# Patient Record
Sex: FEMALE | Race: WHITE | NOT HISPANIC OR LATINO | Employment: OTHER | URBAN - METROPOLITAN AREA
[De-identification: names, ages, dates, MRNs, and addresses within clinical notes are randomized per-mention and may not be internally consistent; named-entity substitution may affect disease eponyms.]

---

## 2023-04-03 DIAGNOSIS — M85.80 OSTEOPENIA, UNSPECIFIED LOCATION: ICD-10-CM

## 2023-04-03 RX ORDER — ALENDRONATE SODIUM 70 MG/1
70 TABLET ORAL
Qty: 12 TABLET | Refills: 3 | Status: SHIPPED | OUTPATIENT
Start: 2023-04-03 | End: 2024-04-16 | Stop reason: SDUPTHER

## 2023-04-03 RX ORDER — ALENDRONATE SODIUM 70 MG/1
70 TABLET ORAL
COMMUNITY
Start: 2020-07-31 | End: 2023-04-03 | Stop reason: SDUPTHER

## 2023-04-10 LAB
ALBUMIN (G/DL) IN SER/PLAS: 4.7 G/DL (ref 3.4–5)
ANION GAP IN SER/PLAS: 14 MMOL/L (ref 10–20)
APPEARANCE, URINE: ABNORMAL
BACTERIA, URINE: ABNORMAL /HPF
BILIRUBIN, URINE: NEGATIVE
BLOOD, URINE: ABNORMAL
CALCIUM (MG/DL) IN SER/PLAS: 9.8 MG/DL (ref 8.6–10.6)
CARBON DIOXIDE, TOTAL (MMOL/L) IN SER/PLAS: 30 MMOL/L (ref 21–32)
CHLORIDE (MMOL/L) IN SER/PLAS: 104 MMOL/L (ref 98–107)
COLOR, URINE: YELLOW
CREATININE (MG/DL) IN SER/PLAS: 0.81 MG/DL (ref 0.5–1.05)
ERYTHROCYTE DISTRIBUTION WIDTH (RATIO) BY AUTOMATED COUNT: 14.6 % (ref 11.5–14.5)
ERYTHROCYTE MEAN CORPUSCULAR HEMOGLOBIN CONCENTRATION (G/DL) BY AUTOMATED: 31.4 G/DL (ref 32–36)
ERYTHROCYTE MEAN CORPUSCULAR VOLUME (FL) BY AUTOMATED COUNT: 92 FL (ref 80–100)
ERYTHROCYTES (10*6/UL) IN BLOOD BY AUTOMATED COUNT: 4.27 X10E12/L (ref 4–5.2)
GFR FEMALE: 74 ML/MIN/1.73M2
GLUCOSE (MG/DL) IN SER/PLAS: 117 MG/DL (ref 74–99)
GLUCOSE, URINE: NEGATIVE MG/DL
HEMATOCRIT (%) IN BLOOD BY AUTOMATED COUNT: 39.2 % (ref 36–46)
HEMOGLOBIN (G/DL) IN BLOOD: 12.3 G/DL (ref 12–16)
HYALINE CASTS, URINE: ABNORMAL /LPF
KETONES, URINE: NEGATIVE MG/DL
LEUKOCYTE ESTERASE, URINE: ABNORMAL
LEUKOCYTES (10*3/UL) IN BLOOD BY AUTOMATED COUNT: 7.6 X10E9/L (ref 4.4–11.3)
NITRITE, URINE: NEGATIVE
NRBC (PER 100 WBCS) BY AUTOMATED COUNT: 0 /100 WBC (ref 0–0)
PH, URINE: 6 (ref 5–8)
PHOSPHATE (MG/DL) IN SER/PLAS: 4 MG/DL (ref 2.5–4.9)
PLATELETS (10*3/UL) IN BLOOD AUTOMATED COUNT: 173 X10E9/L (ref 150–450)
POTASSIUM (MMOL/L) IN SER/PLAS: 4.5 MMOL/L (ref 3.5–5.3)
PROTEIN, URINE: NEGATIVE MG/DL
RBC, URINE: 1 /HPF (ref 0–5)
SODIUM (MMOL/L) IN SER/PLAS: 143 MMOL/L (ref 136–145)
SPECIFIC GRAVITY, URINE: 1.01 (ref 1–1.03)
SQUAMOUS EPITHELIAL CELLS, URINE: 2 /HPF
UREA NITROGEN (MG/DL) IN SER/PLAS: 21 MG/DL (ref 6–23)
UROBILINOGEN, URINE: <2 MG/DL (ref 0–1.9)
WBC CLUMPS, URINE: ABNORMAL /HPF
WBC, URINE: 69 /HPF (ref 0–5)

## 2023-04-12 LAB — URINE CULTURE: ABNORMAL

## 2023-04-17 LAB
ALANINE AMINOTRANSFERASE (SGPT) (U/L) IN SER/PLAS: 12 U/L (ref 7–45)
ALBUMIN (G/DL) IN SER/PLAS: 4.7 G/DL (ref 3.4–5)
ALKALINE PHOSPHATASE (U/L) IN SER/PLAS: 73 U/L (ref 33–136)
ANION GAP IN SER/PLAS: 12 MMOL/L (ref 10–20)
ASPARTATE AMINOTRANSFERASE (SGOT) (U/L) IN SER/PLAS: 15 U/L (ref 9–39)
BILIRUBIN TOTAL (MG/DL) IN SER/PLAS: 0.3 MG/DL (ref 0–1.2)
CALCIUM (MG/DL) IN SER/PLAS: 9.9 MG/DL (ref 8.6–10.6)
CARBON DIOXIDE, TOTAL (MMOL/L) IN SER/PLAS: 30 MMOL/L (ref 21–32)
CHLORIDE (MMOL/L) IN SER/PLAS: 106 MMOL/L (ref 98–107)
CREATININE (MG/DL) IN SER/PLAS: 0.73 MG/DL (ref 0.5–1.05)
ERYTHROCYTE DISTRIBUTION WIDTH (RATIO) BY AUTOMATED COUNT: 14.5 % (ref 11.5–14.5)
ERYTHROCYTE MEAN CORPUSCULAR HEMOGLOBIN CONCENTRATION (G/DL) BY AUTOMATED: 30.3 G/DL (ref 32–36)
ERYTHROCYTE MEAN CORPUSCULAR VOLUME (FL) BY AUTOMATED COUNT: 92 FL (ref 80–100)
ERYTHROCYTES (10*6/UL) IN BLOOD BY AUTOMATED COUNT: 4.37 X10E12/L (ref 4–5.2)
GFR FEMALE: 84 ML/MIN/1.73M2
GLUCOSE (MG/DL) IN SER/PLAS: 68 MG/DL (ref 74–99)
HEMATOCRIT (%) IN BLOOD BY AUTOMATED COUNT: 40.2 % (ref 36–46)
HEMOGLOBIN (G/DL) IN BLOOD: 12.2 G/DL (ref 12–16)
LEUKOCYTES (10*3/UL) IN BLOOD BY AUTOMATED COUNT: 7.3 X10E9/L (ref 4.4–11.3)
NRBC (PER 100 WBCS) BY AUTOMATED COUNT: 0 /100 WBC (ref 0–0)
PLATELETS (10*3/UL) IN BLOOD AUTOMATED COUNT: 177 X10E9/L (ref 150–450)
POTASSIUM (MMOL/L) IN SER/PLAS: 4.3 MMOL/L (ref 3.5–5.3)
PROTEIN TOTAL: 7.2 G/DL (ref 6.4–8.2)
SODIUM (MMOL/L) IN SER/PLAS: 144 MMOL/L (ref 136–145)
UREA NITROGEN (MG/DL) IN SER/PLAS: 24 MG/DL (ref 6–23)

## 2023-04-18 LAB
APPEARANCE, URINE: CLEAR
BILIRUBIN, URINE: NEGATIVE
BLOOD, URINE: ABNORMAL
COLOR, URINE: YELLOW
GLUCOSE, URINE: NEGATIVE MG/DL
KETONES, URINE: NEGATIVE MG/DL
LEUKOCYTE ESTERASE, URINE: ABNORMAL
MUCUS, URINE: NORMAL /LPF
NITRITE, URINE: NEGATIVE
PH, URINE: 6 (ref 5–8)
PROTEIN, URINE: NEGATIVE MG/DL
RBC, URINE: <1 /HPF (ref 0–5)
SPECIFIC GRAVITY, URINE: 1.01 (ref 1–1.03)
SQUAMOUS EPITHELIAL CELLS, URINE: 1 /HPF
UROBILINOGEN, URINE: <2 MG/DL (ref 0–1.9)
WBC, URINE: NORMAL /HPF (ref 0–5)

## 2023-04-19 LAB — URINE CULTURE: NORMAL

## 2023-04-21 LAB
ALBUMIN (G/DL) IN SER/PLAS: 4.6 G/DL (ref 3.4–5)
ANION GAP IN SER/PLAS: 9 MMOL/L (ref 10–20)
APPEARANCE, URINE: CLEAR
BILIRUBIN, URINE: NEGATIVE
BLOOD, URINE: ABNORMAL
CALCIUM (MG/DL) IN SER/PLAS: 9.7 MG/DL (ref 8.6–10.6)
CARBON DIOXIDE, TOTAL (MMOL/L) IN SER/PLAS: 33 MMOL/L (ref 21–32)
CHLORIDE (MMOL/L) IN SER/PLAS: 106 MMOL/L (ref 98–107)
COLOR, URINE: YELLOW
CREATININE (MG/DL) IN SER/PLAS: 0.84 MG/DL (ref 0.5–1.05)
ERYTHROCYTE DISTRIBUTION WIDTH (RATIO) BY AUTOMATED COUNT: 14.5 % (ref 11.5–14.5)
ERYTHROCYTE MEAN CORPUSCULAR HEMOGLOBIN CONCENTRATION (G/DL) BY AUTOMATED: 31 G/DL (ref 32–36)
ERYTHROCYTE MEAN CORPUSCULAR VOLUME (FL) BY AUTOMATED COUNT: 92 FL (ref 80–100)
ERYTHROCYTES (10*6/UL) IN BLOOD BY AUTOMATED COUNT: 4.12 X10E12/L (ref 4–5.2)
GFR FEMALE: 71 ML/MIN/1.73M2
GLUCOSE (MG/DL) IN SER/PLAS: 105 MG/DL (ref 74–99)
GLUCOSE, URINE: NEGATIVE MG/DL
HEMATOCRIT (%) IN BLOOD BY AUTOMATED COUNT: 38.1 % (ref 36–46)
HEMOGLOBIN (G/DL) IN BLOOD: 11.8 G/DL (ref 12–16)
HYALINE CASTS, URINE: ABNORMAL /LPF
KETONES, URINE: NEGATIVE MG/DL
LEUKOCYTE ESTERASE, URINE: ABNORMAL
LEUKOCYTES (10*3/UL) IN BLOOD BY AUTOMATED COUNT: 6.9 X10E9/L (ref 4.4–11.3)
MUCUS, URINE: ABNORMAL /LPF
NITRITE, URINE: NEGATIVE
NRBC (PER 100 WBCS) BY AUTOMATED COUNT: 0 /100 WBC (ref 0–0)
PH, URINE: 5 (ref 5–8)
PHOSPHATE (MG/DL) IN SER/PLAS: 3.6 MG/DL (ref 2.5–4.9)
PLATELETS (10*3/UL) IN BLOOD AUTOMATED COUNT: 163 X10E9/L (ref 150–450)
POTASSIUM (MMOL/L) IN SER/PLAS: 4.2 MMOL/L (ref 3.5–5.3)
PROTEIN, URINE: NEGATIVE MG/DL
RBC, URINE: <1 /HPF (ref 0–5)
SODIUM (MMOL/L) IN SER/PLAS: 144 MMOL/L (ref 136–145)
SPECIFIC GRAVITY, URINE: 1.01 (ref 1–1.03)
SQUAMOUS EPITHELIAL CELLS, URINE: 3 /HPF
UREA NITROGEN (MG/DL) IN SER/PLAS: 19 MG/DL (ref 6–23)
UROBILINOGEN, URINE: <2 MG/DL (ref 0–1.9)
WBC, URINE: 4 /HPF (ref 0–5)

## 2023-04-22 LAB — URINE CULTURE: NORMAL

## 2023-04-28 LAB
ALBUMIN (G/DL) IN SER/PLAS: 4.6 G/DL (ref 3.4–5)
ANION GAP IN SER/PLAS: 10 MMOL/L (ref 10–20)
APPEARANCE, URINE: CLEAR
BILIRUBIN, URINE: NEGATIVE
BLOOD, URINE: ABNORMAL
CALCIUM (MG/DL) IN SER/PLAS: 9.7 MG/DL (ref 8.6–10.6)
CARBON DIOXIDE, TOTAL (MMOL/L) IN SER/PLAS: 31 MMOL/L (ref 21–32)
CHLORIDE (MMOL/L) IN SER/PLAS: 106 MMOL/L (ref 98–107)
COLOR, URINE: YELLOW
CREATININE (MG/DL) IN SER/PLAS: 0.87 MG/DL (ref 0.5–1.05)
ERYTHROCYTE DISTRIBUTION WIDTH (RATIO) BY AUTOMATED COUNT: 14.6 % (ref 11.5–14.5)
ERYTHROCYTE MEAN CORPUSCULAR HEMOGLOBIN CONCENTRATION (G/DL) BY AUTOMATED: 31.6 G/DL (ref 32–36)
ERYTHROCYTE MEAN CORPUSCULAR VOLUME (FL) BY AUTOMATED COUNT: 92 FL (ref 80–100)
ERYTHROCYTES (10*6/UL) IN BLOOD BY AUTOMATED COUNT: 4.04 X10E12/L (ref 4–5.2)
GFR FEMALE: 68 ML/MIN/1.73M2
GLUCOSE (MG/DL) IN SER/PLAS: 158 MG/DL (ref 74–99)
GLUCOSE, URINE: NEGATIVE MG/DL
HEMATOCRIT (%) IN BLOOD BY AUTOMATED COUNT: 37 % (ref 36–46)
HEMOGLOBIN (G/DL) IN BLOOD: 11.7 G/DL (ref 12–16)
KETONES, URINE: NEGATIVE MG/DL
LEUKOCYTE ESTERASE, URINE: ABNORMAL
LEUKOCYTES (10*3/UL) IN BLOOD BY AUTOMATED COUNT: 6.3 X10E9/L (ref 4.4–11.3)
NITRITE, URINE: NEGATIVE
NRBC (PER 100 WBCS) BY AUTOMATED COUNT: 0 /100 WBC (ref 0–0)
PH, URINE: 5 (ref 5–8)
PHOSPHATE (MG/DL) IN SER/PLAS: 3.9 MG/DL (ref 2.5–4.9)
PLATELETS (10*3/UL) IN BLOOD AUTOMATED COUNT: 146 X10E9/L (ref 150–450)
POTASSIUM (MMOL/L) IN SER/PLAS: 4 MMOL/L (ref 3.5–5.3)
PROTEIN, URINE: NEGATIVE MG/DL
RBC, URINE: 2 /HPF (ref 0–5)
SODIUM (MMOL/L) IN SER/PLAS: 143 MMOL/L (ref 136–145)
SPECIFIC GRAVITY, URINE: 1.02 (ref 1–1.03)
SQUAMOUS EPITHELIAL CELLS, URINE: 1 /HPF
UREA NITROGEN (MG/DL) IN SER/PLAS: 21 MG/DL (ref 6–23)
UROBILINOGEN, URINE: <2 MG/DL (ref 0–1.9)
WBC, URINE: 12 /HPF (ref 0–5)

## 2023-04-29 LAB — URINE CULTURE: NORMAL

## 2023-05-03 DIAGNOSIS — F41.9 ANXIETY: ICD-10-CM

## 2023-05-03 RX ORDER — SERTRALINE HYDROCHLORIDE 100 MG/1
2 TABLET, FILM COATED ORAL DAILY
COMMUNITY
Start: 2013-10-15 | End: 2023-05-03 | Stop reason: SDUPTHER

## 2023-05-03 RX ORDER — SERTRALINE HYDROCHLORIDE 100 MG/1
200 TABLET, FILM COATED ORAL DAILY
Qty: 180 TABLET | Refills: 3 | Status: SHIPPED | OUTPATIENT
Start: 2023-05-03 | End: 2024-05-06 | Stop reason: SDUPTHER

## 2023-05-05 LAB
ALBUMIN (G/DL) IN SER/PLAS: 4.6 G/DL (ref 3.4–5)
ANION GAP IN SER/PLAS: 12 MMOL/L (ref 10–20)
APPEARANCE, URINE: CLEAR
BILIRUBIN, URINE: NEGATIVE
BLOOD, URINE: ABNORMAL
CALCIUM (MG/DL) IN SER/PLAS: 10 MG/DL (ref 8.6–10.6)
CARBON DIOXIDE, TOTAL (MMOL/L) IN SER/PLAS: 30 MMOL/L (ref 21–32)
CHLORIDE (MMOL/L) IN SER/PLAS: 105 MMOL/L (ref 98–107)
COLOR, URINE: ABNORMAL
CREATININE (MG/DL) IN SER/PLAS: 0.82 MG/DL (ref 0.5–1.05)
ERYTHROCYTE DISTRIBUTION WIDTH (RATIO) BY AUTOMATED COUNT: 14.9 % (ref 11.5–14.5)
ERYTHROCYTE MEAN CORPUSCULAR HEMOGLOBIN CONCENTRATION (G/DL) BY AUTOMATED: 31.8 G/DL (ref 32–36)
ERYTHROCYTE MEAN CORPUSCULAR VOLUME (FL) BY AUTOMATED COUNT: 92 FL (ref 80–100)
ERYTHROCYTES (10*6/UL) IN BLOOD BY AUTOMATED COUNT: 4.15 X10E12/L (ref 4–5.2)
GFR FEMALE: 73 ML/MIN/1.73M2
GLUCOSE (MG/DL) IN SER/PLAS: 102 MG/DL (ref 74–99)
GLUCOSE, URINE: NEGATIVE MG/DL
HEMATOCRIT (%) IN BLOOD BY AUTOMATED COUNT: 38 % (ref 36–46)
HEMOGLOBIN (G/DL) IN BLOOD: 12.1 G/DL (ref 12–16)
KETONES, URINE: NEGATIVE MG/DL
LEUKOCYTE ESTERASE, URINE: ABNORMAL
LEUKOCYTES (10*3/UL) IN BLOOD BY AUTOMATED COUNT: 7.7 X10E9/L (ref 4.4–11.3)
NITRITE, URINE: NEGATIVE
NRBC (PER 100 WBCS) BY AUTOMATED COUNT: 0 /100 WBC (ref 0–0)
PH, URINE: 6 (ref 5–8)
PHOSPHATE (MG/DL) IN SER/PLAS: 3.4 MG/DL (ref 2.5–4.9)
PLATELETS (10*3/UL) IN BLOOD AUTOMATED COUNT: 158 X10E9/L (ref 150–450)
POTASSIUM (MMOL/L) IN SER/PLAS: 3.7 MMOL/L (ref 3.5–5.3)
PROTEIN, URINE: NEGATIVE MG/DL
RBC, URINE: <1 /HPF (ref 0–5)
SODIUM (MMOL/L) IN SER/PLAS: 143 MMOL/L (ref 136–145)
SPECIFIC GRAVITY, URINE: 1 (ref 1–1.03)
SQUAMOUS EPITHELIAL CELLS, URINE: 2 /HPF
UREA NITROGEN (MG/DL) IN SER/PLAS: 22 MG/DL (ref 6–23)
UROBILINOGEN, URINE: <2 MG/DL (ref 0–1.9)
WBC, URINE: 2 /HPF (ref 0–5)

## 2023-05-06 LAB — URINE CULTURE: NORMAL

## 2023-05-12 LAB
ALBUMIN (G/DL) IN SER/PLAS: 4.6 G/DL (ref 3.4–5)
ANION GAP IN SER/PLAS: 14 MMOL/L (ref 10–20)
APPEARANCE, URINE: ABNORMAL
BILIRUBIN, URINE: NEGATIVE
BLOOD, URINE: ABNORMAL
CALCIUM (MG/DL) IN SER/PLAS: 10.1 MG/DL (ref 8.6–10.6)
CALCIUM OXALATE CRYSTALS, URINE: ABNORMAL /HPF
CARBON DIOXIDE, TOTAL (MMOL/L) IN SER/PLAS: 30 MMOL/L (ref 21–32)
CHLORIDE (MMOL/L) IN SER/PLAS: 107 MMOL/L (ref 98–107)
COLOR, URINE: ABNORMAL
CREATININE (MG/DL) IN SER/PLAS: 0.81 MG/DL (ref 0.5–1.05)
ERYTHROCYTE DISTRIBUTION WIDTH (RATIO) BY AUTOMATED COUNT: 15.5 % (ref 11.5–14.5)
ERYTHROCYTE MEAN CORPUSCULAR HEMOGLOBIN CONCENTRATION (G/DL) BY AUTOMATED: 31.6 G/DL (ref 32–36)
ERYTHROCYTE MEAN CORPUSCULAR VOLUME (FL) BY AUTOMATED COUNT: 94 FL (ref 80–100)
ERYTHROCYTES (10*6/UL) IN BLOOD BY AUTOMATED COUNT: 3.98 X10E12/L (ref 4–5.2)
GFR FEMALE: 74 ML/MIN/1.73M2
GLUCOSE (MG/DL) IN SER/PLAS: 112 MG/DL (ref 74–99)
GLUCOSE, URINE: NEGATIVE MG/DL
HEMATOCRIT (%) IN BLOOD BY AUTOMATED COUNT: 37.3 % (ref 36–46)
HEMOGLOBIN (G/DL) IN BLOOD: 11.8 G/DL (ref 12–16)
KETONES, URINE: NEGATIVE MG/DL
LEUKOCYTE ESTERASE, URINE: ABNORMAL
LEUKOCYTES (10*3/UL) IN BLOOD BY AUTOMATED COUNT: 7 X10E9/L (ref 4.4–11.3)
MUCUS, URINE: ABNORMAL /LPF
NITRITE, URINE: NEGATIVE
NRBC (PER 100 WBCS) BY AUTOMATED COUNT: 0 /100 WBC (ref 0–0)
PH, URINE: 5 (ref 5–8)
PHOSPHATE (MG/DL) IN SER/PLAS: 3.5 MG/DL (ref 2.5–4.9)
PLATELETS (10*3/UL) IN BLOOD AUTOMATED COUNT: 157 X10E9/L (ref 150–450)
POTASSIUM (MMOL/L) IN SER/PLAS: 4.7 MMOL/L (ref 3.5–5.3)
PROTEIN, URINE: NEGATIVE MG/DL
RBC, URINE: 1 /HPF (ref 0–5)
SODIUM (MMOL/L) IN SER/PLAS: 146 MMOL/L (ref 136–145)
SPECIFIC GRAVITY, URINE: 1.02 (ref 1–1.03)
SQUAMOUS EPITHELIAL CELLS, URINE: <1 /HPF
UREA NITROGEN (MG/DL) IN SER/PLAS: 18 MG/DL (ref 6–23)
UROBILINOGEN, URINE: <2 MG/DL (ref 0–1.9)
WBC CLUMPS, URINE: ABNORMAL /HPF
WBC, URINE: 23 /HPF (ref 0–5)

## 2023-05-13 LAB — URINE CULTURE: NORMAL

## 2023-05-19 LAB
ALBUMIN (G/DL) IN SER/PLAS: 4.8 G/DL (ref 3.4–5)
ANION GAP IN SER/PLAS: 16 MMOL/L (ref 10–20)
APPEARANCE, URINE: ABNORMAL
BILIRUBIN, URINE: NEGATIVE
BLOOD, URINE: ABNORMAL
CALCIUM (MG/DL) IN SER/PLAS: 10.4 MG/DL (ref 8.6–10.6)
CARBON DIOXIDE, TOTAL (MMOL/L) IN SER/PLAS: 27 MMOL/L (ref 21–32)
CHLORIDE (MMOL/L) IN SER/PLAS: 107 MMOL/L (ref 98–107)
COLOR, URINE: YELLOW
CREATININE (MG/DL) IN SER/PLAS: 0.81 MG/DL (ref 0.5–1.05)
ERYTHROCYTE DISTRIBUTION WIDTH (RATIO) BY AUTOMATED COUNT: 15 % (ref 11.5–14.5)
ERYTHROCYTE MEAN CORPUSCULAR HEMOGLOBIN CONCENTRATION (G/DL) BY AUTOMATED: 31.5 G/DL (ref 32–36)
ERYTHROCYTE MEAN CORPUSCULAR VOLUME (FL) BY AUTOMATED COUNT: 91 FL (ref 80–100)
ERYTHROCYTES (10*6/UL) IN BLOOD BY AUTOMATED COUNT: 4.15 X10E12/L (ref 4–5.2)
GFR FEMALE: 74 ML/MIN/1.73M2
GLUCOSE (MG/DL) IN SER/PLAS: 129 MG/DL (ref 74–99)
GLUCOSE, URINE: NEGATIVE MG/DL
HEMATOCRIT (%) IN BLOOD BY AUTOMATED COUNT: 37.8 % (ref 36–46)
HEMOGLOBIN (G/DL) IN BLOOD: 11.9 G/DL (ref 12–16)
KETONES, URINE: NEGATIVE MG/DL
LEUKOCYTE ESTERASE, URINE: ABNORMAL
LEUKOCYTES (10*3/UL) IN BLOOD BY AUTOMATED COUNT: 7.9 X10E9/L (ref 4.4–11.3)
MUCUS, URINE: NORMAL /LPF
NITRITE, URINE: NEGATIVE
NRBC (PER 100 WBCS) BY AUTOMATED COUNT: 0 /100 WBC (ref 0–0)
PH, URINE: 5 (ref 5–8)
PHOSPHATE (MG/DL) IN SER/PLAS: 3.7 MG/DL (ref 2.5–4.9)
PLATELETS (10*3/UL) IN BLOOD AUTOMATED COUNT: 193 X10E9/L (ref 150–450)
POTASSIUM (MMOL/L) IN SER/PLAS: 4.7 MMOL/L (ref 3.5–5.3)
PROTEIN, URINE: ABNORMAL MG/DL
RBC, URINE: 1 /HPF (ref 0–5)
SODIUM (MMOL/L) IN SER/PLAS: 145 MMOL/L (ref 136–145)
SPECIFIC GRAVITY, URINE: 1.02 (ref 1–1.03)
SQUAMOUS EPITHELIAL CELLS, URINE: 3 /HPF
UREA NITROGEN (MG/DL) IN SER/PLAS: 23 MG/DL (ref 6–23)
UROBILINOGEN, URINE: <2 MG/DL (ref 0–1.9)
WBC, URINE: 2 /HPF (ref 0–5)

## 2023-05-20 LAB — URINE CULTURE: ABNORMAL

## 2023-07-21 DIAGNOSIS — G47.00 INSOMNIA, UNSPECIFIED TYPE: Primary | ICD-10-CM

## 2023-07-21 RX ORDER — MIRTAZAPINE 30 MG/1
30 TABLET, FILM COATED ORAL NIGHTLY
Qty: 90 TABLET | Refills: 3 | Status: SHIPPED | OUTPATIENT
Start: 2023-07-21

## 2023-07-21 RX ORDER — MIRTAZAPINE 30 MG/1
30 TABLET, FILM COATED ORAL NIGHTLY
COMMUNITY
End: 2023-07-21 | Stop reason: SDUPTHER

## 2023-09-14 PROBLEM — M18.12 LOCALIZED PRIMARY OSTEOARTHRITIS OF CARPOMETACARPAL JOINT OF LEFT THUMB: Status: ACTIVE | Noted: 2023-09-14

## 2023-09-14 PROBLEM — H90.3 SENSORINEURAL HEARING LOSS OF BOTH EARS: Status: ACTIVE | Noted: 2023-09-14

## 2023-09-14 PROBLEM — M65.4 DE QUERVAIN'S TENOSYNOVITIS, RIGHT: Status: ACTIVE | Noted: 2023-09-14

## 2023-09-14 PROBLEM — M77.11 LATERAL EPICONDYLITIS OF RIGHT ELBOW: Status: ACTIVE | Noted: 2023-09-14

## 2023-09-14 PROBLEM — I10 HTN (HYPERTENSION), BENIGN: Status: ACTIVE | Noted: 2023-09-14

## 2023-09-14 PROBLEM — K63.5 COLON POLYP: Status: ACTIVE | Noted: 2023-09-14

## 2023-09-14 PROBLEM — S42.90XA SHOULDER FRACTURE: Status: ACTIVE | Noted: 2023-09-14

## 2023-09-14 PROBLEM — M51.36 DEGENERATION OF LUMBAR INTERVERTEBRAL DISC: Status: ACTIVE | Noted: 2023-09-14

## 2023-09-14 PROBLEM — M77.12 LEFT LATERAL EPICONDYLITIS: Status: ACTIVE | Noted: 2023-09-14

## 2023-09-14 PROBLEM — R92.8 ABNORMAL MAMMOGRAM: Status: ACTIVE | Noted: 2023-09-14

## 2023-09-14 PROBLEM — M51.369 DEGENERATION OF LUMBAR INTERVERTEBRAL DISC: Status: ACTIVE | Noted: 2023-09-14

## 2023-09-14 PROBLEM — R73.01 IMPAIRED FASTING GLUCOSE: Status: ACTIVE | Noted: 2023-09-14

## 2023-09-14 PROBLEM — E55.9 VITAMIN D DEFICIENCY: Status: ACTIVE | Noted: 2023-09-14

## 2023-09-14 PROBLEM — E78.5 DYSLIPIDEMIA: Status: ACTIVE | Noted: 2023-09-14

## 2023-09-14 PROBLEM — M48.07 LUMBOSACRAL SPINAL STENOSIS: Status: ACTIVE | Noted: 2023-09-14

## 2023-09-14 PROBLEM — M65.312 TRIGGER THUMB OF LEFT HAND: Status: ACTIVE | Noted: 2023-09-14

## 2023-09-14 PROBLEM — L65.9 ALOPECIA: Status: ACTIVE | Noted: 2023-09-14

## 2023-09-14 PROBLEM — M51.26 DISC DISPLACEMENT, LUMBAR: Status: ACTIVE | Noted: 2023-09-14

## 2023-09-14 PROBLEM — M17.12 PRIMARY OSTEOARTHRITIS OF LEFT KNEE: Status: ACTIVE | Noted: 2023-09-14

## 2023-09-14 PROBLEM — S42.209A CLOSED FRACTURE OF PROXIMAL END OF HUMERUS: Status: ACTIVE | Noted: 2023-09-14

## 2023-09-14 PROBLEM — J30.9 ALLERGIC RHINITIS: Status: ACTIVE | Noted: 2023-09-14

## 2023-09-14 PROBLEM — H91.90 DECREASED HEARING: Status: ACTIVE | Noted: 2023-09-14

## 2023-09-14 PROBLEM — N95.2 ATROPHY OF VAGINA: Status: ACTIVE | Noted: 2023-09-14

## 2023-09-14 PROBLEM — C67.9 CARCINOMA OF BLADDER (MULTI): Status: ACTIVE | Noted: 2023-09-14

## 2023-09-14 PROBLEM — R07.9 CHEST PAIN: Status: ACTIVE | Noted: 2023-09-14

## 2023-09-14 PROBLEM — K21.9 ACID REFLUX: Status: ACTIVE | Noted: 2023-09-14

## 2023-09-14 PROBLEM — H93.19 SUBJECTIVE TINNITUS: Status: ACTIVE | Noted: 2023-09-14

## 2023-09-14 PROBLEM — S42.309A FRACTURE OF HUMERUS: Status: ACTIVE | Noted: 2023-09-14

## 2023-09-14 PROBLEM — M85.80 OSTEOPENIA: Status: ACTIVE | Noted: 2023-09-14

## 2023-09-14 PROBLEM — E78.5 HYPERLIPIDEMIA: Status: ACTIVE | Noted: 2023-09-14

## 2023-09-14 PROBLEM — D49.4 BLADDER TUMOR: Status: ACTIVE | Noted: 2023-09-14

## 2023-09-14 PROBLEM — C67.9 BLADDER CANCER (MULTI): Status: ACTIVE | Noted: 2023-09-14

## 2023-09-14 PROBLEM — R32 URINARY INCONTINENCE: Status: ACTIVE | Noted: 2023-09-14

## 2023-09-14 RX ORDER — ROSUVASTATIN CALCIUM 20 MG/1
20 TABLET, COATED ORAL DAILY
COMMUNITY
End: 2023-10-17 | Stop reason: SDUPTHER

## 2023-09-14 RX ORDER — FLUTICASONE PROPIONATE 50 MCG
2 SPRAY, SUSPENSION (ML) NASAL DAILY
COMMUNITY
Start: 2014-09-04

## 2023-09-14 RX ORDER — GEMCITABINE HYDROCHLORIDE 1 G/1
1000 INJECTION, POWDER, LYOPHILIZED, FOR SOLUTION INTRAVENOUS
COMMUNITY
Start: 2022-06-28 | End: 2023-11-20 | Stop reason: ALTCHOICE

## 2023-09-14 RX ORDER — DOCETAXEL 10 MG/ML
INJECTION, SOLUTION INTRAVENOUS
COMMUNITY
Start: 2022-06-28 | End: 2023-11-20 | Stop reason: ALTCHOICE

## 2023-09-14 RX ORDER — CLOTRIMAZOLE AND BETAMETHASONE DIPROPIONATE 10; .64 MG/G; MG/G
CREAM TOPICAL 2 TIMES DAILY PRN
Status: ON HOLD | COMMUNITY
Start: 2022-08-22 | End: 2024-03-28 | Stop reason: WASHOUT

## 2023-09-14 RX ORDER — OMEPRAZOLE 20 MG/1
20 CAPSULE, DELAYED RELEASE ORAL DAILY
COMMUNITY
End: 2023-09-18 | Stop reason: SDUPTHER

## 2023-09-14 RX ORDER — PREDNISONE 50 MG/1
TABLET ORAL
COMMUNITY
Start: 2023-01-27 | End: 2023-11-20 | Stop reason: ALTCHOICE

## 2023-09-14 RX ORDER — OXYBUTYNIN CHLORIDE 15 MG/1
15 TABLET, EXTENDED RELEASE ORAL DAILY
COMMUNITY
End: 2024-01-16 | Stop reason: SDUPTHER

## 2023-09-14 RX ORDER — ALPRAZOLAM 0.25 MG/1
1 TABLET ORAL 3 TIMES DAILY PRN
COMMUNITY
Start: 2014-08-26 | End: 2024-04-05 | Stop reason: SDUPTHER

## 2023-09-14 RX ORDER — NITROFURANTOIN 25; 75 MG/1; MG/1
1 CAPSULE ORAL 2 TIMES DAILY
COMMUNITY
Start: 2022-11-10 | End: 2023-11-20 | Stop reason: ALTCHOICE

## 2023-09-14 RX ORDER — HYDROCODONE BITARTRATE AND ACETAMINOPHEN 5; 325 MG/1; MG/1
1 TABLET ORAL EVERY 6 HOURS PRN
COMMUNITY
Start: 2023-02-17 | End: 2023-11-20 | Stop reason: ALTCHOICE

## 2023-09-14 RX ORDER — CHOLECALCIFEROL (VITAMIN D3) 50 MCG
1 TABLET ORAL DAILY
COMMUNITY

## 2023-09-14 RX ORDER — MINOXIDIL 2.5 MG/1
0.5 TABLET ORAL DAILY
COMMUNITY

## 2023-09-18 DIAGNOSIS — K21.9 GASTROESOPHAGEAL REFLUX DISEASE WITHOUT ESOPHAGITIS: Primary | ICD-10-CM

## 2023-09-18 RX ORDER — OMEPRAZOLE 20 MG/1
20 CAPSULE, DELAYED RELEASE ORAL DAILY
Qty: 90 CAPSULE | Refills: 3 | Status: SHIPPED | OUTPATIENT
Start: 2023-09-18

## 2023-10-17 ENCOUNTER — PROCEDURE VISIT (OUTPATIENT)
Dept: UROLOGY | Facility: HOSPITAL | Age: 78
End: 2023-10-17
Payer: MEDICARE

## 2023-10-17 ENCOUNTER — APPOINTMENT (OUTPATIENT)
Dept: UROLOGY | Facility: HOSPITAL | Age: 78
End: 2023-10-17
Payer: MEDICARE

## 2023-10-17 VITALS
DIASTOLIC BLOOD PRESSURE: 76 MMHG | WEIGHT: 159.61 LBS | TEMPERATURE: 97.3 F | OXYGEN SATURATION: 94 % | HEART RATE: 86 BPM | BODY MASS INDEX: 29.19 KG/M2 | RESPIRATION RATE: 18 BRPM | SYSTOLIC BLOOD PRESSURE: 144 MMHG

## 2023-10-17 DIAGNOSIS — E78.5 DYSLIPIDEMIA: Primary | ICD-10-CM

## 2023-10-17 DIAGNOSIS — R31.0 GROSS HEMATURIA: ICD-10-CM

## 2023-10-17 DIAGNOSIS — C67.3 MALIGNANT NEOPLASM OF ANTERIOR WALL OF URINARY BLADDER (MULTI): Primary | ICD-10-CM

## 2023-10-17 PROCEDURE — 88112 CYTOPATH CELL ENHANCE TECH: CPT | Mod: TC,MCY | Performed by: STUDENT IN AN ORGANIZED HEALTH CARE EDUCATION/TRAINING PROGRAM

## 2023-10-17 PROCEDURE — 88112 CYTOPATH CELL ENHANCE TECH: CPT | Performed by: PATHOLOGY

## 2023-10-17 PROCEDURE — 99213 OFFICE O/P EST LOW 20 MIN: CPT | Performed by: STUDENT IN AN ORGANIZED HEALTH CARE EDUCATION/TRAINING PROGRAM

## 2023-10-17 RX ORDER — ROSUVASTATIN CALCIUM 20 MG/1
20 TABLET, COATED ORAL DAILY
Qty: 90 TABLET | Refills: 3 | Status: SHIPPED | OUTPATIENT
Start: 2023-10-17 | End: 2024-05-08 | Stop reason: WASHOUT

## 2023-10-17 RX ORDER — CHLORHEXIDINE GLUCONATE 40 MG/ML
SOLUTION TOPICAL DAILY PRN
Status: CANCELLED | OUTPATIENT
Start: 2023-10-17

## 2023-10-17 RX ORDER — SODIUM CHLORIDE 9 MG/ML
100 INJECTION, SOLUTION INTRAVENOUS CONTINUOUS
Status: CANCELLED | OUTPATIENT
Start: 2023-10-17

## 2023-10-17 ASSESSMENT — ENCOUNTER SYMPTOMS
LOSS OF SENSATION IN FEET: 0
OCCASIONAL FEELINGS OF UNSTEADINESS: 1
DEPRESSION: 1

## 2023-10-17 ASSESSMENT — PAIN SCALES - GENERAL: PAINLEVEL: 0-NO PAIN

## 2023-10-17 NOTE — PROGRESS NOTES
Subjective   Patient ID: Anahi Wall is a 78 y.o. 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     She tolerated [place]/Doc well. She denies gross hematuria. She has met with medical oncology at [facility].     Pathology and cytology showed HG NMIBC from anterior wall and right ureter. Muscle was present and not involved. Cytology was concerning on the left side and not on the right side.     Initial dx: 2010, initially LG but recurred with HG pTa.     BCG treatment: First dose in 2013. She had induction and 1 maintenance with recurrence of HG pTa with CIS. She received further induction BCG and six cycles maintenance BCG through 2019. She developed more recurrences in 2019 with CIS, 2020 HG pTa with chemotherapy after surgery. She was given more BCG including induction and maintenance and had recurrence 11 months later with HG with LG pTa.       Past medical, surgical, family and social history were reviewed and unchanged since last visit besides what is in the HPI.           Review of Systems   All other systems reviewed and are negative.      Objective   Physical Exam  Gen: No acute distress     Psych: Alert and oriented x3     Neuro:  Normal ROM    Resp: Nonlabored respirations     CV: Regular rate and rhythm     Abd: S, NT, ND.     : Normal External Genitalia    Skin: Warm, dry and intact without rashes     Lymphatics: No peripheral edema         Cystoscopy    Date/Time: 10/17/2023 1:45 PM    Performed by: Jorge A De Leon MD MPH  Authorized by: Jorge A De Leon MD MPH    Procedure - Bladder Cystoscopy:     Procedure details: cystoscopy    Post-procedure:     Patient tolerance: Patient tolerated the procedure well with no immediate complications      Comments:      Procedure: Cystoscopy    Indication: Bladder cancer     Complications: None    Time Out: Performed with 2 patient identifiers    EBL: None    Abx: None    Procedure:   The patient was brought to the procedure room.  Informed consent was obtained.  The genitals were sterilely prepped and draped once they were placed on the procedure table.    Multi focal on the anterior wall of the bladder. The total volume is larger than seen on last cystoscopy. Papillary tumour has progressed towards R UO. Left UO is unaffected.         Assessment/Plan   Problem List Items Addressed This Visit             ICD-10-CM       Hematology and Neoplasia    Bladder cancer (CMS/HCC) - Primary C67.9     The patient tolerated the cystoscopy well today. We discussed that they may have hematuria after the procedure.     Findings of this procedure showed     We had a long discussion regarding her options moving forward. She is refractory to both BCG and combo [place]/Doc. She has failed both these therapies. 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 The gold standard therapy is cystectomy which she has refused in the past. We discussed her options:    IV pembro, clinical trial. I would refer her to medical oncology. There is no published data for cohort 2 papillary disease in patients unresposive to BCG and will most likely not lead to durable responses. Pap cohort was preseted at national meetings but data is not currently published. It is an option, however with significant cost and a not insignificant risk of potential severe toxicity.     Adstriladrin. Adenovirus vector coupled with high doses interferon. Manufacturers are having a very difficult time supplying this and it is not ready for clinic use yet.     3.  Periodic TURBT without therapies. Although, non-standard , an option is to move forward without therapies for 4-5 months and continue TURBTs when disease burden worsens.                  Plan:  Urine cytology today   Schedule re-TURBT and R URS given worsened disease burden   May leave stent         Scribe Attestation  By signing my name below, I, Katelyn Casalla, Scribe   attest  that this documentation has been prepared under the direction and in the presence of Jorge A De Leon MD MPH.

## 2023-10-17 NOTE — ASSESSMENT & PLAN NOTE
The patient tolerated the cystoscopy well today. We discussed that they may have hematuria after the procedure.     Findings of this procedure showed multi focal tumour on the anterior wall of the bladder. The total volume is larger than seen on last cystoscopy. Papillary tumour has progressed towards R UO. Left UO is unaffected.     We had a long discussion regarding her options moving forward. She is refractory to both BCG and combo [place]/Doc. She has failed both these therapies. 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 The gold standard therapy is cystectomy which she has refused in the past. We discussed her options:    IV pembro,. I would refer her to medical oncology. There is no published data for cohort 2 papillary disease in patients unresposive to BCG and will most likely not lead to durable responses. Pap cohort was preseted at national meetings but data is not currently published. It is an option, however with significant cost and a not insignificant risk of potential severe toxicity.     Adstriladrin. Adenovirus vector coupled with high doses interferon. Manufacturers are having a very difficult time supplying this and it is not ready for clinic use yet.     3.  Periodic TURBT without therapies. Although, non-standard , an option is to move forward without therapies for 4-5 months and continue TURBTs when disease burden worsens.     4. Clinical Trial

## 2023-10-18 LAB
LABORATORY COMMENT REPORT: NORMAL
LABORATORY COMMENT REPORT: NORMAL
PATH REPORT.FINAL DX SPEC: NORMAL
PATH REPORT.GROSS SPEC: NORMAL
PATH REPORT.RELEVANT HX SPEC: NORMAL
PATH REPORT.TOTAL CANCER: NORMAL

## 2023-11-03 ENCOUNTER — PREP FOR PROCEDURE (OUTPATIENT)
Dept: UROLOGY | Facility: HOSPITAL | Age: 78
End: 2023-11-03
Payer: MEDICARE

## 2023-11-20 ENCOUNTER — PRE-ADMISSION TESTING (OUTPATIENT)
Dept: PREADMISSION TESTING | Facility: HOSPITAL | Age: 78
End: 2023-11-20
Payer: MEDICARE

## 2023-11-20 ENCOUNTER — LAB (OUTPATIENT)
Dept: LAB | Facility: LAB | Age: 78
End: 2023-11-20
Payer: MEDICARE

## 2023-11-20 VITALS
SYSTOLIC BLOOD PRESSURE: 146 MMHG | BODY MASS INDEX: 29.58 KG/M2 | RESPIRATION RATE: 20 BRPM | DIASTOLIC BLOOD PRESSURE: 77 MMHG | HEIGHT: 62 IN | WEIGHT: 160.72 LBS | TEMPERATURE: 97 F | HEART RATE: 79 BPM

## 2023-11-20 DIAGNOSIS — Z01.818 PREOPERATIVE TESTING: ICD-10-CM

## 2023-11-20 DIAGNOSIS — R31.0 GROSS HEMATURIA: ICD-10-CM

## 2023-11-20 DIAGNOSIS — C67.3 MALIGNANT NEOPLASM OF ANTERIOR WALL OF URINARY BLADDER (MULTI): ICD-10-CM

## 2023-11-20 DIAGNOSIS — Z01.818 PREOPERATIVE TESTING: Primary | ICD-10-CM

## 2023-11-20 LAB
AMORPH CRY #/AREA UR COMP ASSIST: ABNORMAL /HPF
ANION GAP SERPL CALC-SCNC: 12 MMOL/L (ref 10–20)
APPEARANCE UR: ABNORMAL
APTT PPP: 21.6 SECONDS (ref 22–32.5)
BACTERIA #/AREA URNS AUTO: ABNORMAL /HPF
BILIRUB UR STRIP.AUTO-MCNC: NEGATIVE MG/DL
BUN SERPL-MCNC: 31 MG/DL (ref 6–23)
CALCIUM SERPL-MCNC: 9.6 MG/DL (ref 8.6–10.3)
CHLORIDE SERPL-SCNC: 104 MMOL/L (ref 98–107)
CO2 SERPL-SCNC: 30 MMOL/L (ref 21–32)
COLOR UR: YELLOW
CREAT SERPL-MCNC: 0.82 MG/DL (ref 0.5–1.05)
ERYTHROCYTE [DISTWIDTH] IN BLOOD BY AUTOMATED COUNT: 13.8 % (ref 11.5–14.5)
GFR SERPL CREATININE-BSD FRML MDRD: 73 ML/MIN/1.73M*2
GLUCOSE SERPL-MCNC: 85 MG/DL (ref 74–99)
GLUCOSE UR STRIP.AUTO-MCNC: NEGATIVE MG/DL
GRAN CASTS #/AREA UR COMP ASSIST: ABNORMAL /LPF
HCT VFR BLD AUTO: 37.8 % (ref 36–46)
HGB BLD-MCNC: 12.3 G/DL (ref 12–16)
HOLD SPECIMEN: NORMAL
HYALINE CASTS #/AREA URNS AUTO: ABNORMAL /LPF
INR PPP: 1 (ref 0.9–1.2)
KETONES UR STRIP.AUTO-MCNC: NEGATIVE MG/DL
LEUKOCYTE ESTERASE UR QL STRIP.AUTO: ABNORMAL
MCH RBC QN AUTO: 29.6 PG (ref 26–34)
MCHC RBC AUTO-ENTMCNC: 32.5 G/DL (ref 32–36)
MCV RBC AUTO: 91 FL (ref 80–100)
MUCOUS THREADS #/AREA URNS AUTO: ABNORMAL /LPF
NITRITE UR QL STRIP.AUTO: NEGATIVE
NRBC BLD-RTO: NORMAL /100{WBCS}
OVAL FAT BODIES #/AREA UR COMP ASSIST: PRESENT /HPF
PH UR STRIP.AUTO: 6 [PH]
PLATELET # BLD AUTO: 169 X10*3/UL (ref 150–450)
POTASSIUM SERPL-SCNC: 4.3 MMOL/L (ref 3.5–5.3)
PROT UR STRIP.AUTO-MCNC: NEGATIVE MG/DL
PROTHROMBIN TIME: 10.2 SECONDS (ref 9.3–12.7)
RBC # BLD AUTO: 4.16 X10*6/UL (ref 4–5.2)
RBC # UR STRIP.AUTO: ABNORMAL /UL
RBC #/AREA URNS AUTO: ABNORMAL /HPF
RENAL EPI CELLS #/AREA UR COMP ASSIST: ABNORMAL /HPF
SODIUM SERPL-SCNC: 142 MMOL/L (ref 136–145)
SP GR UR STRIP.AUTO: <=1.005
SQUAMOUS #/AREA URNS AUTO: ABNORMAL /HPF
TRANS CELLS #/AREA UR COMP ASSIST: ABNORMAL /HPF
UROBILINOGEN UR STRIP.AUTO-MCNC: 0.2 MG/DL
WBC # BLD AUTO: 9.5 X10*3/UL (ref 4.4–11.3)
WBC #/AREA URNS AUTO: ABNORMAL /HPF

## 2023-11-20 PROCEDURE — 85730 THROMBOPLASTIN TIME PARTIAL: CPT

## 2023-11-20 PROCEDURE — 85027 COMPLETE CBC AUTOMATED: CPT

## 2023-11-20 PROCEDURE — 80048 BASIC METABOLIC PNL TOTAL CA: CPT

## 2023-11-20 PROCEDURE — 99203 OFFICE O/P NEW LOW 30 MIN: CPT | Performed by: NURSE PRACTITIONER

## 2023-11-20 PROCEDURE — 36415 COLL VENOUS BLD VENIPUNCTURE: CPT

## 2023-11-20 PROCEDURE — 85610 PROTHROMBIN TIME: CPT

## 2023-11-20 PROCEDURE — 87086 URINE CULTURE/COLONY COUNT: CPT

## 2023-11-20 PROCEDURE — 81001 URINALYSIS AUTO W/SCOPE: CPT

## 2023-11-20 ASSESSMENT — PAIN SCALES - GENERAL: PAINLEVEL_OUTOF10: 0 - NO PAIN

## 2023-11-20 ASSESSMENT — PAIN - FUNCTIONAL ASSESSMENT: PAIN_FUNCTIONAL_ASSESSMENT: 0-10

## 2023-11-20 NOTE — H&P (VIEW-ONLY)
CPM/PAT Evaluation     Anahi Wall is a 78 y.o. female   Chief Complaint: bladder cancer    HPI:  Patient is a 78 alert and oriented fe male coming in for PAT for a scheduled Flexible Cystoscopy w/ Excision of Tissue, Cystoscopy w/ Retrograde Pyelogram on 11/28/23 w/ Dr. De Leon.  The patient had an in office cystoscopy w/ Dr. De Leon on 10/17/23.  The patient reports she was diagnosed with bladder cancer in 2010.   She denies any hematuria, dysuria, burning with urination, frequency or urgency. No fever or chills.  No lower abdominal, back or flank pain.  Patient denies chest pain, SOB, RENEE and NVDC.  Patient also denies Hx: DVT/PE.  Current medications were reviewed and a presurgical mediation schedule was provided.  She has no questions at this time.   Past Medical History:   Diagnosis Date    Age-related osteoporosis without current pathological fracture     Osteoporosis    Anxiety disorder, unspecified 09/04/2014    Anxiety    Cramp and spasm     Cramp of limb    Other conditions influencing health status     Arthritis    Other conditions influencing health status     Acute Meniscal Tear Of Right Knee    Trochanteric bursitis, unspecified hip     Trochanteric bursitis      Past Surgical History:   Procedure Laterality Date    BREAST LUMPECTOMY  02/10/2014    Breast Surgery Lumpectomy    CARPAL TUNNEL RELEASE  02/10/2014    Neuroplasty Decompression Median Nerve At Carpal Tunnel    HYSTERECTOMY  02/10/2014    Hysterectomy        Allergies   Allergen Reactions    Aspirin Nausea Only    Nsaids (Non-Steroidal Anti-Inflammatory Drug) Nausea Only    Penicillins Rash        Current Outpatient Medications on File Prior to Visit   Medication Sig Dispense Refill    alendronate (Fosamax) 70 mg tablet Take 1 tablet (70 mg) by mouth every 7 days. 12 tablet 3    ALPRAZolam (Xanax) 0.25 mg tablet Take 1 tablet (0.25 mg) by mouth 3 times a day as needed.      cholecalciferol (Vitamin D-3) 50 MCG (2000 UT) tablet Take 1  tablet (2,000 Units) by mouth once daily.      clotrimazole-betamethasone (Lotrisone) cream Apply topically 2 times a day as needed.      minoxidil (Loniten) 2.5 mg tablet Take 0.5 tablets (1.25 mg) by mouth once daily.      mirtazapine (Remeron) 30 mg tablet Take 1 tablet (30 mg) by mouth once daily at bedtime. 90 tablet 3    omeprazole (PriLOSEC) 20 mg DR capsule Take 1 capsule (20 mg) by mouth once daily. 90 capsule 3    oxybutynin XL (Ditropan-XL) 15 mg 24 hr tablet Take 1 tablet (15 mg) by mouth once daily.      rosuvastatin (Crestor) 20 mg tablet Take 1 tablet (20 mg) by mouth once daily. (Patient taking differently: Take 1 tablet (20 mg) by mouth once daily at bedtime.) 90 tablet 3    sertraline (Zoloft) 100 mg tablet Take 2 tablets (200 mg) by mouth once daily. 180 tablet 3    fluticasone (Flonase) 50 mcg/actuation nasal spray Administer 2 sprays into affected nostril(s) once daily.      [DISCONTINUED] DOCEtaxeL (Taxotere) 20 mg/2 mL (10 mg/mL) chemo injection Infuse into a venous catheter.      [DISCONTINUED] gemcitabine (Gemzar) 1 gram chemo injection Infuse 26.3 mL (1,000 mg) into a venous catheter 1 (one) time per week.      [DISCONTINUED] HYDROcodone-acetaminophen (Norco) 5-325 mg tablet Take 1 tablet by mouth every 6 hours if needed.      [DISCONTINUED] nitrofurantoin, macrocrystal-monohydrate, (Macrobid) 100 mg capsule Take 1 capsule (100 mg) by mouth 2 times a day.      [DISCONTINUED] predniSONE (Deltasone) 50 mg tablet Take by mouth.       No current facility-administered medications on file prior to visit.         Review of Systems   Genitourinary:         See hpi for details   All other systems reviewed and are negative.     Physical Exam  Vitals and nursing note reviewed.   Constitutional:       Appearance: Normal appearance.   HENT:      Head: Normocephalic and atraumatic.      Mouth/Throat:      Mouth: Mucous membranes are moist.      Pharynx: Oropharynx is clear.   Eyes:      Pupils: Pupils  are equal, round, and reactive to light.   Cardiovascular:      Rate and Rhythm: Normal rate and regular rhythm.      Heart sounds: Normal heart sounds.   Pulmonary:      Effort: Pulmonary effort is normal.      Breath sounds: Normal breath sounds.   Abdominal:      General: Bowel sounds are normal.   Musculoskeletal:         General: Normal range of motion.      Cervical back: Normal range of motion and neck supple.   Skin:     General: Skin is warm and dry.   Neurological:      Mental Status: She is alert and oriented to person, place, and time.   Psychiatric:         Mood and Affect: Mood normal.         Behavior: Behavior normal.         Thought Content: Thought content normal.         Judgment: Judgment normal.        PAT AIRWAY:   Airway:     Mallampati::  IV    TM distance::  <3 FB    Neck ROM::  Full  Has dental crowns    Stop Bang Score      Assessment and Plan:   Malignant Neoplasm of Anterior Wall of Urinary Bladder  Flexible Cystoscopy w/ Excision of Tissue, Cystoscopy w/ Retrograde Pyelogram  Managed with ditropan XL 15mg daily     Osteoporosis  Managed with alendronate 70mg once a week    GERD  Managed with  Prilosec 20mg daily  Stable no recent flares    ASA II  RCRI - 0 points  Class I Risk 3.9%  ANDREINA -2  points low Risk for JESSICA   NSQIP - Predicted length of stay 0 days  ARISCAT - 3 points Low Risk 1.6%  DASI 34.7 Points 7.01 Mets  LILLIANA - 0.1%  JHFRAT - 7 points moderate risk for falls  Clearance - not indicated  PAT Testing - EKG  CBC, BPM, PT/INR, UA & EKG ordered by Dr. De Leon    Face to Face patient contact time 20 minutes    ESTEFANÍA Domínguez-CNP 11/20/2023 10:57 AM

## 2023-11-20 NOTE — PREPROCEDURE INSTRUCTIONS
Medication List            Accurate as of November 20, 2023 11:10 AM. Always use your most recent med list.                alendronate 70 mg tablet  Commonly known as: Fosamax  Take 1 tablet (70 mg) by mouth every 7 days.  Notes to patient: Takes every 7 days     ALPRAZolam 0.25 mg tablet  Commonly known as: Xanax  Medication Adjustments for Surgery: Take morning of surgery with sip of water, no other fluids     cholecalciferol 50 MCG (2000 UT) tablet  Commonly known as: Vitamin D-3  Medication Adjustments for Surgery: Stop 7 days before surgery     clotrimazole-betamethasone cream  Commonly known as: Lotrisone  Medication Adjustments for Surgery: Stop 1 day before surgery     fluticasone 50 mcg/actuation nasal spray  Commonly known as: Flonase  Medication Adjustments for Surgery: Take morning of surgery with sip of water, no other fluids     minoxidil 2.5 mg tablet  Commonly known as: Loniten  Medication Adjustments for Surgery: Take morning of surgery with sip of water, no other fluids     mirtazapine 30 mg tablet  Commonly known as: Remeron  Take 1 tablet (30 mg) by mouth once daily at bedtime.  Medication Adjustments for Surgery: Continue until night before surgery     omeprazole 20 mg DR capsule  Commonly known as: PriLOSEC  Take 1 capsule (20 mg) by mouth once daily.  Medication Adjustments for Surgery: Take morning of surgery with sip of water, no other fluids     oxybutynin XL 15 mg 24 hr tablet  Commonly known as: Ditropan-XL  Medication Adjustments for Surgery: Stop 1 day before surgery     rosuvastatin 20 mg tablet  Commonly known as: Crestor  Take 1 tablet (20 mg) by mouth once daily.  Medication Adjustments for Surgery: Continue until night before surgery     sertraline 100 mg tablet  Commonly known as: Zoloft  Take 2 tablets (200 mg) by mouth once daily.  Medication Adjustments for Surgery: Take morning of surgery with sip of water, no other fluids                              NPO  Instructions:    Do not eat any food after midnight the night before your surgery/procedure.    Additional Instructions:     Seven/Six Days before Surgery:  Review your medication instructions, stop indicated medications  Five Days before Surgery:  Review your medication instructions, stop indicated medications  Three Days before Surgery:  Review your medication instructions, stop indicated medications  The Day before Surgery:  Review your medication instructions, stop indicated medications  You will be contacted regarding the time of your arrival to facility and surgery time  Do not eat any food after Midnight  Day of Surgery:  Review your medication instructions, take indicated medications  Wear  comfortable loose fitting clothing  Do not use moisturizers, creams, lotions or perfume  All jewelry and valuables should be left at home

## 2023-11-21 LAB — BACTERIA UR CULT: NORMAL

## 2023-11-24 ENCOUNTER — ANESTHESIA EVENT (OUTPATIENT)
Dept: OPERATING ROOM | Facility: HOSPITAL | Age: 78
End: 2023-11-24
Payer: MEDICARE

## 2023-11-27 ENCOUNTER — CLINICAL SUPPORT (OUTPATIENT)
Dept: AUDIOLOGY | Facility: CLINIC | Age: 78
End: 2023-11-27
Payer: MEDICARE

## 2023-11-27 DIAGNOSIS — H90.3 SENSORINEURAL HEARING LOSS OF BOTH EARS: Primary | ICD-10-CM

## 2023-11-27 PROCEDURE — 92550 TYMPANOMETRY & REFLEX THRESH: CPT | Performed by: AUDIOLOGIST

## 2023-11-27 PROCEDURE — 92557 COMPREHENSIVE HEARING TEST: CPT | Performed by: AUDIOLOGIST

## 2023-11-27 ASSESSMENT — PAIN SCALES - GENERAL: PAINLEVEL_OUTOF10: 0 - NO PAIN

## 2023-11-27 ASSESSMENT — PAIN - FUNCTIONAL ASSESSMENT: PAIN_FUNCTIONAL_ASSESSMENT: 0-10

## 2023-11-27 NOTE — PROGRESS NOTES
AUDIOLOGY ADULT AUDIOMETRIC EVALUATION    Name:  Anahi Wall  :  1945  Age:  78 y.o.  Date of Evaluation:  23  Time: 1035 - 1115    History:  Ms. Wall being seen today for annual audiogram.  Hx of bilateral sensorineural hearing loss with hearing use.      Fit 9/11/15  Audeo V70 312 T bilaterally.   Right # [id number]  Left # [id number]   Warranty expires 18     Today, reports  - Stable hearing  - No issues with hiearng aids  - No pain, pressure, drainage, ear surgeries, dizziness/imbalance.  - Tinnitus bilaterally, non bothersome    Recall 2022  77 year old female seen today for her annual hearing test due to history of bilateral sensorineural hearing loss. She wears hearing aids and reported that these are working well. She stated that the only time she removes the hearing aids is when she is sleeping or bathing. She denied otalgia, otorrhea, vertigo, dizziness, and recent ear infections. She has had intermittent bilateral tinnitus for many years, per patient report. This is non-bothersome.      Hearing aid information:   Fit 9/11/15  Audeo V70 312 T bilaterally.   Right # [id number]  Left # [id number]   Warranty expires 18     RESULTS:    Otoscopic Evaluation: Clear bilaterally     Immittance Measures: 226 Hz       Right Ear: Middle ear pressure and eardrm mobility within defined limits       Left Ear: Middle ear pressure and eardrm mobility within defined limits    Acoustic reflexes (Ipsilateral)  - Could not maintain seal bilaterally    Test technique:  Pure Tone Audiometry     Reliability:   good    Pure Tone Audiometry:  - Right ear:  Normal hearing sloping to moderately-severe sensorineural hearing loss   - Left ear: Normal hearing sloping to severe sensorineural hearing loss     Speech Audiometry:   - Right ear: 90 % at 70 dB HL, recorded NU6 words  - Left ear:  90 % at 70 dB HL, recorded NU6 words    IMPRESSIONS/RECOMMENDATIONS:  - Stable sensorineural hearing loss  bilaterally  - Happy with performance with current hearing aids.  - Current aids are from 2015 - discussed option to upgrade if she is interested. She has Medicare with [employer] as secondary. She is aware that we do not bill the secondary and hearing aids at  would be out of her pocket.  - She inquired about new hearing aids and obtaining hearing aids from an outside provider to bring in for programming. She was advised of our programming fee. She was also encouraged to call our clinic and inquire if we have the programming capabilities for any new devices she would purchase at an outside location. She was informed of our consultation fee.   - Annual audiogram to monitor hearing, or sooner if there are concerns re: hearing or if warranted by ENT     Viral Olamide Soriano, PhD  Audiologist /  of Otolaryngology

## 2023-11-28 ENCOUNTER — ANESTHESIA (OUTPATIENT)
Dept: OPERATING ROOM | Facility: HOSPITAL | Age: 78
End: 2023-11-28
Payer: MEDICARE

## 2023-11-28 ENCOUNTER — APPOINTMENT (OUTPATIENT)
Dept: RADIOLOGY | Facility: HOSPITAL | Age: 78
End: 2023-11-28
Payer: MEDICARE

## 2023-11-28 ENCOUNTER — HOSPITAL ENCOUNTER (OUTPATIENT)
Facility: HOSPITAL | Age: 78
Setting detail: OUTPATIENT SURGERY
Discharge: HOME | End: 2023-11-28
Attending: STUDENT IN AN ORGANIZED HEALTH CARE EDUCATION/TRAINING PROGRAM | Admitting: STUDENT IN AN ORGANIZED HEALTH CARE EDUCATION/TRAINING PROGRAM
Payer: MEDICARE

## 2023-11-28 VITALS
OXYGEN SATURATION: 96 % | HEART RATE: 84 BPM | TEMPERATURE: 96.8 F | BODY MASS INDEX: 29.49 KG/M2 | RESPIRATION RATE: 16 BRPM | HEIGHT: 62 IN | DIASTOLIC BLOOD PRESSURE: 72 MMHG | WEIGHT: 160.27 LBS | SYSTOLIC BLOOD PRESSURE: 147 MMHG

## 2023-11-28 DIAGNOSIS — C67.3 MALIGNANT NEOPLASM OF ANTERIOR WALL OF URINARY BLADDER (MULTI): Primary | ICD-10-CM

## 2023-11-28 DIAGNOSIS — G89.18 ACUTE POST-OPERATIVE PAIN: ICD-10-CM

## 2023-11-28 PROCEDURE — 52351 CYSTOURETERO & OR PYELOSCOPE: CPT | Performed by: STUDENT IN AN ORGANIZED HEALTH CARE EDUCATION/TRAINING PROGRAM

## 2023-11-28 PROCEDURE — 2500000001 HC RX 250 WO HCPCS SELF ADMINISTERED DRUGS (ALT 637 FOR MEDICARE OP): Performed by: STUDENT IN AN ORGANIZED HEALTH CARE EDUCATION/TRAINING PROGRAM

## 2023-11-28 PROCEDURE — 88112 CYTOPATH CELL ENHANCE TECH: CPT | Mod: TC,MCY,BEALAB | Performed by: STUDENT IN AN ORGANIZED HEALTH CARE EDUCATION/TRAINING PROGRAM

## 2023-11-28 PROCEDURE — A52240 PR CYSTOURETHROSCOPY,FULGUR >5 CM LESN: Performed by: STUDENT IN AN ORGANIZED HEALTH CARE EDUCATION/TRAINING PROGRAM

## 2023-11-28 PROCEDURE — 2500000004 HC RX 250 GENERAL PHARMACY W/ HCPCS (ALT 636 FOR OP/ED): Performed by: STUDENT IN AN ORGANIZED HEALTH CARE EDUCATION/TRAINING PROGRAM

## 2023-11-28 PROCEDURE — 99100 ANES PT EXTEME AGE<1 YR&>70: CPT | Performed by: STUDENT IN AN ORGANIZED HEALTH CARE EDUCATION/TRAINING PROGRAM

## 2023-11-28 PROCEDURE — 3700000002 HC GENERAL ANESTHESIA TIME - EACH INCREMENTAL 1 MINUTE: Performed by: STUDENT IN AN ORGANIZED HEALTH CARE EDUCATION/TRAINING PROGRAM

## 2023-11-28 PROCEDURE — 3700000001 HC GENERAL ANESTHESIA TIME - INITIAL BASE CHARGE: Performed by: STUDENT IN AN ORGANIZED HEALTH CARE EDUCATION/TRAINING PROGRAM

## 2023-11-28 PROCEDURE — 7100000010 HC PHASE TWO TIME - EACH INCREMENTAL 1 MINUTE: Performed by: STUDENT IN AN ORGANIZED HEALTH CARE EDUCATION/TRAINING PROGRAM

## 2023-11-28 PROCEDURE — 88112 CYTOPATH CELL ENHANCE TECH: CPT | Performed by: PATHOLOGY

## 2023-11-28 PROCEDURE — 2550000001 HC RX 255 CONTRASTS: Performed by: STUDENT IN AN ORGANIZED HEALTH CARE EDUCATION/TRAINING PROGRAM

## 2023-11-28 PROCEDURE — 3600000008 HC OR TIME - EACH INCREMENTAL 1 MINUTE - PROCEDURE LEVEL THREE: Performed by: STUDENT IN AN ORGANIZED HEALTH CARE EDUCATION/TRAINING PROGRAM

## 2023-11-28 PROCEDURE — 76000 FLUOROSCOPY <1 HR PHYS/QHP: CPT

## 2023-11-28 PROCEDURE — 74420 UROGRAPHY RTRGR +-KUB: CPT | Performed by: STUDENT IN AN ORGANIZED HEALTH CARE EDUCATION/TRAINING PROGRAM

## 2023-11-28 PROCEDURE — 7100000001 HC RECOVERY ROOM TIME - INITIAL BASE CHARGE: Performed by: STUDENT IN AN ORGANIZED HEALTH CARE EDUCATION/TRAINING PROGRAM

## 2023-11-28 PROCEDURE — 3600000003 HC OR TIME - INITIAL BASE CHARGE - PROCEDURE LEVEL THREE: Performed by: STUDENT IN AN ORGANIZED HEALTH CARE EDUCATION/TRAINING PROGRAM

## 2023-11-28 PROCEDURE — C1769 GUIDE WIRE: HCPCS | Performed by: STUDENT IN AN ORGANIZED HEALTH CARE EDUCATION/TRAINING PROGRAM

## 2023-11-28 PROCEDURE — C1758 CATHETER, URETERAL: HCPCS | Performed by: STUDENT IN AN ORGANIZED HEALTH CARE EDUCATION/TRAINING PROGRAM

## 2023-11-28 PROCEDURE — 7100000009 HC PHASE TWO TIME - INITIAL BASE CHARGE: Performed by: STUDENT IN AN ORGANIZED HEALTH CARE EDUCATION/TRAINING PROGRAM

## 2023-11-28 PROCEDURE — 7100000002 HC RECOVERY ROOM TIME - EACH INCREMENTAL 1 MINUTE: Performed by: STUDENT IN AN ORGANIZED HEALTH CARE EDUCATION/TRAINING PROGRAM

## 2023-11-28 PROCEDURE — 2500000005 HC RX 250 GENERAL PHARMACY W/O HCPCS: Performed by: STUDENT IN AN ORGANIZED HEALTH CARE EDUCATION/TRAINING PROGRAM

## 2023-11-28 PROCEDURE — 52240 CYSTOSCOPY AND TREATMENT: CPT | Performed by: STUDENT IN AN ORGANIZED HEALTH CARE EDUCATION/TRAINING PROGRAM

## 2023-11-28 PROCEDURE — 2720000007 HC OR 272 NO HCPCS: Performed by: STUDENT IN AN ORGANIZED HEALTH CARE EDUCATION/TRAINING PROGRAM

## 2023-11-28 PROCEDURE — 88307 TISSUE EXAM BY PATHOLOGIST: CPT | Mod: TC,SUR,BEALAB | Performed by: STUDENT IN AN ORGANIZED HEALTH CARE EDUCATION/TRAINING PROGRAM

## 2023-11-28 PROCEDURE — 88112 CYTOPATH CELL ENHANCE TECH: CPT | Mod: TC,BEALAB | Performed by: STUDENT IN AN ORGANIZED HEALTH CARE EDUCATION/TRAINING PROGRAM

## 2023-11-28 PROCEDURE — 88307 TISSUE EXAM BY PATHOLOGIST: CPT | Performed by: PATHOLOGY

## 2023-11-28 RX ORDER — ROCURONIUM BROMIDE 10 MG/ML
INJECTION, SOLUTION INTRAVENOUS AS NEEDED
Status: DISCONTINUED | OUTPATIENT
Start: 2023-11-28 | End: 2023-11-28

## 2023-11-28 RX ORDER — OXYBUTYNIN CHLORIDE 5 MG/1
5 TABLET ORAL ONCE
Status: COMPLETED | OUTPATIENT
Start: 2023-11-28 | End: 2023-11-28

## 2023-11-28 RX ORDER — IPRATROPIUM BROMIDE 0.5 MG/2.5ML
500 SOLUTION RESPIRATORY (INHALATION) ONCE
Status: DISCONTINUED | OUTPATIENT
Start: 2023-11-28 | End: 2023-11-28 | Stop reason: HOSPADM

## 2023-11-28 RX ORDER — PHENAZOPYRIDINE HYDROCHLORIDE 100 MG/1
200 TABLET, FILM COATED ORAL ONCE
Status: COMPLETED | OUTPATIENT
Start: 2023-11-28 | End: 2023-11-28

## 2023-11-28 RX ORDER — OXYCODONE HYDROCHLORIDE 5 MG/1
5 TABLET ORAL EVERY 6 HOURS PRN
Qty: 5 TABLET | Refills: 0 | Status: SHIPPED | OUTPATIENT
Start: 2023-11-28 | End: 2023-12-01 | Stop reason: SDUPTHER

## 2023-11-28 RX ORDER — CHLORHEXIDINE GLUCONATE 40 MG/ML
SOLUTION TOPICAL DAILY PRN
Status: DISCONTINUED | OUTPATIENT
Start: 2023-11-28 | End: 2023-11-28 | Stop reason: HOSPADM

## 2023-11-28 RX ORDER — ONDANSETRON HYDROCHLORIDE 2 MG/ML
INJECTION, SOLUTION INTRAVENOUS AS NEEDED
Status: DISCONTINUED | OUTPATIENT
Start: 2023-11-28 | End: 2023-11-28

## 2023-11-28 RX ORDER — CEFAZOLIN SODIUM 2 G/100ML
2 INJECTION, SOLUTION INTRAVENOUS ONCE
Status: COMPLETED | OUTPATIENT
Start: 2023-11-28 | End: 2023-11-28

## 2023-11-28 RX ORDER — MEPERIDINE HYDROCHLORIDE 25 MG/ML
12.5 INJECTION INTRAMUSCULAR; INTRAVENOUS; SUBCUTANEOUS EVERY 10 MIN PRN
Status: DISCONTINUED | OUTPATIENT
Start: 2023-11-28 | End: 2023-11-28 | Stop reason: HOSPADM

## 2023-11-28 RX ORDER — LIDOCAINE HYDROCHLORIDE 10 MG/ML
INJECTION, SOLUTION EPIDURAL; INFILTRATION; INTRACAUDAL; PERINEURAL AS NEEDED
Status: DISCONTINUED | OUTPATIENT
Start: 2023-11-28 | End: 2023-11-28

## 2023-11-28 RX ORDER — POLYETHYLENE GLYCOL 3350 17 G/17G
17 POWDER, FOR SOLUTION ORAL DAILY
Qty: 10 PACKET | Refills: 0 | Status: SHIPPED | OUTPATIENT
Start: 2023-11-28 | End: 2024-01-09 | Stop reason: WASHOUT

## 2023-11-28 RX ORDER — ONDANSETRON HYDROCHLORIDE 2 MG/ML
4 INJECTION, SOLUTION INTRAVENOUS ONCE AS NEEDED
Status: DISCONTINUED | OUTPATIENT
Start: 2023-11-28 | End: 2023-11-28 | Stop reason: HOSPADM

## 2023-11-28 RX ORDER — ALBUTEROL SULFATE 0.83 MG/ML
2.5 SOLUTION RESPIRATORY (INHALATION) ONCE AS NEEDED
Status: DISCONTINUED | OUTPATIENT
Start: 2023-11-28 | End: 2023-11-28 | Stop reason: HOSPADM

## 2023-11-28 RX ORDER — FENTANYL CITRATE 50 UG/ML
25 INJECTION, SOLUTION INTRAMUSCULAR; INTRAVENOUS EVERY 5 MIN PRN
Status: DISCONTINUED | OUTPATIENT
Start: 2023-11-28 | End: 2023-11-28 | Stop reason: HOSPADM

## 2023-11-28 RX ORDER — OXYCODONE HYDROCHLORIDE 5 MG/1
5 TABLET ORAL ONCE AS NEEDED
Status: COMPLETED | OUTPATIENT
Start: 2023-11-28 | End: 2023-11-28

## 2023-11-28 RX ORDER — HYDRALAZINE HYDROCHLORIDE 20 MG/ML
5 INJECTION INTRAMUSCULAR; INTRAVENOUS EVERY 30 MIN PRN
Status: DISCONTINUED | OUTPATIENT
Start: 2023-11-28 | End: 2023-11-28 | Stop reason: HOSPADM

## 2023-11-28 RX ORDER — LABETALOL HYDROCHLORIDE 5 MG/ML
5 INJECTION, SOLUTION INTRAVENOUS ONCE AS NEEDED
Status: DISCONTINUED | OUTPATIENT
Start: 2023-11-28 | End: 2023-11-28 | Stop reason: HOSPADM

## 2023-11-28 RX ORDER — PROPOFOL 10 MG/ML
INJECTION, EMULSION INTRAVENOUS AS NEEDED
Status: DISCONTINUED | OUTPATIENT
Start: 2023-11-28 | End: 2023-11-28

## 2023-11-28 RX ORDER — PHENAZOPYRIDINE HYDROCHLORIDE 200 MG/1
200 TABLET, FILM COATED ORAL 3 TIMES DAILY PRN
Qty: 10 TABLET | Refills: 0 | Status: SHIPPED | OUTPATIENT
Start: 2023-11-28 | End: 2023-12-01 | Stop reason: SDUPTHER

## 2023-11-28 RX ORDER — DIPHENHYDRAMINE HYDROCHLORIDE 50 MG/ML
12.5 INJECTION INTRAMUSCULAR; INTRAVENOUS ONCE AS NEEDED
Status: DISCONTINUED | OUTPATIENT
Start: 2023-11-28 | End: 2023-11-28 | Stop reason: HOSPADM

## 2023-11-28 RX ORDER — SODIUM CHLORIDE, SODIUM LACTATE, POTASSIUM CHLORIDE, CALCIUM CHLORIDE 600; 310; 30; 20 MG/100ML; MG/100ML; MG/100ML; MG/100ML
100 INJECTION, SOLUTION INTRAVENOUS CONTINUOUS
Status: DISCONTINUED | OUTPATIENT
Start: 2023-11-28 | End: 2023-11-28 | Stop reason: HOSPADM

## 2023-11-28 RX ORDER — ACETAMINOPHEN 325 MG/1
650 TABLET ORAL EVERY 6 HOURS PRN
Qty: 30 TABLET | Refills: 0 | Status: SHIPPED | OUTPATIENT
Start: 2023-11-28 | End: 2024-04-05 | Stop reason: ALTCHOICE

## 2023-11-28 RX ORDER — FENTANYL CITRATE 50 UG/ML
INJECTION, SOLUTION INTRAMUSCULAR; INTRAVENOUS AS NEEDED
Status: DISCONTINUED | OUTPATIENT
Start: 2023-11-28 | End: 2023-11-28

## 2023-11-28 RX ORDER — DEXAMETHASONE SODIUM PHOSPHATE 4 MG/ML
INJECTION, SOLUTION INTRA-ARTICULAR; INTRALESIONAL; INTRAMUSCULAR; INTRAVENOUS; SOFT TISSUE AS NEEDED
Status: DISCONTINUED | OUTPATIENT
Start: 2023-11-28 | End: 2023-11-28

## 2023-11-28 RX ORDER — FENTANYL CITRATE 50 UG/ML
50 INJECTION, SOLUTION INTRAMUSCULAR; INTRAVENOUS EVERY 5 MIN PRN
Status: DISCONTINUED | OUTPATIENT
Start: 2023-11-28 | End: 2023-11-28 | Stop reason: HOSPADM

## 2023-11-28 RX ORDER — SODIUM CHLORIDE 9 MG/ML
100 INJECTION, SOLUTION INTRAVENOUS CONTINUOUS
Status: DISCONTINUED | OUTPATIENT
Start: 2023-11-28 | End: 2023-11-28 | Stop reason: HOSPADM

## 2023-11-28 RX ADMIN — OXYCODONE HYDROCHLORIDE 5 MG: 5 TABLET ORAL at 13:19

## 2023-11-28 RX ADMIN — CEFAZOLIN SODIUM 2 G: 2 INJECTION, SOLUTION INTRAVENOUS at 10:45

## 2023-11-28 RX ADMIN — ROCURONIUM BROMIDE 10 MG: 10 INJECTION, SOLUTION INTRAVENOUS at 11:23

## 2023-11-28 RX ADMIN — ONDANSETRON 4 MG: 2 INJECTION INTRAMUSCULAR; INTRAVENOUS at 12:01

## 2023-11-28 RX ADMIN — PROPOFOL 50 MG: 10 INJECTION, EMULSION INTRAVENOUS at 12:20

## 2023-11-28 RX ADMIN — ROCURONIUM BROMIDE 40 MG: 10 INJECTION, SOLUTION INTRAVENOUS at 10:40

## 2023-11-28 RX ADMIN — SODIUM CHLORIDE: 900 INJECTION, SOLUTION INTRAVENOUS at 11:42

## 2023-11-28 RX ADMIN — LIDOCAINE HYDROCHLORIDE 5 ML: 10 INJECTION, SOLUTION EPIDURAL; INFILTRATION; INTRACAUDAL; PERINEURAL at 10:39

## 2023-11-28 RX ADMIN — FENTANYL CITRATE 25 MCG: 0.05 INJECTION, SOLUTION INTRAMUSCULAR; INTRAVENOUS at 13:03

## 2023-11-28 RX ADMIN — OXYBUTYNIN CHLORIDE 5 MG: 5 TABLET ORAL at 14:38

## 2023-11-28 RX ADMIN — DEXAMETHASONE SODIUM PHOSPHATE 4 MG: 4 INJECTION, SOLUTION INTRAMUSCULAR; INTRAVENOUS at 12:01

## 2023-11-28 RX ADMIN — PROPOFOL 120 MG: 10 INJECTION, EMULSION INTRAVENOUS at 10:39

## 2023-11-28 RX ADMIN — FENTANYL CITRATE 50 MCG: 50 INJECTION INTRAMUSCULAR; INTRAVENOUS at 11:23

## 2023-11-28 RX ADMIN — FENTANYL CITRATE 25 MCG: 0.05 INJECTION, SOLUTION INTRAMUSCULAR; INTRAVENOUS at 12:35

## 2023-11-28 RX ADMIN — FENTANYL CITRATE 50 MCG: 50 INJECTION INTRAMUSCULAR; INTRAVENOUS at 10:39

## 2023-11-28 RX ADMIN — PHENAZOPYRIDINE 200 MG: 100 TABLET ORAL at 14:38

## 2023-11-28 RX ADMIN — SUGAMMADEX 200 MG: 100 INJECTION, SOLUTION INTRAVENOUS at 12:06

## 2023-11-28 RX ADMIN — SODIUM CHLORIDE 100 ML/HR: 900 INJECTION, SOLUTION INTRAVENOUS at 08:45

## 2023-11-28 SDOH — HEALTH STABILITY: MENTAL HEALTH: CURRENT SMOKER: 0

## 2023-11-28 ASSESSMENT — PAIN SCALES - GENERAL
PAINLEVEL_OUTOF10: 5 - MODERATE PAIN
PAINLEVEL_OUTOF10: 8
PAINLEVEL_OUTOF10: 6
PAINLEVEL_OUTOF10: 5 - MODERATE PAIN
PAINLEVEL_OUTOF10: 0 - NO PAIN
PAINLEVEL_OUTOF10: 4
PAINLEVEL_OUTOF10: 6
PAINLEVEL_OUTOF10: 8
PAINLEVEL_OUTOF10: 6

## 2023-11-28 ASSESSMENT — PAIN - FUNCTIONAL ASSESSMENT
PAIN_FUNCTIONAL_ASSESSMENT: 0-10

## 2023-11-28 NOTE — ANESTHESIA PROCEDURE NOTES
Airway  Date/Time: 11/28/2023 10:43 AM  Urgency: elective    Airway not difficult    Staffing  Performed: attending   Authorized by: Freddie Forte MD    Performed by: Freddie Forte MD  Patient location during procedure: OR    Indications and Patient Condition  Indications for airway management: anesthesia and airway protection  Spontaneous Ventilation: absent  Sedation level: deep  Preoxygenated: yes  Mask difficulty assessment: 2 - vent by mask + OA or adjuvant +/- NMBA    Final Airway Details  Final airway type: endotracheal airway      Successful airway: ETT  Cuffed: yes   Successful intubation technique: direct laryngoscopy  Facilitating devices/methods: cricoid pressure  Blade: Marcus  Blade size: #3  ETT size (mm): 7.0  Cormack-Lehane Classification: grade IIb - view of arytenoids or posterior of glottis only  Placement verified by: capnometry   Cuff volume (mL): 8  Measured from: lips  ETT to lips (cm): 21  Number of attempts at approach: 1

## 2023-11-28 NOTE — ANESTHESIA POSTPROCEDURE EVALUATION
Patient: Anahi Wall    Procedure Summary       Date: 11/28/23 Room / Location: [facility] / [facility]    Anesthesia Start: 1030 Anesthesia Stop: 1230    Procedures:       Cystoscopy with Excision Tissue (Bladder)      Cystoscopy Flexible      Cystoscopy with Retrograde Pyelogram (Bilateral: Bladder) Diagnosis:       Malignant neoplasm of anterior wall of urinary bladder (CMS/HCC)      (Malignant neoplasm of anterior wall of urinary bladder (CMS/HCC) [C67.3])    Surgeons: Jorge A De Leon MD MPH Responsible Provider: Freddie Forte MD    Anesthesia Type: general ASA Status: 2            Anesthesia Type: general    Vitals Value Taken Time   /75 11/28/23 1300   Temp 35.8 °C (96.4 °F) 11/28/23 1221   Pulse 80 11/28/23 1300   Resp 16 11/28/23 1300   SpO2 97 % 11/28/23 1305       Anesthesia Post Evaluation    Patient location during evaluation: PACU  Patient participation: complete - patient participated  Level of consciousness: awake  Pain management: adequate  Multimodal analgesia pain management approach  Airway patency: patent  Cardiovascular status: acceptable and hemodynamically stable  Respiratory status: acceptable and spontaneous ventilation  Hydration status: euvolemic  Postoperative Nausea and Vomiting: none  Comments: No nausea or vomiting        There were no known notable events for this encounter.

## 2023-11-28 NOTE — ANESTHESIA PREPROCEDURE EVALUATION
Patient: Anahi Wall    Procedure Information       Date/Time: 11/28/23 0930    Procedures:       Cystoscopy with Excision Tissue (Bladder)      Cystoscopy Flexible (Right)      Cystoscopy with Retrograde Pyelogram (Bilateral: Bladder)    Location: [facility] / [facility]    Surgeons: Jorge A De Leon MD MPH            Relevant Problems   Cardiovascular   (+) Chest pain   (+) HTN (hypertension), benign   (+) Hyperlipidemia   No history of complications History of coronary artery bypass graft   No history of complications Pacemaker      Endocrine   No history of complications Diabetes mellitus, type 2 (CMS/HCC)      GI   (+) Acid reflux      Neuro/Psych   No history of complications CVA (cerebral vascular accident) (CMS/HCC)   No history of complications Seizures (CMS/HCC)      Pulmonary   No history of complications Asthma   No history of complications Chronic obstructive pulmonary disease (CMS/HCC)   No history of complications JESSICA (obstructive sleep apnea)      Hematology   No history of complications Coagulopathy (CMS/HCC)      Musculoskeletal   (+) Degeneration of lumbar intervertebral disc   (+) Disc displacement, lumbar   (+) Localized primary osteoarthritis of carpometacarpal joint of left thumb   (+) Lumbosacral spinal stenosis   (+) Primary osteoarthritis of left knee      Eyes, Ears, Nose, and Throat   (+) Sensorineural hearing loss of both ears      Other   (+) Lateral epicondylitis of right elbow   (+) Left lateral epicondylitis       Clinical information reviewed:   Tobacco  Allergies  Meds  Problems  Med Hx  Surg Hx  OB Status    Fam Hx  Soc Hx        NPO Detail:  NPO/Void Status  Date of Last Liquid: 11/27/23  Time of Last Liquid: 2300  Date of Last Solid: 11/27/23  Time of Last Solid: 2300  Time of Last Void: 0825         Physical Exam    Airway  Mallampati: II  TM distance: >3 FB  Neck ROM: full     Cardiovascular    Dental    Pulmonary    Abdominal            Anesthesia Plan    ASA 3      general     The patient is not a current smoker.    intravenous induction   Postoperative administration of opioids is intended.  Anesthetic plan and risks discussed with patient.  Use of blood products discussed with patient who consented to blood products.

## 2023-11-28 NOTE — DISCHARGE INSTRUCTIONS
DEPARTMENT OF UROLOGY  DISCHARGE INSTRUCTIONS TURBT/BIOPSY  Outpatient Surgery    C O N F I D E N T I A L   I N F O R M A T I O N    Anahi NOONAN [facility]      Call [phone] during regular daytime business hours (8:00 am - 5:00 pm) and after 5:00 pm and ask for the Urology resident with any questions or concerns.      If it is a life-threatening situation, proceed to the nearest emergency department.        Follow-up appointment:  12/19/23     Thank you for the opportunity to care for you today.  Your health and healing are very important to us.  We hope we made you feel as comfortable as possible and are committed to your recovery and continued well-being.      The following is a brief overview of your transurethral bladder tumor resection/biopsy today. Some of the information contained on this summary may be confidential.  This information should be kept in your records and should be shared with your regular doctor.    Physicians:   Dr. De Leon      Procedure performed: bladder tumor resection/biopsy  Pending results:   pathology of tissue taken from your bladder (we will go over these results at your post-operative appointment.)    What to Expect During your Recovery and Home Care  Anesthesia Side Effects   You received anesthesia today.  You may feel sleepy, tired, or have a sore throat.   You may also feel drowsiness, dizziness, or inability to think clearly.  For your safety, do not drive, drink alcoholic beverages, take any unprescribed medication or make any important decisions for 24 hours.  A responsible adult should be with you for 24 hours.        Activity and Recovery    No heavy lifting over ten pounds. Limit activity if you have a urinary catheter in place. Avoid activities that would cause pulling or tugging on your catheter.    Do not drive or operate heavy machinery while taking narcotic pain medications as these medications can alter perception, impair judgement, and slow reaction times.      Pain  Control  Unfortunately, you may experience pain after your procedure.  Adequate management can include alternative measures to help ease your pain and can include over the counter Tylenol or oxycodone can be taken as prescribed as needed for breakthrough pain. Do not take more than 4,000mg of Tylenol in a 24-hour period.      Oxycodone is a narcotic and can become addictive and may also induce constipation.  Please take Miralax (prescribed or not) stool softeners when taking this medication to prevent constipation.    You may also experience bladder spasms due to the catheter. Ditropan may be prescribed to help alleviate this problem.    Nausea/Vomiting   Clear liquids are best tolerated at first. Start slow, advance your diet as tolerated to normal foods. Avoid spicy, greasy, heavy foods at first. Also, you may feel nauseous or like you need to vomit if you take any type of medication on an empty stomach.  Call your physician if you are unable to eat or drink and have persistent vomiting.    Signs of Bleeding   You are going to have some blood in your urine. Your urine will be light pink to yellow. If you have a catheter you always want to look at the urine in the tubing of your catheter and not in the large urine collection bag to check for bleeding. If urine becomes thick dark jessica red, has large clots or stops draining, please notify your physician.    Treatment/wound care:   Keep area(s) clean and dry. Clean around insertion site of catheter daily with mild soap and water.  It is okay to shower 24 hours after time of surgery.    Do not submerge your catheter in standing water until seen for follow up appointment (no tub bathing, swimming, or hot tubs).      Signs of Infection  Signs of infection can include fever, drainage(green/yellow), chills, burning sensation with passing of urine, catheter leakage, or severe abdominal pain.  If you see any of these occur, please contact your doctor's office at  [phone].  Any fever higher than 100.4, especially if associated with an ill feeling, abdominal pain, chills, or nausea should be reported to your surgeon.          Assist in bowel movements/urination  Increase fiber in diet  Increase water (6 to 8 glasses)  Increase walking   Urination should occur within 6 hours of anesthesia  If you have tried these methods and your bladder still feels full and you cannot use the bathroom, please go to your nearest Emergency room.    Additional Instructions: CATHETER CARE  Always keep the catheters tubing and drainage bag below the level of your bladder.  Avoid loops and kinks in the catheter tubing.  NOTIFY your physician if catheter falls out or catheter seems clogged and urine is not draining.   Do not wear the small leg bag to bed you should be provided with a larger overnight bag that you should wear to bed and can hang over the side of the bed.  We recommend wearing the large bag in the shower as well as this is easy to dry, and you do not get your leg straps wet from your leg bag.   Your catheter should be secured to your upper thigh, do not allow it to hang or dangle.  Your catheter will be removed at your post-operative appointment.

## 2023-11-28 NOTE — OP NOTE
Cystoscopy with Excision Tissue, Cystoscopy Flexible, Cystoscopy with Retrograde Pyelogram (B) Operative Note     Date: 2023  OR Location: [facility]    Name: Anahi Wall, : 1945, Age: 78 y.o., MRN: [id number], Sex: female    Diagnosis  Pre-op Diagnosis     * Malignant neoplasm of anterior wall of urinary bladder (CMS/HCC) [C67.3] Post-op Diagnosis     * Malignant neoplasm of anterior wall of urinary bladder (CMS/HCC) [C67.3]     Procedures  Cystoscopy with Excision Tissue  26549 - [place] CYSTOURETHROSCOPY W/DEST &/RMVL TUMOR LARGE    Cystoscopy Flexible  19183 - [place] CYSTO W/URTROSCOPY&/PYELOSCOPY DX    Cystoscopy with Retrograde Pyelogram  97446 - [facility] UROGRAPHY RETROGRADE WITH/WO KUB      Surgeons      * Jorge A De Leon - Primary    Resident/Fellow/Other Assistant:  Surgeon(s) and Role:    Procedure Summary  Anesthesia: General  ASA: II  Anesthesia Staff: Anesthesiologist: Freddie Forte MD  Estimated Blood Loss: 20 mL  Intra-op Medications: * No intraprocedure medications in log *           Anesthesia Record               Intraprocedure I/O Totals          Intake    Propofol Drip 0.00 mL    The total shown is the total volume documented since Anesthesia Start was filed.    sodium chloride 0.9% infusion 1000.00 mL    Total Intake 1000 mL          Specimen:   ID Type Source Tests Collected by Time   1 : right ureteral wash Non-Gynecologic Cytology URETER WASH RIGHT CYTOLOGY CONSULTATION (NON-GYNECOLOGIC) Jorge A De Leon MD MPH 2023 1126   2 : left ureteral wash Non-Gynecologic Cytology URETER WASH LEFT CYTOLOGY CONSULTATION (NON-GYNECOLOGIC) Jorge A De Leon MD MPH 2023 1126   3 : Bladder tumor Tissue BLADDER TRANSURETHRAL RESECTION SURGICAL PATHOLOGY EXAM Jorge A De Leon MD MPH 2023 1201        Staff:   Circulator: Li Salmon RN  Scrub Person: Miquel Ricardo         Drains and/or Catheters:   Urethral Catheter Double-lumen 18 Fr. (Active)         Findings: Large volume bladder tumor.   Right URS with large volume papillary tumor taking up the entirety of the renal pelvis and calyces.    Indications: Anahi Wall is an 78 y.o. female who is having surgery for Malignant neoplasm of anterior wall of urinary bladder (CMS/HCC) [C67.3]. 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  She has recurrence again in the bladder.  Her last recurrence was near the right UO.  Cytology was done bilaterally.  We planned to do URS on the right today because of the tumor overlying the right UO.    The patient was seen in the preoperative area. The risks, benefits, complications, treatment options, non-operative alternatives, expected recovery and outcomes were discussed with the patient. The possibilities of reaction to medication, pulmonary aspiration, injury to surrounding structures, bleeding, recurrent infection, the need for additional procedures, failure to diagnose a condition, and creating a complication requiring transfusion or operation were discussed with the patient. The patient concurred with the proposed plan, giving informed consent.  The site of surgery was properly noted/marked if necessary per policy. The patient has been actively warmed in preoperative area. Preoperative antibiotics have been ordered and given within 1 hours of incision. Venous thrombosis prophylaxis have been ordered including bilateral sequential compression devices    Procedure Details:     After general anesthesia was induced, we placed the patient in dorsal lithotomy position and prepped and drapped her genitalia.  We placed a cystoscope into the bladder and large volume papillary tumor was seen diffusely.  This was multifocal.    We found the left and the right UO.  The right UO had tumor over it again.  Ureteral wash cytology was performed with a dual lumen catheter on the left and the right.  RGP was then performed on the right which showed some concerns for right pelvis filling defects.  A second wire was  inserted.  A ureteroscope was inserted over one of the wires to the renal pelvis.  On the right, there was high volume papillary tumors throughout the renal pelvis in all calcyces.  No biopsies were taken given no access sheath.  There was some disease within the ureter as well.  Pictures were taken.    The left side was addressed then with RGP and URS in a same fashion however no tumor was visualized.    We then removed all wires.  No stent was placed given no access sheath was used or trauma.  We then placed our resectoscope.  We systematically resected all of the tumors.  We removed them and sent them for pathology.  There was > 5 cm in total tumor but the tumor was quite multifocal.  The bladder was thin given her age, gender and past resections.  Given it was thin, a rodriguez was left in place.  We obtained hemostasis.  Specimens were obtained.  No bleeding was seen without flow.  The scope was removed and a rodriguez was inserted.        Complications:  None; patient tolerated the procedure well.    Disposition: PACU - hemodynamically stable.  Condition: stable         Additional Details:     Attending Attestation: I was present and scrubbed for the entire procedure.    Jorge A De Leon  Phone Number: [phone]

## 2023-11-28 NOTE — PERIOPERATIVE NURSING NOTE
Patient in Phase 2; dressed and up to chair with RN assist. Tolerating po fluids, moderate complaint of pain and no complaint of nausea.     Significant other at bedside; discussed discharge instructions with patient and Significant other. All questions at this time answered.     Patient clinically appropriate for discharge. IV removed and patient transported to discharge area via wheelchair.

## 2023-11-29 ASSESSMENT — PAIN SCALES - GENERAL: PAINLEVEL_OUTOF10: 4

## 2023-12-01 DIAGNOSIS — G89.18 ACUTE POST-OPERATIVE PAIN: ICD-10-CM

## 2023-12-01 RX ORDER — OXYCODONE HYDROCHLORIDE 5 MG/1
5 TABLET ORAL EVERY 6 HOURS PRN
Qty: 5 TABLET | Refills: 0 | Status: SHIPPED | OUTPATIENT
Start: 2023-12-01 | End: 2024-01-09 | Stop reason: WASHOUT

## 2023-12-01 RX ORDER — PHENAZOPYRIDINE HYDROCHLORIDE 200 MG/1
200 TABLET, FILM COATED ORAL 3 TIMES DAILY PRN
Qty: 10 TABLET | Refills: 0 | Status: SHIPPED | OUTPATIENT
Start: 2023-12-01 | End: 2024-01-09 | Stop reason: WASHOUT

## 2023-12-05 ENCOUNTER — APPOINTMENT (OUTPATIENT)
Dept: UROLOGY | Facility: HOSPITAL | Age: 78
End: 2023-12-05
Payer: MEDICARE

## 2023-12-06 DIAGNOSIS — C67.3 MALIGNANT NEOPLASM OF ANTERIOR WALL OF URINARY BLADDER (MULTI): Primary | ICD-10-CM

## 2023-12-10 LAB
LABORATORY COMMENT REPORT: NORMAL
PATH REPORT.FINAL DX SPEC: NORMAL
PATH REPORT.GROSS SPEC: NORMAL
PATH REPORT.RELEVANT HX SPEC: NORMAL
PATH REPORT.TOTAL CANCER: NORMAL

## 2023-12-11 DIAGNOSIS — C67.9 MALIGNANT NEOPLASM OF URINARY BLADDER, UNSPECIFIED SITE (MULTI): ICD-10-CM

## 2023-12-13 ENCOUNTER — LAB (OUTPATIENT)
Dept: LAB | Facility: LAB | Age: 78
End: 2023-12-13
Payer: MEDICARE

## 2023-12-13 DIAGNOSIS — N39.0 URINARY TRACT INFECTION WITHOUT HEMATURIA, SITE UNSPECIFIED: ICD-10-CM

## 2023-12-13 DIAGNOSIS — C67.9 MALIGNANT NEOPLASM OF URINARY BLADDER, UNSPECIFIED SITE (MULTI): ICD-10-CM

## 2023-12-13 LAB
ANION GAP SERPL CALC-SCNC: 13 MMOL/L (ref 10–20)
APPEARANCE UR: ABNORMAL
BILIRUB UR STRIP.AUTO-MCNC: NEGATIVE MG/DL
BUN SERPL-MCNC: 21 MG/DL (ref 6–23)
CALCIUM SERPL-MCNC: 9.5 MG/DL (ref 8.6–10.6)
CHLORIDE SERPL-SCNC: 107 MMOL/L (ref 98–107)
CO2 SERPL-SCNC: 29 MMOL/L (ref 21–32)
COLOR UR: YELLOW
CREAT SERPL-MCNC: 1.03 MG/DL (ref 0.5–1.05)
GFR SERPL CREATININE-BSD FRML MDRD: 55 ML/MIN/1.73M*2
GLUCOSE SERPL-MCNC: 108 MG/DL (ref 74–99)
GLUCOSE UR STRIP.AUTO-MCNC: NEGATIVE MG/DL
KETONES UR STRIP.AUTO-MCNC: NEGATIVE MG/DL
LEUKOCYTE ESTERASE UR QL STRIP.AUTO: ABNORMAL
MUCOUS THREADS #/AREA URNS AUTO: ABNORMAL /LPF
NITRITE UR QL STRIP.AUTO: NEGATIVE
PH UR STRIP.AUTO: 5 [PH]
POTASSIUM SERPL-SCNC: 4.2 MMOL/L (ref 3.5–5.3)
PROT UR STRIP.AUTO-MCNC: ABNORMAL MG/DL
RBC # UR STRIP.AUTO: ABNORMAL /UL
RBC #/AREA URNS AUTO: ABNORMAL /HPF
SODIUM SERPL-SCNC: 145 MMOL/L (ref 136–145)
SP GR UR STRIP.AUTO: 1.01
SQUAMOUS #/AREA URNS AUTO: ABNORMAL /HPF
UROBILINOGEN UR STRIP.AUTO-MCNC: <2 MG/DL
WBC #/AREA URNS AUTO: >50 /HPF
WBC CLUMPS #/AREA URNS AUTO: ABNORMAL /HPF

## 2023-12-13 PROCEDURE — 81001 URINALYSIS AUTO W/SCOPE: CPT

## 2023-12-13 PROCEDURE — 80048 BASIC METABOLIC PNL TOTAL CA: CPT

## 2023-12-13 PROCEDURE — 87086 URINE CULTURE/COLONY COUNT: CPT

## 2023-12-13 PROCEDURE — 36415 COLL VENOUS BLD VENIPUNCTURE: CPT

## 2023-12-14 LAB — BACTERIA UR CULT: NORMAL

## 2023-12-18 ENCOUNTER — ANCILLARY PROCEDURE (OUTPATIENT)
Dept: RADIOLOGY | Facility: CLINIC | Age: 78
End: 2023-12-18
Payer: MEDICARE

## 2023-12-18 DIAGNOSIS — C67.3 MALIGNANT NEOPLASM OF ANTERIOR WALL OF URINARY BLADDER (MULTI): ICD-10-CM

## 2023-12-18 PROCEDURE — 76377 3D RENDER W/INTRP POSTPROCES: CPT

## 2023-12-18 PROCEDURE — 2550000001 HC RX 255 CONTRASTS: Performed by: STUDENT IN AN ORGANIZED HEALTH CARE EDUCATION/TRAINING PROGRAM

## 2023-12-18 PROCEDURE — 76376 3D RENDER W/INTRP POSTPROCES: CPT | Performed by: RADIOLOGY

## 2023-12-18 PROCEDURE — 74178 CT ABD&PLV WO CNTR FLWD CNTR: CPT | Performed by: RADIOLOGY

## 2023-12-18 RX ADMIN — IOHEXOL 75 ML: 350 INJECTION, SOLUTION INTRAVENOUS at 14:03

## 2023-12-19 ENCOUNTER — APPOINTMENT (OUTPATIENT)
Dept: UROLOGY | Facility: HOSPITAL | Age: 78
End: 2023-12-19
Payer: MEDICARE

## 2023-12-28 NOTE — PROGRESS NOTES
Subjective   Patient ID: Anahi Wall is a 78 y.o. 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 CT Urogram 12/18/2023 showed two prominent lymph nodes which are concerning. One measures 1.4cm in size. Another is near renal artery which measures 1cm.     She denies gross hematuria. She was recently treated for UTI. She denies new or bothersome lower back pain. She denies pain that radiates to her LE.     She tolerated [place]/Doc well. Pathology and cytology showed HG NMIBC from anterior wall and right ureter. Muscle was present and not involved. Cytology was concerning on the left side and not on the right side.     Initial dx: 2010, initially LG but recurred with HG pTa.     BCG treatment: First dose in 2013. She had induction and 1 maintenance with recurrence of HG pTa with CIS. She received further induction BCG and six cycles maintenance BCG through 2019. She developed more recurrences in 2019 with CIS, 2020 HG pTa with chemotherapy after surgery. She was given more BCG including induction and maintenance and had recurrence 11 months later with HG with LG pTa.     She has 14 year history of NMIBC.     Past medical, surgical, family and social history were reviewed and unchanged since last visit besides what is in the HPI.           Review of Systems   All other systems reviewed and are negative.      Objective   Physical Exam  Gen: No acute distress     Psych: Alert and oriented x3     Neuro:  Normal ROM    Resp: Nonlabored respirations     CV: Regular rate and rhythm     Abd: S, NT, ND.     : Deferred    Skin: Warm, dry and intact without rashes     Lymphatics: No peripheral edema             Assessment/Plan   Problem List Items Addressed This Visit             ICD-10-CM       Hematology and Neoplasia    Bladder cancer (CMS/HCC) C67.9     Pathology of R URS showed recurrence in right kidney.  CT Urogram 12/18/2023 showed two prominent lymph nodes which are concerning. One measures 1.4cm in size. Another is near renal artery which measures 1cm.     We also discussed small bone break in her back. She will discuss this with her PCP and orthopedic physician.     We had a long discussion regarding her options moving forward. She is refractory to both BCG and combo [place]/Doc. She has failed both these therapies. 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     I will present her case at . Given presence of two lymph nodes, I believe that the best approach would be referral to medical oncologist for consideration of neoadjuvant immunotherapy or chemotherapy then radical R nephrectomy.           Other Visit Diagnoses         Codes    Dysuria    -  Primary R30.0    Relevant Orders    Urine Culture           Plan:  Ucx and treat recurrent UTI accordingly  Discuss her case at   Referral to medical oncology   I would likely advise a neoadjuvant approach here followed by consolidative surgery if response.  The scope of that surgery whether nephroureterectomy +/- cystectomy would be discussed later with node dissection.  She will be resistant to cystectomy I imagine.        Famibe Attestation  By signing my name below, I, Katelyn Casalla, Scribe   attest that this documentation has been prepared under the direction and in the presence of Jorge A De Leon MD MPH.

## 2023-12-29 ENCOUNTER — OFFICE VISIT (OUTPATIENT)
Dept: UROLOGY | Facility: CLINIC | Age: 78
End: 2023-12-29
Payer: MEDICARE

## 2023-12-29 VITALS
SYSTOLIC BLOOD PRESSURE: 142 MMHG | WEIGHT: 159 LBS | HEART RATE: 105 BPM | TEMPERATURE: 93.6 F | BODY MASS INDEX: 29.26 KG/M2 | HEIGHT: 62 IN | DIASTOLIC BLOOD PRESSURE: 67 MMHG

## 2023-12-29 DIAGNOSIS — R30.0 DYSURIA: ICD-10-CM

## 2023-12-29 DIAGNOSIS — C67.3 MALIGNANT NEOPLASM OF ANTERIOR WALL OF URINARY BLADDER (MULTI): Primary | ICD-10-CM

## 2023-12-29 PROCEDURE — 3077F SYST BP >= 140 MM HG: CPT | Performed by: STUDENT IN AN ORGANIZED HEALTH CARE EDUCATION/TRAINING PROGRAM

## 2023-12-29 PROCEDURE — 87086 URINE CULTURE/COLONY COUNT: CPT

## 2023-12-29 PROCEDURE — 99215 OFFICE O/P EST HI 40 MIN: CPT | Performed by: STUDENT IN AN ORGANIZED HEALTH CARE EDUCATION/TRAINING PROGRAM

## 2023-12-29 PROCEDURE — 3078F DIAST BP <80 MM HG: CPT | Performed by: STUDENT IN AN ORGANIZED HEALTH CARE EDUCATION/TRAINING PROGRAM

## 2023-12-29 PROCEDURE — 1036F TOBACCO NON-USER: CPT | Performed by: STUDENT IN AN ORGANIZED HEALTH CARE EDUCATION/TRAINING PROGRAM

## 2023-12-29 PROCEDURE — 1159F MED LIST DOCD IN RCRD: CPT | Performed by: STUDENT IN AN ORGANIZED HEALTH CARE EDUCATION/TRAINING PROGRAM

## 2023-12-29 PROCEDURE — 1126F AMNT PAIN NOTED NONE PRSNT: CPT | Performed by: STUDENT IN AN ORGANIZED HEALTH CARE EDUCATION/TRAINING PROGRAM

## 2023-12-29 NOTE — ASSESSMENT & PLAN NOTE
Pathology of R URS showed recurrence in right kidney. CT Urogram 12/18/2023 showed two prominent lymph nodes which are concerning. One measures 1.4cm in size. Another is near renal artery which measures 1cm.     We also discussed small bone break in her back. She will discuss this with her PCP and orthopedic physician.     We had a long discussion regarding her options moving forward. She is refractory to both BCG and combo [place]/Doc. She has failed both these therapies. 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     I will present her case at [facility]. Given presence of two lymph nodes, I believe that the best approach would be referral to medical oncologist for consideration of neoadjuvant immunotherapy or chemotherapy then radical R nephrectomy.

## 2023-12-31 LAB — BACTERIA UR CULT: NORMAL

## 2024-01-07 NOTE — PROGRESS NOTES
Oncology New Patient Visit  Patient ID: Anahi Wall is a 78 y.o. female who presents today to [facility] care.  Referring Physician: Jorge A De Leon MD MPH  [id number]  [place] [address]  Primary Care Provider: MD Jorge A Warner  Clemencia Guerra  [facility]     Cancer History  Recurrent NMIBC  Treatment  -3/2010 LGTA non inv Pap   -Recurrence 2013, s/p 6 BCG, s/p BCG maintenance,   -8/2014 - non inv CIS, repeat 6 BCG, maintenance, Jan 2017  -Continued to be monitored  -7/2020 - non inv HG pap UC, s/p BCG, and maintenance into Jan 2021  -Path 6/2021 - pap UC, LG w/ focal HG features   -3 induction cycles of BCG  -cysto 10/12/2021 - multifocal lesions, s/p IV gem , path - / TURBT Urothelium, mod to severe atypia, carcinoma in situ  -refused Pembro/Cystectomy s/p induction gem/docetaxel x2   -repeat cysto - 4/22 - non inv pap UC HG, possible R UO, involvement, non specific Lns  -repeat gem/docetaxel completed 6 cycles 8/22, procedure 1/23 pap tumors at both ureteral orficies, r>l, tumor posterior trigone, wall of bladder, path c/w HG NMIBC ant wall and right ureteral, cytology concerning on left side, yet unable to evaluate, repeat gem/docetaxel x2 w/ persistent disease  -cysto / right ureteroscope - 11/23 - right UO tumor, renal pelvis high volume papillary tumors, path urinary bladder pap UC, HG, no muscle invasion, right ureter wash - highly atypical cells concerning for urothelial neoplasms, right, left, highly atypical UC present, concerning for neoplasms  CT urogram 12/23- Multiple subcentimeter small filling defects in the right kidney, lower pole calyx, as well as left kidney, mid zone, lower pole, small necrosis or pap necrosis, small foci of neoplasm, acute subacute fracture of L5, enhancing mass left gluteus gume [id number], stable, right pelvic sidewall LAD, malignant, or neoplastic, 14mm,  grade 1 anterolisthesis,     Breast Cancer - left sided lumpectomy,  s/p xrt also completed 1996    Other contributing history  HL, Broken Wrist, Overactive bladder issues, Depression, Uterine Fibroids, Carpal tunnel syndrome, cataract,   Anxiety, HTN,     HPI  Referred by provider/s above.  Oncology history is summarized above.  The patient is accompanied by her .  Again they asked multiple questions throughout overall she is doing fairly well some mild complications with infusion but other than that she is without any other major new complaints.  Denies any ongoing issues with nausea, vomiting, fevers, chills, easy bruising or bleeding denies any chest pain or chest tightness.  Appetite and energy is otherwise stable.    ROS:  10-pt ROS reviewed and negative except as mentioned above.    Medications: below was reviewed and is accurate  Current Outpatient Medications   Medication Instructions    acetaminophen (TYLENOL) 650 mg, oral, Every 6 hours PRN    alendronate (FOSAMAX) 70 mg, oral, Every 7 days    ALPRAZolam (Xanax) 0.25 mg tablet 1 tablet, oral, 3 times daily PRN    cholecalciferol (Vitamin D-3) 50 MCG (2000 UT) tablet 1 tablet, oral, Daily    clotrimazole-betamethasone (Lotrisone) cream Topical, 2 times daily PRN    fluticasone (Flonase) 50 mcg/actuation nasal spray 2 sprays, nasal, Daily    minoxidil (Loniten) 2.5 mg tablet 0.5 tablets, oral, Daily    mirtazapine (REMERON) 30 mg, oral, Nightly    omeprazole (PRILOSEC) 20 mg, oral, Daily    oxybutynin XL (DITROPAN-XL) 15 mg, oral, Daily    rosuvastatin (CRESTOR) 20 mg, oral, Daily    sertraline (ZOLOFT) 200 mg, oral, Daily        Past Medical History  Past Medical History:   Diagnosis Date    Age-related osteoporosis without current pathological fracture     Osteoporosis    Anxiety     Anxiety disorder, unspecified 09/04/2014    Anxiety    Arthritis     Bladder cancer (CMS/HCC)     Breast cancer (CMS/HCC)     left breast cancer 1994    Cramp and spasm     Cramp of limb    Depression     GERD (gastroesophageal reflux  disease)     HL (hearing loss)     wears B/L hearing aids    Hyperlipidemia     Joint pain     Other conditions influencing health status     Arthritis    Other conditions influencing health status     Acute Meniscal Tear Of Right Knee    Trochanteric bursitis, unspecified hip     Trochanteric bursitis     FamilyHistory  Family History   Problem Relation Name Age of Onset    Diabetes Mother      Multiple sclerosis Mother      Heart disease Father      Bipolar disorder Brother      Cardiomyopathy Brother        Past Surgical History  Past Surgical History:   Procedure Laterality Date    BLADDER SURGERY      bladder bx    BREAST LUMPECTOMY Left     Breast Surgery Lumpectomy x 2 1996    CARPAL TUNNEL RELEASE Left     Neuroplasty Decompression Median Nerve At Carpal Tunnel    CATARACT EXTRACTION EXTRACAPSULAR W/ INTRAOCULAR LENS IMPLANTATION Bilateral     COLONOSCOPY      CYSTOSCOPY      multiple    HYSTERECTOMY  02/10/2014    Hysterectomy    OTHER SURGICAL HISTORY      bladder sling    OTHER SURGICAL HISTORY Left     post cataract laser surgery 2021    OTHER SURGICAL HISTORY Left     left shoulder surgery to remove bone spur     Social History    She  reports that she does not currently use alcohol after a past usage of about 1.0 standard drink of alcohol per week.  She  reports that she does not currently use drugs.    Physical exam:  Vitals:    01/09/24 1402   BP: 137/72   BP Location: Left arm   Patient Position: Sitting   BP Cuff Size: Adult   Pulse: 81   Resp: 18   Temp: 36 °C (96.8 °F)   TempSrc: Core   SpO2: 96%   Weight: 72.8 kg (160 lb 7.9 oz)       GEN: NAD, well-appearing  HEENT: no clear lesions seen  NECK: no clear masses  LYMPH: no palpable adenopathy  SKIN: no concerning new lesions  LUNGS: CTA  CV: RRR no clear R/G  ABD: Soft, NTND. No rebound or guarding.  EXT:  trace edema bilaterally   NEURO: Grossly intact. No focal deficits.  PSYCH: Normal mood and affect  No clear masses in the gluteal  "region    Radiology review  Reviewed in detail personally and for detail see results in EPIC        Genomics Data    Laboratory review  Reviewed in detail personally and for detail see results in EPIC  Lab Results   Component Value Date    WBC 9.5 11/20/2023    WBC 7.9 05/19/2023    WBC 7.0 05/12/2023     Lab Results   Component Value Date    HGB 12.3 11/20/2023    HGB 11.9 (L) 05/19/2023    HGB 11.8 (L) 05/12/2023     Lab Results   Component Value Date     11/20/2023     05/19/2023     05/12/2023     Lab Results   Component Value Date    GLUCOSE 108 (H) 12/13/2023    CALCIUM 9.5 12/13/2023     12/13/2023    K 4.2 12/13/2023    CO2 29 12/13/2023     12/13/2023    BUN 21 12/13/2023    CREATININE 1.03 12/13/2023     No results found for: \"PSA\"  Lab Results   Component Value Date    ALT 12 04/17/2023    AST 15 04/17/2023    ALKPHOS 73 04/17/2023    BILITOT 0.3 04/17/2023       ASSESSMENT AND PLAN   We had a long discussion regarding the natural history, prognosis, and potential treatment options for his disease.  We discussed timing of therapy and next line standard options as well as clinical trial options for his current disease state. Discussed also NCCN guidlines as per the patients diagnosis and treatment options.  The Total Visit time for this visit was minutes , which includes the following :  face to face time during the visit today if in person, phone / virtual time with the patient,  review of records, including office notes, pathology, radiology, labs, and prior treatments and care coordination. This review directly influenced my assessment and recommendations for this visit.    NMIBC -discussed in detail with the patient discussed in detail options at this juncture I discussed in detail there are 2 competing interests.  Discussed in detail the need for definitive management of her nonmuscle invasive bladder cancer status post refractory multiple agents including BCG and most " recently gemcitabine and docetaxel intravesical and discussed again also the need to address now upper tract atypical cells concerning for high-grade disease also in the right upper tract.  I discussed in detail first the rationale to address the right upper tract disease which would require a right nephroureterectomy.  I did discuss in detail the pros and cons of repeat imaging and/or repeat testing and difficulty sometimes getting a definitive diagnosis.  Also discussed in detail potential role of neoadjuvant chemotherapy with cisplatin and gemcitabine based therapy days 1 and 8 on a every 3 week cycle every 4 weeks for 4 cycles discussed in detail also limited data of the use of enfortumab vedotin pembrolizumab as a neoadjuvant approach.  Discussed the backbone of treatment would be definitive surgery discussed in detail with no clear endpoints to monitor to prevent surgery.  Discussed in detail complications that could occur from chemotherapy.  I also then discussed the need to further address and options for other agents for her nonmuscle invasive bladder cancer including discussing experimental FDA approvals FastTrack's and other agents such as studies [id number] / other options [place] / Nadoferagene risk benefits and alternatives.  Extensive conversations were had and also given I did review in detail overall very minimal growth in these lymph nodes but limited utility of CT-guided biopsy and would have definitive surgical resection of these lymph nodes with surgery.  Also discussed with her the nonspecific findings of the gluteus which would monitor for now.  I will touch base with her urologist today and most likely will schedule another follow-up with her urologist and then decide how they want to proceed.    Marcos Rojas MD  Senior Attending Physician/  in Medicine [employer] [employer]  [facility]  Patient line  [phone]  Fax [phone]

## 2024-01-09 ENCOUNTER — TUMOR BOARD CONFERENCE (OUTPATIENT)
Dept: HEMATOLOGY/ONCOLOGY | Facility: HOSPITAL | Age: 79
End: 2024-01-09
Payer: MEDICARE

## 2024-01-09 ENCOUNTER — OFFICE VISIT (OUTPATIENT)
Dept: HEMATOLOGY/ONCOLOGY | Facility: CLINIC | Age: 79
End: 2024-01-09
Payer: MEDICARE

## 2024-01-09 VITALS
HEART RATE: 81 BPM | DIASTOLIC BLOOD PRESSURE: 72 MMHG | SYSTOLIC BLOOD PRESSURE: 137 MMHG | OXYGEN SATURATION: 96 % | RESPIRATION RATE: 18 BRPM | TEMPERATURE: 96.8 F | BODY MASS INDEX: 29.35 KG/M2 | WEIGHT: 160.5 LBS

## 2024-01-09 DIAGNOSIS — C67.3 MALIGNANT NEOPLASM OF ANTERIOR WALL OF URINARY BLADDER (MULTI): ICD-10-CM

## 2024-01-09 PROCEDURE — 3075F SYST BP GE 130 - 139MM HG: CPT | Performed by: INTERNAL MEDICINE

## 2024-01-09 PROCEDURE — 1126F AMNT PAIN NOTED NONE PRSNT: CPT | Performed by: INTERNAL MEDICINE

## 2024-01-09 PROCEDURE — 1160F RVW MEDS BY RX/DR IN RCRD: CPT | Performed by: INTERNAL MEDICINE

## 2024-01-09 PROCEDURE — 3078F DIAST BP <80 MM HG: CPT | Performed by: INTERNAL MEDICINE

## 2024-01-09 PROCEDURE — 99215 OFFICE O/P EST HI 40 MIN: CPT | Performed by: INTERNAL MEDICINE

## 2024-01-09 PROCEDURE — 1036F TOBACCO NON-USER: CPT | Performed by: INTERNAL MEDICINE

## 2024-01-09 PROCEDURE — 1159F MED LIST DOCD IN RCRD: CPT | Performed by: INTERNAL MEDICINE

## 2024-01-09 ASSESSMENT — PAIN SCALES - GENERAL: PAINLEVEL: 0-NO PAIN

## 2024-01-09 ASSESSMENT — ENCOUNTER SYMPTOMS
OCCASIONAL FEELINGS OF UNSTEADINESS: 0
LOSS OF SENSATION IN FEET: 0
DEPRESSION: 0

## 2024-01-16 ENCOUNTER — TELEPHONE (OUTPATIENT)
Dept: HEMATOLOGY/ONCOLOGY | Facility: CLINIC | Age: 79
End: 2024-01-16
Payer: MEDICARE

## 2024-01-16 DIAGNOSIS — N32.81 OVERACTIVE BLADDER: Primary | ICD-10-CM

## 2024-01-16 RX ORDER — OXYBUTYNIN CHLORIDE 15 MG/1
15 TABLET, EXTENDED RELEASE ORAL DAILY
Qty: 90 TABLET | Refills: 1 | Status: SHIPPED | OUTPATIENT
Start: 2024-01-16

## 2024-02-06 NOTE — ASSESSMENT & PLAN NOTE
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  She has a slowly enlarging RP and pelvic lymph nodes.  She has met with med onc and [facility] urology oncology

## 2024-02-06 NOTE — PROGRESS NOTES
Subjective   Patient ID: Anahi Wall is a 78 y.o. female with BCG unresponsive HG pTa NMIBC with concern for b/l UO involvement s/p R URS who presents for 1 month FUV.    In the interim, has seen Dr. Rojas and seen second opinion at [facility] with Dr. Hayden.     She has a new right upper tract urothelial carcinoma which blankets the entire collecting system on the right.  This is not visible on CT Urogram.  She has an enlarging pelvic LN.  She has a recurrence of HG NMIBC.  The rest of a long history of HG NMIBC is outlined in a multitude of past notes.    Pathology showed recurrence in her R kidney. CT Urogram 12/18/2023 showed two prominent lymph nodes which are concerning. One measures 1.4cm in size. Another is near renal artery which measures 1cm.     Past medical, surgical, family and social history were reviewed and unchanged since last visit besides what is in the HPI.           Review of Systems   All other systems reviewed and are negative.      Objective   Physical Exam  Gen: No acute distress     Psych: Alert and oriented x3     Neuro:  Normal ROM    Resp: Nonlabored respirations     CV: Regular rate and rhythm     Abd: S, NT, ND.     : Deferred    Skin: Warm, dry and intact without rashes     Lymphatics: No peripheral edema             Assessment/Plan   Problem List Items Addressed This Visit             ICD-10-CM    Bladder cancer (CMS/HCC) - Primary C67.9     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  She has new right upper urinary tract urothelial carcinoma.  She has a slowly enlarging RP and pelvic lymph nodes.  She has met with med onc and [facility] urology oncology                 Plan:  I would likely advise a neoadjuvant approach here followed by consolidative surgery if response.  The scope of that surgery whether nephroureterectomy +/- cystectomy would be discussed later with node dissection.  She will be resistant to cystectomy I imagine still.  She is afraid of chemotherapy.  We went over  that I would consider doing the radical nephroureterectomy and node dissection without chemotherapy but if those nodes are positive then the surgery will not be curative and she will need systemic therapy and she may not be able to get that given changes to expect in GFR if she has one kidney.    She will call us next week with her decisions on how to proceed.        Soraida Attestation  By signing my name below, I, Katelyn Casalla, Scribe   attest that this documentation has been prepared under the direction and in the presence of Jorge A De Leon MD MPH.

## 2024-02-09 ENCOUNTER — OFFICE VISIT (OUTPATIENT)
Dept: UROLOGY | Facility: CLINIC | Age: 79
End: 2024-02-09
Payer: MEDICARE

## 2024-02-09 VITALS
HEART RATE: 84 BPM | SYSTOLIC BLOOD PRESSURE: 142 MMHG | WEIGHT: 159 LBS | DIASTOLIC BLOOD PRESSURE: 79 MMHG | BODY MASS INDEX: 29.26 KG/M2 | HEIGHT: 62 IN

## 2024-02-09 DIAGNOSIS — C67.9 MALIGNANT NEOPLASM OF URINARY BLADDER, UNSPECIFIED SITE (MULTI): Primary | ICD-10-CM

## 2024-02-09 PROCEDURE — 1036F TOBACCO NON-USER: CPT | Performed by: STUDENT IN AN ORGANIZED HEALTH CARE EDUCATION/TRAINING PROGRAM

## 2024-02-09 PROCEDURE — 1157F ADVNC CARE PLAN IN RCRD: CPT | Performed by: STUDENT IN AN ORGANIZED HEALTH CARE EDUCATION/TRAINING PROGRAM

## 2024-02-09 PROCEDURE — 1160F RVW MEDS BY RX/DR IN RCRD: CPT | Performed by: STUDENT IN AN ORGANIZED HEALTH CARE EDUCATION/TRAINING PROGRAM

## 2024-02-09 PROCEDURE — 1159F MED LIST DOCD IN RCRD: CPT | Performed by: STUDENT IN AN ORGANIZED HEALTH CARE EDUCATION/TRAINING PROGRAM

## 2024-02-09 PROCEDURE — 99215 OFFICE O/P EST HI 40 MIN: CPT | Performed by: STUDENT IN AN ORGANIZED HEALTH CARE EDUCATION/TRAINING PROGRAM

## 2024-02-09 PROCEDURE — 3078F DIAST BP <80 MM HG: CPT | Performed by: STUDENT IN AN ORGANIZED HEALTH CARE EDUCATION/TRAINING PROGRAM

## 2024-02-09 PROCEDURE — 1126F AMNT PAIN NOTED NONE PRSNT: CPT | Performed by: STUDENT IN AN ORGANIZED HEALTH CARE EDUCATION/TRAINING PROGRAM

## 2024-02-09 PROCEDURE — 3077F SYST BP >= 140 MM HG: CPT | Performed by: STUDENT IN AN ORGANIZED HEALTH CARE EDUCATION/TRAINING PROGRAM

## 2024-02-13 ENCOUNTER — TELEPHONE (OUTPATIENT)
Dept: HEMATOLOGY/ONCOLOGY | Facility: CLINIC | Age: 79
End: 2024-02-13
Payer: MEDICARE

## 2024-02-13 ENCOUNTER — PREP FOR PROCEDURE (OUTPATIENT)
Dept: UROLOGY | Facility: HOSPITAL | Age: 79
End: 2024-02-13
Payer: MEDICARE

## 2024-02-13 DIAGNOSIS — C64.9 UROTHELIAL CARCINOMA OF KIDNEY, UNSPECIFIED LATERALITY (MULTI): Primary | ICD-10-CM

## 2024-02-13 DIAGNOSIS — C68.9 UROTHELIAL CARCINOMA (MULTI): ICD-10-CM

## 2024-02-13 DIAGNOSIS — C67.3 MALIGNANT NEOPLASM OF ANTERIOR WALL OF URINARY BLADDER (MULTI): Primary | ICD-10-CM

## 2024-02-13 DIAGNOSIS — R31.21 ASYMPTOMATIC MICROSCOPIC HEMATURIA: ICD-10-CM

## 2024-02-13 DIAGNOSIS — R30.0 DYSURIA: ICD-10-CM

## 2024-02-13 RX ORDER — CHLORHEXIDINE GLUCONATE 40 MG/ML
SOLUTION TOPICAL DAILY PRN
Status: CANCELLED | OUTPATIENT
Start: 2024-02-13

## 2024-02-13 RX ORDER — SODIUM CHLORIDE 9 MG/ML
100 INJECTION, SOLUTION INTRAVENOUS CONTINUOUS
Status: CANCELLED | OUTPATIENT
Start: 2024-02-13

## 2024-02-13 RX ORDER — HEPARIN SODIUM 5000 [USP'U]/ML
5000 INJECTION, SOLUTION INTRAVENOUS; SUBCUTANEOUS ONCE
Status: CANCELLED | OUTPATIENT
Start: 2024-02-13 | End: 2024-02-13

## 2024-02-13 RX ORDER — GABAPENTIN 100 MG/1
300 CAPSULE ORAL ONCE
Status: CANCELLED | OUTPATIENT
Start: 2024-02-13 | End: 2024-02-13

## 2024-02-13 RX ORDER — CELECOXIB 50 MG/1
400 CAPSULE ORAL ONCE
Status: CANCELLED | OUTPATIENT
Start: 2024-02-13 | End: 2024-02-13

## 2024-02-13 RX ORDER — ACETAMINOPHEN 325 MG/1
975 TABLET ORAL ONCE
Status: CANCELLED | OUTPATIENT
Start: 2024-02-13 | End: 2024-02-13

## 2024-02-13 NOTE — TELEPHONE ENCOUNTER
Discussed in detail with the patient and the patient's  discussed in detail with urology at this point no planned neoadjuvant chemotherapy wants to proceed without any systemic therapy aware the risk benefits and alternatives we will then arrange follow-up postsurgery.  Her last scan was in December we will go ahead and arrange for follow-up CT scan of the chest abdomen and pelvis prior to surgery order was placed.

## 2024-02-14 PROBLEM — C68.9 UROTHELIAL CARCINOMA (MULTI): Status: ACTIVE | Noted: 2024-02-13

## 2024-02-19 ENCOUNTER — APPOINTMENT (OUTPATIENT)
Dept: PRIMARY CARE | Facility: CLINIC | Age: 79
End: 2024-02-19
Payer: MEDICARE

## 2024-02-20 ENCOUNTER — TELEPHONE (OUTPATIENT)
Dept: HEMATOLOGY/ONCOLOGY | Facility: CLINIC | Age: 79
End: 2024-02-20
Payer: MEDICARE

## 2024-02-20 NOTE — TELEPHONE ENCOUNTER
Discussed in detail with the  who was going to relay the message to the wife.  Again ongoing difficulty in communication as they continue to dictate how they would like to proceed with her treatment explained in detail the rationale to get a CAT scan prior to surgery who I did actually discuss with their urologist Dr. French also recommended as if those lymph nodes are growing prior to surgery the recommendation would be to proceed with chemotherapy.  The patient refused getting CAT scan aware of the risk benefits and alternatives and again offered to get the CAT scan and they will contact us if they would like to get that done before surgery but as of now it is canceled and patient is proceeding with surgery alone and no neoadjuvant chemotherapy or systemic therapy upfront.

## 2024-03-01 ENCOUNTER — OFFICE VISIT (OUTPATIENT)
Dept: PRIMARY CARE | Facility: CLINIC | Age: 79
End: 2024-03-01
Payer: MEDICARE

## 2024-03-01 ENCOUNTER — LAB (OUTPATIENT)
Dept: LAB | Facility: LAB | Age: 79
End: 2024-03-01
Payer: MEDICARE

## 2024-03-01 VITALS — SYSTOLIC BLOOD PRESSURE: 120 MMHG | HEART RATE: 68 BPM | OXYGEN SATURATION: 97 % | DIASTOLIC BLOOD PRESSURE: 64 MMHG

## 2024-03-01 DIAGNOSIS — I10 HTN (HYPERTENSION), BENIGN: Primary | ICD-10-CM

## 2024-03-01 DIAGNOSIS — I10 HTN (HYPERTENSION), BENIGN: ICD-10-CM

## 2024-03-01 LAB
ALBUMIN SERPL BCP-MCNC: 4.7 G/DL (ref 3.4–5)
ALP SERPL-CCNC: 63 U/L (ref 33–136)
ALT SERPL W P-5'-P-CCNC: 16 U/L (ref 7–45)
ANION GAP SERPL CALC-SCNC: 13 MMOL/L (ref 10–20)
AST SERPL W P-5'-P-CCNC: 19 U/L (ref 9–39)
BASOPHILS # BLD AUTO: 0.02 X10*3/UL (ref 0–0.1)
BASOPHILS NFR BLD AUTO: 0.3 %
BILIRUB SERPL-MCNC: 0.3 MG/DL (ref 0–1.2)
BUN SERPL-MCNC: 28 MG/DL (ref 6–23)
CALCIUM SERPL-MCNC: 9.7 MG/DL (ref 8.6–10.3)
CHLORIDE SERPL-SCNC: 105 MMOL/L (ref 98–107)
CO2 SERPL-SCNC: 29 MMOL/L (ref 21–32)
CREAT SERPL-MCNC: 0.88 MG/DL (ref 0.5–1.05)
EGFRCR SERPLBLD CKD-EPI 2021: 67 ML/MIN/1.73M*2
EOSINOPHIL # BLD AUTO: 0.08 X10*3/UL (ref 0–0.4)
EOSINOPHIL NFR BLD AUTO: 1.2 %
ERYTHROCYTE [DISTWIDTH] IN BLOOD BY AUTOMATED COUNT: 13.8 % (ref 11.5–14.5)
ERYTHROCYTE [SEDIMENTATION RATE] IN BLOOD BY WESTERGREN METHOD: 15 MM/H (ref 0–30)
GLUCOSE SERPL-MCNC: 104 MG/DL (ref 74–99)
HCT VFR BLD AUTO: 37.9 % (ref 36–46)
HGB BLD-MCNC: 12.2 G/DL (ref 12–16)
IMM GRANULOCYTES # BLD AUTO: 0.02 X10*3/UL (ref 0–0.5)
IMM GRANULOCYTES NFR BLD AUTO: 0.3 % (ref 0–0.9)
LYMPHOCYTES # BLD AUTO: 1.42 X10*3/UL (ref 0.8–3)
LYMPHOCYTES NFR BLD AUTO: 20.7 %
MCH RBC QN AUTO: 29.4 PG (ref 26–34)
MCHC RBC AUTO-ENTMCNC: 32.2 G/DL (ref 32–36)
MCV RBC AUTO: 91 FL (ref 80–100)
MONOCYTES # BLD AUTO: 0.46 X10*3/UL (ref 0.05–0.8)
MONOCYTES NFR BLD AUTO: 6.7 %
NEUTROPHILS # BLD AUTO: 4.87 X10*3/UL (ref 1.6–5.5)
NEUTROPHILS NFR BLD AUTO: 70.8 %
NRBC BLD-RTO: 0 /100 WBCS (ref 0–0)
PLATELET # BLD AUTO: 146 X10*3/UL (ref 150–450)
POTASSIUM SERPL-SCNC: 4.6 MMOL/L (ref 3.5–5.3)
PROT SERPL-MCNC: 7.6 G/DL (ref 6.4–8.2)
RBC # BLD AUTO: 4.15 X10*6/UL (ref 4–5.2)
SODIUM SERPL-SCNC: 142 MMOL/L (ref 136–145)
TSH SERPL-ACNC: 2.31 MIU/L (ref 0.44–3.98)
WBC # BLD AUTO: 6.9 X10*3/UL (ref 4.4–11.3)

## 2024-03-01 PROCEDURE — 1160F RVW MEDS BY RX/DR IN RCRD: CPT | Performed by: INTERNAL MEDICINE

## 2024-03-01 PROCEDURE — 84443 ASSAY THYROID STIM HORMONE: CPT

## 2024-03-01 PROCEDURE — 85025 COMPLETE CBC W/AUTO DIFF WBC: CPT

## 2024-03-01 PROCEDURE — 99213 OFFICE O/P EST LOW 20 MIN: CPT | Performed by: INTERNAL MEDICINE

## 2024-03-01 PROCEDURE — 1126F AMNT PAIN NOTED NONE PRSNT: CPT | Performed by: INTERNAL MEDICINE

## 2024-03-01 PROCEDURE — 1036F TOBACCO NON-USER: CPT | Performed by: INTERNAL MEDICINE

## 2024-03-01 PROCEDURE — 1157F ADVNC CARE PLAN IN RCRD: CPT | Performed by: INTERNAL MEDICINE

## 2024-03-01 PROCEDURE — 36415 COLL VENOUS BLD VENIPUNCTURE: CPT

## 2024-03-01 PROCEDURE — 85652 RBC SED RATE AUTOMATED: CPT

## 2024-03-01 PROCEDURE — 80053 COMPREHEN METABOLIC PANEL: CPT

## 2024-03-01 PROCEDURE — 3074F SYST BP LT 130 MM HG: CPT | Performed by: INTERNAL MEDICINE

## 2024-03-01 PROCEDURE — 3078F DIAST BP <80 MM HG: CPT | Performed by: INTERNAL MEDICINE

## 2024-03-01 PROCEDURE — 1159F MED LIST DOCD IN RCRD: CPT | Performed by: INTERNAL MEDICINE

## 2024-03-01 NOTE — PROGRESS NOTES
Renal CT legs tired  labs order  Patient is here today to discuss her ongoing evaluation for urothelial cancer.  She has seen urologist and hematology oncologist at the [facility] and at [facility].  She apparently has urothelial cancer involving the renal pelvis although that was not seen on CT scan.  Recommendation was for a staging workup and then possible chemo versus surgical approach.  She had been resistant to the workup and is very resistant to chemotherapy at this time.  We reviewed her medical records and her current workup.  I did encourage her to go ahead and get the CT chest abdomen pelvis to help with staging and further treatment planning.  Patient also complains of generalized fatigue.  She has not had any lab work in about 6 months she denies any specific complaints of chest pain shortness of breath nausea vomiting diarrhea or constipation.  Her weight is stable her appetite is good.  Will check a CBC chemistries and thyroid function studies.  Will reach out to Dr. French her urologist to discuss her treatment plan and help her make some decisions about how to approach her upcoming treatment.

## 2024-03-06 ENCOUNTER — HOSPITAL ENCOUNTER (OUTPATIENT)
Dept: RADIOLOGY | Facility: CLINIC | Age: 79
Discharge: HOME | End: 2024-03-06
Payer: MEDICARE

## 2024-03-06 ENCOUNTER — APPOINTMENT (OUTPATIENT)
Dept: RADIOLOGY | Facility: CLINIC | Age: 79
End: 2024-03-06
Payer: MEDICARE

## 2024-03-06 ENCOUNTER — TELEPHONE (OUTPATIENT)
Dept: HEMATOLOGY/ONCOLOGY | Facility: CLINIC | Age: 79
End: 2024-03-06
Payer: MEDICARE

## 2024-03-06 DIAGNOSIS — C67.3 MALIGNANT NEOPLASM OF ANTERIOR WALL OF URINARY BLADDER (MULTI): ICD-10-CM

## 2024-03-06 PROCEDURE — 74176 CT ABD & PELVIS W/O CONTRAST: CPT

## 2024-03-06 PROCEDURE — 74176 CT ABD & PELVIS W/O CONTRAST: CPT | Performed by: RADIOLOGY

## 2024-03-06 PROCEDURE — 71250 CT THORAX DX C-: CPT | Performed by: RADIOLOGY

## 2024-03-06 NOTE — TELEPHONE ENCOUNTER
Discussed in detail nonspecific findings on the CT nonspecific pulmonary nodules cannot find an old CT will need to follow-up and monitor with 3 months new kidney stones lymph nodes are stable at this point she is proceeding with surgery and then we will arrange follow-up afterwards to discuss pathology and will need follow-up CAT scan in the future.

## 2024-03-11 ENCOUNTER — PRE-ADMISSION TESTING (OUTPATIENT)
Dept: PREADMISSION TESTING | Facility: HOSPITAL | Age: 79
End: 2024-03-11
Payer: MEDICARE

## 2024-03-11 ENCOUNTER — HOSPITAL ENCOUNTER (OUTPATIENT)
Dept: CARDIOLOGY | Facility: HOSPITAL | Age: 79
Discharge: HOME | End: 2024-03-11
Payer: MEDICARE

## 2024-03-11 VITALS
RESPIRATION RATE: 16 BRPM | SYSTOLIC BLOOD PRESSURE: 139 MMHG | WEIGHT: 160 LBS | BODY MASS INDEX: 29.44 KG/M2 | HEIGHT: 62 IN | DIASTOLIC BLOOD PRESSURE: 81 MMHG | HEART RATE: 88 BPM | TEMPERATURE: 97.2 F

## 2024-03-11 DIAGNOSIS — C68.9 UROTHELIAL CARCINOMA (MULTI): ICD-10-CM

## 2024-03-11 DIAGNOSIS — C67.3 MALIGNANT NEOPLASM OF ANTERIOR WALL OF URINARY BLADDER (MULTI): ICD-10-CM

## 2024-03-11 DIAGNOSIS — R31.21 ASYMPTOMATIC MICROSCOPIC HEMATURIA: ICD-10-CM

## 2024-03-11 LAB
ABO GROUP (TYPE) IN BLOOD: NORMAL
ANION GAP SERPL CALC-SCNC: 11 MMOL/L (ref 10–20)
ANTIBODY SCREEN: NORMAL
APPEARANCE UR: ABNORMAL
APTT PPP: 31 SECONDS (ref 27–38)
BILIRUB UR STRIP.AUTO-MCNC: NEGATIVE MG/DL
BUN SERPL-MCNC: 23 MG/DL (ref 6–23)
CALCIUM SERPL-MCNC: 9.7 MG/DL (ref 8.6–10.6)
CHLORIDE SERPL-SCNC: 106 MMOL/L (ref 98–107)
CO2 SERPL-SCNC: 29 MMOL/L (ref 21–32)
COLOR UR: ABNORMAL
CREAT SERPL-MCNC: 0.7 MG/DL (ref 0.5–1.05)
EGFRCR SERPLBLD CKD-EPI 2021: 88 ML/MIN/1.73M*2
ERYTHROCYTE [DISTWIDTH] IN BLOOD BY AUTOMATED COUNT: 13.6 % (ref 11.5–14.5)
GLUCOSE SERPL-MCNC: 76 MG/DL (ref 74–99)
GLUCOSE UR STRIP.AUTO-MCNC: NORMAL MG/DL
HCT VFR BLD AUTO: 36.6 % (ref 36–46)
HGB BLD-MCNC: 12 G/DL (ref 12–16)
INR PPP: 1.1 (ref 0.9–1.1)
KETONES UR STRIP.AUTO-MCNC: NEGATIVE MG/DL
LEUKOCYTE ESTERASE UR QL STRIP.AUTO: ABNORMAL
MCH RBC QN AUTO: 29.7 PG (ref 26–34)
MCHC RBC AUTO-ENTMCNC: 32.8 G/DL (ref 32–36)
MCV RBC AUTO: 91 FL (ref 80–100)
NITRITE UR QL STRIP.AUTO: NEGATIVE
NRBC BLD-RTO: 0 /100 WBCS (ref 0–0)
PH UR STRIP.AUTO: 5.5 [PH]
PLATELET # BLD AUTO: 142 X10*3/UL (ref 150–450)
POTASSIUM SERPL-SCNC: 4.1 MMOL/L (ref 3.5–5.3)
PROT UR STRIP.AUTO-MCNC: ABNORMAL MG/DL
PROTHROMBIN TIME: 12.1 SECONDS (ref 9.8–12.8)
RBC # BLD AUTO: 4.04 X10*6/UL (ref 4–5.2)
RBC # UR STRIP.AUTO: ABNORMAL /UL
RBC #/AREA URNS AUTO: NORMAL /HPF
RH FACTOR (ANTIGEN D): NORMAL
SODIUM SERPL-SCNC: 142 MMOL/L (ref 136–145)
SP GR UR STRIP.AUTO: 1.01
UROBILINOGEN UR STRIP.AUTO-MCNC: NORMAL MG/DL
WBC # BLD AUTO: 5.8 X10*3/UL (ref 4.4–11.3)
WBC #/AREA URNS AUTO: NORMAL /HPF

## 2024-03-11 PROCEDURE — 99205 OFFICE O/P NEW HI 60 MIN: CPT | Performed by: NURSE PRACTITIONER

## 2024-03-11 PROCEDURE — 85610 PROTHROMBIN TIME: CPT | Performed by: STUDENT IN AN ORGANIZED HEALTH CARE EDUCATION/TRAINING PROGRAM

## 2024-03-11 PROCEDURE — 36415 COLL VENOUS BLD VENIPUNCTURE: CPT

## 2024-03-11 PROCEDURE — 93005 ELECTROCARDIOGRAM TRACING: CPT

## 2024-03-11 PROCEDURE — 87086 URINE CULTURE/COLONY COUNT: CPT | Performed by: STUDENT IN AN ORGANIZED HEALTH CARE EDUCATION/TRAINING PROGRAM

## 2024-03-11 PROCEDURE — 85027 COMPLETE CBC AUTOMATED: CPT | Performed by: STUDENT IN AN ORGANIZED HEALTH CARE EDUCATION/TRAINING PROGRAM

## 2024-03-11 PROCEDURE — 81001 URINALYSIS AUTO W/SCOPE: CPT | Performed by: STUDENT IN AN ORGANIZED HEALTH CARE EDUCATION/TRAINING PROGRAM

## 2024-03-11 PROCEDURE — 80048 BASIC METABOLIC PNL TOTAL CA: CPT | Performed by: STUDENT IN AN ORGANIZED HEALTH CARE EDUCATION/TRAINING PROGRAM

## 2024-03-11 PROCEDURE — 93010 ELECTROCARDIOGRAM REPORT: CPT | Performed by: INTERNAL MEDICINE

## 2024-03-11 PROCEDURE — 86900 BLOOD TYPING SEROLOGIC ABO: CPT | Performed by: STUDENT IN AN ORGANIZED HEALTH CARE EDUCATION/TRAINING PROGRAM

## 2024-03-11 ASSESSMENT — CHADS2 SCORE
HYPERTENSION: YES
DIABETES: NO
AGE GREATER THAN OR EQUAL TO 75: YES
CHF: NO
PRIOR STROKE OR TIA OR THROMBOEMBOLISM: NO
CHADS2 SCORE: 2

## 2024-03-11 ASSESSMENT — LIFESTYLE VARIABLES: SMOKING_STATUS: NONSMOKER

## 2024-03-11 ASSESSMENT — ENCOUNTER SYMPTOMS
RESPIRATORY NEGATIVE: 1
NEUROLOGICAL NEGATIVE: 1
MYALGIAS: 1
CONSTITUTIONAL NEGATIVE: 1
NECK NEGATIVE: 1
ARTHRALGIAS: 1
GASTROINTESTINAL NEGATIVE: 1
CARDIOVASCULAR NEGATIVE: 1
ENDOCRINE NEGATIVE: 1

## 2024-03-11 ASSESSMENT — DUKE ACTIVITY SCORE INDEX (DASI)
CAN YOU DO YARD WORK LIKE RAKING LEAVES, WEEDING OR PUSHING A MOWER: YES
TOTAL_SCORE: 28.7
CAN YOU RUN A SHORT DISTANCE: NO
CAN YOU HAVE SEXUAL RELATIONS: YES
CAN YOU DO LIGHT WORK AROUND THE HOUSE LIKE DUSTING OR WASHING DISHES: YES
CAN YOU WALK INDOORS, SUCH AS AROUND YOUR HOUSE: YES
CAN YOU WALK A BLOCK OR TWO ON LEVEL GROUND: YES
CAN YOU PARTICIPATE IN MODERATE RECREATIONAL ACTIVITIES LIKE GOLF, BOWLING, DANCING, DOUBLES TENNIS OR THROWING A BASEBALL OR FOOTBALL: NO
DASI METS SCORE: 6.3
CAN YOU PARTICIPATE IN STRENOUS SPORTS LIKE SWIMMING, SINGLES TENNIS, FOOTBALL, BASKETBALL, OR SKIING: NO
CAN YOU DO HEAVY WORK AROUND THE HOUSE LIKE SCRUBBING FLOORS OR LIFTING AND MOVING HEAVY FURNITURE: NO
CAN YOU DO MODERATE WORK AROUND THE HOUSE LIKE VACUUMING, SWEEPING FLOORS OR CARRYING GROCERIES: YES
CAN YOU CLIMB A FLIGHT OF STAIRS OR WALK UP A HILL: YES
CAN YOU TAKE CARE OF YOURSELF (EAT, DRESS, BATHE, OR USE TOILET): YES

## 2024-03-11 NOTE — CPM/PAT H&P
CPM/PAT Evaluation       Name: Anahi Wall (Anahi Wall)  /Age: 1945/78 y.o.     Visit Type:   In-Person       Chief Complaint: Urothelial carcinoma     HPI  Pt is a 78 year old female past medical history significant for bladder cancer s/p right ureteroscopy. Pt has a new right upper tract urothelial carcinoma, an enlarging pelvic lymph node and a recurrence of HG NMIBC.  Pt is being evaluated in CPM in anticipation of a Laparoscopic Right Nephroureterectomy with Dr. De Leon on 3-28-24.  Past Medical History:   Diagnosis Date    Anxiety     controlled with medication    Arthritis     Bladder cancer (CMS/HCC)     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    Breast cancer (CMS/HCC)     left sided lumpectomy, s/p xrt also completed     Carpal tunnel syndrome     s/p Left wrist surgery    Cataract     removed    Cramp and spasm     Cramp of limb    Depression     controlled with medication    GERD (gastroesophageal reflux disease)     Managed with Prilosec    Hearing aid worn     HL (hearing loss)     wears B/L hearing aids    Hyperlipidemia     F/W PCP    Joint pain     Nephrolithiasis     Osteoporosis     Other conditions influencing health status     Acute Meniscal Tear Of Right Knee    Trochanteric bursitis, unspecified hip     Urothelial cancer (CMS/HCC)     Vision loss     wears glasses       Past Surgical History:   Procedure Laterality Date    BLADDER SURGERY  2019    bladder bx    BREAST LUMPECTOMY Left     Breast Surgery Lumpectomy x 2     CARPAL TUNNEL RELEASE Left     Neuroplasty Decompression Median Nerve At Carpal Tunnel    CATARACT EXTRACTION      CATARACT EXTRACTION EXTRACAPSULAR W/ INTRAOCULAR LENS IMPLANTATION Bilateral 2017    COLONOSCOPY      CYSTOSCOPY      multiple    HYSTERECTOMY  02/10/2014    Hysterectomy    OTHER SURGICAL HISTORY      bladder sling    OTHER SURGICAL HISTORY Left     post cataract laser surgery     OTHER SURGICAL HISTORY Left     left  shoulder surgery to remove bone spur       Patient Sexual activity questions deferred to the physician.    Family History   Problem Relation Name Age of Onset    Diabetes Mother      Multiple sclerosis Mother      Hypertension Father      Stroke Father      Heart disease Father      Bipolar disorder Brother      Cardiomyopathy Brother         Allergies   Allergen Reactions    Aspirin Nausea Only    Nsaids (Non-Steroidal Anti-Inflammatory Drug) Nausea Only    Penicillins Rash       Prior to Admission medications    Medication Sig Start Date End Date Taking? Authorizing Provider   acetaminophen (Tylenol) 325 mg tablet Take 2 tablets (650 mg) by mouth every 6 hours if needed for mild pain (1 - 3).  Patient not taking: Reported on 3/4/2024 11/28/23   Alex Orosco MD   alendronate (Fosamax) 70 mg tablet Take 1 tablet (70 mg) by mouth every 7 days.  Patient taking differently: Take 1 tablet (70 mg) by mouth every 7 days. Takes on [place] 4/3/23   Jose Luis Conrad MD   ALPRAZolam (Xanax) 0.25 mg tablet Take 1 tablet (0.25 mg) by mouth 3 times a day as needed. 8/26/14   Historical Provider, MD   cholecalciferol (Vitamin D-3) 50 MCG (2000 UT) tablet Take 1 tablet (2,000 Units) by mouth once daily.    Historical Provider, MD   clotrimazole-betamethasone (Lotrisone) cream Apply topically 2 times a day as needed. 8/22/22   Historical Provider, MD   fluticasone (Flonase) 50 mcg/actuation nasal spray Administer 2 sprays into affected nostril(s) once daily. 9/4/14   Historical Provider, MD   minoxidil (Loniten) 2.5 mg tablet Take 0.5 tablets (1.25 mg) by mouth once daily.    Historical Provider, MD   mirtazapine (Remeron) 30 mg tablet Take 1 tablet (30 mg) by mouth once daily at bedtime. 7/21/23   Chichi Swann MD   omeprazole (PriLOSEC) 20 mg DR capsule Take 1 capsule (20 mg) by mouth once daily. 9/18/23   Jose Luis Conrad MD   oxybutynin XL (Ditropan-XL) 15 mg 24 hr tablet Take 1 tablet (15 mg) by mouth once daily. 1/16/24   Chichi  JORY Swann MD   rosuvastatin (Crestor) 20 mg tablet Take 1 tablet (20 mg) by mouth once daily.  Patient taking differently: Take 1 tablet (20 mg) by mouth once daily at bedtime. 1:00pm 10/17/23   Jose Luis Conrad MD   sertraline (Zoloft) 100 mg tablet Take 2 tablets (200 mg) by mouth once daily. 5/3/23   Jose Luis Conrad MD        PAT ROS:   Constitutional:   neg    Neuro/Psych:   neg    Eyes:    use of corrective lenses  Ears:    hearing loss   hearing aides  Nose:   neg    Mouth:   neg    Throat:   neg    Neck:   neg    Cardio:   neg    Respiratory:   neg    Endocrine:   neg    GI:   neg    :   neg    Musculoskeletal:    arthralgias   myalgias  Hematologic:   neg    Skin:  neg        Physical Exam  Vitals reviewed.   Constitutional:       Appearance: Normal appearance.   HENT:      Head: Normocephalic.      Nose: Nose normal.      Mouth/Throat:      Mouth: Mucous membranes are moist.   Eyes:      Pupils: Pupils are equal, round, and reactive to light.   Cardiovascular:      Rate and Rhythm: Normal rate and regular rhythm.      Pulses: Normal pulses.      Heart sounds: Normal heart sounds.   Pulmonary:      Effort: Pulmonary effort is normal.      Breath sounds: Normal breath sounds.   Abdominal:      Palpations: Abdomen is soft.   Musculoskeletal:         General: Normal range of motion.      Cervical back: Normal range of motion.   Skin:     General: Skin is warm.   Neurological:      General: No focal deficit present.      Mental Status: She is alert and oriented to person, place, and time.   Psychiatric:         Mood and Affect: Mood normal.         Behavior: Behavior normal.          PAT AIRWAY:   Airway:     Mallampati::  II    TM distance::  >3 FB    Neck ROM::  Full  normal        Visit Vitals  /81   Pulse 88   Temp 36.2 °C (97.2 °F)   Resp 16       DASI Risk Score      Flowsheet Row Most Recent Value   DASI SCORE 28.7   METS Score (Will be calculated only when all the questions are answered) 6.3           Caprini DVT Assessment      Flowsheet Row Most Recent Value   DVT Score 12   Current Status Major surgery planned, lasting over 3 hours   History Prior major surgery, Previous malignancy   Age Over 75 years   BMI 30 or less          Modified Frailty Index      Flowsheet Row Most Recent Value   Modified Frailty Index Calculator .[id number]          CHADS2 Stroke Risk  Current as of today        N/A 3 - 100%: High Risk   2 - 3%: Medium Risk   0 - 2%: Low Risk     Last Change: N/A          This score determines the patient's risk of having a stroke if the patient has atrial fibrillation.        This score is not applicable to this patient. Components are not calculated.          Revised Cardiac Risk Index      Flowsheet Row Most Recent Value   Revised Cardiac Risk Calculator 0          Apfel Simplified Score      Flowsheet Row Most Recent Value   Apfel Simplified Score Calculator 3          Risk Analysis Index Results This Encounter    No data found in the last 1 encounters.       Stop Bang Score      Flowsheet Row Most Recent Value   Do you snore loudly? 1   Do you often feel tired or fatigued after your sleep? 0   Has anyone ever observed you stop breathing in your sleep? 0   Do you have or are you being treated for high blood pressure? 0   Recent BMI (Calculated) 29.3   Is BMI greater than 35 kg/m2? 0=No   Age older than 50 years old? 1=Yes   Gender - Male 0=No            Assessment and Plan:     Anesthesia:  The patient denies problems with anesthesia in the past such as PONV, prolonged sedation, awareness, dental damage, aspiration, cardiac arrest, difficult intubation, or unexpected hospital admissions.     Neuro:   The patient has no neurological diagnoses or significant findings on chart review, clinical presentation, and evaluation. No grossly apparent perioperative risk. The patient is at increased risk for postoperative delirium secondary to age 65 or older.    HEENT/Airway  No diagnoses, significant findings  on chart review, clinical presentation, or evaluation.    Cardiovascular  The patient is scheduled for non-cardiac surgery associated with elevated risk.  The patient has no major cardiac contraindications to non- cardiac surgery.  RCRI  The patient meets 0-1 RCRI criteria and therefore has a less than 1% risk of major adverse cardiac complications.  METS  The patient's functional capacity capacity is greater than 4 METS.  EKG  The patient has no EKG or echocardiographic changes concerning for myocardial ischemia.   Heart Failure  The patient has no known history of heart failure.  Additionally, the patient reports no symptoms of heart failure and demonstrates no signs of heart failure.  Hypertension Evaluation  The patient has no known history of hypertension and has a normal blood pressure today.  Heart Rhythm Evaluation  The patient has no history of arrhythmias.  Heart Valve Evaluation  The patient has no known history of valvular heart disease. The patient has no symptoms or physical exam findings to suggest valvular heart disease.  CARDS EVAL  The patient is not followed by cardiology.    The patient has a 30-day risk for MACE of 0 predictors, 3.9% risk for cardiac death, nonfatal myocardial infarction, and nonfatal cardiac arrest.  LILLIANA score which indicates a 0.3% risk of intraoperative or 30-day postoperative.    Pulmonary   No significant findings on chart review or clinical presentation and evaluation. The patient is at increased risk of perioperative pulmonary complications secondary to advanced age greater than 60.    The patient has a stop bang score of 2, which places patient at low risk for having JESSICA.    ARISCAT 19, low, 1.6% risk of in-hospital postoperative pulmonary complications  PRODIGY 12, intermediate risk of respiratory depression episode. Patient given PI sheet for preoperative deep breathing exercises.    Hematology  The patient has diagnoses or significant findings on chart review or  clinical presentation and evaluation significant for Bladder Cancer.  Antiplatelet management   The patient is not currently receiving antiplatelet therapy.  Anticoagulation management  The patient is not currently receiving anticoagulation therapy.    Caprini score 12, high risk of perioperative VTE.   Patient instructed to ambulate as soon as possible postoperatively to decrease thromboembolic risk. Initiate mechanical DVT prophylaxis as soon as possible and initiate chemical prophylaxis when deemed safe from a bleeding standpoint post surgery.     Transfusion Evaluation  A type and screen was obtained given the likelihood for perioperative transfusion of blood or blood products.    Gastrointestinal  The patient has diagnoses or significant findings on chart review or clinical presentation and evaluation significant for GERD.  Eat 10- 0,  self-perceived oropharyngeal dysphagia scale (0-40)     Genitourinary  The patient has diagnoses or significant findings on chart review or clinical presentation and evaluation significant for nephrolithiasis and bladder cancer    Renal  The patient has no known history of chronic kidney disease. The patient has specific risk factors associated with increased risk of perioperative renal complications due to age greater than 55..     Musculoskeletal  The patient has diagnoses or significant findings on chart review or clinical presentation and evaluation significant for osteoarthritis    Endocrine  Diabetes Evaluation  The patient has no history of diabetes mellitus  Thyroid Disease Evaluation  The patient has no history of thyroid disease.    ID  No diagnoses or significant findings on chart review or clinical presentation and evaluation.    -Preoperative medication instructions were provided and reviewed with the patient.  Any additional testing or evaluation was explained to the patient.  NPO Instructions were discussed, and the patient's questions were answered prior to  conclusion of this encounter.       Recent Results (from the past 168 hour(s))   Basic Metabolic Panel    Collection Time: 03/11/24 11:06 AM   Result Value Ref Range    Glucose 76 74 - 99 mg/dL    Sodium 142 136 - 145 mmol/L    Potassium 4.1 3.5 - 5.3 mmol/L    Chloride 106 98 - 107 mmol/L    Bicarbonate 29 21 - 32 mmol/L    Anion Gap 11 10 - 20 mmol/L    Urea Nitrogen 23 6 - 23 mg/dL    Creatinine 0.70 0.50 - 1.05 mg/dL    eGFR 88 >60 mL/min/1.73m*2    Calcium 9.7 8.6 - 10.6 mg/dL   CBC    Collection Time: 03/11/24 11:06 AM   Result Value Ref Range    WBC 5.8 4.4 - 11.3 x10*3/uL    nRBC 0.0 0.0 - 0.0 /100 WBCs    RBC 4.04 4.00 - 5.20 x10*6/uL    Hemoglobin 12.0 12.0 - 16.0 g/dL    Hematocrit 36.6 36.0 - 46.0 %    MCV 91 80 - 100 fL    MCH 29.7 26.0 - 34.0 pg    MCHC 32.8 32.0 - 36.0 g/dL    RDW 13.6 11.5 - 14.5 %    Platelets 142 (L) 150 - 450 x10*3/uL   Coagulation Screen    Collection Time: 03/11/24 11:06 AM   Result Value Ref Range    Protime 12.1 9.8 - 12.8 seconds    INR 1.1 0.9 - 1.1    aPTT 31 27 - 38 seconds   Type And Screen    Collection Time: 03/11/24 11:06 AM   Result Value Ref Range    ABO TYPE AB     Rh TYPE POS     ANTIBODY SCREEN NEG    Urinalysis with Reflex Culture and Microscopic    Collection Time: 03/11/24 11:06 AM   Result Value Ref Range    Color, Urine Light-Yellow Light-Yellow, Yellow, Dark-Yellow    Appearance, Urine Turbid (N) Clear    Specific Gravity, Urine 1.015 1.005 - 1.035    pH, Urine 5.5 5.0, 5.5, 6.0, 6.5, 7.0, 7.5, 8.0    Protein, Urine 10 (TRACE) NEGATIVE, 10 (TRACE), 20 (TRACE) mg/dL    Glucose, Urine Normal Normal mg/dL    Blood, Urine 0.2 (2+) (A) NEGATIVE    Ketones, Urine NEGATIVE NEGATIVE mg/dL    Bilirubin, Urine NEGATIVE NEGATIVE    Urobilinogen, Urine Normal Normal mg/dL    Nitrite, Urine NEGATIVE NEGATIVE    Leukocyte Esterase, Urine 25 Reagan/µL (A) NEGATIVE   Extra Urine Gray Tube    Collection Time: 03/11/24 11:06 AM   Result Value Ref Range    Extra Tube Hold for  add-ons.    Microscopic Only, Urine    Collection Time: 03/11/24 11:06 AM   Result Value Ref Range    WBC, Urine NONE 1-5, NONE /HPF    RBC, Urine NONE NONE, 1-2, 3-5 /HPF   Urine Culture    Collection Time: 03/11/24 11:06 AM    Specimen: Clean Catch/Voided; Urine   Result Value Ref Range    Urine Culture No significant growth    ECG 12 lead    Collection Time: 03/12/24 11:17 AM   Result Value Ref Range    Ventricular Rate 76 BPM    Atrial Rate 76 BPM    CA Interval 156 ms    QRS Duration 86 ms    QT Interval 416 ms    QTC Calculation(Bazett) 468 ms    P Axis 49 degrees    R Axis -50 degrees    T Axis 36 degrees    QRS Count 13 beats    Q Onset 214 ms    P Onset 136 ms    P Offset 191 ms    T Offset 422 ms    QTC Fredericia 449 ms

## 2024-03-11 NOTE — PREPROCEDURE INSTRUCTIONS
NPO Instructions:    Do not eat any food after midnight the night before your surgery/procedure.  You may have clear liquids until TWO hours before surgery/procedure. This includes water, black tea/coffee, (no milk or cream) apple juice and electrolyte drinks (Gatorade).  You may chew gum up to TWO hours before your surgery/procedure.    Additional Instructions:     Seven/Six Days before Surgery:  Review your medication instructions, stop indicated medications  Five Days before Surgery:  Review your medication instructions, stop indicated medications  Three Days before Surgery:  Review your medication instructions, stop indicated medications  The Day before Surgery:  Review your medication instructions, stop indicated medications  You will be contacted regarding the time of your arrival to facility and surgery time  Do not eat any food after Midnight  Day of Surgery:  Review your medication instructions, take indicated medications  You may have clear liquids until TWO hours before surgery/procedure.  This includes water, black tea/coffee, (no milk or cream) apple juice and electrolyte drinks (Gatorade)  You may chew gum up to TWO hours before your surgery/procedure  Wear  comfortable loose fitting clothing  Do not use moisturizers, creams, lotions or perfume  All jewelry and valuables should be left at home

## 2024-03-12 LAB
ATRIAL RATE: 76 BPM
BACTERIA UR CULT: NORMAL
HOLD SPECIMEN: NORMAL
P AXIS: 49 DEGREES
P OFFSET: 191 MS
P ONSET: 136 MS
PR INTERVAL: 156 MS
Q ONSET: 214 MS
QRS COUNT: 13 BEATS
QRS DURATION: 86 MS
QT INTERVAL: 416 MS
QTC CALCULATION(BAZETT): 468 MS
QTC FREDERICIA: 449 MS
R AXIS: -50 DEGREES
T AXIS: 36 DEGREES
T OFFSET: 422 MS
VENTRICULAR RATE: 76 BPM

## 2024-03-12 PROCEDURE — 93005 ELECTROCARDIOGRAM TRACING: CPT

## 2024-03-18 NOTE — PROGRESS NOTES
This visit was completed via telemedicine. All issues as below were discussed and addressed but no physical exam was performed unless allowed by visual confirmation. If it was felt that the patient should be evaluated in clinic, then they were directed there. Patient verbally consented to visit.      Subjective   Patient ID: Anahi Wall is a 78 y.o. female with BCG unresponsive HG pTa NMIBC with concern for b/l UO involvement s/p R URS who presents for 6 week FUV. She is scheduled for nephroureterectomy.     She has seen Dr. Rojas and seen second opinion at [facility] with Dr. Hayden.     She has a new right upper tract urothelial carcinoma which blankets the entire collecting system on the right.  This is not visible on CT Urogram.  She has an enlarging pelvic LN.  She has a recurrence of HG NMIBC.     Pathology showed recurrence in her R kidney. CT Urogram 12/18/2023 showed two prominent lymph nodes which are concerning. One measures 1.4cm in size. Another is near renal artery which measures 1cm.     She is refractory to both BCG and combination Gemcitabine/Docetaxel.    Initial dx: 2010, initially LG but recurred with HG pTa.      BCG treatment: First dose in 2013. She had induction and 1 maintenance with recurrence of HG pTa with CIS. She had more induction BCG and six cycles of maintenance BCG through 2019. She then developed recurrences in 2019 with CIS, 2020 HG pTa with chemotherapy after surgery. She was given more BCG including induction and maintenance and then had recurrence 11 months later with HG with LG pTa.      [place]/Doc treatment: Her recurrences were treated with gem/doc combination in 2022 with recurrence immediately. She had another 6 doses of induction and recurred again 3-4 months thereafter relatively diffusely.       Past medical, surgical, family and social history were reviewed and unchanged since last visit besides what is in the HPI.           Review of Systems   All other systems  reviewed and are negative.      Objective   Physical Exam  Gen: No acute distress     Psych: Alert and oriented x3     Resp: Nonlabored respirations             Assessment/Plan   Problem List Items Addressed This Visit             ICD-10-CM    Bladder cancer (CMS/[facility]) - Primary C67.9     I answered a few of her questions regarding her upcoming scheduled nephroureterectomy.     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  She has a slowly enlarging RP and pelvic lymph nodes.  She has met with med onc and [facility] urology oncology.    I explained that we would begin her surgery by investigating within her bladder. If disease is found, we would remove that. We would then move forward with removal of the kidney, ureter and surrounding lymph nodes. The patient understands that if those nodes are positive then the surgery will not be curative and she will need systemic therapy and she may not be able to get that given changes to expect in GFR if she has one kidney.     I explained to the patient the risks of surgery including bleeding requiring transfusion, infection, injury to surrounding structures (colon, small intestine, spleen, liver and pancreas), recurrence, need for further procedures, wound infection, failure to cure, need for further procedures and cardiopulmonary events.  I would like the patient to see PAT prior to surgery for pre-operative clearance.  I mentioned that the likely recovery period was 4-6 weeks with the initial LOS *.  The patient agreed with the assessment and plan and surgery will be scheduled at the best available time.  I appreciate the kind referral and allowing me to participate in the care of this patient.                   Plan:    Scheduled for nephroureterectomy next week   Plan for TURBT followed by robotic right nephrureterectomy and LND.          Scribe Attestation  By signing my name below, I, Katelyn Casalla, Scribe   attest  that this documentation has been prepared under the direction and in the presence of Jorge A De Leon MD MPH.

## 2024-03-22 ENCOUNTER — TELEMEDICINE (OUTPATIENT)
Dept: UROLOGY | Facility: CLINIC | Age: 79
End: 2024-03-22
Payer: MEDICARE

## 2024-03-22 DIAGNOSIS — C67.9 MALIGNANT NEOPLASM OF URINARY BLADDER, UNSPECIFIED SITE (MULTI): Primary | ICD-10-CM

## 2024-03-22 PROCEDURE — 1159F MED LIST DOCD IN RCRD: CPT | Performed by: STUDENT IN AN ORGANIZED HEALTH CARE EDUCATION/TRAINING PROGRAM

## 2024-03-22 PROCEDURE — 1036F TOBACCO NON-USER: CPT | Performed by: STUDENT IN AN ORGANIZED HEALTH CARE EDUCATION/TRAINING PROGRAM

## 2024-03-22 PROCEDURE — 1160F RVW MEDS BY RX/DR IN RCRD: CPT | Performed by: STUDENT IN AN ORGANIZED HEALTH CARE EDUCATION/TRAINING PROGRAM

## 2024-03-22 PROCEDURE — 99442 PR PHYS/QHP TELEPHONE EVALUATION 11-20 MIN: CPT | Performed by: STUDENT IN AN ORGANIZED HEALTH CARE EDUCATION/TRAINING PROGRAM

## 2024-03-22 PROCEDURE — 1157F ADVNC CARE PLAN IN RCRD: CPT | Performed by: STUDENT IN AN ORGANIZED HEALTH CARE EDUCATION/TRAINING PROGRAM

## 2024-03-22 NOTE — ASSESSMENT & PLAN NOTE
I answered a few of her questions regarding her upcoming scheduled nephroureterectomy.     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  She has a slowly enlarging RP and pelvic lymph nodes.  She has met with med onc and [facility] urology oncology.    I explained that we would begin her surgery by investigating within her bladder. If disease is found, we would remove that. We would then move forward with removal of the kidney, ureter and surrounding lymph nodes. The patient understands that if those nodes are positive then the surgery will not be curative and she will need systemic therapy and she may not be able to get that given changes to expect in GFR if she has one kidney.     I explained to the patient the risks of surgery including bleeding requiring transfusion, infection, injury to surrounding structures (colon, small intestine, spleen, liver and pancreas), recurrence, need for further procedures, wound infection, failure to cure, need for further procedures and cardiopulmonary events.  I would like the patient to see PAT prior to surgery for pre-operative clearance.  I mentioned that the likely recovery period was 4-6 weeks with the initial LOS *.  The patient agreed with the assessment and plan and surgery will be scheduled at the best available time.  I appreciate the kind referral and allowing me to participate in the care of this patient.

## 2024-03-27 ENCOUNTER — ANESTHESIA EVENT (OUTPATIENT)
Dept: OPERATING ROOM | Facility: HOSPITAL | Age: 79
DRG: 654 | End: 2024-03-27
Payer: MEDICARE

## 2024-03-28 ENCOUNTER — HOSPITAL ENCOUNTER (INPATIENT)
Facility: HOSPITAL | Age: 79
LOS: 2 days | Discharge: HOME | DRG: 654 | End: 2024-03-30
Attending: STUDENT IN AN ORGANIZED HEALTH CARE EDUCATION/TRAINING PROGRAM | Admitting: STUDENT IN AN ORGANIZED HEALTH CARE EDUCATION/TRAINING PROGRAM
Payer: MEDICARE

## 2024-03-28 ENCOUNTER — ANESTHESIA (OUTPATIENT)
Dept: OPERATING ROOM | Facility: HOSPITAL | Age: 79
DRG: 654 | End: 2024-03-28
Payer: MEDICARE

## 2024-03-28 DIAGNOSIS — C68.9 UROTHELIAL CARCINOMA (MULTI): ICD-10-CM

## 2024-03-28 DIAGNOSIS — Z90.5 HISTORY OF NEPHROURETERECTOMY: ICD-10-CM

## 2024-03-28 DIAGNOSIS — Z90.6 HISTORY OF NEPHROURETERECTOMY: ICD-10-CM

## 2024-03-28 DIAGNOSIS — C64.9 UROTHELIAL CARCINOMA OF KIDNEY, UNSPECIFIED LATERALITY (MULTI): Primary | ICD-10-CM

## 2024-03-28 PROBLEM — C64.1 UROTHELIAL CARCINOMA OF KIDNEY, RIGHT (MULTI): Status: ACTIVE | Noted: 2024-03-28

## 2024-03-28 LAB
ANION GAP BLDA CALCULATED.4IONS-SCNC: 14 MMO/L (ref 10–25)
ANION GAP SERPL CALC-SCNC: 16 MMOL/L (ref 10–20)
BASE EXCESS BLDA CALC-SCNC: -2.7 MMOL/L (ref -2–3)
BODY TEMPERATURE: 37 DEGREES CELSIUS
BUN SERPL-MCNC: 25 MG/DL (ref 6–23)
CA-I BLDA-SCNC: 1.23 MMOL/L (ref 1.1–1.33)
CALCIUM SERPL-MCNC: 8.7 MG/DL (ref 8.6–10.6)
CHLORIDE BLDA-SCNC: 104 MMOL/L (ref 98–107)
CHLORIDE SERPL-SCNC: 105 MMOL/L (ref 98–107)
CO2 SERPL-SCNC: 25 MMOL/L (ref 21–32)
CREAT SERPL-MCNC: 1.18 MG/DL (ref 0.5–1.05)
EGFRCR SERPLBLD CKD-EPI 2021: 47 ML/MIN/1.73M*2
ERYTHROCYTE [DISTWIDTH] IN BLOOD BY AUTOMATED COUNT: 14.1 % (ref 11.5–14.5)
GLUCOSE BLDA-MCNC: 201 MG/DL (ref 74–99)
GLUCOSE SERPL-MCNC: 185 MG/DL (ref 74–99)
HCO3 BLDA-SCNC: 23.2 MMOL/L (ref 22–26)
HCT VFR BLD AUTO: 34.2 % (ref 36–46)
HCT VFR BLD EST: 33 % (ref 36–46)
HGB BLD-MCNC: 10.8 G/DL (ref 12–16)
HGB BLDA-MCNC: 11 G/DL (ref 12–16)
INHALED O2 CONCENTRATION: 65 %
LACTATE BLDA-SCNC: 1.6 MMOL/L (ref 0.4–2)
MCH RBC QN AUTO: 29.2 PG (ref 26–34)
MCHC RBC AUTO-ENTMCNC: 31.6 G/DL (ref 32–36)
MCV RBC AUTO: 92 FL (ref 80–100)
NRBC BLD-RTO: 0 /100 WBCS (ref 0–0)
OXYHGB MFR BLDA: 94.7 % (ref 94–98)
PCO2 BLDA: 44 MM HG (ref 38–42)
PH BLDA: 7.33 PH (ref 7.38–7.42)
PLATELET # BLD AUTO: 128 X10*3/UL (ref 150–450)
PO2 BLDA: 77 MM HG (ref 85–95)
POTASSIUM BLDA-SCNC: 4 MMOL/L (ref 3.5–5.3)
POTASSIUM SERPL-SCNC: 3.9 MMOL/L (ref 3.5–5.3)
RBC # BLD AUTO: 3.7 X10*6/UL (ref 4–5.2)
SAO2 % BLDA: 97 % (ref 94–100)
SODIUM BLDA-SCNC: 137 MMOL/L (ref 136–145)
SODIUM SERPL-SCNC: 142 MMOL/L (ref 136–145)
WBC # BLD AUTO: 9.7 X10*3/UL (ref 4.4–11.3)

## 2024-03-28 PROCEDURE — 2500000004 HC RX 250 GENERAL PHARMACY W/ HCPCS (ALT 636 FOR OP/ED): Performed by: STUDENT IN AN ORGANIZED HEALTH CARE EDUCATION/TRAINING PROGRAM

## 2024-03-28 PROCEDURE — 2780000003 HC OR 278 NO HCPCS: Performed by: STUDENT IN AN ORGANIZED HEALTH CARE EDUCATION/TRAINING PROGRAM

## 2024-03-28 PROCEDURE — 37799 UNLISTED PX VASCULAR SURGERY: CPT | Performed by: STUDENT IN AN ORGANIZED HEALTH CARE EDUCATION/TRAINING PROGRAM

## 2024-03-28 PROCEDURE — 2500000001 HC RX 250 WO HCPCS SELF ADMINISTERED DRUGS (ALT 637 FOR MEDICARE OP): Performed by: STUDENT IN AN ORGANIZED HEALTH CARE EDUCATION/TRAINING PROGRAM

## 2024-03-28 PROCEDURE — 2500000005 HC RX 250 GENERAL PHARMACY W/O HCPCS

## 2024-03-28 PROCEDURE — 84132 ASSAY OF SERUM POTASSIUM: CPT | Performed by: STUDENT IN AN ORGANIZED HEALTH CARE EDUCATION/TRAINING PROGRAM

## 2024-03-28 PROCEDURE — 36620 INSERTION CATHETER ARTERY: CPT

## 2024-03-28 PROCEDURE — 3600000018 HC OR TIME - INITIAL BASE CHARGE - PROCEDURE LEVEL SIX: Performed by: STUDENT IN AN ORGANIZED HEALTH CARE EDUCATION/TRAINING PROGRAM

## 2024-03-28 PROCEDURE — 0TT64ZZ RESECTION OF RIGHT URETER, PERCUTANEOUS ENDOSCOPIC APPROACH: ICD-10-PCS | Performed by: STUDENT IN AN ORGANIZED HEALTH CARE EDUCATION/TRAINING PROGRAM

## 2024-03-28 PROCEDURE — 8E0W4CZ ROBOTIC ASSISTED PROCEDURE OF TRUNK REGION, PERCUTANEOUS ENDOSCOPIC APPROACH: ICD-10-PCS | Performed by: STUDENT IN AN ORGANIZED HEALTH CARE EDUCATION/TRAINING PROGRAM

## 2024-03-28 PROCEDURE — 99100 ANES PT EXTEME AGE<1 YR&>70: CPT | Performed by: ANESTHESIOLOGY

## 2024-03-28 PROCEDURE — 0TJ98ZZ INSPECTION OF URETER, VIA NATURAL OR ARTIFICIAL OPENING ENDOSCOPIC: ICD-10-PCS | Performed by: STUDENT IN AN ORGANIZED HEALTH CARE EDUCATION/TRAINING PROGRAM

## 2024-03-28 PROCEDURE — 85027 COMPLETE CBC AUTOMATED: CPT | Performed by: STUDENT IN AN ORGANIZED HEALTH CARE EDUCATION/TRAINING PROGRAM

## 2024-03-28 PROCEDURE — 84132 ASSAY OF SERUM POTASSIUM: CPT

## 2024-03-28 PROCEDURE — 2500000004 HC RX 250 GENERAL PHARMACY W/ HCPCS (ALT 636 FOR OP/ED)

## 2024-03-28 PROCEDURE — 38780 REMOVE ABDOMEN LYMPH NODES: CPT | Performed by: STUDENT IN AN ORGANIZED HEALTH CARE EDUCATION/TRAINING PROGRAM

## 2024-03-28 PROCEDURE — A50548 PR NEPHRECTOMY, W/PART. URETECTOMY: Performed by: ANESTHESIOLOGY

## 2024-03-28 PROCEDURE — 7100000001 HC RECOVERY ROOM TIME - INITIAL BASE CHARGE: Performed by: STUDENT IN AN ORGANIZED HEALTH CARE EDUCATION/TRAINING PROGRAM

## 2024-03-28 PROCEDURE — 88307 TISSUE EXAM BY PATHOLOGIST: CPT | Performed by: PATHOLOGY

## 2024-03-28 PROCEDURE — 88342 IMHCHEM/IMCYTCHM 1ST ANTB: CPT | Performed by: PATHOLOGY

## 2024-03-28 PROCEDURE — 07TC4ZZ RESECTION OF PELVIS LYMPHATIC, PERCUTANEOUS ENDOSCOPIC APPROACH: ICD-10-PCS | Performed by: STUDENT IN AN ORGANIZED HEALTH CARE EDUCATION/TRAINING PROGRAM

## 2024-03-28 PROCEDURE — 0TT04ZZ RESECTION OF RIGHT KIDNEY, PERCUTANEOUS ENDOSCOPIC APPROACH: ICD-10-PCS | Performed by: STUDENT IN AN ORGANIZED HEALTH CARE EDUCATION/TRAINING PROGRAM

## 2024-03-28 PROCEDURE — 96372 THER/PROPH/DIAG INJ SC/IM: CPT | Performed by: STUDENT IN AN ORGANIZED HEALTH CARE EDUCATION/TRAINING PROGRAM

## 2024-03-28 PROCEDURE — 2720000007 HC OR 272 NO HCPCS: Performed by: STUDENT IN AN ORGANIZED HEALTH CARE EDUCATION/TRAINING PROGRAM

## 2024-03-28 PROCEDURE — 50548 LAPARO REMOVE W/URETER: CPT | Performed by: STUDENT IN AN ORGANIZED HEALTH CARE EDUCATION/TRAINING PROGRAM

## 2024-03-28 PROCEDURE — 38780 REMOVE ABDOMEN LYMPH NODES: CPT

## 2024-03-28 PROCEDURE — 0TBB4ZZ EXCISION OF BLADDER, PERCUTANEOUS ENDOSCOPIC APPROACH: ICD-10-PCS | Performed by: STUDENT IN AN ORGANIZED HEALTH CARE EDUCATION/TRAINING PROGRAM

## 2024-03-28 PROCEDURE — 3700000001 HC GENERAL ANESTHESIA TIME - INITIAL BASE CHARGE: Performed by: STUDENT IN AN ORGANIZED HEALTH CARE EDUCATION/TRAINING PROGRAM

## 2024-03-28 PROCEDURE — 3700000002 HC GENERAL ANESTHESIA TIME - EACH INCREMENTAL 1 MINUTE: Performed by: STUDENT IN AN ORGANIZED HEALTH CARE EDUCATION/TRAINING PROGRAM

## 2024-03-28 PROCEDURE — 2500000002 HC RX 250 W HCPCS SELF ADMINISTERED DRUGS (ALT 637 FOR MEDICARE OP, ALT 636 FOR OP/ED)

## 2024-03-28 PROCEDURE — 88307 TISSUE EXAM BY PATHOLOGIST: CPT | Mod: TC,SUR | Performed by: STUDENT IN AN ORGANIZED HEALTH CARE EDUCATION/TRAINING PROGRAM

## 2024-03-28 PROCEDURE — 50548 LAPARO REMOVE W/URETER: CPT

## 2024-03-28 PROCEDURE — 3600000017 HC OR TIME - EACH INCREMENTAL 1 MINUTE - PROCEDURE LEVEL SIX: Performed by: STUDENT IN AN ORGANIZED HEALTH CARE EDUCATION/TRAINING PROGRAM

## 2024-03-28 PROCEDURE — 2500000002 HC RX 250 W HCPCS SELF ADMINISTERED DRUGS (ALT 637 FOR MEDICARE OP, ALT 636 FOR OP/ED): Mod: MUE | Performed by: STUDENT IN AN ORGANIZED HEALTH CARE EDUCATION/TRAINING PROGRAM

## 2024-03-28 PROCEDURE — 1170000001 HC PRIVATE ONCOLOGY ROOM DAILY

## 2024-03-28 PROCEDURE — 7100000002 HC RECOVERY ROOM TIME - EACH INCREMENTAL 1 MINUTE: Performed by: STUDENT IN AN ORGANIZED HEALTH CARE EDUCATION/TRAINING PROGRAM

## 2024-03-28 PROCEDURE — 2500000002 HC RX 250 W HCPCS SELF ADMINISTERED DRUGS (ALT 637 FOR MEDICARE OP, ALT 636 FOR OP/ED): Performed by: STUDENT IN AN ORGANIZED HEALTH CARE EDUCATION/TRAINING PROGRAM

## 2024-03-28 PROCEDURE — 88341 IMHCHEM/IMCYTCHM EA ADD ANTB: CPT | Performed by: PATHOLOGY

## 2024-03-28 RX ORDER — ONDANSETRON 4 MG/1
4 TABLET, ORALLY DISINTEGRATING ORAL EVERY 8 HOURS PRN
Status: DISCONTINUED | OUTPATIENT
Start: 2024-03-28 | End: 2024-03-30 | Stop reason: HOSPADM

## 2024-03-28 RX ORDER — PANTOPRAZOLE SODIUM 40 MG/10ML
40 INJECTION, POWDER, LYOPHILIZED, FOR SOLUTION INTRAVENOUS
Status: DISCONTINUED | OUTPATIENT
Start: 2024-03-29 | End: 2024-03-30 | Stop reason: HOSPADM

## 2024-03-28 RX ORDER — SODIUM CHLORIDE, SODIUM LACTATE, POTASSIUM CHLORIDE, CALCIUM CHLORIDE 600; 310; 30; 20 MG/100ML; MG/100ML; MG/100ML; MG/100ML
100 INJECTION, SOLUTION INTRAVENOUS CONTINUOUS
Status: DISCONTINUED | OUTPATIENT
Start: 2024-03-28 | End: 2024-03-28 | Stop reason: HOSPADM

## 2024-03-28 RX ORDER — NORETHINDRONE AND ETHINYL ESTRADIOL 0.5-0.035
KIT ORAL AS NEEDED
Status: DISCONTINUED | OUTPATIENT
Start: 2024-03-28 | End: 2024-03-28

## 2024-03-28 RX ORDER — CHLORHEXIDINE GLUCONATE 40 MG/ML
SOLUTION TOPICAL DAILY PRN
Status: DISCONTINUED | OUTPATIENT
Start: 2024-03-28 | End: 2024-03-28 | Stop reason: HOSPADM

## 2024-03-28 RX ORDER — ACETAMINOPHEN 325 MG/1
975 TABLET ORAL ONCE
Status: COMPLETED | OUTPATIENT
Start: 2024-03-28 | End: 2024-03-28

## 2024-03-28 RX ORDER — ESMOLOL HYDROCHLORIDE 10 MG/ML
INJECTION INTRAVENOUS AS NEEDED
Status: DISCONTINUED | OUTPATIENT
Start: 2024-03-28 | End: 2024-03-28

## 2024-03-28 RX ORDER — OXYCODONE HYDROCHLORIDE 5 MG/1
5 TABLET ORAL EVERY 4 HOURS PRN
Status: DISCONTINUED | OUTPATIENT
Start: 2024-03-28 | End: 2024-03-28 | Stop reason: HOSPADM

## 2024-03-28 RX ORDER — GABAPENTIN 300 MG/1
300 CAPSULE ORAL ONCE
Status: COMPLETED | OUTPATIENT
Start: 2024-03-28 | End: 2024-03-28

## 2024-03-28 RX ORDER — NALOXONE HYDROCHLORIDE 0.4 MG/ML
0.2 INJECTION, SOLUTION INTRAMUSCULAR; INTRAVENOUS; SUBCUTANEOUS EVERY 5 MIN PRN
Status: DISCONTINUED | OUTPATIENT
Start: 2024-03-28 | End: 2024-03-30 | Stop reason: HOSPADM

## 2024-03-28 RX ORDER — SODIUM CHLORIDE 9 MG/ML
100 INJECTION, SOLUTION INTRAVENOUS CONTINUOUS
Status: DISCONTINUED | OUTPATIENT
Start: 2024-03-28 | End: 2024-03-28

## 2024-03-28 RX ORDER — LIDOCAINE HYDROCHLORIDE 10 MG/ML
0.1 INJECTION, SOLUTION EPIDURAL; INFILTRATION; INTRACAUDAL; PERINEURAL ONCE
Status: DISCONTINUED | OUTPATIENT
Start: 2024-03-28 | End: 2024-03-28 | Stop reason: HOSPADM

## 2024-03-28 RX ORDER — ALPRAZOLAM 0.5 MG/1
0.25 TABLET ORAL NIGHTLY PRN
Status: DISCONTINUED | OUTPATIENT
Start: 2024-03-28 | End: 2024-03-30 | Stop reason: HOSPADM

## 2024-03-28 RX ORDER — DOCUSATE SODIUM 100 MG/1
100 CAPSULE, LIQUID FILLED ORAL 2 TIMES DAILY
Status: DISCONTINUED | OUTPATIENT
Start: 2024-03-28 | End: 2024-03-30 | Stop reason: HOSPADM

## 2024-03-28 RX ORDER — HYDROMORPHONE HYDROCHLORIDE 1 MG/ML
0.2 INJECTION, SOLUTION INTRAMUSCULAR; INTRAVENOUS; SUBCUTANEOUS EVERY 5 MIN PRN
Status: DISCONTINUED | OUTPATIENT
Start: 2024-03-28 | End: 2024-03-28 | Stop reason: HOSPADM

## 2024-03-28 RX ORDER — SODIUM CHLORIDE 9 MG/ML
50 INJECTION, SOLUTION INTRAVENOUS CONTINUOUS
Status: DISCONTINUED | OUTPATIENT
Start: 2024-03-28 | End: 2024-03-30 | Stop reason: HOSPADM

## 2024-03-28 RX ORDER — CEFAZOLIN SODIUM 2 G/100ML
2 INJECTION, SOLUTION INTRAVENOUS ONCE
Status: DISCONTINUED | OUTPATIENT
Start: 2024-03-28 | End: 2024-03-28 | Stop reason: HOSPADM

## 2024-03-28 RX ORDER — LIDOCAINE 560 MG/1
1 PATCH PERCUTANEOUS; TOPICAL; TRANSDERMAL DAILY
Status: DISCONTINUED | OUTPATIENT
Start: 2024-03-28 | End: 2024-03-30 | Stop reason: HOSPADM

## 2024-03-28 RX ORDER — OXYCODONE HYDROCHLORIDE 5 MG/1
10 TABLET ORAL EVERY 4 HOURS PRN
Status: DISCONTINUED | OUTPATIENT
Start: 2024-03-28 | End: 2024-03-28 | Stop reason: HOSPADM

## 2024-03-28 RX ORDER — POLYETHYLENE GLYCOL 3350 17 G/17G
17 POWDER, FOR SOLUTION ORAL DAILY
Status: DISCONTINUED | OUTPATIENT
Start: 2024-03-28 | End: 2024-03-30 | Stop reason: HOSPADM

## 2024-03-28 RX ORDER — LIDOCAINE HYDROCHLORIDE 20 MG/ML
INJECTION, SOLUTION INFILTRATION; PERINEURAL AS NEEDED
Status: DISCONTINUED | OUTPATIENT
Start: 2024-03-28 | End: 2024-03-28

## 2024-03-28 RX ORDER — CELECOXIB 200 MG/1
400 CAPSULE ORAL ONCE
Status: DISCONTINUED | OUTPATIENT
Start: 2024-03-28 | End: 2024-03-28 | Stop reason: HOSPADM

## 2024-03-28 RX ORDER — SERTRALINE HYDROCHLORIDE 100 MG/1
200 TABLET, FILM COATED ORAL DAILY
Status: DISCONTINUED | OUTPATIENT
Start: 2024-03-29 | End: 2024-03-30 | Stop reason: HOSPADM

## 2024-03-28 RX ORDER — MIRTAZAPINE 15 MG/1
30 TABLET, FILM COATED ORAL NIGHTLY
Status: DISCONTINUED | OUTPATIENT
Start: 2024-03-28 | End: 2024-03-30 | Stop reason: HOSPADM

## 2024-03-28 RX ORDER — ACETAMINOPHEN 325 MG/1
650 TABLET ORAL EVERY 4 HOURS
Status: DISCONTINUED | OUTPATIENT
Start: 2024-03-28 | End: 2024-03-30 | Stop reason: HOSPADM

## 2024-03-28 RX ORDER — HYDROMORPHONE HYDROCHLORIDE 1 MG/ML
0.4 INJECTION, SOLUTION INTRAMUSCULAR; INTRAVENOUS; SUBCUTANEOUS EVERY 5 MIN PRN
Status: DISCONTINUED | OUTPATIENT
Start: 2024-03-28 | End: 2024-03-28 | Stop reason: HOSPADM

## 2024-03-28 RX ORDER — OXYCODONE HYDROCHLORIDE 5 MG/1
5 TABLET ORAL EVERY 4 HOURS PRN
Status: DISCONTINUED | OUTPATIENT
Start: 2024-03-28 | End: 2024-03-30 | Stop reason: HOSPADM

## 2024-03-28 RX ORDER — ACETAMINOPHEN 500 MG
5 TABLET ORAL NIGHTLY PRN
Status: DISCONTINUED | OUTPATIENT
Start: 2024-03-28 | End: 2024-03-30 | Stop reason: HOSPADM

## 2024-03-28 RX ORDER — OXYCODONE HYDROCHLORIDE 5 MG/1
10 TABLET ORAL EVERY 4 HOURS PRN
Status: DISCONTINUED | OUTPATIENT
Start: 2024-03-28 | End: 2024-03-30 | Stop reason: HOSPADM

## 2024-03-28 RX ORDER — PANTOPRAZOLE SODIUM 40 MG/1
40 TABLET, DELAYED RELEASE ORAL
Status: DISCONTINUED | OUTPATIENT
Start: 2024-03-29 | End: 2024-03-30 | Stop reason: HOSPADM

## 2024-03-28 RX ORDER — HYDROMORPHONE HYDROCHLORIDE 1 MG/ML
INJECTION, SOLUTION INTRAMUSCULAR; INTRAVENOUS; SUBCUTANEOUS AS NEEDED
Status: DISCONTINUED | OUTPATIENT
Start: 2024-03-28 | End: 2024-03-28

## 2024-03-28 RX ORDER — TRAMADOL HYDROCHLORIDE 50 MG/1
50 TABLET ORAL EVERY 4 HOURS PRN
Status: DISCONTINUED | OUTPATIENT
Start: 2024-03-28 | End: 2024-03-29

## 2024-03-28 RX ORDER — SIMETHICONE 80 MG
160 TABLET,CHEWABLE ORAL
Status: DISCONTINUED | OUTPATIENT
Start: 2024-03-29 | End: 2024-03-30 | Stop reason: HOSPADM

## 2024-03-28 RX ORDER — CEFAZOLIN 1 G/1
INJECTION, POWDER, FOR SOLUTION INTRAVENOUS AS NEEDED
Status: DISCONTINUED | OUTPATIENT
Start: 2024-03-28 | End: 2024-03-28

## 2024-03-28 RX ORDER — HEPARIN SODIUM 5000 [USP'U]/ML
5000 INJECTION, SOLUTION INTRAVENOUS; SUBCUTANEOUS EVERY 8 HOURS
Status: DISCONTINUED | OUTPATIENT
Start: 2024-03-28 | End: 2024-03-30 | Stop reason: HOSPADM

## 2024-03-28 RX ORDER — PROCHLORPERAZINE MALEATE 10 MG
10 TABLET ORAL EVERY 6 HOURS PRN
Status: DISCONTINUED | OUTPATIENT
Start: 2024-03-28 | End: 2024-03-30 | Stop reason: HOSPADM

## 2024-03-28 RX ORDER — PROCHLORPERAZINE EDISYLATE 5 MG/ML
10 INJECTION INTRAMUSCULAR; INTRAVENOUS EVERY 6 HOURS PRN
Status: DISCONTINUED | OUTPATIENT
Start: 2024-03-28 | End: 2024-03-30 | Stop reason: HOSPADM

## 2024-03-28 RX ORDER — FENTANYL CITRATE 50 UG/ML
INJECTION, SOLUTION INTRAMUSCULAR; INTRAVENOUS AS NEEDED
Status: DISCONTINUED | OUTPATIENT
Start: 2024-03-28 | End: 2024-03-28

## 2024-03-28 RX ORDER — CEFAZOLIN SODIUM 2 G/100ML
2 INJECTION, SOLUTION INTRAVENOUS EVERY 8 HOURS
Status: DISCONTINUED | OUTPATIENT
Start: 2024-03-28 | End: 2024-03-30

## 2024-03-28 RX ORDER — ROSUVASTATIN CALCIUM 10 MG/1
20 TABLET, COATED ORAL NIGHTLY
Status: DISCONTINUED | OUTPATIENT
Start: 2024-03-28 | End: 2024-03-30 | Stop reason: HOSPADM

## 2024-03-28 RX ORDER — PROCHLORPERAZINE 25 MG/1
25 SUPPOSITORY RECTAL EVERY 12 HOURS PRN
Status: DISCONTINUED | OUTPATIENT
Start: 2024-03-28 | End: 2024-03-30 | Stop reason: HOSPADM

## 2024-03-28 RX ORDER — ONDANSETRON HYDROCHLORIDE 2 MG/ML
4 INJECTION, SOLUTION INTRAVENOUS EVERY 8 HOURS PRN
Status: DISCONTINUED | OUTPATIENT
Start: 2024-03-28 | End: 2024-03-30 | Stop reason: HOSPADM

## 2024-03-28 RX ORDER — PROPOFOL 10 MG/ML
INJECTION, EMULSION INTRAVENOUS AS NEEDED
Status: DISCONTINUED | OUTPATIENT
Start: 2024-03-28 | End: 2024-03-28

## 2024-03-28 RX ORDER — HYDROMORPHONE HYDROCHLORIDE 1 MG/ML
0.2 INJECTION, SOLUTION INTRAMUSCULAR; INTRAVENOUS; SUBCUTANEOUS
Status: DISCONTINUED | OUTPATIENT
Start: 2024-03-28 | End: 2024-03-30 | Stop reason: HOSPADM

## 2024-03-28 RX ORDER — ONDANSETRON HYDROCHLORIDE 2 MG/ML
INJECTION, SOLUTION INTRAVENOUS AS NEEDED
Status: DISCONTINUED | OUTPATIENT
Start: 2024-03-28 | End: 2024-03-28

## 2024-03-28 RX ORDER — APREPITANT 40 MG/1
CAPSULE ORAL AS NEEDED
Status: DISCONTINUED | OUTPATIENT
Start: 2024-03-28 | End: 2024-03-28

## 2024-03-28 RX ORDER — PHENYLEPHRINE HCL IN 0.9% NACL 1 MG/10 ML
SYRINGE (ML) INTRAVENOUS AS NEEDED
Status: DISCONTINUED | OUTPATIENT
Start: 2024-03-28 | End: 2024-03-28

## 2024-03-28 RX ORDER — HEPARIN SODIUM 5000 [USP'U]/ML
5000 INJECTION, SOLUTION INTRAVENOUS; SUBCUTANEOUS ONCE
Status: COMPLETED | OUTPATIENT
Start: 2024-03-28 | End: 2024-03-28

## 2024-03-28 RX ORDER — ROCURONIUM BROMIDE 10 MG/ML
INJECTION, SOLUTION INTRAVENOUS AS NEEDED
Status: DISCONTINUED | OUTPATIENT
Start: 2024-03-28 | End: 2024-03-28

## 2024-03-28 RX ADMIN — DOCUSATE SODIUM 100 MG: 100 CAPSULE, LIQUID FILLED ORAL at 20:46

## 2024-03-28 RX ADMIN — HYDROMORPHONE HYDROCHLORIDE 0.2 MG: 1 INJECTION, SOLUTION INTRAMUSCULAR; INTRAVENOUS; SUBCUTANEOUS at 13:37

## 2024-03-28 RX ADMIN — FENTANYL CITRATE 100 MCG: 50 INJECTION, SOLUTION INTRAMUSCULAR; INTRAVENOUS at 07:25

## 2024-03-28 RX ADMIN — HYDROMORPHONE HYDROCHLORIDE 0.4 MG: 1 INJECTION, SOLUTION INTRAMUSCULAR; INTRAVENOUS; SUBCUTANEOUS at 15:39

## 2024-03-28 RX ADMIN — CEFAZOLIN 2 G: 1 INJECTION, POWDER, FOR SOLUTION INTRAMUSCULAR; INTRAVENOUS at 07:40

## 2024-03-28 RX ADMIN — HYDROMORPHONE HYDROCHLORIDE 0.2 MG: 1 INJECTION, SOLUTION INTRAMUSCULAR; INTRAVENOUS; SUBCUTANEOUS at 16:19

## 2024-03-28 RX ADMIN — HEPARIN SODIUM 5000 UNITS: 5000 INJECTION INTRAVENOUS; SUBCUTANEOUS at 20:46

## 2024-03-28 RX ADMIN — EPHEDRINE SULFATE 5 MG: 50 INJECTION, SOLUTION INTRAVENOUS at 10:37

## 2024-03-28 RX ADMIN — ROCURONIUM 20 MG: 50 INJECTION, SOLUTION INTRAVENOUS at 10:28

## 2024-03-28 RX ADMIN — EPHEDRINE SULFATE 5 MG: 50 INJECTION, SOLUTION INTRAVENOUS at 12:31

## 2024-03-28 RX ADMIN — APREPITANT 40 MG: 40 CAPSULE ORAL at 07:03

## 2024-03-28 RX ADMIN — ROCURONIUM 10 MG: 50 INJECTION, SOLUTION INTRAVENOUS at 12:17

## 2024-03-28 RX ADMIN — SODIUM CHLORIDE 100 ML/HR: 9 INJECTION, SOLUTION INTRAVENOUS at 20:46

## 2024-03-28 RX ADMIN — DEXAMETHASONE SODIUM PHOSPHATE 8 MG: 4 INJECTION INTRA-ARTICULAR; INTRALESIONAL; INTRAMUSCULAR; INTRAVENOUS; SOFT TISSUE at 07:40

## 2024-03-28 RX ADMIN — EPHEDRINE SULFATE 5 MG: 50 INJECTION, SOLUTION INTRAVENOUS at 08:18

## 2024-03-28 RX ADMIN — LIDOCAINE HYDROCHLORIDE 50 MG: 20 INJECTION, SOLUTION INFILTRATION; PERINEURAL at 07:25

## 2024-03-28 RX ADMIN — ROCURONIUM 10 MG: 50 INJECTION, SOLUTION INTRAVENOUS at 11:42

## 2024-03-28 RX ADMIN — ESMOLOL HYDROCHLORIDE 30 MG: 10 INJECTION, SOLUTION INTRAVENOUS at 08:22

## 2024-03-28 RX ADMIN — EPHEDRINE SULFATE 5 MG: 50 INJECTION, SOLUTION INTRAVENOUS at 08:14

## 2024-03-28 RX ADMIN — PROPOFOL 50 MG: 10 INJECTION, EMULSION INTRAVENOUS at 07:34

## 2024-03-28 RX ADMIN — ESMOLOL HYDROCHLORIDE 20 MG: 10 INJECTION, SOLUTION INTRAVENOUS at 08:20

## 2024-03-28 RX ADMIN — HEPARIN SODIUM 5000 UNITS: 5000 INJECTION INTRAVENOUS; SUBCUTANEOUS at 07:04

## 2024-03-28 RX ADMIN — ROCURONIUM 10 MG: 50 INJECTION, SOLUTION INTRAVENOUS at 10:01

## 2024-03-28 RX ADMIN — EPHEDRINE SULFATE 5 MG: 50 INJECTION, SOLUTION INTRAVENOUS at 08:54

## 2024-03-28 RX ADMIN — ROSUVASTATIN CALCIUM 20 MG: 10 TABLET, FILM COATED ORAL at 20:46

## 2024-03-28 RX ADMIN — ROCURONIUM 50 MG: 50 INJECTION, SOLUTION INTRAVENOUS at 07:29

## 2024-03-28 RX ADMIN — HYDROMORPHONE HYDROCHLORIDE 0.4 MG: 1 INJECTION, SOLUTION INTRAMUSCULAR; INTRAVENOUS; SUBCUTANEOUS at 14:22

## 2024-03-28 RX ADMIN — ONDANSETRON 4 MG: 2 INJECTION, SOLUTION INTRAMUSCULAR; INTRAVENOUS at 12:50

## 2024-03-28 RX ADMIN — SUGAMMADEX 200 MG: 100 INJECTION, SOLUTION INTRAVENOUS at 13:31

## 2024-03-28 RX ADMIN — SODIUM CHLORIDE, SODIUM LACTATE, POTASSIUM CHLORIDE, AND CALCIUM CHLORIDE: 600; 310; 30; 20 INJECTION, SOLUTION INTRAVENOUS at 11:20

## 2024-03-28 RX ADMIN — GABAPENTIN 300 MG: 300 CAPSULE ORAL at 07:04

## 2024-03-28 RX ADMIN — ACETAMINOPHEN 975 MG: 325 TABLET ORAL at 07:04

## 2024-03-28 RX ADMIN — SODIUM CHLORIDE, SODIUM LACTATE, POTASSIUM CHLORIDE, AND CALCIUM CHLORIDE: 600; 310; 30; 20 INJECTION, SOLUTION INTRAVENOUS at 07:18

## 2024-03-28 RX ADMIN — HYDROMORPHONE HYDROCHLORIDE 0.4 MG: 1 INJECTION, SOLUTION INTRAMUSCULAR; INTRAVENOUS; SUBCUTANEOUS at 13:45

## 2024-03-28 RX ADMIN — EPHEDRINE SULFATE 5 MG: 50 INJECTION, SOLUTION INTRAVENOUS at 08:56

## 2024-03-28 RX ADMIN — ACETAMINOPHEN 650 MG: 325 TABLET ORAL at 20:46

## 2024-03-28 RX ADMIN — EPHEDRINE SULFATE 5 MG: 50 INJECTION, SOLUTION INTRAVENOUS at 11:08

## 2024-03-28 RX ADMIN — HYDROMORPHONE HYDROCHLORIDE 0.2 MG: 1 INJECTION, SOLUTION INTRAMUSCULAR; INTRAVENOUS; SUBCUTANEOUS at 13:08

## 2024-03-28 RX ADMIN — EPHEDRINE SULFATE 10 MG: 50 INJECTION, SOLUTION INTRAVENOUS at 11:57

## 2024-03-28 RX ADMIN — Medication 80 MCG: at 12:57

## 2024-03-28 RX ADMIN — PROPOFOL 150 MG: 10 INJECTION, EMULSION INTRAVENOUS at 07:26

## 2024-03-28 RX ADMIN — EPHEDRINE SULFATE 5 MG: 50 INJECTION, SOLUTION INTRAVENOUS at 12:17

## 2024-03-28 RX ADMIN — CEFAZOLIN 2 G: 1 INJECTION, POWDER, FOR SOLUTION INTRAMUSCULAR; INTRAVENOUS at 11:40

## 2024-03-28 RX ADMIN — MIRTAZAPINE 30 MG: 15 TABLET, FILM COATED ORAL at 20:46

## 2024-03-28 RX ADMIN — ROCURONIUM 20 MG: 50 INJECTION, SOLUTION INTRAVENOUS at 09:04

## 2024-03-28 RX ADMIN — CEFAZOLIN SODIUM 2 G: 2 INJECTION, SOLUTION INTRAVENOUS at 21:59

## 2024-03-28 RX ADMIN — HYDROMORPHONE HYDROCHLORIDE 0.2 MG: 1 INJECTION, SOLUTION INTRAMUSCULAR; INTRAVENOUS; SUBCUTANEOUS at 12:44

## 2024-03-28 RX ADMIN — ROCURONIUM 20 MG: 50 INJECTION, SOLUTION INTRAVENOUS at 08:31

## 2024-03-28 SDOH — SOCIAL STABILITY: SOCIAL INSECURITY: DO YOU FEEL ANYONE HAS EXPLOITED OR TAKEN ADVANTAGE OF YOU FINANCIALLY OR OF YOUR PERSONAL PROPERTY?: NO

## 2024-03-28 SDOH — SOCIAL STABILITY: SOCIAL INSECURITY: DO YOU FEEL UNSAFE GOING BACK TO THE PLACE WHERE YOU ARE LIVING?: NO

## 2024-03-28 SDOH — SOCIAL STABILITY: SOCIAL INSECURITY: ABUSE: ADULT

## 2024-03-28 SDOH — SOCIAL STABILITY: SOCIAL INSECURITY: DOES ANYONE TRY TO KEEP YOU FROM HAVING/CONTACTING OTHER FRIENDS OR DOING THINGS OUTSIDE YOUR HOME?: NO

## 2024-03-28 SDOH — SOCIAL STABILITY: SOCIAL INSECURITY: ARE THERE ANY APPARENT SIGNS OF INJURIES/BEHAVIORS THAT COULD BE RELATED TO ABUSE/NEGLECT?: NO

## 2024-03-28 SDOH — SOCIAL STABILITY: SOCIAL INSECURITY: ARE YOU OR HAVE YOU BEEN THREATENED OR ABUSED PHYSICALLY, EMOTIONALLY, OR SEXUALLY BY ANYONE?: NO

## 2024-03-28 SDOH — SOCIAL STABILITY: SOCIAL INSECURITY: HAVE YOU HAD THOUGHTS OF HARMING ANYONE ELSE?: NO

## 2024-03-28 SDOH — SOCIAL STABILITY: SOCIAL INSECURITY: HAS ANYONE EVER THREATENED TO HURT YOUR FAMILY OR YOUR PETS?: NO

## 2024-03-28 SDOH — SOCIAL STABILITY: SOCIAL INSECURITY: WERE YOU ABLE TO COMPLETE ALL THE BEHAVIORAL HEALTH SCREENINGS?: YES

## 2024-03-28 SDOH — HEALTH STABILITY: MENTAL HEALTH: CURRENT SMOKER: 0

## 2024-03-28 ASSESSMENT — PAIN SCALES - GENERAL
PAINLEVEL_OUTOF10: 0 - NO PAIN
PAINLEVEL_OUTOF10: 7
PAINLEVEL_OUTOF10: 3
PAINLEVEL_OUTOF10: 7
PAINLEVEL_OUTOF10: 3
PAINLEVEL_OUTOF10: 5 - MODERATE PAIN
PAINLEVEL_OUTOF10: 0 - NO PAIN
PAINLEVEL_OUTOF10: 5 - MODERATE PAIN

## 2024-03-28 ASSESSMENT — PAIN - FUNCTIONAL ASSESSMENT

## 2024-03-28 ASSESSMENT — COLUMBIA-SUICIDE SEVERITY RATING SCALE - C-SSRS
1. IN THE PAST MONTH, HAVE YOU WISHED YOU WERE DEAD OR WISHED YOU COULD GO TO SLEEP AND NOT WAKE UP?: NO
6. HAVE YOU EVER DONE ANYTHING, STARTED TO DO ANYTHING, OR PREPARED TO DO ANYTHING TO END YOUR LIFE?: NO
6. HAVE YOU EVER DONE ANYTHING, STARTED TO DO ANYTHING, OR PREPARED TO DO ANYTHING TO END YOUR LIFE?: NO
2. HAVE YOU ACTUALLY HAD ANY THOUGHTS OF KILLING YOURSELF?: NO
1. IN THE PAST MONTH, HAVE YOU WISHED YOU WERE DEAD OR WISHED YOU COULD GO TO SLEEP AND NOT WAKE UP?: NO
2. HAVE YOU ACTUALLY HAD ANY THOUGHTS OF KILLING YOURSELF?: NO

## 2024-03-28 ASSESSMENT — PAIN DESCRIPTION - ORIENTATION
ORIENTATION: RIGHT
ORIENTATION: RIGHT

## 2024-03-28 ASSESSMENT — LIFESTYLE VARIABLES
HOW MANY STANDARD DRINKS CONTAINING ALCOHOL DO YOU HAVE ON A TYPICAL DAY: PATIENT DOES NOT DRINK
AUDIT-C TOTAL SCORE: 0
HOW OFTEN DO YOU HAVE 6 OR MORE DRINKS ON ONE OCCASION: NEVER
HOW OFTEN DO YOU HAVE A DRINK CONTAINING ALCOHOL: NEVER
SKIP TO QUESTIONS 9-10: 1
AUDIT-C TOTAL SCORE: 0

## 2024-03-28 ASSESSMENT — COGNITIVE AND FUNCTIONAL STATUS - GENERAL
MOBILITY SCORE: 24
PATIENT BASELINE BEDBOUND: NO
DAILY ACTIVITIY SCORE: 24

## 2024-03-28 ASSESSMENT — ACTIVITIES OF DAILY LIVING (ADL)
HEARING - RIGHT EAR: HEARING AID
JUDGMENT_ADEQUATE_SAFELY_COMPLETE_DAILY_ACTIVITIES: YES
ADEQUATE_TO_COMPLETE_ADL: YES
LACK_OF_TRANSPORTATION: NO
DRESSING YOURSELF: INDEPENDENT
TOILETING: INDEPENDENT
BATHING: INDEPENDENT
GROOMING: INDEPENDENT
PATIENT'S MEMORY ADEQUATE TO SAFELY COMPLETE DAILY ACTIVITIES?: YES
FEEDING YOURSELF: INDEPENDENT
WALKS IN HOME: INDEPENDENT
HEARING - LEFT EAR: HEARING AID

## 2024-03-28 ASSESSMENT — PAIN DESCRIPTION - LOCATION
LOCATION: ABDOMEN

## 2024-03-28 ASSESSMENT — PATIENT HEALTH QUESTIONNAIRE - PHQ9
SUM OF ALL RESPONSES TO PHQ9 QUESTIONS 1 & 2: 0
1. LITTLE INTEREST OR PLEASURE IN DOING THINGS: NOT AT ALL
2. FEELING DOWN, DEPRESSED OR HOPELESS: NOT AT ALL

## 2024-03-28 NOTE — ANESTHESIA PROCEDURE NOTES
Arterial Line:    Date/Time: 3/28/2024 8:05 AM    Staffing  Performed: attending and resident   Authorized by: Saskia Talbot MD    Performed by: Gabe Lucas MD    An arterial line was placed. Procedure performed using surface landmarks.in the OR for the following indication(s): continuous blood pressure monitoring and blood sampling needed.    A 20 gauge (size) (length), Angiocath (type) catheter was placed into the Left radial artery, secured by Tegaderm,   Seldinger technique not used.  Events:  patient tolerated procedure well with no complications and First attempt unsuccessful, pulsatile flow returned but unable to thread wire. Second attempt by attending successful.

## 2024-03-28 NOTE — H&P
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 Extensive urologic history as below.    \"-3/2010 LGTA non inv Pap   -Recurrence 2013, s/p 6 BCG, s/p BCG maintenance,   -8/2014 - non inv CIS, repeat 6 BCG, maintenance, Jan 2017  -Continued to be monitored  -7/2020 - non inv HG pap UC, s/p BCG, and maintenance into Jan 2021  -Path 6/2021 - pap UC, LG w/ focal HG features   -3 induction cycles of BCG  -cysto 10/12/2021 - multifocal lesions, s/p IV gem , path - / TURBT Urothelium, mod to severe atypia, carcinoma in situ  -refused Pembro/Cystectomy s/p induction gem/docetaxel x2   -repeat cysto - 4/22 - non inv pap UC HG, possible R UO, involvement, non specific Lns  -repeat gem/docetaxel completed 6 cycles 8/22, procedure 1/23 pap tumors at both ureteral orficies, r>l, tumor posterior trigone, wall of bladder, path c/w HG NMIBC ant wall and right ureteral, cytology concerning on left side, yet unable to evaluate, repeat gem/docetaxel x2 w/ persistent disease  -cysto / right ureteroscope - 11/23 - right UO tumor, renal pelvis high volume papillary tumors, path urinary bladder pap UC, HG, no muscle invasion, right ureter wash - highly atypical cells concerning for urothelial neoplasms, right, left, highly atypical UC present, concerning for neoplasms\"       11/28/23 cystoscopy/URS with findings of large volume bladder tumor and large volume papillary tumor taking up the entirety of the right renal pelvis and calyces. CT Urogram 12/18/2023 showed right pelvic sidewall lymphadenopathy measuring up to 1.4cm. and borderline size retroperitoneal lymph nodes (follows with Dr. Rojas). She was recommended " neoadjuvant chemo and nephroureterectomy +/- cystectomy, though elected to move forward only with TURBT and nephroureterctomy with lymph node dissection at this time with future treatment dependant upon surgical pathology.      Past Medical History  Past Medical History:   Diagnosis Date    Anxiety     controlled with medication    Arthritis     Bladder cancer (CMS/HCC)     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    Breast cancer (CMS/HCC)     left sided lumpectomy, s/p xrt also completed 1996    Carpal tunnel syndrome     s/p Left wrist surgery    Cataract     removed    Cramp and spasm     Cramp of limb    Depression     controlled with medication    GERD (gastroesophageal reflux disease)     Managed with Prilosec    Hearing aid worn     HL (hearing loss)     wears B/L hearing aids    Hyperlipidemia     F/W PCP    Joint pain     Nephrolithiasis     Osteoporosis     Other conditions influencing health status     Acute Meniscal Tear Of Right Knee    Trochanteric bursitis, unspecified hip     Urothelial cancer (CMS/HCC)     Vision loss     wears glasses       Surgical History  Past Surgical History:   Procedure Laterality Date    BLADDER SURGERY  09/03/2019    bladder bx    BREAST LUMPECTOMY Left     Breast Surgery Lumpectomy x 2 1996    CARPAL TUNNEL RELEASE Left 2003    Neuroplasty Decompression Median Nerve At Carpal Tunnel    CATARACT EXTRACTION      CATARACT EXTRACTION EXTRACAPSULAR W/ INTRAOCULAR LENS IMPLANTATION Bilateral 2017    COLONOSCOPY      CYSTOSCOPY      multiple    HYSTERECTOMY  02/10/2014    Hysterectomy    OTHER SURGICAL HISTORY  2007    bladder sling    OTHER SURGICAL HISTORY Left     post cataract laser surgery 2021    OTHER SURGICAL HISTORY Left     left shoulder surgery to remove bone spur        Social History  She reports that she quit smoking about 56 years ago. Her smoking use included cigarettes. She has a 1.00 pack-year smoking history. She has never used smokeless tobacco. She reports  current alcohol use of about 1.0 standard drink of alcohol per week. She reports that she does not use drugs.    Family History  Family History   Problem Relation Name Age of Onset    Diabetes Mother      Multiple sclerosis Mother      Hypertension Father      Stroke Father      Heart disease Father      Bipolar disorder Brother      Cardiomyopathy Brother          Allergies  Aspirin, Nsaids (non-steroidal anti-inflammatory drug), and Penicillins     Physical Exam  Constitutional: Alert, NAD  HEENT: normocephalic, atraumatic EOMI  Neuro: A&O x3  CV: rate is regular  Pulm: Non-laboured breathing  GI: abdomen soft, non-tender, non-distended  : Voiding spontaneously  Skin: No lesions or discoloration noted.  Musculoskeletal ZAC  Extremities: no edema or cyanosis noted       Last Recorded Vitals  There were no vitals taken for this visit.    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 De Leon 3/28 for TURBT, robotic radical right nephroureterectomy, and lymph node dissection      Simona Zaldivar PA-C

## 2024-03-28 NOTE — OP NOTE
Nephroureterectomy Robot-Assisted (R) Operative Note     Date: 3/28/2024  OR Location: [facility] OR    Name: Anahi Wall, : 1945, Age: 78 y.o., MRN: [id number], Sex: female    Diagnosis  Pre-op Diagnosis     * Urothelial carcinoma (CMS/HCC) [C68.9] Post-op Diagnosis     * Urothelial carcinoma (CMS/HCC) [C68.9]     Procedures  Nephroureterectomy Robot-Assisted  39331 - DC LAPAROSCOPY NEPHRECTOMY W/TOTAL URETERECTOMY    DC RPR TABDL LMPHADEC EXTNSV W/PEL AORTIC&RNL [50821]  Surgeons      * Jorge A De Leon - Primary    Resident/Fellow/Other Assistant:  Surgeon(s) and Role:     * Morris Mendez MD - Assisting  Simona SHERMAN - The PA served the role of a bedside assistant due to the lack of a qualified assistant.  Their role was docking and undocking, port placement and closure, suction, instrument exchanging and passing.      Procedure Summary  Anesthesia: * No anesthesia type entered *  ASA: III  Anesthesia Staff: Anesthesiologist: Saskia Talbot MD  C-AA: AIDA Kim; AIDA Hemphill  Anesthesia Resident: Gabe Lucas MD  Estimated Blood Loss: 50 mL  Intra-op Medications: Administrations occurring from 0715 to 1145 on 24:  * No intraprocedure medications in log *           Anesthesia Record               Intraprocedure I/O Totals          Intake    LR bolus 1000.00 mL    Total Intake 1000 mL       Output    Urine 185 mL    Est. Blood Loss 50 mL    Total Output 235 mL       Net    Net Volume 765 mL          Specimen:   ID Type Source Tests Collected by Time   1 : Bladder Tumor Tissue BLADDER TRANSURETHRAL RESECTION SURGICAL PATHOLOGY EXAM Jorge A De Leon MD MPH 3/28/2024 0854   2 : INTERAORTOCAVALE LYMPH NODE Tissue LYMPH NODE  SURGICAL PATHOLOGY EXAM Jorge A De Leon MD MPH 3/28/2024 1240   3 : RIGHT PELVIC LYMPH NODE Tissue LYMPH NODE  SURGICAL PATHOLOGY EXAM Jorge A De Leon MD MPH 3/28/2024 1242   4 : Right kidney, ureter and bladder cuffs Tissue KIDNEY RADICAL  NEPHROURETERECTOMY RIGHT SURGICAL PATHOLOGY EXAM Jorge A De Leon MD MPH 3/28/2024 1473        Staff:   Circulator: Manolo Nagy RN  Relief Circulator: Abdi Andujar RN; Zoe Corey RN  Relief Scrub: Lorena Vyas  Scrub Person: Abdi Bentley; Nj Velasquez RN         Drains and/or Catheters:   Urethral Catheter Non-latex 16 Fr. (Active)       Findings: Multifocal papillary bladder tumor throughout the bladder.  Medium in size.  Right URS demonstrating entire renal pelvis with urothelial carcinoma.  IAC removed.  Complete right PLND    Indications: Anahi Wall is an 78 y.o. female who is having surgery for Urothelial carcinoma (CMS/[facility]) [C68.9]. She has a longstanding history of HG NMIBC.  She has had care at  and [facility].  She has been unresponsive to BCG and [facility].  Recently, she was found to have right UTC on URS.  She was staged and had a solitary pelvic node and a IAC node.  She saw Dr. Rojas with [facility].  We discussed her case at tumor board.  She saw providers at [facility].  My recommendation was to consider NAC followed by cystectomy, nephroureterectomy and node dissection.  She refused cystectomy.  She was educated and refused NAC.  After multidisciplinary discussion, we thought it would be acceptable to offer nephrourerectomy, TURBT, RPLND and PLND.    The patient was seen in the preoperative area. The risks, benefits, complications, treatment options, non-operative alternatives, expected recovery and outcomes were discussed with the patient. The possibilities of reaction to medication, pulmonary aspiration, injury to surrounding structures, bleeding, recurrent infection, the need for additional procedures, failure to diagnose a condition, and creating a complication requiring transfusion or operation were discussed with the patient. The patient concurred with the proposed plan, giving informed consent.  The site of surgery was properly noted/marked if necessary per policy. The patient has  been actively warmed in preoperative area. Preoperative antibiotics have been ordered and given within 1 hours of incision. Venous thrombosis prophylaxis have been ordered including bilateral sequential compression devices and chemical prophylaxis    Procedure Details:     The patient was identified in the preoperative holding area by name and identification band.  The patient was then taken back to the operative suite by the Anesthesia Team and placed in supine position on the operating table.  He received 2 g of intravenous Ancef for antibiotic prophylaxis.  Sequential compression devices were placed on the lower extremities bilaterally and 5000 units of subcutaneous heparin was administered for DVT prophylaxis.  General anesthesia was then induced.  An orogastric tube was placed for gastric decompression.    We placed a 26 F rigid resectoscope.  There was papillary tumor present.  This was multifocal and the largest area being 2 cm.  We resected and cauterized the base of the tumors.  We then placed a wire up the right ureter and placed a ureteroscope up the right kidney.  We visualized multifocal tumor in the entire renal pelvis.  We removed the scope.  We placed a rodriguez under sterile technique.    We repositioned the patient.     The patient was then placed in the right lateral decubitus, LEFT flank up position.  The table was gently flexed, and an axillary roll was placed to prevent neuropraxia. The patient was secured to the table. The abdomen and LEFT flank were then sterilely prepped and draped.  The upper arm was supported in a hammock on her side. A universal timeout procedure was performed confirming the patient's identity, laterality, and procedure to be performed.    Laparoscopic access was obtained using a Veress needle. A pneumoperitoneum to 15 mmHg pressure was established without difficulty with the patient showing no adverse hemodynamic or respiratory function change, and an initial 8mm Xi robotic  trocar was placed at mid-clavicular line at level of renal hilum.  Through this initial trocar, intraabdominal laparoscopy was performed and no intraabdominal abnormalities were seen, no bleeding, bowel abnormality or injury, or other intraabdominal pathology recognized.  Additional 3 robotic trocars were placed. I Another 12mm non bladed assistant trocar was placed just inferior and medial to the umbilicus. All subsequent trocars after the initial trocar were placed under direct vision, with safe placement and correct position confirmed visually by laparoscopy. With all trocars in position in standard configuration, the Xi Da Jalyn robot was brought to the field and docked to the trocars to proceed with the operation.    The procedure was started by mobilizing the Line of Toldt. Colon was mobilized medially. We kocherized the duodenum.  We mobilized this more than normal due to the fact we needed to get to the IAC space. We localized the gonadal vessels and then the ureter in the RP just north of the iliac vessels. The gonadal vessels were divided at the insertion into the IVC. We then dissected under the ureter and localized the psoas muscle and found our psoas window.  We then tracked this plane of dissection towards the hilum.  The hilum include 2 renal veins and 1 renal arteries.  The  vessels were placed on stretched and dissected.  The hilum was then divided subsequently with Endo-vascular staple loads.  The adrenal was then spared.  The remaining attachments on the superior, lateral and inferior attachments were divided. The ureter was clipped.     The ureter was the dissected to the intramural tunnel.  The camera and working ports were shifted to inferior ports as needed. The female parts were previously removed..   A 3-0 vloc suture was used to close the bladder cuff using this first as an anchor and then subsequently closing the bladder cuff as the ureter was removed.  This was water-tight and tested to  120 ml without leak.    A Lymphadenectomy was then performed based on the scans.  A complete right PLND was done.  Lymphatic channels were bipolared or clipped.  The obturator nerve was kept out of harms way.     We then performed an IAC node dissection clipping the superior and inferior attachements.  This was done by finding the medial edge of the cava and rolling the tissue off.  We then localized an enlarged lymph node.  We clipped the borders and removed the mass.  The nodes and the primary were then bagged.  Tissel was sprayed.  No bleeding was seen.    A drain was placed in the pelvis and secured in the pelvis.       A periumbilical incision was made after the robot was dedocked.  The fascia was opened and the specimen extracted.  Again, there was no evidence of ongoing bleeding.  The fascia  was closed using a #1 PDS suture.  The assistant trocar was closed with a Darrin Thommasen. The subcutaneous tissues were copiously irrigated and 0.25% Marcaine was administered for local analgesia.  The subcutaneous tissues were reapproximated using a #2-0 Vicryl suture, and the skin of the extraction port as well as the assistant ports were closed using subcuticular #4-0 Monocril sutures.       The patient was then placed back in the supine position, awoken from anesthesia, and taken to the PACU in stable condition.    All sponge, instrument and needle counts were correct x 2 at the conclusion of the procedure.          Complications:  None; patient tolerated the procedure well.    Disposition: PACU - hemodynamically stable.  Condition: stable         Additional Details:     Attending Attestation:     Jorge A De Leon  Phone Number: [phone]

## 2024-03-28 NOTE — PROGRESS NOTES
Pharmacy Medication History Review    Anahi Wall is a 78 y.o. female admitted for Urothelial carcinoma (CMS/[facility]). Pharmacy reviewed the patient's [id number] medications and allergies for accuracy.    The list below reflects the updated PTA list. Comments regarding how patient may be taking medications differently can be found in the Admit Orders Activity  Prior to Admission Medications   Prescriptions Last Dose Informant Patient Reported?   ALPRAZolam (Xanax) 0.25 mg tablet 3/27/2024 Self Yes   Sig: Take 1 tablet (0.25 mg) by mouth 3 times a day as needed.   acetaminophen (Tylenol) 325 mg tablet  Self No   Sig: Take 2 tablets (650 mg) by mouth every 6 hours if needed for mild pain (1 - 3).   Patient not taking: Reported on 3/4/2024   alendronate (Fosamax) 70 mg tablet 3/24/2024 Self No   Sig: Take 1 tablet (70 mg) by mouth every 7 days.   Patient taking differently: Take 1 tablet (70 mg) by mouth every 7 days. Takes on Sunday   cholecalciferol (Vitamin D-3) 50 MCG (2000 UT) tablet 3/26/2024 Self Yes   Sig: Take 1 tablet (2,000 Units) by mouth once daily.   fluticasone (Flonase) 50 mcg/actuation nasal spray Past Month Self Yes   Sig: Administer 2 sprays into affected nostril(s) once daily.   minoxidil (Loniten) 2.5 mg tablet 3/28/2024 Self Yes   Sig: Take 0.5 tablets (1.25 mg) by mouth once daily.   mirtazapine (Remeron) 30 mg tablet 3/27/2024 Self No   Sig: Take 1 tablet (30 mg) by mouth once daily at bedtime.   omeprazole (PriLOSEC) 20 mg DR capsule 3/28/2024 Self No   Sig: Take 1 capsule (20 mg) by mouth once daily.   oxybutynin XL (Ditropan-XL) 15 mg 24 hr tablet 3/28/2024 Self No   Sig: Take 1 tablet (15 mg) by mouth once daily.   rosuvastatin (Crestor) 20 mg tablet 3/27/2024 Self No   Sig: Take 1 tablet (20 mg) by mouth once daily.   Patient taking differently: Take 1 tablet (20 mg) by mouth once daily at bedtime. 1:00pm   sertraline (Zoloft) 100 mg tablet 3/28/2024 Self No   Sig: Take 2 tablets  (200 mg) by mouth once daily.      Facility-Administered Medications: None        The list below reflects the updated allergy list. Please review each documented allergy for additional clarification and justification.  Allergies  Reviewed by Jessy Chaudhry PharmD on 3/28/2024        Severity Reactions Comments    Aspirin Low Nausea Only     Nsaids (non-steroidal Anti-inflammatory Drug) Low Nausea Only     Penicillins Low Rash             Patient declines M2B at discharge. Pharmacy has been updated to [facility] in [place].    Sources used to complete the med history include out patient fill history, OARRS, and patient interview- good historian.      Below are additional concerns with the patient's PTA list.  Patient reports taking Flomax on Sundays.  Patient reports using Xanax once daily as needed, instead of tid prn.    Jessy Chaudhry PharmD  Transitions of Care Pharmacist  [facility]  Please reach out via Secure Chat for questions, or if no response call [facility] or [facility]

## 2024-03-28 NOTE — ANESTHESIA POSTPROCEDURE EVALUATION
Patient: Anahi Wall    Procedure Summary       Date: 03/28/24 Room / Location: [facility] OR 05 / [facility] OR    Anesthesia Start: 0718 Anesthesia Stop: 1345    Procedure: Nephroureterectomy Robot-Assisted (Right: Abdomen) Diagnosis:       Urothelial carcinoma (CMS/HCC)      (Urothelial carcinoma (CMS/HCC) [C68.9])    Surgeons: Jorge A De Leon MD MPH Responsible Provider: Saskia Talbot MD    Anesthesia Type: general ASA Status: 3            Anesthesia Type: general    Vitals Value Taken Time   /56 03/28/24 1345   Temp 37C 03/28/24 1352   Pulse 81 03/28/24 1346   Resp 7 03/28/24 1346   SpO2 94 % 03/28/24 1346   Vitals shown include unvalidated device data.    Anesthesia Post Evaluation    Patient location during evaluation: PACU  Patient participation: complete - patient participated  Level of consciousness: awake and alert  Pain management: adequate  Airway patency: patent  Cardiovascular status: acceptable and hemodynamically stable  Respiratory status: acceptable  Hydration status: acceptable  Postoperative Nausea and Vomiting: none        No notable events documented.

## 2024-03-28 NOTE — ANESTHESIA PROCEDURE NOTES
Airway  Date/Time: 3/28/2024 7:33 AM  Urgency: elective    Airway not difficult    Staffing  Performed: resident   Authorized by: Saskia Talbot MD    Performed by: Gabe Lucas MD  Patient location during procedure: OR    Indications and Patient Condition  Indications for airway management: anesthesia  Spontaneous Ventilation: absent  Sedation level: deep  Preoxygenated: yes  Patient position: sniffing  Mask difficulty assessment: 1 - vent by mask  Planned trial extubation    Final Airway Details  Final airway type: endotracheal airway      Successful airway: ETT  Cuffed: yes   Successful intubation technique: direct laryngoscopy  Facilitating devices/methods: intubating stylet  Endotracheal tube insertion site: oral  Blade: Marcus  Blade size: #3  ETT size (mm): 7.0  Cormack-Lehane Classification: grade IIa - partial view of glottis  Placement verified by: chest auscultation and capnometry   Measured from: lips  ETT to lips (cm): 21  Number of attempts at approach: 1

## 2024-03-28 NOTE — ANESTHESIA PREPROCEDURE EVALUATION
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 Extensive urologic history as below.     Relevant Problems   Cardiac   (+) Chest pain   (+) HTN (hypertension), benign   (+) Hyperlipidemia      GI   (+) Acid reflux      /Renal   (+) Urothelial carcinoma of kidney, right (CMS/HCC)      Musculoskeletal   (+) Degeneration of lumbar intervertebral disc   (+) Disc displacement, lumbar   (+) Localized primary osteoarthritis of carpometacarpal joint of left thumb   (+) Lumbosacral spinal stenosis   (+) Primary osteoarthritis of left knee      HEENT   (+) Sensorineural hearing loss of both ears       Clinical information reviewed:    Allergies  Meds     OB Status           NPO Detail:  NPO/Void Status  Carbohydrate Drink Given Prior to Surgery? : N  Date of Last Liquid: 03/27/24  Time of Last Liquid: 2300  Date of Last Solid: 03/27/24  Time of Last Solid: 2300  Last Intake Type: Clear fluids  Time of Last Void: 0652         Vitals:    03/28/24 0649   BP: 147/79   Pulse: 79   Resp: 16   Temp: 36.1 °C (97 °F)   SpO2: 95%       Past Surgical History:   Procedure Laterality Date    BLADDER SURGERY  09/03/2019    bladder bx    BREAST LUMPECTOMY Left     Breast Surgery Lumpectomy x 2 1996    CARPAL TUNNEL RELEASE Left 2003    Neuroplasty Decompression Median Nerve At Carpal Tunnel    CATARACT EXTRACTION      CATARACT EXTRACTION EXTRACAPSULAR W/ INTRAOCULAR LENS IMPLANTATION Bilateral 2017     COLONOSCOPY      CYSTOSCOPY      multiple    HYSTERECTOMY  02/10/2014    Hysterectomy    OTHER SURGICAL HISTORY  2007    bladder sling    OTHER SURGICAL HISTORY Left     post cataract laser surgery 2021    OTHER SURGICAL HISTORY Left     left shoulder surgery to remove bone spur     Past Medical History:   Diagnosis Date    Anxiety     controlled with medication    Arthritis     Bladder cancer (CMS/HCC)     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    Breast cancer (CMS/[facility])     left sided lumpectomy, s/p xrt also completed 1996    Carpal tunnel syndrome     s/p Left wrist surgery    Cataract     removed    Cramp and spasm     Cramp of limb    Depression     controlled with medication    GERD (gastroesophageal reflux disease)     Managed with Prilosec    Hearing aid worn     HL (hearing loss)     wears B/L hearing aids    Hyperlipidemia     F/W PCP    Joint pain     Nephrolithiasis     Osteoporosis     Other conditions influencing health status     Acute Meniscal Tear Of Right Knee    Trochanteric bursitis, unspecified hip     Urothelial cancer (CMS/[facility])     Vision loss     wears glasses       Current Facility-Administered Medications:     ceFAZolin in dextrose (iso-os) (Ancef) IVPB 2 g, 2 g, intravenous, Once, Jorge A De Leon MD MPH    celecoxib (CeleBREX) capsule 400 mg, 400 mg, oral, Once, Jorge A De Leon MD MPH    chlorhexidine (Hibiclens) 4 % liquid, , Topical, Daily PRN, Jorge A De Leon MD MPH    sodium chloride 0.9% infusion, 100 mL/hr, intravenous, Continuous, Jorge A De Leon MD MPH    Facility-Administered Medications Ordered in Other Encounters:     aprepitant (Emend) capsule, , oral, PRN, Gabe Lucas MD, 40 mg at 03/28/24 0703    ceFAZolin (Ancef) injection, , intravenous, PRN, Gabe Lucas MD, 2 g at 03/28/24 0740    dexAMETHasone (Decadron) injection, , intravenous, PRN, Gabe Lucas MD, 8 mg at 03/28/24 0740    fentaNYL PF (Sublimaze) injection, , intravenous, PRN, Gabe Lucas MD, 100 mcg at  03/28/24 0725    lidocaine (Xylocaine) 20 mg/mL (2 %) injection, , miscellaneous, PRN, Gabe Lucas MD, 50 mg at 03/28/24 0725    propofol (Diprivan) injection, , intravenous, PRN, Gabe Lucas MD, 50 mg at 03/28/24 0734    rocuronium (ZeMuron) injection, , intravenous, PRN, Gabe Lucas MD, 20 mg at 03/28/24 0831  Prior to Admission medications    Medication Sig Start Date End Date Taking? Authorizing Provider   acetaminophen (Tylenol) 325 mg tablet Take 2 tablets (650 mg) by mouth every 6 hours if needed for mild pain (1 - 3).  Patient not taking: Reported on 3/4/2024 11/28/23   Alex Orosco MD   alendronate (Fosamax) 70 mg tablet Take 1 tablet (70 mg) by mouth every 7 days.  Patient taking differently: Take 1 tablet (70 mg) by mouth every 7 days. Takes on [place] 4/3/23   Jose Luis Conrad MD   ALPRAZolam (Xanax) 0.25 mg tablet Take 1 tablet (0.25 mg) by mouth 3 times a day as needed. 8/26/14   Historical Provider, MD   cholecalciferol (Vitamin D-3) 50 MCG (2000 UT) tablet Take 1 tablet (2,000 Units) by mouth once daily.    Historical Provider, MD   fluticasone (Flonase) 50 mcg/actuation nasal spray Administer 2 sprays into affected nostril(s) once daily. 9/4/14   Historical Provider, MD   minoxidil (Loniten) 2.5 mg tablet Take 0.5 tablets (1.25 mg) by mouth once daily.    Historical Provider, MD   mirtazapine (Remeron) 30 mg tablet Take 1 tablet (30 mg) by mouth once daily at bedtime. 7/21/23   Chichi Swann MD   omeprazole (PriLOSEC) 20 mg DR capsule Take 1 capsule (20 mg) by mouth once daily. 9/18/23   Jose Luis Conrad MD   oxybutynin XL (Ditropan-XL) 15 mg 24 hr tablet Take 1 tablet (15 mg) by mouth once daily. 1/16/24   Chichi Swann MD   rosuvastatin (Crestor) 20 mg tablet Take 1 tablet (20 mg) by mouth once daily.  Patient taking differently: Take 1 tablet (20 mg) by mouth once daily at bedtime. 1:00pm 10/17/23   Jose Luis Conrad MD   sertraline (Zoloft) 100 mg tablet Take 2 tablets (200 mg) by mouth  once daily. 5/3/23   Jose Luis Conrad MD   clotrimazole-betamethasone (Lotrisone) cream Apply topically 2 times a day as needed. 8/22/22 3/28/24  Historical Provider, MD     Allergies   Allergen Reactions    Aspirin Nausea Only    Nsaids (Non-Steroidal Anti-Inflammatory Drug) Nausea Only    Penicillins Rash     Social History     Tobacco Use    Smoking status: Former     Packs/day: 0.50     Years: 2.00     Additional pack years: 0.00     Total pack years: 1.00     Types: Cigarettes     Quit date:      Years since quittin.2    Smokeless tobacco: Never   Substance Use Topics    Alcohol use: Yes     Alcohol/week: 1.0 standard drink of alcohol     Types: 1 Glasses of wine per week     Comment: occasionally         Chemistry    Lab Results   Component Value Date/Time     2024 1106    K 4.1 2024 1106     2024 1106    CO2 29 2024 1106    BUN 23 2024 1106    CREATININE 0.70 2024 1106    Lab Results   Component Value Date/Time    CALCIUM 9.7 2024 1106    ALKPHOS 63 2024 1556    AST 19 2024 1556    ALT 16 2024 1556    BILITOT 0.3 2024 1556          Lab Results   Component Value Date/Time    WBC 5.8 2024 1106    HGB 12.0 2024 1106    HCT 36.6 2024 1106     (L) 2024 1106     Lab Results   Component Value Date/Time    PROTIME 12.1 2024 1106    INR 1.1 2024 1106     Encounter Date: 24   ECG 12 lead   Result Value    Ventricular Rate 76    Atrial Rate 76    NE Interval 156    QRS Duration 86    QT Interval 416    QTC Calculation(Bazett) 468    P Axis 49    R Axis -50    T Axis 36    QRS Count 13    Q Onset 214    P Onset 136    P Offset 191    T Offset 422    QTC Fredericia 449    Narrative    Normal sinus rhythm  Pulmonary disease pattern  Left anterior fascicular block  Cannot rule out Anterior infarct , age undetermined  Abnormal ECG  When compared with ECG of 2023 11:21,  No significant  change was found  Confirmed by Karlos Riojas ([id number]) on 3/12/2024 9:23:42 PM       Physical Exam    Airway  Mallampati: III  TM distance: >3 FB  Neck ROM: full     Cardiovascular   Rhythm: regular  Rate: normal     Dental    Pulmonary   Comments: Effort nml   Abdominal          Anesthesia Plan    History of general anesthesia?: yes  History of complications of general anesthesia?: no    ASA 3     general     The patient is not a current smoker.    intravenous induction   Postoperative administration of opioids is intended.  Trial extubation is planned.  Anesthetic plan and risks discussed with patient.  Use of blood products discussed with patient who consented to blood products.    Plan discussed with attending and resident.

## 2024-03-29 LAB
ANION GAP SERPL CALC-SCNC: 12 MMOL/L (ref 10–20)
BUN SERPL-MCNC: 22 MG/DL (ref 6–23)
CALCIUM SERPL-MCNC: 8.4 MG/DL (ref 8.6–10.6)
CHLORIDE SERPL-SCNC: 106 MMOL/L (ref 98–107)
CO2 SERPL-SCNC: 28 MMOL/L (ref 21–32)
CREAT SERPL-MCNC: 1.55 MG/DL (ref 0.5–1.05)
EGFRCR SERPLBLD CKD-EPI 2021: 34 ML/MIN/1.73M*2
ERYTHROCYTE [DISTWIDTH] IN BLOOD BY AUTOMATED COUNT: 14.3 % (ref 11.5–14.5)
GLUCOSE SERPL-MCNC: 99 MG/DL (ref 74–99)
HCT VFR BLD AUTO: 30.9 % (ref 36–46)
HGB BLD-MCNC: 9.9 G/DL (ref 12–16)
MCH RBC QN AUTO: 29.9 PG (ref 26–34)
MCHC RBC AUTO-ENTMCNC: 32 G/DL (ref 32–36)
MCV RBC AUTO: 93 FL (ref 80–100)
NRBC BLD-RTO: 0 /100 WBCS (ref 0–0)
PLATELET # BLD AUTO: 108 X10*3/UL (ref 150–450)
POTASSIUM SERPL-SCNC: 3.8 MMOL/L (ref 3.5–5.3)
RBC # BLD AUTO: 3.31 X10*6/UL (ref 4–5.2)
SODIUM SERPL-SCNC: 142 MMOL/L (ref 136–145)
WBC # BLD AUTO: 7.3 X10*3/UL (ref 4.4–11.3)

## 2024-03-29 PROCEDURE — 2500000002 HC RX 250 W HCPCS SELF ADMINISTERED DRUGS (ALT 637 FOR MEDICARE OP, ALT 636 FOR OP/ED): Mod: MUE | Performed by: STUDENT IN AN ORGANIZED HEALTH CARE EDUCATION/TRAINING PROGRAM

## 2024-03-29 PROCEDURE — 2500000002 HC RX 250 W HCPCS SELF ADMINISTERED DRUGS (ALT 637 FOR MEDICARE OP, ALT 636 FOR OP/ED): Performed by: STUDENT IN AN ORGANIZED HEALTH CARE EDUCATION/TRAINING PROGRAM

## 2024-03-29 PROCEDURE — 2500000004 HC RX 250 GENERAL PHARMACY W/ HCPCS (ALT 636 FOR OP/ED): Performed by: STUDENT IN AN ORGANIZED HEALTH CARE EDUCATION/TRAINING PROGRAM

## 2024-03-29 PROCEDURE — 80048 BASIC METABOLIC PNL TOTAL CA: CPT | Performed by: STUDENT IN AN ORGANIZED HEALTH CARE EDUCATION/TRAINING PROGRAM

## 2024-03-29 PROCEDURE — 97161 PT EVAL LOW COMPLEX 20 MIN: CPT | Mod: GP

## 2024-03-29 PROCEDURE — C9113 INJ PANTOPRAZOLE SODIUM, VIA: HCPCS | Performed by: STUDENT IN AN ORGANIZED HEALTH CARE EDUCATION/TRAINING PROGRAM

## 2024-03-29 PROCEDURE — 36415 COLL VENOUS BLD VENIPUNCTURE: CPT | Performed by: STUDENT IN AN ORGANIZED HEALTH CARE EDUCATION/TRAINING PROGRAM

## 2024-03-29 PROCEDURE — 2500000001 HC RX 250 WO HCPCS SELF ADMINISTERED DRUGS (ALT 637 FOR MEDICARE OP): Performed by: STUDENT IN AN ORGANIZED HEALTH CARE EDUCATION/TRAINING PROGRAM

## 2024-03-29 PROCEDURE — 1170000001 HC PRIVATE ONCOLOGY ROOM DAILY

## 2024-03-29 PROCEDURE — 2500000005 HC RX 250 GENERAL PHARMACY W/O HCPCS: Performed by: STUDENT IN AN ORGANIZED HEALTH CARE EDUCATION/TRAINING PROGRAM

## 2024-03-29 PROCEDURE — 85027 COMPLETE CBC AUTOMATED: CPT | Performed by: STUDENT IN AN ORGANIZED HEALTH CARE EDUCATION/TRAINING PROGRAM

## 2024-03-29 PROCEDURE — 97530 THERAPEUTIC ACTIVITIES: CPT | Mod: GP

## 2024-03-29 RX ORDER — TRAMADOL HYDROCHLORIDE 50 MG/1
50 TABLET ORAL EVERY 6 HOURS PRN
Qty: 10 TABLET | Refills: 0 | Status: SHIPPED | OUTPATIENT
Start: 2024-03-29 | End: 2024-03-30 | Stop reason: HOSPADM

## 2024-03-29 RX ORDER — ACETAMINOPHEN 325 MG/1
650 TABLET ORAL EVERY 4 HOURS
Qty: 60 TABLET | Refills: 0 | Status: SHIPPED | OUTPATIENT
Start: 2024-03-29 | End: 2024-04-03

## 2024-03-29 RX ORDER — METHOCARBAMOL 500 MG/1
500 TABLET, FILM COATED ORAL EVERY 8 HOURS SCHEDULED
Status: DISCONTINUED | OUTPATIENT
Start: 2024-03-29 | End: 2024-03-30 | Stop reason: HOSPADM

## 2024-03-29 RX ORDER — POLYETHYLENE GLYCOL 3350 17 G/17G
17 POWDER, FOR SOLUTION ORAL DAILY
Qty: 10 PACKET | Refills: 0 | Status: SHIPPED | OUTPATIENT
Start: 2024-03-29 | End: 2024-04-08

## 2024-03-29 RX ADMIN — ACETAMINOPHEN 650 MG: 325 TABLET ORAL at 22:03

## 2024-03-29 RX ADMIN — ACETAMINOPHEN 650 MG: 325 TABLET ORAL at 04:25

## 2024-03-29 RX ADMIN — CEFAZOLIN SODIUM 2 G: 2 INJECTION, SOLUTION INTRAVENOUS at 14:05

## 2024-03-29 RX ADMIN — OXYCODONE HYDROCHLORIDE 5 MG: 5 TABLET ORAL at 20:10

## 2024-03-29 RX ADMIN — SIMETHICONE 160 MG: 80 TABLET, CHEWABLE ORAL at 18:15

## 2024-03-29 RX ADMIN — MIRTAZAPINE 30 MG: 15 TABLET, FILM COATED ORAL at 20:10

## 2024-03-29 RX ADMIN — DOCUSATE SODIUM 100 MG: 100 CAPSULE, LIQUID FILLED ORAL at 08:54

## 2024-03-29 RX ADMIN — HEPARIN SODIUM 5000 UNITS: 5000 INJECTION INTRAVENOUS; SUBCUTANEOUS at 20:10

## 2024-03-29 RX ADMIN — HEPARIN SODIUM 5000 UNITS: 5000 INJECTION INTRAVENOUS; SUBCUTANEOUS at 14:08

## 2024-03-29 RX ADMIN — ACETAMINOPHEN 650 MG: 325 TABLET ORAL at 08:54

## 2024-03-29 RX ADMIN — CEFAZOLIN SODIUM 2 G: 2 INJECTION, SOLUTION INTRAVENOUS at 20:11

## 2024-03-29 RX ADMIN — SERTRALINE 200 MG: 100 TABLET, FILM COATED ORAL at 08:54

## 2024-03-29 RX ADMIN — DOCUSATE SODIUM 100 MG: 100 CAPSULE, LIQUID FILLED ORAL at 20:10

## 2024-03-29 RX ADMIN — METHOCARBAMOL 500 MG: 500 TABLET ORAL at 22:03

## 2024-03-29 RX ADMIN — SIMETHICONE 160 MG: 80 TABLET, CHEWABLE ORAL at 14:10

## 2024-03-29 RX ADMIN — ROSUVASTATIN CALCIUM 20 MG: 10 TABLET, FILM COATED ORAL at 20:10

## 2024-03-29 RX ADMIN — HEPARIN SODIUM 5000 UNITS: 5000 INJECTION INTRAVENOUS; SUBCUTANEOUS at 04:25

## 2024-03-29 RX ADMIN — CEFAZOLIN SODIUM 2 G: 2 INJECTION, SOLUTION INTRAVENOUS at 04:25

## 2024-03-29 RX ADMIN — SIMETHICONE 160 MG: 80 TABLET, CHEWABLE ORAL at 08:54

## 2024-03-29 RX ADMIN — OXYCODONE HYDROCHLORIDE 5 MG: 5 TABLET ORAL at 04:25

## 2024-03-29 RX ADMIN — ACETAMINOPHEN 650 MG: 325 TABLET ORAL at 14:09

## 2024-03-29 RX ADMIN — PANTOPRAZOLE SODIUM 40 MG: 40 INJECTION, POWDER, FOR SOLUTION INTRAVENOUS at 06:19

## 2024-03-29 RX ADMIN — ACETAMINOPHEN 650 MG: 325 TABLET ORAL at 18:14

## 2024-03-29 RX ADMIN — LIDOCAINE 1 PATCH: 4 PATCH TOPICAL at 08:54

## 2024-03-29 RX ADMIN — POLYETHYLENE GLYCOL 3350 17 G: 17 POWDER, FOR SOLUTION ORAL at 08:54

## 2024-03-29 ASSESSMENT — COGNITIVE AND FUNCTIONAL STATUS - GENERAL
MOVING TO AND FROM BED TO CHAIR: A LITTLE
MOVING FROM LYING ON BACK TO SITTING ON SIDE OF FLAT BED WITH BEDRAILS: A LITTLE
MOBILITY SCORE: 18
WALKING IN HOSPITAL ROOM: A LITTLE
STANDING UP FROM CHAIR USING ARMS: A LITTLE
TURNING FROM BACK TO SIDE WHILE IN FLAT BAD: A LITTLE
CLIMB 3 TO 5 STEPS WITH RAILING: A LITTLE

## 2024-03-29 ASSESSMENT — PAIN - FUNCTIONAL ASSESSMENT
PAIN_FUNCTIONAL_ASSESSMENT: 0-10

## 2024-03-29 ASSESSMENT — PAIN SCALES - GENERAL
PAINLEVEL_OUTOF10: 5 - MODERATE PAIN
PAINLEVEL_OUTOF10: 5 - MODERATE PAIN
PAINLEVEL_OUTOF10: 6
PAINLEVEL_OUTOF10: 3
PAINLEVEL_OUTOF10: 5 - MODERATE PAIN

## 2024-03-29 ASSESSMENT — ACTIVITIES OF DAILY LIVING (ADL)
LACK_OF_TRANSPORTATION: NO
ADL_ASSISTANCE: INDEPENDENT

## 2024-03-29 ASSESSMENT — PAIN DESCRIPTION - LOCATION: LOCATION: ABDOMEN

## 2024-03-29 NOTE — NURSING NOTE
Pt transferred into 5004 from PACU in stable condition. Report taken from PHILLIP Vo. Pt oriented to call light, room, unit.

## 2024-03-29 NOTE — PROGRESS NOTES
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 Extensive urologic history as below.     \"-3/2010 LGTA non inv Pap   -Recurrence 2013, s/p 6 BCG, s/p BCG maintenance,   -8/2014 - non inv CIS, repeat 6 BCG, maintenance, Jan 2017  -Continued to be monitored  -7/2020 - non inv HG pap UC, s/p BCG, and maintenance into Jan 2021  -Path 6/2021 - pap UC, LG w/ focal HG features   -3 induction cycles of BCG  -cysto 10/12/2021 - multifocal lesions, s/p IV gem , path - / TURBT Urothelium, mod to severe atypia, carcinoma in situ  -refused Pembro/Cystectomy s/p induction gem/docetaxel x2   -repeat cysto - 4/22 - non inv pap UC HG, possible R UO, involvement, non specific Lns  -repeat gem/docetaxel completed 6 cycles 8/22, procedure 1/23 pap tumors at both ureteral orficies, r>l, tumor posterior trigone, wall of bladder, path c/w HG NMIBC ant wall and right ureteral, cytology concerning on left side, yet unable to evaluate, repeat gem/docetaxel x2 w/ persistent disease  -cysto / right ureteroscope - 11/23 - right UO tumor, renal pelvis high volume papillary tumors, path urinary bladder pap UC, HG, no muscle invasion, right ureter wash - highly atypical cells concerning for urothelial neoplasms, right, left, highly atypical UC present, concerning for neoplasms\"      11/28/23 cystoscopy/URS with findings of large volume bladder tumor and large volume papillary tumor taking up the entirety of the right renal pelvis and calyces. CT Urogram 12/18/2023 showed right pelvic sidewall lymphadenopathy measuring up to 1.4cm and borderline size retroperitoneal lymph nodes (follows with Dr. Rojas). She was recommended neoadjuvant chemo and " nephroureterectomy +/- cystectomy, though elected to move forward only with TURBT and nephroureterctomy with lymph node dissection which she underwent on 3/28 with Dr. De Leon with future treatment dependant upon surgical pathology. Findings were multifocal papillary bladder tumor throughout the bladder and right URS demonstrating entire renal pelvis with urothelial carcinoma.      Subjective Endorses mild surgical site pain controlled on medication, pain upon breathing. Using IS. Has been drinking water, no n/v. -gas/-BM. Has not been OOB. No other complaints.    Objective    Vital Signs:  Heart Rate:  []   Temp:  [36.4 °C (97.5 °F)-37 °C (98.6 °F)]   Resp:  [10-16]   BP: (102-132)/(53-72)   SpO2:  [93 %-97 %]     I&Os    Intake/Output Summary (Last 24 hours) at 3/29/2024 0744  Last data filed at 3/29/2024 0559  Gross per 24 hour   Intake 3366.33 ml   Output 1930 ml   Net 1436.33 ml        Labs  Results from last 72 hours   Lab Units 03/28/24  1411   CREATININE mg/dL 1.18*   HEMOGLOBIN g/dL 10.8*   WBC AUTO x10*3/uL 9.7   GLUCOSE mg/dL 185*   POTASSIUM mmol/L 3.9        Physical Exam:   Constitutional: Alert, in NAD  HEENT: Normocephalic, atraumatic  Neuro: A&O x3  CV: Regular rate  Pulm: Non-laboured breathing   GI: Abdomen soft, non-distended, appropriately-tender  : Hughes in place draining clear yellow urine  Skin: Incisions covered with dermabond, c/d/i. No lesions or discoloration noted.  Musculoskeletal: ZAC  Extremities: no edema or cyanosis noted    Current Medications:  acetaminophen, 650 mg, oral, q4h  ceFAZolin, 2 g, intravenous, q8h  docusate sodium, 100 mg, oral, BID  heparin (porcine), 5,000 Units, subcutaneous, q8h  lidocaine, 1 patch, transdermal, Daily  mirtazapine, 30 mg, oral, Nightly  pantoprazole, 40 mg, oral, Daily before breakfast   Or  pantoprazole, 40 mg, intravenous, Daily before breakfast  polyethylene glycol, 17 g, oral, Daily  rosuvastatin, 20 mg, oral, Nightly  sertraline, 200  mg, oral, Daily  simethicone, 160 mg, oral, TID after meals      PRN medications: ALPRAZolam, HYDROmorphone, melatonin, naloxone, naloxone, naloxone, ondansetron ODT **OR** ondansetron, oxyCODONE, oxyCODONE, prochlorperazine **OR** prochlorperazine **OR** prochlorperazine, traMADol   Dietary Orders (From admission, onward)       Start     Ordered    03/29/24 0618  Adult diet Regular, 40 gram fat  Diet effective now        Comments: POD 0   Question Answer Comment   Diet type Regular    Diet type 40 gram fat        03/29/24 0618                    Current Outpatient Medications   Medication Instructions    acetaminophen (TYLENOL) 650 mg, oral, Every 6 hours PRN    acetaminophen (TYLENOL) 650 mg, oral, Every 4 hours    alendronate (FOSAMAX) 70 mg, oral, Every 7 days    ALPRAZolam (Xanax) 0.25 mg tablet 1 tablet, oral, 3 times daily PRN    cholecalciferol (Vitamin D-3) 50 MCG (2000 UT) tablet 1 tablet, oral, Daily    fluticasone (Flonase) 50 mcg/actuation nasal spray 2 sprays, nasal, Daily    minoxidil (Loniten) 2.5 mg tablet 0.5 tablets, oral, Daily    mirtazapine (REMERON) 30 mg, oral, Nightly    omeprazole (PRILOSEC) 20 mg, oral, Daily    oxybutynin XL (DITROPAN-XL) 15 mg, oral, Daily    polyethylene glycol (GLYCOLAX, MIRALAX) 17 g, oral, Daily    rosuvastatin (CRESTOR) 20 mg, oral, Daily    sertraline (ZOLOFT) 200 mg, oral, Daily    traMADol (ULTRAM) 50 mg, oral, Every 6 hours PRN, DO NOT TAKE WITH XANAX        Updates:  3/29: VS wnl. Hgb 9.9<10.8, sCr 1.55<1.18. 1.6L clear yellow UOP. 255 drain, ss.     Plan:   Neuro:  #hx of anxiety/depression  #home meds: sertraline, xanax, mirtazapine  - Tylenol 650 mg q4h, tramadol 50mg q4hr for mild pain, oxycodone 5/10 mg q4h PRN for mod/sev pain, Dilaudid 0.2 q2h for breakthrough pain  - Lidocaine patches  - Zofran 4 mg q8h PRN for nausea, compazine 2nd line  - Continue home sertraline, xanax, mirtazapine    CV:   #hx of HLD  #home meds: rosuvastatin  - VS q8hr  - Continue  home rosuvastatin    Heme:   #Expected acute blood loss anemia [Hgb 9.9<10.8 pacu; baseline 12]  - Discharge on DVT prophylactic eliquis  - No indication for transfusion at this time  - Daily CBC    Resp:  - Aggressive pulmonary toilet and incentive spirometry 10x/hour    GI/Diet:   #hx of GERD  #home meds: omeprazole  - Advance to low fat diet  - Nutrition consult for low fat diet for 1 month post-op  - Bowel regimen (Colace 100 mg BID; miralax, simethicone)  - Pantoprazole    /Renal:   #Expected post-op YASMIN [sCr 1.55<1.18 pacu; baseline 0.8]  - Maintain Hughes until outpatient follow up on 4/8 with Adriana Judge, no x-ray cystogram needed  - JULIA creatinine tomorrow morning, remove JULIA drain prior to discharge if negative  - Strict I/Os  - Daily BMP   - IVF 100cc/hr for soft BP    Endocrine:   - No current needs    MSK:  - OOB most of the day  - Ambulate at least 3x per day, more if tolerated  - PT on board, recommend low intensity on discharge, order placed    ID:   - Monitor for signs/sx of infection  - Complete perioperative antibiotics (cefazolin 2 g q8h, to stop tonight)    PPx: SCDs and SQH 5000 U q8H for DVT prophylaxis; pantoprazole for GI prophylaxis    Dispo: RNF, anticipate discharge tomorrow    Seen and discussed with chief resident Dr. Smart. To be discussed with attending physician Dr. De Leon.    ---  Simona Zaldivar PA-C  Service Pager [phone]

## 2024-03-29 NOTE — CONSULTS
"Nutrition Diet Education:     Patient is a 78 y.o. 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     Taken to OR (3/28) now s/p TURBT, Robo Right NephU, and RPLND.     RDN consulted for low fat diet education due to increased risk of chyle leak.    Dietary Orders (From admission, onward)       Start     Ordered    03/29/24 0618  Adult diet Regular, 40 gram fat  Diet effective now        Comments: POD 0   Question Answer Comment   Diet type Regular    Diet type 40 gram fat        03/29/24 0618             Nutrition Education:   Pt provided \"Low Fat Diet\" educational handout from the [employer]. Discussed preventative dietary recommendation for pt to limit intake of fat to 40g/day. Discussed avoiding/limiting foods that are higher in fat such as fried/oily/greasy foods, high fat cuts of meat, foods prepared with cream or whole milk, ice cream, cheeses, or foods containing a high content of butter. Encouraged pt to select foods with <3g fat per serving. Instructed pt to follow up w/ physician for when diet can be liberalized. Pt denied any questions at completion of education.    Time Spent/Follow-up Reminder:   Time Spent (min): 30 minutes  Last Date of Nutrition Visit: 03/29/24  Nutrition Follow-Up Needed?: Other (Comment)  Follow up Comment: Diet Education - No f/u required  "

## 2024-03-29 NOTE — PROGRESS NOTES
Physical Therapy    Physical Therapy Evaluation & Treatment    Patient Name: Anahi Wall  MRN: [id number]  Today's Date: 3/29/2024   Time Calculation  Start Time: 0859  Stop Time: 0923  Time Calculation (min): 24 min    Assessment/Plan   PT Assessment  PT Assessment Results: Decreased strength, Decreased endurance, Impaired balance, Decreased mobility, Pain  Rehab Prognosis: Good  Evaluation/Treatment Tolerance: Patient tolerated treatment well  Medical Staff Made Aware: Yes  End of Session Communication: Bedside nurse  Assessment Comment: pt POD #1 s/p TURBT, Robo Right NephU, and RPLND. pt IND at baseline. lives at home with , both retired. pt [place] with bed mobility and SBA-CGA for ambulating. pt benefits from continued skilled PT during IP stay to improve ambulating, strength and balance.  End of Session Patient Position: Up in chair, Alarm off, not on at start of session   IP OR SWING BED PT PLAN  Inpatient or Swing Bed: Inpatient  PT Plan  Treatment/Interventions: Bed mobility, Transfer training, Gait training, Stair training, Balance training, Strengthening, Endurance training, Range of motion, Therapeutic exercise, Therapeutic activity, Home exercise program, Positioning  PT Plan: Skilled PT  PT Frequency: 3 times per week  PT Discharge Recommendations: Low intensity level of continued care  PT Recommended Transfer Status: Contact guard  PT - OK to Discharge: Yes      Subjective     General Visit Information:  General  Reason for Referral: POD #1 s/p TURBT, Robo Right NephU, and RPLND  Past Medical History Relevant to Rehab: HLD, breast cancer s/p left lumpectomy/XRT 1996, GERD, anxiety/depression, OA, and nephrolithiasis and psh of hysterectomy 1995 and bladder sling 2007 who was 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 x1 R JULIA; rodriguez  Home Living:  Home Living  Type of Home: Condo  Lives With: Spouse  Home Adaptive Equipment: Cane  Home Layout: Two level, Able to live on main level with bedroom/bathroom (2 steps down to living room with HR; 1 SISSY; does not use 2nd floor.)  Bathroom Shower/Tub: Walk-in shower  Bathroom Toilet: Standard  Bathroom Equipment: Grab bars in shower  Prior Level of Function:  Prior Function Per Pt/Caregiver Report  Level of [place]: Independent with ADLs and functional transfers  ADL Assistance: Independent  Homemaking Assistance: Independent  Ambulatory Assistance: Independent  Vocational: Retired  Leisure: + drives; spending time with family; - falls in past 6 months  Precautions:  Precautions  Medical Precautions: Abdominal precautions, Fall precautions  Post-Surgical Precautions: Abdominal surgery precautions  Vital Signs:       Objective   Pain:  Pain Assessment  Pain Assessment: 0-10  Pain Score: 5 - Moderate pain  Pain Location: Abdomen  Cognition:  Cognition  Overall Cognitive Status: Within Functional Limits  Orientation Level: Oriented X4    General Assessments:     Activity Tolerance  Endurance: Tolerates 10 - 20 min exercise with multiple rests    Sensation  Light Touch: No apparent deficits            Perception  Inattention/Neglect: Appears intact      Coordination  Movements are Fluid and Coordinated: Yes    Postural Control  Postural Control: Within Functional Limits    Static Sitting Balance  Static Sitting-Balance Support: Bilateral upper extremity supported  Static Sitting-Level of Assistance:  (SBA)  Dynamic Sitting Balance  Dynamic Sitting-Balance Support: No upper extremity supported  Dynamic Sitting-Balance: Trunk control activities  Dynamic Sitting-Comments: CGA    Static Standing Balance  Static Standing-Balance Support: No upper extremity supported  Static Standing-Level  of Assistance: Contact guard  Static Standing-Comment/Number of Minutes: ~5 min  Dynamic Standing Balance  Dynamic Standing-Balance Support: No upper extremity supported  Dynamic Standing-Balance: Turning (gait)  Dynamic Standing-Comments: SBA-CGA  Functional Assessments:  Bed Mobility  Bed Mobility: Yes  Bed Mobility 1  Bed Mobility 1: Supine to sitting  Level of Assistance 1: Minimum assistance, Minimal verbal cues (log roll)  Bed Mobility Comments 1: HOB elevated    Transfers  Transfer: Yes  Transfer 1  Transfer From 1: Bed to  Transfer to 1: Stand  Technique 1: Sit to stand, Stand to sit  Transfer Device 1:  (UE support)  Transfer Level of Assistance 1: Minimum assistance, Minimal verbal cues  Trials/Comments 1: x1    Ambulation/Gait Training  Ambulation/Gait Training Performed: Yes  Ambulation/Gait Training 1  Surface 1: Level tile  Device 1: No device  Assistance 1: Contact guard  Quality of Gait 1: Wide base of support, Soft knee(s), Antalgic (slowed gait speed)  Comments/Distance (ft) 1: ~4 ft  Extremity/Trunk Assessments:  RUE   RUE : Within Functional Limits  LUE   LUE: Within Functional Limits  RLE   RLE : Within Functional Limits  LLE   LLE : Within Functional Limits  Treatments:   Pt ambulated additional ~25ft in room with SBA-CGA, no assistive device. Slowed gait speed 2/2 pain. Took ~5 min total standing rest breaks no UE support. Educated pt on abdominal precautions and log roll technique.   Outcome Measures:  [facility] Basic Mobility  Turning from your back to your side while in a flat bed without using bedrails: A little  Moving from lying on your back to sitting on the side of a flat bed without using bedrails: A little  Moving to and from bed to chair (including a wheelchair): A little  Standing up from a chair using your arms (e.g. wheelchair or bedside chair): A little  To walk in hospital room: A little  Climbing 3-5 steps with railing: A little  Basic Mobility - Total Score: 18    Encounter  Problems       Encounter Problems (Active)       Balance       pt will score >/- 24/28 on Tinetti balance assessment indicating low falls risk  (Progressing)       Start:  03/29/24    Expected End:  04/12/24               Mobility       STG - Patient will ambulate >/= 250ft with SBA, no acute LOB  (Progressing)       Start:  03/29/24    Expected End:  04/12/24            STG - Patient will ascend and descend four to six stairs SBA (Progressing)       Start:  03/29/24    Expected End:  04/12/24               PT Transfers       STG - Transfer from bed to chair SBA (Progressing)       Start:  03/29/24    Expected End:  04/12/24            STG - Patient will perform bed mobility SBA (Progressing)       Start:  03/29/24    Expected End:  04/12/24            STG - Patient will transfer sit to and from stand SBA (Progressing)       Start:  03/29/24    Expected End:  04/12/24                   Education Documentation  Precautions, taught by Guillermina Chew, PT at 3/29/2024 10:09 AM.  Learner: Patient  Readiness: Acceptance  Method: Explanation  Response: Verbalizes Understanding    Body Mechanics, taught by Guillermina Chew, PT at 3/29/2024 10:09 AM.  Learner: Patient  Readiness: Acceptance  Method: Explanation  Response: Verbalizes Understanding    Mobility Training, taught by Guillermina Chew, PT at 3/29/2024 10:09 AM.  Learner: Patient  Readiness: Acceptance  Method: Explanation  Response: Verbalizes Understanding    Education Comments  No comments found.      03/29/24 at 10:10 AM - Guillermina Chew, PT

## 2024-03-29 NOTE — PROGRESS NOTES
03/29/24 1300   Discharge Planning   Living Arrangements Spouse/significant other   Support Systems Spouse/significant other   Assistance Needed none   Type of Residence Private residence   Who is requesting discharge planning? Provider   Home or Post Acute Services In home services   Type of Home Care Services Home PT   Patient expects to be discharged to: home   Does the patient need discharge transport arranged? No   Financial Resource Strain   How hard is it for you to pay for the very basics like food, housing, medical care, and heating? Not hard   Housing Stability   In the last 12 months, was there a time when you were not able to pay the mortgage or rent on time? N   In the last 12 months, how many places have you lived? 1   In the last 12 months, was there a time when you did not have a steady place to sleep or slept in a shelter (including now)? N   Transportation Needs   In the past 12 months, has lack of transportation kept you from medical appointments or from getting medications? no   In the past 12 months, has lack of transportation kept you from meetings, work, or from getting things needed for daily living? No         3/29/24- TCC met with patient to discuss anticipated discharge needs. Patient is agreeable to use [facility] for home PT. TCC update patients team for [facility] referral. TCC will continue to follow patient for discharge needs.  Leslee Leonard RN TCC

## 2024-03-29 NOTE — SIGNIFICANT EVENT
S: Feeling well, slightly sore. Denies f/c, n/v, CP, SOB, worsening abd pain, flank pain, SP pain, and bladder spasms    O:         3/28/2024     4:00 PM 3/28/2024     4:19 PM 3/28/2024     4:30 PM 3/28/2024     5:00 PM 3/28/2024     5:30 PM 3/28/2024     6:30 PM 3/28/2024     7:23 PM   Vitals   Systolic 117  115 132 127 115 121   Diastolic 57  58 59 61 59 68   Heart Rate 80 81 83 90 87 100 90   Temp      37 °C (98.6 °F) 36.6 °C (97.9 °F)   Resp 12 11 10 11 12 16 16       PHYSICAL EXAM:  Gen        - NAD, resting in bed comfortably  NEURO   - AA&Ox3  HEENT   - EOMI, non-icteric  CV          - RRR, no JVD  Pulm      - NLB on RA  Abd        - soft, mildly distended, appropriately tender, incisions clean, dry and intact, JULIA SS           - rodriguez draining clear yellow urine with small red flakes  Ext         - no edema, warm, well perfused  Neuro     - no focal deficits, moves all extremities x4  Psych    - Pleasant, cooperative    Results for orders placed or performed during the hospital encounter of 03/28/24 (from the past 24 hour(s))   BLOOD GAS ARTERIAL FULL PANEL   Result Value Ref Range    POCT pH, Arterial 7.33 (L) 7.38 - 7.42 pH    POCT pCO2, Arterial 44 (H) 38 - 42 mm Hg    POCT pO2, Arterial 77 (L) 85 - 95 mm Hg    POCT SO2, Arterial 97 94 - 100 %    POCT Oxy Hemoglobin, Arterial 94.7 94.0 - 98.0 %    POCT Hematocrit Calculated, Arterial 33.0 (L) 36.0 - 46.0 %    POCT Sodium, Arterial 137 136 - 145 mmol/L    POCT Potassium, Arterial 4.0 3.5 - 5.3 mmol/L    POCT Chloride, Arterial 104 98 - 107 mmol/L    POCT Ionized Calcium, Arterial 1.23 1.10 - 1.33 mmol/L    POCT Glucose, Arterial 201 (H) 74 - 99 mg/dL    POCT Lactate, Arterial 1.6 0.4 - 2.0 mmol/L    POCT Base Excess, Arterial -2.7 (L) -2.0 - 3.0 mmol/L    POCT HCO3 Calculated, Arterial 23.2 22.0 - 26.0 mmol/L    POCT Hemoglobin, Arterial 11.0 (L) 12.0 - 16.0 g/dL    POCT Anion Gap, Arterial 14 10 - 25 mmo/L    Patient Temperature 37.0 degrees Celsius     FiO2 65 %   CBC   Result Value Ref Range    WBC 9.7 4.4 - 11.3 x10*3/uL    nRBC 0.0 0.0 - 0.0 /100 WBCs    RBC 3.70 (L) 4.00 - 5.20 x10*6/uL    Hemoglobin 10.8 (L) 12.0 - 16.0 g/dL    Hematocrit 34.2 (L) 36.0 - 46.0 %    MCV 92 80 - 100 fL    MCH 29.2 26.0 - 34.0 pg    MCHC 31.6 (L) 32.0 - 36.0 g/dL    RDW 14.1 11.5 - 14.5 %    Platelets 128 (L) 150 - 450 x10*3/uL   Basic metabolic panel   Result Value Ref Range    Glucose 185 (H) 74 - 99 mg/dL    Sodium 142 136 - 145 mmol/L    Potassium 3.9 3.5 - 5.3 mmol/L    Chloride 105 98 - 107 mmol/L    Bicarbonate 25 21 - 32 mmol/L    Anion Gap 16 10 - 20 mmol/L    Urea Nitrogen 25 (H) 6 - 23 mg/dL    Creatinine 1.18 (H) 0.50 - 1.05 mg/dL    eGFR 47 (L) >60 mL/min/1.73m*2    Calcium 8.7 8.6 - 10.6 mg/dL         A/P:   Anahi Wall is a 78 y.o. M/F with a history of UTUC POD #0 s/p TURBT, Robo Right NephU, and RPLND. Postoperative course uncomplicated. Pain controlled.     - No acute issues at this time, continue management per primary team orders  - Continue monitoring clinical status  - Notify team with changes in clinical status    Se Dunlap MD  Urology Resident (PGY-4)  Adult Pager [phone]  Pediatric Pager [phone]

## 2024-03-30 ENCOUNTER — DOCUMENTATION (OUTPATIENT)
Dept: HOME HEALTH SERVICES | Facility: HOME HEALTH | Age: 79
End: 2024-03-30
Payer: MEDICARE

## 2024-03-30 ENCOUNTER — HOME HEALTH ADMISSION (OUTPATIENT)
Dept: HOME HEALTH SERVICES | Facility: HOME HEALTH | Age: 79
End: 2024-03-30
Payer: MEDICARE

## 2024-03-30 VITALS
HEIGHT: 62 IN | SYSTOLIC BLOOD PRESSURE: 121 MMHG | RESPIRATION RATE: 18 BRPM | BODY MASS INDEX: 29.58 KG/M2 | OXYGEN SATURATION: 93 % | HEART RATE: 74 BPM | DIASTOLIC BLOOD PRESSURE: 70 MMHG | WEIGHT: 160.72 LBS | TEMPERATURE: 98.1 F

## 2024-03-30 LAB
ALBUMIN SERPL BCP-MCNC: 3.2 G/DL (ref 3.4–5)
ANION GAP SERPL CALC-SCNC: 10 MMOL/L (ref 10–20)
BUN SERPL-MCNC: 18 MG/DL (ref 6–23)
CALCIUM SERPL-MCNC: 8.6 MG/DL (ref 8.6–10.6)
CHLORIDE SERPL-SCNC: 107 MMOL/L (ref 98–107)
CO2 SERPL-SCNC: 29 MMOL/L (ref 21–32)
CREAT FLD-MCNC: 1.44 MG/DL
CREAT SERPL-MCNC: 1.48 MG/DL (ref 0.5–1.05)
EGFRCR SERPLBLD CKD-EPI 2021: 36 ML/MIN/1.73M*2
ERYTHROCYTE [DISTWIDTH] IN BLOOD BY AUTOMATED COUNT: 14.5 % (ref 11.5–14.5)
GLUCOSE SERPL-MCNC: 116 MG/DL (ref 74–99)
HCT VFR BLD AUTO: 30.4 % (ref 36–46)
HGB BLD-MCNC: 9.8 G/DL (ref 12–16)
MCH RBC QN AUTO: 30.3 PG (ref 26–34)
MCHC RBC AUTO-ENTMCNC: 32.2 G/DL (ref 32–36)
MCV RBC AUTO: 94 FL (ref 80–100)
NRBC BLD-RTO: 0 /100 WBCS (ref 0–0)
PHOSPHATE SERPL-MCNC: 3.1 MG/DL (ref 2.5–4.9)
PLATELET # BLD AUTO: 94 X10*3/UL (ref 150–450)
POTASSIUM SERPL-SCNC: 3.8 MMOL/L (ref 3.5–5.3)
RBC # BLD AUTO: 3.23 X10*6/UL (ref 4–5.2)
SODIUM SERPL-SCNC: 142 MMOL/L (ref 136–145)
WBC # BLD AUTO: 5.5 X10*3/UL (ref 4.4–11.3)

## 2024-03-30 PROCEDURE — 2500000004 HC RX 250 GENERAL PHARMACY W/ HCPCS (ALT 636 FOR OP/ED): Performed by: STUDENT IN AN ORGANIZED HEALTH CARE EDUCATION/TRAINING PROGRAM

## 2024-03-30 PROCEDURE — 2500000002 HC RX 250 W HCPCS SELF ADMINISTERED DRUGS (ALT 637 FOR MEDICARE OP, ALT 636 FOR OP/ED): Mod: MUE | Performed by: STUDENT IN AN ORGANIZED HEALTH CARE EDUCATION/TRAINING PROGRAM

## 2024-03-30 PROCEDURE — 2500000001 HC RX 250 WO HCPCS SELF ADMINISTERED DRUGS (ALT 637 FOR MEDICARE OP): Performed by: STUDENT IN AN ORGANIZED HEALTH CARE EDUCATION/TRAINING PROGRAM

## 2024-03-30 PROCEDURE — 82570 ASSAY OF URINE CREATININE: CPT | Performed by: STUDENT IN AN ORGANIZED HEALTH CARE EDUCATION/TRAINING PROGRAM

## 2024-03-30 PROCEDURE — 36415 COLL VENOUS BLD VENIPUNCTURE: CPT

## 2024-03-30 PROCEDURE — 80069 RENAL FUNCTION PANEL: CPT

## 2024-03-30 PROCEDURE — 85027 COMPLETE CBC AUTOMATED: CPT

## 2024-03-30 PROCEDURE — 2500000005 HC RX 250 GENERAL PHARMACY W/O HCPCS: Performed by: STUDENT IN AN ORGANIZED HEALTH CARE EDUCATION/TRAINING PROGRAM

## 2024-03-30 PROCEDURE — 2500000002 HC RX 250 W HCPCS SELF ADMINISTERED DRUGS (ALT 637 FOR MEDICARE OP, ALT 636 FOR OP/ED): Performed by: STUDENT IN AN ORGANIZED HEALTH CARE EDUCATION/TRAINING PROGRAM

## 2024-03-30 RX ORDER — OXYCODONE HYDROCHLORIDE 5 MG/1
5 TABLET ORAL EVERY 6 HOURS PRN
Qty: 12 TABLET | Refills: 0 | Status: SHIPPED | OUTPATIENT
Start: 2024-03-30 | End: 2024-04-02

## 2024-03-30 RX ADMIN — HEPARIN SODIUM 5000 UNITS: 5000 INJECTION INTRAVENOUS; SUBCUTANEOUS at 05:01

## 2024-03-30 RX ADMIN — SIMETHICONE 160 MG: 80 TABLET, CHEWABLE ORAL at 13:43

## 2024-03-30 RX ADMIN — SERTRALINE 200 MG: 100 TABLET, FILM COATED ORAL at 08:29

## 2024-03-30 RX ADMIN — ONDANSETRON 4 MG: 2 INJECTION INTRAMUSCULAR; INTRAVENOUS at 10:16

## 2024-03-30 RX ADMIN — LIDOCAINE 1 PATCH: 4 PATCH TOPICAL at 08:30

## 2024-03-30 RX ADMIN — METHOCARBAMOL 500 MG: 500 TABLET ORAL at 05:01

## 2024-03-30 RX ADMIN — ACETAMINOPHEN 650 MG: 325 TABLET ORAL at 05:15

## 2024-03-30 RX ADMIN — METHOCARBAMOL 500 MG: 500 TABLET ORAL at 13:43

## 2024-03-30 RX ADMIN — SIMETHICONE 160 MG: 80 TABLET, CHEWABLE ORAL at 08:28

## 2024-03-30 RX ADMIN — CEFAZOLIN SODIUM 2 G: 2 INJECTION, SOLUTION INTRAVENOUS at 05:01

## 2024-03-30 RX ADMIN — PANTOPRAZOLE SODIUM 40 MG: 40 TABLET, DELAYED RELEASE ORAL at 08:29

## 2024-03-30 RX ADMIN — ACETAMINOPHEN 650 MG: 325 TABLET ORAL at 10:15

## 2024-03-30 RX ADMIN — DOCUSATE SODIUM 100 MG: 100 CAPSULE, LIQUID FILLED ORAL at 08:29

## 2024-03-30 RX ADMIN — POLYETHYLENE GLYCOL 3350 17 G: 17 POWDER, FOR SOLUTION ORAL at 08:29

## 2024-03-30 RX ADMIN — ACETAMINOPHEN 650 MG: 325 TABLET ORAL at 13:43

## 2024-03-30 RX ADMIN — HEPARIN SODIUM 5000 UNITS: 5000 INJECTION INTRAVENOUS; SUBCUTANEOUS at 13:43

## 2024-03-30 ASSESSMENT — PAIN DESCRIPTION - LOCATION
LOCATION: ABDOMEN
LOCATION: ABDOMEN

## 2024-03-30 ASSESSMENT — PAIN - FUNCTIONAL ASSESSMENT
PAIN_FUNCTIONAL_ASSESSMENT: 0-10

## 2024-03-30 ASSESSMENT — PAIN SCALES - GENERAL
PAINLEVEL_OUTOF10: 2
PAINLEVEL_OUTOF10: 0 - NO PAIN
PAINLEVEL_OUTOF10: 2

## 2024-03-30 NOTE — CARE PLAN
The patient's goals for the shift include      The clinical goals for the shift include pain control for duration of shift    Patient is getting discharged

## 2024-03-30 NOTE — HH CARE COORDINATION
Home Care received a Referral for Physical Therapy. We have processed the referral for a Start of Care on 3/31-4/1.     If you have any questions or concerns, please feel free to contact us at [phone]. Follow the prompts, enter your five digit zip code, and you will be directed to your care team on C[facility] 1.

## 2024-03-30 NOTE — DISCHARGE INSTRUCTIONS
DEPARTMENT OF Urology  DISCHARGE INSTRUCTIONS --transurethral bladder tumor resection and robotic nephro-ureterectomy  Inpatient Surgery    C O N F I D E N T I A L   I N F O R M A T I O N    Anahi Wall      Call [phone] during regular daytime business hours (8:00 am - 5:00 pm) and after 5:00 pm ask for the Urology resident with any questions or concerns.    If it is a life-threatening situation, proceed to the nearest emergency department.        Follow-up appointment: We will call you for an appointment to have your catheter removed at 7-10 days after your surgery. You also have an appointment 4/16/2024 (postoperative visit with Dr. De Leon)      Thank you for the opportunity to care for you today.  Your health and healing are very important to us.  We hope we made you feel as comfortable as possible and are committed to your recovery and continued well-being.      The following is a brief overview of your transurethral bladder tumor resection and robotic nephro-ureterectomy procedure. Some of the information contained on this summary may be confidential.  This information should be kept in your records and should be shared with your regular doctor.    Physicians:   Dr. De Leon    Procedure performed: removal of all your kidney and ureter, removal of bladder tumors  Pending Results: Pathology    What to Expect During your Recovery and Home Care  Anesthesia Side Effects   You may feel sleepy, tired, or have a sore throat.   You may also feel drowsiness, dizziness, or inability to think clearly.  For your safety, do not drive, drink alcoholic beverages, take any unprescribed medication or make any important decisions for 24 hours after anesthesia.  A responsible adult should be with you for 24 hours.        Activity and Recovery    No heavy lifting greater than 10 pounds for the next 4 weeks. No driving until mobility has returned to normal. Do not drive or operate heavy machinery while taking narcotic pain  medications as these medications can alter perception, impair judgement, and slow reaction times.  Pain Control  Unfortunately, you may experience pain after your procedure. Adequate pain management can include alternative measures to help ease your pain and that can include over the counter Tylenol/ibuprofen or oxycodone which can be taken as prescribed as needed for breakthrough pain. Do not take more than 4,000mg of Tylenol in a 24-hour period.      If your procedure was done robotically you may experience gas pains from the surgery. Getting up and moving around is the best way to relieve those pains. You can also take some over the counter gas-x(simethicone).    Nausea/Vomiting   Clear liquids are best tolerated at first. Start slow, advance your diet as tolerated to normal foods. Avoid spicy, greasy, heavy foods at first. Also, you may feel nauseous or like you need to vomit if you take any type of medication on an empty stomach.  Call your physician if you are unable to eat or drink, are not passing gas and have persistent vomiting.    Signs of Bleeding   You could have some blood in your urine off and on over the next several weeks. Your urine will be light pink to yellow. Minor bleeding or drainage may occur from the surgical sites; however, excessive or consistent bleeding should be reported to your surgeon. Excessive bleeding is defined as blood that is dripping from wound, soaking you bandages, and is ketchup colored, thick with possible blood clots.  Consistent is defined as bleeding that does not stop.      Treatment/wound care:   Keep area(s) clean and dry.   It is okay to shower 24 hours after time of surgery.    Do not scrub wound(s), pat dry.    Do not submerge wound(s) in standing water until seen for follow up appointment (no tub bathing, swimming, or hot tubs).    Clean with mild soap, gentle washing, pat dry, cover with bandage as needed.    Avoid waterproof bandages.  No oils or lotions on  incisions.  Staples/sutures removed in our [id number] days after the date of surgery.  Do not remove the staples/sutures on your own.    Please visually inspect your wound(s) at least once daily.  If the wound(s) are in a difficult to see location, please use a mirror or have someone else assist with visual inspection.    Signs of Infection  Signs of infection can include fever, chills, redness around surgical incisions, green/yellow drainage from incisions, burning sensation with passing of urine, or severe abdominal pain.  If you see any of these occur, please contact your doctor's office at [phone].  Any fever higher than 100.4, especially if associated with an ill feeling, abdominal pain, chills, or nausea should be reported to your surgeon.      Assist in bowel movements/urination  Take daily stool softener you were prescribed  Increase fiber in diet  Increase water (6 to 8 glasses)  Increase walking   Can try adding over the counter MiraLAX or in extreme cases milk of magnesia.  If you have tried these methods and you are not passing gas, your bladder still feels full and you cannot have a bowel movement, please go to your nearest Emergency room/contact your physician.    Additional Instructions:   Use a small pillow to put pressure on your belly. This can make you more comfortable when you cough, laugh or do other actions.     You will also be discharged with a new medication called Eliquis.  This is a medication that will help prevent blood clots.  You will take this for 28 days.    You were also provided information on a low-fat diet.  You can adhere to this for a month, and then resume your regular diet if desired.    Additional Instructions: CATHETER CARE   Always keep the catheters tubing and drainage bag below the level of your bladder.  Avoid loops and kinks in the catheter tubing.  NOTIFY your physician if catheter falls out or catheter seems clogged and urine is not draining.   Do not wear the  small leg bag to bed you should be provided with a larger overnight bag that you should wear to bed and can hang over the side of the bed.  We recommend wearing the large bag in the shower as well as this is easy to dry, and you do not get your leg straps wet from your leg bag.   Your catheter should be secured to your upper thigh, do not allow it to hang or dangle.  Your catheter will be removed at your post-operative appointment.

## 2024-03-30 NOTE — PROGRESS NOTES
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 Extensive urologic history as below.     \"-3/2010 LGTA non inv Pap   -Recurrence 2013, s/p 6 BCG, s/p BCG maintenance,   -8/2014 - non inv CIS, repeat 6 BCG, maintenance, Jan 2017  -Continued to be monitored  -7/2020 - non inv HG pap UC, s/p BCG, and maintenance into Jan 2021  -Path 6/2021 - pap UC, LG w/ focal HG features   -3 induction cycles of BCG  -cysto 10/12/2021 - multifocal lesions, s/p IV gem , path - / TURBT Urothelium, mod to severe atypia, carcinoma in situ  -refused Pembro/Cystectomy s/p induction gem/docetaxel x2   -repeat cysto - 4/22 - non inv pap UC HG, possible R UO, involvement, non specific Lns  -repeat gem/docetaxel completed 6 cycles 8/22, procedure 1/23 pap tumors at both ureteral orficies, r>l, tumor posterior trigone, wall of bladder, path c/w HG NMIBC ant wall and right ureteral, cytology concerning on left side, yet unable to evaluate, repeat gem/docetaxel x2 w/ persistent disease  -cysto / right ureteroscope - 11/23 - right UO tumor, renal pelvis high volume papillary tumors, path urinary bladder pap UC, HG, no muscle invasion, right ureter wash - highly atypical cells concerning for urothelial neoplasms, right, left, highly atypical UC present, concerning for neoplasms\"      11/28/23 cystoscopy/URS with findings of large volume bladder tumor and large volume papillary tumor taking up the entirety of the right renal pelvis and calyces. CT Urogram 12/18/2023 showed right pelvic sidewall lymphadenopathy measuring up to 1.4cm and borderline size retroperitoneal lymph nodes (follows with Dr. Rojas). She was recommended neoadjuvant chemo and " nephroureterectomy +/- cystectomy, though elected to move forward only with TURBT and nephroureterctomy with lymph node dissection which she underwent on 3/28 with Dr. De Leon with future treatment dependant upon surgical pathology. Findings were multifocal papillary bladder tumor throughout the bladder and right URS demonstrating entire renal pelvis with urothelial carcinoma.      Subjective Endorses mild surgical site pain controlled on medication, pain upon breathing. Using IS. Has been drinking water, no n/v. +gas/-BM. No other complaints.    Objective    Vital Signs:  Heart Rate:  [72-83]   Temp:  [36.2 °C (97.2 °F)-37.1 °C (98.7 °F)]   Resp:  [16-18]   BP: ()/(61-77)   SpO2:  [94 %-95 %]     I&Os    Intake/Output Summary (Last 24 hours) at 3/30/2024 1014  Last data filed at 3/30/2024 0846  Gross per 24 hour   Intake 1566.67 ml   Output 2750 ml   Net -1183.33 ml          Labs  Results from last 72 hours   Lab Units 03/30/24  0641 03/29/24  0815 03/28/24  1411   CREATININE mg/dL 1.48* 1.55* 1.18*   HEMOGLOBIN g/dL 9.8* 9.9* 10.8*   WBC AUTO x10*3/uL 5.5 7.3 9.7   GLUCOSE mg/dL 116* 99 185*   POTASSIUM mmol/L 3.8 3.8 3.9          Physical Exam:   Constitutional: Alert, in NAD  HEENT: Normocephalic, atraumatic  Neuro: A&O x3  CV: Regular rate  Pulm: Non-laboured breathing   GI: Abdomen soft, non-distended, appropriately-tender  : Hughes in place draining clear yellow urine  Skin: Incisions covered with dermabond, c/d/i. No lesions or discoloration noted.  Musculoskeletal: ZAC  Extremities: no edema or cyanosis noted    Current Medications:  acetaminophen, 650 mg, oral, q4h  ceFAZolin, 2 g, intravenous, q8h  docusate sodium, 100 mg, oral, BID  heparin (porcine), 5,000 Units, subcutaneous, q8h  lidocaine, 1 patch, transdermal, Daily  methocarbamol, 500 mg, oral, q8h ELMER  mirtazapine, 30 mg, oral, Nightly  pantoprazole, 40 mg, oral, Daily before breakfast   Or  pantoprazole, 40 mg, intravenous, Daily before  breakfast  polyethylene glycol, 17 g, oral, Daily  rosuvastatin, 20 mg, oral, Nightly  sertraline, 200 mg, oral, Daily  simethicone, 160 mg, oral, TID after meals      PRN medications: ALPRAZolam, HYDROmorphone, melatonin, naloxone, naloxone, naloxone, ondansetron ODT **OR** ondansetron, oxyCODONE, oxyCODONE, prochlorperazine **OR** prochlorperazine **OR** prochlorperazine   Dietary Orders (From admission, onward)       Start     Ordered    03/29/24 0618  Adult diet Regular, 40 gram fat  Diet effective now        Comments: POD 0   Question Answer Comment   Diet type Regular    Diet type 40 gram fat        03/29/24 0618                    Current Outpatient Medications   Medication Instructions    acetaminophen (TYLENOL) 650 mg, oral, Every 6 hours PRN    acetaminophen (TYLENOL) 650 mg, oral, Every 4 hours    alendronate (FOSAMAX) 70 mg, oral, Every 7 days    ALPRAZolam (Xanax) 0.25 mg tablet 1 tablet, oral, 3 times daily PRN    apixaban (ELIQUIS) 2.5 mg, oral, 2 times daily    cholecalciferol (Vitamin D-3) 50 MCG (2000 UT) tablet 1 tablet, oral, Daily    fluticasone (Flonase) 50 mcg/actuation nasal spray 2 sprays, nasal, Daily    minoxidil (Loniten) 2.5 mg tablet 0.5 tablets, oral, Daily    mirtazapine (REMERON) 30 mg, oral, Nightly    omeprazole (PRILOSEC) 20 mg, oral, Daily    oxybutynin XL (DITROPAN-XL) 15 mg, oral, Daily    polyethylene glycol (GLYCOLAX, MIRALAX) 17 g, oral, Daily    rosuvastatin (CRESTOR) 20 mg, oral, Daily    sertraline (ZOLOFT) 200 mg, oral, Daily    traMADol (ULTRAM) 50 mg, oral, Every 6 hours PRN, DO NOT TAKE WITH XANAX        Updates:  3/29: VS wnl. Hgb 9.9<10.8, sCr 1.55<1.18. 1.6L clear yellow UOP. 255 drain, ss.     Plan:   Neuro:  #hx of anxiety/depression  #home meds: sertraline, xanax, mirtazapine  - Tylenol 650 mg q4h, tramadol 50mg q4hr for mild pain, oxycodone 5/10 mg q4h PRN for mod/sev pain, Dilaudid 0.2 q2h for breakthrough pain  - Lidocaine patches  - Zofran 4 mg q8h PRN for  nausea, compazine 2nd line  - Continue home sertraline, xanax, mirtazapine    CV:   #hx of HLD  #home meds: rosuvastatin  - VS q8hr  - Continue home rosuvastatin    Heme:   #Expected acute blood loss anemia [Hgb 9.9<10.8 pacu; baseline 12]  - Discharge on DVT prophylactic eliquis  - No indication for transfusion at this time  - Daily CBC    Resp:  - Aggressive pulmonary toilet and incentive spirometry 10x/hour    GI/Diet:   #hx of GERD  #home meds: omeprazole  - Advance to low fat diet  - Nutrition consult for low fat diet for 1 month post-op  - Bowel regimen (Colace 100 mg BID; miralax, simethicone)  - Pantoprazole    /Renal:   #Expected post-op YASMIN [sCr 1.55<1.18 pacu; baseline 0.8]  - Maintain Hughes until outpatient follow up on 4/8 with Adriana Judge, no x-ray cystogram needed  - JULIA creatinine today, remove JULIA drain prior to discharge if negative  - Strict I/Os  - Daily BMP   - IVF 100cc/hr for soft BP    Endocrine:   - No current needs    MSK:  - OOB most of the day  - Ambulate at least 3x per day, more if tolerated  - PT on board, recommend low intensity on discharge, order placed    ID:   - Monitor for signs/sx of infection  - Complete perioperative antibiotics (cefazolin 2 g q8h, to stop tonight)    PPx: SCDs and SQH 5000 U q8H for DVT prophylaxis; pantoprazole for GI prophylaxis    Dispo: RNF, anticipate discharge today    Seen and discussed with chief resident Dr. Smart. To be discussed with attending physician Dr. De Leon.    ---  Se Dunlap MD  Urology Resident (PGY-4)  Adult Pager [phone]  Pediatric Pager [phone]

## 2024-03-31 ENCOUNTER — HOME CARE VISIT (OUTPATIENT)
Dept: HOME HEALTH SERVICES | Facility: HOME HEALTH | Age: 79
End: 2024-03-31
Payer: MEDICARE

## 2024-03-31 VITALS
OXYGEN SATURATION: 96 % | TEMPERATURE: 97.7 F | DIASTOLIC BLOOD PRESSURE: 68 MMHG | SYSTOLIC BLOOD PRESSURE: 154 MMHG | RESPIRATION RATE: 18 BRPM | HEART RATE: 77 BPM

## 2024-03-31 PROCEDURE — G0151 HHCP-SERV OF PT,EA 15 MIN: HCPCS | Mod: HHH

## 2024-03-31 PROCEDURE — 1090000001 HH PPS REVENUE CREDIT

## 2024-03-31 PROCEDURE — 169592 NO-PAY CLAIM PROCEDURE

## 2024-03-31 PROCEDURE — 1090000003 HH PPS REVENUE ADJ

## 2024-03-31 PROCEDURE — 1090000002 HH PPS REVENUE DEBIT

## 2024-03-31 PROCEDURE — 0023 HH SOC

## 2024-03-31 SDOH — HEALTH STABILITY: PHYSICAL HEALTH: RESISTANCE: AS TOLERATED

## 2024-03-31 SDOH — HEALTH STABILITY: PHYSICAL HEALTH: EXERCISE ACTIVITY: GAIT TRAINING

## 2024-03-31 SDOH — HEALTH STABILITY: PHYSICAL HEALTH: PHYSICAL EXERCISE: SUPINE, SITTING, STANDING

## 2024-03-31 SDOH — HEALTH STABILITY: PHYSICAL HEALTH: EXERCISE ACTIVITIES SETS: 2

## 2024-03-31 SDOH — HEALTH STABILITY: PHYSICAL HEALTH: EXERCISE TYPE: SKILLED THERAPEUTIC EXERCISES

## 2024-03-31 SDOH — HEALTH STABILITY: PHYSICAL HEALTH: EXERCISE ACTIVITY: SKILLED THERAPEUTIC EXERCISES

## 2024-03-31 SDOH — HEALTH STABILITY: PHYSICAL HEALTH: EXERCISE ACTIVITY: STAIR TRAINING

## 2024-03-31 SDOH — HEALTH STABILITY: PHYSICAL HEALTH: PHYSICAL EXERCISE: 10

## 2024-03-31 ASSESSMENT — BALANCE ASSESSMENTS
NUDGED SCORE: 0
ATTEMPTS TO ARISE: 1 - ABLE, REQUIRES MORE THAN ONE ATTEMPT
ARISING SCORE: 1
SITTING DOWN: 1 - USES ARMS OR NOT SMOOTH MOTION
SITTING BALANCE: 1 - STEADY, SAFE
ARISES: 1 - ABLE, USES ARMS TO HELP
BALANCE SCORE: 7
STANDING BALANCE: 1 - STEADY BUT WIDE STANCE AND USES CANE OR OTHER SUPPORT
EYES CLOSED AT MAXIMUM POSITION NUDGED: 0 - UNSTEADY
TURNING 360 DEGREES STEPS: 0 - DISCONTINUOUS STEPS
NUDGED: 0 - BEGINS TO FALL
IMMEDIATE STANDING BALANCE FIRST 5 SECONDS: 2 - STEADY WITHOUT WALKER OR OTHER SUPPORT

## 2024-03-31 ASSESSMENT — GAIT ASSESSMENTS
STEP SYMMETRY: 0 - RIGHT AND LEFT STEP LENGTH NOT EQUAL
TRUNK: 1 - NO SWAY BUT FLEXION OF KNEES OR BACK OR SPREADS ARMS WHILE WALKING
WALKING STANCE: 0 - HEELS APART
PATH: 1 - MILD/MODERATE DEVIATION OR USES WALKING AID
STEP CONTINUITY: 0 - STOPPING OR DISCONTINUITY BETWEEN STEPS
PATH SCORE: 1
BALANCE AND GAIT SCORE: 14
GAIT SCORE: 7
TRUNK SCORE: 1
INITIATION OF GAIT IMMEDIATELY AFTER GO: 1 - NO HESITANCY

## 2024-03-31 ASSESSMENT — ENCOUNTER SYMPTOMS
HIGHEST PAIN SEVERITY IN PAST 24 HOURS: 4/10
PAIN: 1
SUBJECTIVE PAIN PROGRESSION: GRADUALLY IMPROVING
PAIN LOCATION - RELIEVING FACTORS: PAIN MEDS
MUSCLE WEAKNESS: 1
PAIN LOCATION - EXACERBATING FACTORS: MOVEMENT
PAIN LOCATION: ABDOMEN
PAIN LOCATION - PAIN SEVERITY: 2/10
PAIN SEVERITY GOAL: 0/10
PERSON REPORTING PAIN: PATIENT
LIMITED RANGE OF MOTION: 1
ARTHRALGIAS: 1
LOWEST PAIN SEVERITY IN PAST 24 HOURS: 2/10

## 2024-03-31 ASSESSMENT — ACTIVITIES OF DAILY LIVING (ADL)
AMBULATION_DISTANCE/DURATION_TOLERATED: 10 FT
PHYSICAL TRANSFERS ASSESSED: 1
AMBULATION ASSISTANCE: 1
ENTERING_EXITING_HOME: MODERATE ASSIST
AMBULATION ASSISTANCE ON FLAT SURFACES: 1
PHYSICAL_TRANSFERS_DEVICES: BODY SUPPORT
CURRENT_FUNCTION: STAND BY ASSIST
AMBULATION ASSISTANCE: STAND BY ASSIST

## 2024-03-31 NOTE — Clinical Note
Patient admitted for home health therapy today. Plan to progress 2 x 3 weeks of therapy.    Patient given HEP specific to patient's need to improve overall endurance, strength, and improve gait and balance activities. Encouraged and reinforced patient to continue and maintain long-term home exercise routine.

## 2024-03-31 NOTE — HOME HEALTH
GOALS: PATIENT WILL DEMONSTRATE IMPROVED CORE AND LE STRENGTH AND MOBILITY.          SUBJECTIVE: THE PATIENT REPORTS NO PAIN. PATIENT REPORTED DIFFICULTY WITH AMBULATION.          LIVING CONDITION: PATIENT IS CURRENTLY LIVING IN A ONE-STORY BUILDING WITH STAIRS. PATIENT IS CURRENTLY LIVING WITH HER .          OBJECTIVE: MANUAL MUSCLE TESTING WAS PERFORMED TO DETERMINE BOTH UE AND LE STRENGTH. PATIENT DEMONSTRATED 3+/5 MUSCLE STRENGTH TO UE'S AND 3+/5 TO LE'S. BALANCE TESTING WAS PERFORMED WHICH SHOWED PATIENT TO BE A HIGH FALL RISK. PATIENT WAS ABLE TO WALK WITH NO ASSISTIVE DEVICE, BUT DEMONSTRATED DIFFICULTY WITH TRANSFERS FROM STS AND GAIT ABNORMALITIES ALONG WITH BALANCE IMPAIRMENT.          THERAPEUTIC ACTIVITIES: MMT, TINETTI, MOBILITY, AND GAIT ASSESSMENT COMPLETED          ASSESSMENT: DEMONSTRATED GROSSLY REDUCED STRENGTH AND ENDURANCE SECONDARY TO ASSOCIATED COMORBID CONDITIONS. PATIENT, PRESENTLY, IS A FALL RISK, CURRENTLY AMBULATING WITH NO AD. FALL PRECAUTIONS DISCUSSED. NO SIGNS OF INFECTION NOTED AROUND THE WOUND SITE.          PLAN: CONSIDER COMORBID FACTORS WHEN IMPLEMENTING POC. CONTINUE WITH GENERAL ENDURANCE AND STRENGTH TRAINING UE'S AND LE'S, GAIT TRAINING ACTIVITIES, BALANCE AND PROPRIOCEPTIVE TRAINING, AND FALL PREVENTION STRATEGIES AS PER PT POC. PROGRESS AS TOLERATED.

## 2024-04-01 PROCEDURE — 1090000002 HH PPS REVENUE DEBIT

## 2024-04-01 PROCEDURE — 1090000001 HH PPS REVENUE CREDIT

## 2024-04-02 PROCEDURE — 1090000001 HH PPS REVENUE CREDIT

## 2024-04-02 PROCEDURE — 1090000002 HH PPS REVENUE DEBIT

## 2024-04-03 PROCEDURE — 1090000002 HH PPS REVENUE DEBIT

## 2024-04-03 PROCEDURE — 1090000001 HH PPS REVENUE CREDIT

## 2024-04-04 PROCEDURE — 1090000001 HH PPS REVENUE CREDIT

## 2024-04-04 PROCEDURE — 1090000002 HH PPS REVENUE DEBIT

## 2024-04-04 ASSESSMENT — ACTIVITIES OF DAILY LIVING (ADL): OASIS_M1830: 03

## 2024-04-05 ENCOUNTER — HOME CARE VISIT (OUTPATIENT)
Dept: HOME HEALTH SERVICES | Facility: HOME HEALTH | Age: 79
End: 2024-04-05
Payer: MEDICARE

## 2024-04-05 ENCOUNTER — OFFICE VISIT (OUTPATIENT)
Dept: UROLOGY | Facility: CLINIC | Age: 79
End: 2024-04-05
Payer: MEDICARE

## 2024-04-05 VITALS
DIASTOLIC BLOOD PRESSURE: 80 MMHG | HEIGHT: 62 IN | TEMPERATURE: 97.1 F | HEART RATE: 99 BPM | WEIGHT: 160 LBS | SYSTOLIC BLOOD PRESSURE: 133 MMHG | BODY MASS INDEX: 29.44 KG/M2

## 2024-04-05 DIAGNOSIS — C67.9 MALIGNANT NEOPLASM OF URINARY BLADDER, UNSPECIFIED SITE (MULTI): Primary | ICD-10-CM

## 2024-04-05 DIAGNOSIS — I10 HTN (HYPERTENSION), BENIGN: Primary | ICD-10-CM

## 2024-04-05 DIAGNOSIS — L89.329 PRESSURE INJURY OF SKIN OF LEFT BUTTOCK, UNSPECIFIED INJURY STAGE: ICD-10-CM

## 2024-04-05 PROCEDURE — 1157F ADVNC CARE PLAN IN RCRD: CPT | Performed by: NURSE PRACTITIONER

## 2024-04-05 PROCEDURE — 3079F DIAST BP 80-89 MM HG: CPT | Performed by: NURSE PRACTITIONER

## 2024-04-05 PROCEDURE — 1036F TOBACCO NON-USER: CPT | Performed by: NURSE PRACTITIONER

## 2024-04-05 PROCEDURE — 1160F RVW MEDS BY RX/DR IN RCRD: CPT | Performed by: NURSE PRACTITIONER

## 2024-04-05 PROCEDURE — 1111F DSCHRG MED/CURRENT MED MERGE: CPT | Performed by: NURSE PRACTITIONER

## 2024-04-05 PROCEDURE — 1126F AMNT PAIN NOTED NONE PRSNT: CPT | Performed by: NURSE PRACTITIONER

## 2024-04-05 PROCEDURE — 1090000002 HH PPS REVENUE DEBIT

## 2024-04-05 PROCEDURE — 1159F MED LIST DOCD IN RCRD: CPT | Performed by: NURSE PRACTITIONER

## 2024-04-05 PROCEDURE — 1090000001 HH PPS REVENUE CREDIT

## 2024-04-05 PROCEDURE — 3075F SYST BP GE 130 - 139MM HG: CPT | Performed by: NURSE PRACTITIONER

## 2024-04-05 RX ORDER — NITROFURANTOIN 25; 75 MG/1; MG/1
CAPSULE ORAL
Qty: 2 CAPSULE | Refills: 0 | Status: SHIPPED | OUTPATIENT
Start: 2024-04-05

## 2024-04-05 RX ORDER — ALPRAZOLAM 0.25 MG/1
0.25 TABLET ORAL 3 TIMES DAILY PRN
Qty: 10 TABLET | Refills: 0 | Status: SHIPPED | OUTPATIENT
Start: 2024-04-05

## 2024-04-05 ASSESSMENT — PAIN SCALES - GENERAL: PAINLEVEL: 0-NO PAIN

## 2024-04-05 NOTE — PROGRESS NOTES
"04/05/24   [id number]    TOV, left buttocks concern for pressure ulcer     Subjective      HPI Anahi Wall is a 78 y.o. female who presents for TOV. Hx urothelial carcinoma, hx HG NMIBC unresponsive to Crescent/Doce.     Passed TOV performed on patient. 160ml in, Patient released 180ml.    s/p nephroureterectomy robot assisted on 3/28/24 w Dr. De Leon.     No pain, no symptoms of infection, no constipation     noticed patient developing ulcer left buttocks from lying on that side, since noted, using bacitracin, dressing and keeping off that side, area appears to be healing, slight erosion noted, no sign of infection;  wound care referral placed if doesn't continue to heal;         Objective     /80   Pulse 99   Temp 36.2 °C (97.1 °F)   Ht 1.575 m (5' 2\")   Wt 72.6 kg (160 lb)   BMI 29.26 kg/m²    Physical Exam  General: Appears comfortable and in no apparent distress, well nourished  Head: Normocephalic, atraumatic  Neck: trachea midline  Respiratory: respirations unlabored, no wheezes, and no use of accessory muscles  Cardiovascular: at rest no dyspnea, well perfused  Skin: no visible rashes or lesions, incisions well approximated, no sign of infection, dressing changed to post drain site, looks good, discussed letting air get to it to dry out more; no discharge; left buttocks area of erosion tissue, start of pressure ulcer but not more than superficial skin layer;   Neurologic: grossly intact, oriented to person, place, and time  Psychiatric: mood and affect appropriate  Musculoskeletal: in chair for appt. no difficulty w upper body movement      Assessment/Plan   Problem List Items Addressed This Visit          Hematology and Neoplasia    Bladder cancer (CMS/HCC) - Primary    Relevant Medications    nitrofurantoin, macrocrystal-monohydrate, (Macrobid) 100 mg capsule     Other Visit Diagnoses       Pressure injury of skin of left buttock, unspecified injury stage        Relevant Orders    Referral " to Wound Clinic          Orders Placed This Encounter   Procedures    Referral to Wound Clinic     Standing Status:   Future     Standing Expiration Date:   10/5/2024     Referral Priority:   Routine     Referral Type:   Consultation     Referral Reason:   Specialty Services Required     Requested Specialty:   Wound Care     Number of Visits Requested:   1      Plan:   Macrobid today and again tomorrow one dose UTI prevention w cath removal  Call if unable to urinate or symptoms of UTI, go to ER if weekend or evening  Call if fever, chills, nausea or vomiting  Discussed constipation prevention, MiraLAX or Colace  Follow up as scheduled w Dr. De Leon  Refer to wound care for left buttocks start of ulcer, managing well, but if assistance needed  Call nurse linesooner if any concerns on nonemergency nurse line       ESTEFANÍA Ernandez-CNP  Lab Results   Component Value Date    GLUCOSE 116 (H) 03/30/2024    CALCIUM 8.6 03/30/2024     03/30/2024    K 3.8 03/30/2024    CO2 29 03/30/2024     03/30/2024    BUN 18 03/30/2024    CREATININE 1.48 (H) 03/30/2024        None needed

## 2024-04-05 NOTE — PATIENT INSTRUCTIONS
Plan:   Macrobid today and again tomorrow one dose UTI prevention w cath removal  Call if unable to urinate or symptoms of UTI, go to ER if weekend or evening  Call if fever, chills, nausea or vomiting  Discussed constipation prevention, MiraLAX or Colace  Follow up as scheduled w Dr. De Leon  Refer to wound care for left buttocks start of ulcer, managing well, but if assistance needed  Call nurse linesooner if any concerns on nonemergency nurse line

## 2024-04-06 PROCEDURE — 1090000001 HH PPS REVENUE CREDIT

## 2024-04-06 PROCEDURE — 1090000002 HH PPS REVENUE DEBIT

## 2024-04-07 PROCEDURE — 1090000002 HH PPS REVENUE DEBIT

## 2024-04-07 PROCEDURE — 1090000001 HH PPS REVENUE CREDIT

## 2024-04-08 ENCOUNTER — APPOINTMENT (OUTPATIENT)
Dept: UROLOGY | Facility: CLINIC | Age: 79
End: 2024-04-08
Payer: MEDICARE

## 2024-04-08 PROCEDURE — 1090000002 HH PPS REVENUE DEBIT

## 2024-04-08 PROCEDURE — 1090000001 HH PPS REVENUE CREDIT

## 2024-04-09 ENCOUNTER — HOME CARE VISIT (OUTPATIENT)
Dept: HOME HEALTH SERVICES | Facility: HOME HEALTH | Age: 79
End: 2024-04-09
Payer: MEDICARE

## 2024-04-09 PROCEDURE — 1090000001 HH PPS REVENUE CREDIT

## 2024-04-09 PROCEDURE — 1090000002 HH PPS REVENUE DEBIT

## 2024-04-10 PROCEDURE — 1090000002 HH PPS REVENUE DEBIT

## 2024-04-10 PROCEDURE — 1090000001 HH PPS REVENUE CREDIT

## 2024-04-11 ENCOUNTER — DOCUMENTATION (OUTPATIENT)
Dept: UROLOGY | Facility: HOSPITAL | Age: 79
End: 2024-04-11
Payer: MEDICARE

## 2024-04-11 PROCEDURE — 1090000002 HH PPS REVENUE DEBIT

## 2024-04-11 PROCEDURE — 1090000001 HH PPS REVENUE CREDIT

## 2024-04-11 NOTE — DISCHARGE SUMMARY
Discharge Diagnosis  Urothelial carcinoma (CMS/HCC)    Issues Requiring Follow-Up  Trial of Void appointment 4/8  Post procedure visit 4/16    Test Results Pending At Discharge  Pending Labs       Order Current Status    Surgical Pathology Exam In process            Hospital Course  Anahi Wall a 78 y.o. female now POD#3 s/p TURBT and Robotic nephroureterectomy and pelvic/abdominal lymph node dissection with Dr. De Leon. The patient tolerated the procedure well and there were no complications. Pain was well-controlled with IV analgesia which was effectively weaned to PO meds. Diet was gradually advanced and tolerated well without N/V. Prior to discharge on 3/30/24, the patient was tolerating a regular diet, pain was well-controlled with oral meds, and appropriate post-hospital follow-up was arranged.    Pertinent Physical Exam At Time of Discharge  Physical Exam  Vitals and nursing note reviewed.   Constitutional:       Appearance: Normal appearance.   HENT:      Head: Normocephalic and atraumatic.      Nose: Nose normal.      Mouth/Throat:      Mouth: Mucous membranes are moist.   Eyes:      Pupils: Pupils are equal, round, and reactive to light.   Cardiovascular:      Rate and Rhythm: Normal rate.      Pulses: Normal pulses.   Pulmonary:      Effort: Pulmonary effort is normal.   Abdominal:      General: There is no distension.      Palpations: Abdomen is soft. There is no mass.      Tenderness: There is no abdominal tenderness. There is no guarding.   Musculoskeletal:         General: Normal range of motion.      Cervical back: Normal range of motion and neck supple.   Skin:     General: Skin is warm and dry.   Neurological:      General: No focal deficit present.      Mental Status: She is alert. Mental status is at baseline.   Psychiatric:         Mood and Affect: Mood normal.         Home Medications     Medication List      START taking these medications     apixaban 2.5 mg tablet; Commonly known as:  Eliquis; Take 1 tablet (2.5   mg) by mouth 2 times a day.     CONTINUE taking these medications     alendronate 70 mg tablet; Commonly known as: Fosamax; Take 1 tablet (70   mg) by mouth every 7 days.   cholecalciferol 50 MCG (2000 UT) tablet; Commonly known as: Vitamin D-3   fluticasone 50 mcg/actuation nasal spray; Commonly known as: Flonase   minoxidil 2.5 mg tablet; Commonly known as: Loniten   mirtazapine 30 mg tablet; Commonly known as: Remeron; Take 1 tablet (30   mg) by mouth once daily at bedtime.   omeprazole 20 mg DR capsule; Commonly known as: PriLOSEC; Take 1 capsule   (20 mg) by mouth once daily.   oxybutynin XL 15 mg 24 hr tablet; Commonly known as: Ditropan-XL; Take 1   tablet (15 mg) by mouth once daily.   rosuvastatin 20 mg tablet; Commonly known as: Crestor; Take 1 tablet (20   mg) by mouth once daily.   sertraline 100 mg tablet; Commonly known as: Zoloft; Take 2 tablets (200   mg) by mouth once daily.     STOP taking these medications     acetaminophen 325 mg tablet; Commonly known as: Tylenol     ASK your doctor about these medications     acetaminophen 325 mg tablet; Commonly known as: Tylenol; Take 2 tablets   (650 mg) by mouth every 4 hours for 5 days.; Ask about: Should I take this   medication?   oxyCODONE 5 mg immediate release tablet; Commonly known as: Roxicodone;   Take 1 tablet (5 mg) by mouth every 6 hours if needed for severe pain (7 -   10) for up to 3 days.; Ask about: Should I take this medication?   polyethylene glycol 17 gram packet; Commonly known as: Glycolax,   Miralax; Take 17 g by mouth once daily for 10 days.; Ask about: Should I   take this medication?       Outpatient Follow-Up  Future Appointments   Date Time Provider Department Center   4/12/2024 To Be Determined Madhav [facility]   4/15/2024 To Be Determined Madhav [facility]   4/16/2024 10:50 AM Jorge A De Leon MD MPH [id number]   4/18/2024 To Be Determined Sd Flores PT [facility]    12/4/2024 10:30 AM Karis Silva, YESSICA, [id number]       Morris Mendez MD

## 2024-04-12 ENCOUNTER — HOME CARE VISIT (OUTPATIENT)
Dept: HOME HEALTH SERVICES | Facility: HOME HEALTH | Age: 79
End: 2024-04-12
Payer: MEDICARE

## 2024-04-12 PROCEDURE — 1090000001 HH PPS REVENUE CREDIT

## 2024-04-12 PROCEDURE — 1090000002 HH PPS REVENUE DEBIT

## 2024-04-13 PROCEDURE — 1090000002 HH PPS REVENUE DEBIT

## 2024-04-13 PROCEDURE — 1090000001 HH PPS REVENUE CREDIT

## 2024-04-14 ENCOUNTER — HOME CARE VISIT (OUTPATIENT)
Dept: HOME HEALTH SERVICES | Facility: HOME HEALTH | Age: 79
End: 2024-04-14
Payer: MEDICARE

## 2024-04-14 PROCEDURE — 1090000001 HH PPS REVENUE CREDIT

## 2024-04-14 PROCEDURE — 1090000002 HH PPS REVENUE DEBIT

## 2024-04-15 ENCOUNTER — APPOINTMENT (OUTPATIENT)
Dept: HOME HEALTH SERVICES | Facility: HOME HEALTH | Age: 79
End: 2024-04-15
Payer: MEDICARE

## 2024-04-15 LAB
LAB AP ASR DISCLAIMER: NORMAL
LABORATORY COMMENT REPORT: NORMAL
PATH REPORT.FINAL DX SPEC: NORMAL
PATH REPORT.GROSS SPEC: NORMAL
PATH REPORT.RELEVANT HX SPEC: NORMAL
PATH REPORT.TOTAL CANCER: NORMAL
PATHOLOGY SYNOPTIC REPORT: NORMAL

## 2024-04-15 PROCEDURE — 1090000002 HH PPS REVENUE DEBIT

## 2024-04-15 PROCEDURE — 1090000001 HH PPS REVENUE CREDIT

## 2024-04-15 NOTE — PROGRESS NOTES
Subjective   Patient ID: Anahi Wall is a 78 y.o. female with BCG unresponsive HG pTa NMIBC and HG upper tract stage 3 UTC s/p TURBT, nephroureterectomy and pelvic RPLND who presents for POV.     She has long history of HG NMIBC refractory to BCG and chemotherapy. I took her to the OR due to the upper tract shedding that was contributing to her recurrences. She did not have chemotherapy done beforehand.     Pathology was HG NMIBC with muscle present but not invaded. She had HG upper tract UTC in her ureter. This is a stage 3 tumor as it invades into the kidney. Margins and lymph nodes were negative, however it is concerning that she has stage 3 tumor into the fat and kidney.     She is eating and drinking normally. She denies issues with urinating or BM.     CT Urogram 12/18/2023 showed two prominent lymph nodes. One measured 1.4cm in size. Another is near renal artery which measures 1cm.     She is refractory to both BCG and combination Gemcitabine/Docetaxel.    Initial dx: 2010, initially LG but recurred with HG pTa.      BCG treatment: First dose in 2013. She had induction and 1 maintenance with recurrence of HG pTa with CIS. She had more induction BCG and six cycles of maintenance BCG through 2019. She then developed recurrences in 2019 with CIS, 2020 HG pTa with chemotherapy after surgery. She was given more BCG including induction and maintenance and then had recurrence 11 months later with HG with LG pTa.      Coal/Doc treatment: Her recurrences were treated with gem/doc combination in 2022 with recurrence immediately. She had another 6 doses of induction and recurred again 3-4 months thereafter relatively diffusely.       Past medical, surgical, family and social history were reviewed and unchanged since last visit besides what is in the HPI.           Review of Systems   All other systems reviewed and are negative.      Objective   Physical Exam  Gen: No acute distress     Psych: Alert and oriented x3      Neuro:  Normal ROM    Resp: Nonlabored respirations     CV: Regular rate and rhythm     Abd: S, NT, ND. Incisions c/d/i    : Deferred    Skin: Warm, dry and intact without rashes     Lymphatics: No peripheral edema               Assessment/Plan   Problem List Items Addressed This Visit             ICD-10-CM    Bladder cancer (Multi) - Primary C67.9     I had a long and extensive discussion with the patient regarding her pathology. Pathology was HG NMIBC with muscle present but not invaded. She had HG upper tract UTC in her ureter. This is a stage 3 tumor as it invades into the kidney. Margins and lymph nodes were negative, however it is concerning that she has stage 3 tumor into the fat and kidney.     We discussed that the stage of her UTC is what is driving her disease and so I would recommend she focus on treatment of this prior to treatment of her NMIBC. I would like to refer her to Dr. Rojas for further discussion to treat her HG upper tract UTC including adjuvant systemic therapy therapy with chemotherapy or immunotherapy     The patient understands and agrees with this plan.                  Plan:  Referral to Dr. Rojas to discuss adjuvant nivo or chemotherapy for pT3 N0 UTC  Can consider Adstiladrin intravesically for HG NMIBC as well but I think decision for adjuvant therapy for UTC which is  of outcomes now more important first.  Will have to see if we can do these things concomitantly  Okay to expand diet  Phone visit in a few weeks to organize care          Scribe Attestation  By signing my name below, I, Katelyn Casalla, Scribe   attest that this documentation has been prepared under the direction and in the presence of Jorge A De Leon MD MPH.

## 2024-04-16 ENCOUNTER — OFFICE VISIT (OUTPATIENT)
Dept: UROLOGY | Facility: HOSPITAL | Age: 79
End: 2024-04-16
Payer: MEDICARE

## 2024-04-16 VITALS
DIASTOLIC BLOOD PRESSURE: 55 MMHG | WEIGHT: 153 LBS | HEART RATE: 80 BPM | OXYGEN SATURATION: 98 % | SYSTOLIC BLOOD PRESSURE: 139 MMHG | TEMPERATURE: 97.5 F | BODY MASS INDEX: 27.98 KG/M2

## 2024-04-16 DIAGNOSIS — M85.80 OSTEOPENIA, UNSPECIFIED LOCATION: ICD-10-CM

## 2024-04-16 DIAGNOSIS — C67.9 MALIGNANT NEOPLASM OF URINARY BLADDER, UNSPECIFIED SITE (MULTI): Primary | ICD-10-CM

## 2024-04-16 PROCEDURE — 1160F RVW MEDS BY RX/DR IN RCRD: CPT | Performed by: STUDENT IN AN ORGANIZED HEALTH CARE EDUCATION/TRAINING PROGRAM

## 2024-04-16 PROCEDURE — 3075F SYST BP GE 130 - 139MM HG: CPT | Performed by: STUDENT IN AN ORGANIZED HEALTH CARE EDUCATION/TRAINING PROGRAM

## 2024-04-16 PROCEDURE — 1090000001 HH PPS REVENUE CREDIT

## 2024-04-16 PROCEDURE — 1090000002 HH PPS REVENUE DEBIT

## 2024-04-16 PROCEDURE — 1159F MED LIST DOCD IN RCRD: CPT | Performed by: STUDENT IN AN ORGANIZED HEALTH CARE EDUCATION/TRAINING PROGRAM

## 2024-04-16 PROCEDURE — 1111F DSCHRG MED/CURRENT MED MERGE: CPT | Performed by: STUDENT IN AN ORGANIZED HEALTH CARE EDUCATION/TRAINING PROGRAM

## 2024-04-16 PROCEDURE — 3078F DIAST BP <80 MM HG: CPT | Performed by: STUDENT IN AN ORGANIZED HEALTH CARE EDUCATION/TRAINING PROGRAM

## 2024-04-16 PROCEDURE — 1126F AMNT PAIN NOTED NONE PRSNT: CPT | Performed by: STUDENT IN AN ORGANIZED HEALTH CARE EDUCATION/TRAINING PROGRAM

## 2024-04-16 PROCEDURE — 1157F ADVNC CARE PLAN IN RCRD: CPT | Performed by: STUDENT IN AN ORGANIZED HEALTH CARE EDUCATION/TRAINING PROGRAM

## 2024-04-16 PROCEDURE — 1036F TOBACCO NON-USER: CPT | Performed by: STUDENT IN AN ORGANIZED HEALTH CARE EDUCATION/TRAINING PROGRAM

## 2024-04-16 RX ORDER — ALENDRONATE SODIUM 70 MG/1
70 TABLET ORAL
Qty: 12 TABLET | Refills: 3 | Status: SHIPPED | OUTPATIENT
Start: 2024-04-16

## 2024-04-16 ASSESSMENT — PAIN SCALES - GENERAL: PAINLEVEL: 0-NO PAIN

## 2024-04-16 NOTE — DOCUMENTATION CLARIFICATION NOTE
"    PATIENT:               SETH MYERS  [facility] #:                  [id number]  MRN:                       [id number]  :                       1945  ADMIT DATE:       3/28/2024 6:02 AM  DISCH DATE:        3/30/2024 4:24 PM  RESPONDING PROVIDER #:        [id number]          PROVIDER RESPONSE TEXT:    Pathology findings high grade papillary urothelial carcinoma of the bladder, right ureter and kidney    CDI QUERY TEXT:    Clarification        Instruction:    Based on your assessment of the patient and the clinical information, please provide the requested documentation by clicking on the appropriate radio button and enter any additional information if prompted.    Question: Based on your assessment of the patient and the pathology findings, can the pathology findings be confirmed by providing the requested documentation to include if specimen is benign or malignant, primary or secondary and any metastatic sites.  Please include diagnosis of disease    When answering this query, please exercise your independent professional judgment. The fact that a question is being asked, does not imply that any particular answer is desired or expected.    The patient's clinical indicators include:  Clinical Information:  The 3/30 Discharge Summary, Dr. Mendez:  \"78 y.o. female now POD#3 s/p TURBT and Robotic nephroureterectomy and pelvic/abdominal lymph node dissection with Dr. De Leon.\"    Surgical Pathology from 3/28/24 resulted as -  \"A.  Urinary bladder, tumor, transurethral resection:  --Fragments of noninvasive papillary urothelial carcinoma, high-grade.  --No definitive muscularis propria present for evaluation.  B.  Lymph nodes, inter aortocaval, dissection:  --One lymph node, negative for metastatic carcinoma, 0/1.  C.  Lymph nodes, right pelvic, dissection:  -- Nine lymph nodes, negative for metastatic carcinoma, 0/9.  D.  Kidney and ureter, right, nephroureterectomy:  --Multifocal papillary urothelial carcinoma, " "high-grade, involving renal pelvis and ureter.  --Carcinoma invades beyond muscularis into peripelvic fat, and also involves renal parenchyma.  --Incidental papillary adenoma identified in renal parenchyma, 1 mm.  --See case summary report.\"    Clinical Indicators:  The 3/28 Op Note, Dr. De Leon:  \"Right URS demonstrating entire renal pelvis with urothelial carcinoma.\"  Options provided:  -- Pathology findings, Please specify additional information below  -- Other - I will add my own diagnosis  -- Refer to Clinical Documentation Reviewer    Query created by: Lesly Slade on 4/16/2024 7:27 AM      Electronically signed by:  ANGLE SINGER PA-C 4/16/2024 7:47 AM          "

## 2024-04-16 NOTE — ASSESSMENT & PLAN NOTE
I had a long and extensive discussion with the patient regarding her pathology. Pathology was HG NMIBC with muscle present but not invaded. She had HG upper tract UTC in her ureter. This is a stage 3 tumor as it invades into the kidney. Margins and lymph nodes were negative, however it is concerning that she has stage 3 tumor into the fat and kidney.     We discussed that the stage of her UTC is what is driving her disease and so I would recommend she focus on treatment of this prior to treatment of her NMIBC. I would like to refer her to Dr. Rojas for further discussion to treat her HG upper tract UTC including adjuvant systemic therapy therapy with chemotherapy or immunotherapy     The patient understands and agrees with this plan.

## 2024-04-17 PROCEDURE — 1090000001 HH PPS REVENUE CREDIT

## 2024-04-17 PROCEDURE — 1090000002 HH PPS REVENUE DEBIT

## 2024-04-18 PROCEDURE — 1090000001 HH PPS REVENUE CREDIT

## 2024-04-18 PROCEDURE — 1090000002 HH PPS REVENUE DEBIT

## 2024-04-19 ENCOUNTER — APPOINTMENT (OUTPATIENT)
Dept: UROLOGY | Facility: CLINIC | Age: 79
End: 2024-04-19
Payer: MEDICARE

## 2024-04-19 PROCEDURE — 1090000001 HH PPS REVENUE CREDIT

## 2024-04-19 PROCEDURE — 1090000002 HH PPS REVENUE DEBIT

## 2024-04-20 ENCOUNTER — HOME CARE VISIT (OUTPATIENT)
Dept: HOME HEALTH SERVICES | Facility: HOME HEALTH | Age: 79
End: 2024-04-20
Payer: MEDICARE

## 2024-04-20 ENCOUNTER — APPOINTMENT (OUTPATIENT)
Dept: HOME HEALTH SERVICES | Facility: HOME HEALTH | Age: 79
End: 2024-04-20
Payer: MEDICARE

## 2024-04-20 PROCEDURE — 1090000001 HH PPS REVENUE CREDIT

## 2024-04-20 PROCEDURE — 1090000002 HH PPS REVENUE DEBIT

## 2024-04-21 PROCEDURE — 1090000001 HH PPS REVENUE CREDIT

## 2024-04-21 PROCEDURE — 1090000002 HH PPS REVENUE DEBIT

## 2024-04-22 PROCEDURE — 1090000001 HH PPS REVENUE CREDIT

## 2024-04-22 PROCEDURE — 1090000002 HH PPS REVENUE DEBIT

## 2024-04-23 PROCEDURE — 1090000001 HH PPS REVENUE CREDIT

## 2024-04-23 PROCEDURE — 1090000002 HH PPS REVENUE DEBIT

## 2024-04-24 PROCEDURE — 1090000002 HH PPS REVENUE DEBIT

## 2024-04-24 PROCEDURE — 1090000001 HH PPS REVENUE CREDIT

## 2024-04-25 PROCEDURE — 1090000002 HH PPS REVENUE DEBIT

## 2024-04-25 PROCEDURE — 1090000001 HH PPS REVENUE CREDIT

## 2024-04-26 ENCOUNTER — APPOINTMENT (OUTPATIENT)
Dept: UROLOGY | Facility: CLINIC | Age: 79
End: 2024-04-26
Payer: MEDICARE

## 2024-04-26 PROCEDURE — 1090000002 HH PPS REVENUE DEBIT

## 2024-04-26 PROCEDURE — 1090000001 HH PPS REVENUE CREDIT

## 2024-04-27 PROCEDURE — 1090000001 HH PPS REVENUE CREDIT

## 2024-04-27 PROCEDURE — 1090000002 HH PPS REVENUE DEBIT

## 2024-04-28 PROCEDURE — 1090000001 HH PPS REVENUE CREDIT

## 2024-04-28 PROCEDURE — 1090000002 HH PPS REVENUE DEBIT

## 2024-04-29 PROCEDURE — 1090000001 HH PPS REVENUE CREDIT

## 2024-04-29 PROCEDURE — 1090000002 HH PPS REVENUE DEBIT

## 2024-04-29 PROCEDURE — 1090000003 HH PPS REVENUE ADJ

## 2024-04-30 ENCOUNTER — TELEPHONE (OUTPATIENT)
Dept: HEMATOLOGY/ONCOLOGY | Facility: CLINIC | Age: 79
End: 2024-04-30
Payer: MEDICARE

## 2024-04-30 NOTE — TELEPHONE ENCOUNTER
Updated unable to leave message continues to have issues with phone  Earlier f/up arranged  Yet prefers may visit  Will f/up to discuss next steps

## 2024-05-01 ENCOUNTER — HOME CARE VISIT (OUTPATIENT)
Dept: HOME HEALTH SERVICES | Facility: HOME HEALTH | Age: 79
End: 2024-05-01
Payer: MEDICARE

## 2024-05-06 DIAGNOSIS — F41.9 ANXIETY: ICD-10-CM

## 2024-05-06 RX ORDER — SERTRALINE HYDROCHLORIDE 100 MG/1
200 TABLET, FILM COATED ORAL DAILY
Qty: 180 TABLET | Refills: 3 | Status: SHIPPED | OUTPATIENT
Start: 2024-05-06

## 2024-05-07 ENCOUNTER — APPOINTMENT (OUTPATIENT)
Dept: HEMATOLOGY/ONCOLOGY | Facility: CLINIC | Age: 79
End: 2024-05-07
Payer: MEDICARE

## 2024-05-07 NOTE — PROGRESS NOTES
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  Treatment  -3/2010 LGTA non inv Pap   -Recurrence 2013, s/p 6 BCG, s/p BCG maintenance,   -8/2014 - non inv CIS, repeat 6 BCG, maintenance, Jan 2017  -Continued to be monitored  -7/2020 - non inv HG pap UC, s/p BCG, and maintenance into Jan 2021  -Path 6/2021 - pap UC, LG w/ focal HG features   -3 induction cycles of BCG  -cysto 10/12/2021 - multifocal lesions, s/p IV gem , path - / TURBT Urothelium, mod to severe atypia, carcinoma in situ  -refused Pembro/Cystectomy s/p induction gem/docetaxel x2   -repeat cysto - 4/22 - non inv pap UC HG, possible R UO, involvement, non specific Lns  -repeat gem/docetaxel completed 6 cycles 8/22, procedure 1/23 pap tumors at both ureteral orficies, r>l, tumor posterior trigone, wall of bladder, path c/w HG NMIBC ant wall and right ureteral, cytology concerning on left side, yet unable to evaluate, repeat gem/docetaxel x2 w/ persistent disease  -cysto / right ureteroscope - 11/23 - right UO tumor, renal pelvis high volume papillary tumors, path urinary bladder pap UC, HG, no muscle invasion, right ureter wash - highly atypical cells concerning for urothelial neoplasms, right, left, highly atypical UC present, concerning for neoplasms  CT urogram 12/23- Multiple subcentimeter small filling defects in the right kidney, lower pole calyx, as well as left kidney, mid zone, lower pole, small necrosis or pap necrosis, small foci of neoplasm, acute subacute fracture of L5, enhancing mass left gluteus gume [id number], stable, right pelvic sidewall LAD, malignant, or neoplastic, 14mm,  grade 1 anterolisthesis, refused NAC chemotherapy  -s/p TURBT and robotic right-sided nephro ureterectomy and lymph node dissection March 30, 2024 with pathology showing in the bladder high-grade noninvasive papillary urothelial  carcinoma lymph nodes were negative multifocal papillary urothelial carcinoma high-grade involving the renal pelvis and ureter invasion into the muscularis and perivesical fat and involving the renal parenchyma papillary adenoma 1.5 cm pathological T3    Breast Cancer - left sided lumpectomy, s/p xrt also completed 1996    Other contributing history  HL, Broken Wrist, Overactive bladder issues, Depression, Uterine Fibroids, Carpal tunnel syndrome, cataract,   Anxiety, HTN,     HPI  Follow up  She has recovered fairly well from the surgery no other major new complications.  She denies any other new symptoms.  Denies any nausea, vomiting, fevers, chills, easy bruising or bleeding denies any chest pain or chest tightness.  Appetite and energy is otherwise well denies any melena or bright red blood per rectum.  Her other chronic health issues are fairly stable.    ROS:  10-pt ROS reviewed and negative except as mentioned above.    Medications: below was reviewed and is accurate  Current Outpatient Medications   Medication Instructions    alendronate (FOSAMAX) 70 mg, oral, Every 7 days, Takes on Sunday    ALPRAZolam (XANAX) 0.25 mg, oral, 3 times daily PRN    cholecalciferol (Vitamin D-3) 50 MCG (2000 UT) tablet 1 tablet, oral, Daily    fluticasone (Flonase) 50 mcg/actuation nasal spray 2 sprays, nasal, Daily    minoxidil (Loniten) 2.5 mg tablet 0.5 tablets, oral, Daily    mirtazapine (REMERON) 30 mg, oral, Nightly    nitrofurantoin, macrocrystal-monohydrate, (Macrobid) 100 mg capsule One dose today when she gets home from procedure and again in morning    omeprazole (PRILOSEC) 20 mg, oral, Daily    oxybutynin XL (DITROPAN-XL) 15 mg, oral, Daily    sertraline (ZOLOFT) 200 mg, oral, Daily        Past Medical History / Social / Surgical / Family history reviewed and updated    Physical exam:  Vitals:    05/08/24 1018   BP: 122/69   Pulse: 81   Resp: 18   Temp: 36.1 °C (97 °F)   SpO2: 97%         GEN: NAD,  "well-appearing  HEENT: no clear lesions seen  NECK: no clear masses  LYMPH: no palpable adenopathy  SKIN: no concerning new lesions  LUNGS: CTA  CV: RRR no clear R/G  ABD: Soft, NTND. No rebound or guarding.  Well healed scars  EXT:  trace edema bilaterally   NEURO: Grossly intact. No focal deficits.  PSYCH: Normal mood and affect  No clear masses in the gluteal region    Radiology review  Reviewed in detail personally and for detail see results in EPIC        Genomics Data    Laboratory review  Reviewed in detail personally and for detail see results in [facility]  Lab Results   Component Value Date    WBC 5.5 03/30/2024    WBC 7.3 03/29/2024    WBC 9.7 03/28/2024     Lab Results   Component Value Date    HGB 9.8 (L) 03/30/2024    HGB 9.9 (L) 03/29/2024    HGB 10.8 (L) 03/28/2024     Lab Results   Component Value Date    PLT 94 (L) 03/30/2024     (L) 03/29/2024     (L) 03/28/2024     Lab Results   Component Value Date    GLUCOSE 116 (H) 03/30/2024    CALCIUM 8.6 03/30/2024     03/30/2024    K 3.8 03/30/2024    CO2 29 03/30/2024     03/30/2024    BUN 18 03/30/2024    CREATININE 1.48 (H) 03/30/2024     No results found for: \"PSA\"  Lab Results   Component Value Date    ALT 16 03/01/2024    AST 19 03/01/2024    ALKPHOS 63 03/01/2024    BILITOT 0.3 03/01/2024       ASSESSMENT AND PLAN     Stage III Upper Tract Disease -discussed in detail options for patients with resected stage III urothelial carcinoma who did not receive neoadjuvant therapy.  Discussed the role of adjuvant cisplatin based therapy, adjuvant carboplatinum based therapy including the use of cisplatin and gemcitabine and carboplatinum and gemcitabine for 4-6 cycles also discussed role of adjuvant immunotherapy risk benefits and alternatives discussed the potential risk of relapse and then the need for systemic agents.  Options discussed in detail.  Discussed also her risk of relapse discussed also the role of circulating tumor DNA and " after extensive discussion regarding risk benefits and alternatives she is not interested in any adjuvant therapy she is aware the risk of progression she will contact us if she changes her mind and/or changes her mind discussing the role of Signatera testing.  For now we will arrange a follow-up at the end of June with a CAT scan and then follow-up to discuss and monitor for progressive disease.  She will contact us if she changes her mind regarding adjuvant therapy.  NMIBC -updated in detail urologist and they will make a decision about further treatment for her refractory disease and the role of newer agent such as Adstiladrin.  Chronic kidney disease-May be related to surgery repeat CMP today may have role of then following up with nephrology team.  Anemia-borderline could be related to surgery repeat labs ordered for today    Marcos Rojas MD  Senior Attending Physician/  in Medicine [facility] [employer]  [facility]  Patient line [phone]  Fax [phone]

## 2024-05-08 ENCOUNTER — OFFICE VISIT (OUTPATIENT)
Dept: HEMATOLOGY/ONCOLOGY | Facility: CLINIC | Age: 79
End: 2024-05-08
Payer: MEDICARE

## 2024-05-08 ENCOUNTER — LAB (OUTPATIENT)
Dept: LAB | Facility: LAB | Age: 79
End: 2024-05-08
Payer: MEDICARE

## 2024-05-08 VITALS
SYSTOLIC BLOOD PRESSURE: 122 MMHG | TEMPERATURE: 97 F | DIASTOLIC BLOOD PRESSURE: 69 MMHG | OXYGEN SATURATION: 97 % | RESPIRATION RATE: 18 BRPM | HEART RATE: 81 BPM

## 2024-05-08 DIAGNOSIS — C67.3 MALIGNANT NEOPLASM OF ANTERIOR WALL OF URINARY BLADDER (MULTI): ICD-10-CM

## 2024-05-08 DIAGNOSIS — C67.3 MALIGNANT NEOPLASM OF ANTERIOR WALL OF URINARY BLADDER (MULTI): Primary | ICD-10-CM

## 2024-05-08 LAB
ALBUMIN SERPL BCP-MCNC: 4.4 G/DL (ref 3.4–5)
ALP SERPL-CCNC: 65 U/L (ref 33–136)
ALT SERPL W P-5'-P-CCNC: 12 U/L (ref 7–45)
ANION GAP SERPL CALC-SCNC: 13 MMOL/L (ref 10–20)
AST SERPL W P-5'-P-CCNC: 14 U/L (ref 9–39)
BASOPHILS # BLD AUTO: 0.02 X10*3/UL (ref 0–0.1)
BASOPHILS NFR BLD AUTO: 0.3 %
BILIRUB SERPL-MCNC: 0.3 MG/DL (ref 0–1.2)
BUN SERPL-MCNC: 36 MG/DL (ref 6–23)
CALCIUM SERPL-MCNC: 9.7 MG/DL (ref 8.6–10.6)
CHLORIDE SERPL-SCNC: 106 MMOL/L (ref 98–107)
CO2 SERPL-SCNC: 29 MMOL/L (ref 21–32)
CREAT SERPL-MCNC: 1.29 MG/DL (ref 0.5–1.05)
EGFRCR SERPLBLD CKD-EPI 2021: 42 ML/MIN/1.73M*2
EOSINOPHIL # BLD AUTO: 0.08 X10*3/UL (ref 0–0.4)
EOSINOPHIL NFR BLD AUTO: 1.2 %
ERYTHROCYTE [DISTWIDTH] IN BLOOD BY AUTOMATED COUNT: 14.2 % (ref 11.5–14.5)
GLUCOSE SERPL-MCNC: 89 MG/DL (ref 74–99)
HCT VFR BLD AUTO: 35.4 % (ref 36–46)
HGB BLD-MCNC: 11 G/DL (ref 12–16)
IMM GRANULOCYTES # BLD AUTO: 0.03 X10*3/UL (ref 0–0.5)
IMM GRANULOCYTES NFR BLD AUTO: 0.4 % (ref 0–0.9)
LYMPHOCYTES # BLD AUTO: 1.14 X10*3/UL (ref 0.8–3)
LYMPHOCYTES NFR BLD AUTO: 16.5 %
MCH RBC QN AUTO: 28.6 PG (ref 26–34)
MCHC RBC AUTO-ENTMCNC: 31.1 G/DL (ref 32–36)
MCV RBC AUTO: 92 FL (ref 80–100)
MONOCYTES # BLD AUTO: 0.57 X10*3/UL (ref 0.05–0.8)
MONOCYTES NFR BLD AUTO: 8.2 %
NEUTROPHILS # BLD AUTO: 5.08 X10*3/UL (ref 1.6–5.5)
NEUTROPHILS NFR BLD AUTO: 73.4 %
NRBC BLD-RTO: 0 /100 WBCS (ref 0–0)
PLATELET # BLD AUTO: 147 X10*3/UL (ref 150–450)
POTASSIUM SERPL-SCNC: 4.6 MMOL/L (ref 3.5–5.3)
PROT SERPL-MCNC: 7 G/DL (ref 6.4–8.2)
RBC # BLD AUTO: 3.84 X10*6/UL (ref 4–5.2)
SODIUM SERPL-SCNC: 143 MMOL/L (ref 136–145)
WBC # BLD AUTO: 6.9 X10*3/UL (ref 4.4–11.3)

## 2024-05-08 PROCEDURE — 80053 COMPREHEN METABOLIC PANEL: CPT

## 2024-05-08 PROCEDURE — 3078F DIAST BP <80 MM HG: CPT | Performed by: INTERNAL MEDICINE

## 2024-05-08 PROCEDURE — 99214 OFFICE O/P EST MOD 30 MIN: CPT | Performed by: INTERNAL MEDICINE

## 2024-05-08 PROCEDURE — 1157F ADVNC CARE PLAN IN RCRD: CPT | Performed by: INTERNAL MEDICINE

## 2024-05-08 PROCEDURE — 85025 COMPLETE CBC W/AUTO DIFF WBC: CPT

## 2024-05-08 PROCEDURE — 1159F MED LIST DOCD IN RCRD: CPT | Performed by: INTERNAL MEDICINE

## 2024-05-08 PROCEDURE — 3074F SYST BP LT 130 MM HG: CPT | Performed by: INTERNAL MEDICINE

## 2024-05-08 PROCEDURE — 1160F RVW MEDS BY RX/DR IN RCRD: CPT | Performed by: INTERNAL MEDICINE

## 2024-05-08 PROCEDURE — 1036F TOBACCO NON-USER: CPT | Performed by: INTERNAL MEDICINE

## 2024-05-08 PROCEDURE — 1126F AMNT PAIN NOTED NONE PRSNT: CPT | Performed by: INTERNAL MEDICINE

## 2024-05-08 PROCEDURE — 36415 COLL VENOUS BLD VENIPUNCTURE: CPT

## 2024-05-08 ASSESSMENT — ENCOUNTER SYMPTOMS
OCCASIONAL FEELINGS OF UNSTEADINESS: 0
DEPRESSION: 0
LOSS OF SENSATION IN FEET: 0

## 2024-05-08 ASSESSMENT — PATIENT HEALTH QUESTIONNAIRE - PHQ9
1. LITTLE INTEREST OR PLEASURE IN DOING THINGS: NOT AT ALL
2. FEELING DOWN, DEPRESSED OR HOPELESS: NOT AT ALL
SUM OF ALL RESPONSES TO PHQ9 QUESTIONS 1 AND 2: 0

## 2024-05-08 ASSESSMENT — PAIN SCALES - GENERAL: PAINLEVEL: 0-NO PAIN

## 2024-05-08 ASSESSMENT — COLUMBIA-SUICIDE SEVERITY RATING SCALE - C-SSRS
2. HAVE YOU ACTUALLY HAD ANY THOUGHTS OF KILLING YOURSELF?: NO
6. HAVE YOU EVER DONE ANYTHING, STARTED TO DO ANYTHING, OR PREPARED TO DO ANYTHING TO END YOUR LIFE?: NO
1. IN THE PAST MONTH, HAVE YOU WISHED YOU WERE DEAD OR WISHED YOU COULD GO TO SLEEP AND NOT WAKE UP?: NO

## 2024-05-17 ENCOUNTER — APPOINTMENT (OUTPATIENT)
Dept: UROLOGY | Facility: CLINIC | Age: 79
End: 2024-05-17
Payer: MEDICARE

## 2024-05-17 ENCOUNTER — TELEMEDICINE (OUTPATIENT)
Dept: UROLOGY | Facility: CLINIC | Age: 79
End: 2024-05-17
Payer: MEDICARE

## 2024-05-17 DIAGNOSIS — C67.9 MALIGNANT NEOPLASM OF URINARY BLADDER, UNSPECIFIED SITE (MULTI): Primary | ICD-10-CM

## 2024-05-17 PROCEDURE — 99024 POSTOP FOLLOW-UP VISIT: CPT | Performed by: STUDENT IN AN ORGANIZED HEALTH CARE EDUCATION/TRAINING PROGRAM

## 2024-05-17 PROCEDURE — 1157F ADVNC CARE PLAN IN RCRD: CPT | Performed by: STUDENT IN AN ORGANIZED HEALTH CARE EDUCATION/TRAINING PROGRAM

## 2024-05-17 PROCEDURE — 1159F MED LIST DOCD IN RCRD: CPT | Performed by: STUDENT IN AN ORGANIZED HEALTH CARE EDUCATION/TRAINING PROGRAM

## 2024-05-17 PROCEDURE — 1160F RVW MEDS BY RX/DR IN RCRD: CPT | Performed by: STUDENT IN AN ORGANIZED HEALTH CARE EDUCATION/TRAINING PROGRAM

## 2024-05-17 NOTE — ASSESSMENT & PLAN NOTE
Pathology was HG NMIBC with muscle present but not invaded. She had HG upper tract UTC in her ureter. This is a stage 3 tumor as it invades into the kidney. Margins and lymph nodes were negative, however it is concerning that she has stage 3 tumor into the fat and kidney.    We discussed that the stage of her UTC is what is driving her disease and so I would recommend she focus on treatment of this prior to treatment of her NMIBC. She is not moving forward with chemotherapy for POUT trail or adjuvant immunotherapy with Dr. Rojas.     We had a long discussion regarding intravesical Adstilardrin which was recently FDA approved. This is a virus vector used to deliver interferon into the bladder that must be held for 1 hour. We discussed prescribing medication to help her relax her bladder to hold the medication. This has reasonable efficacy to prevent recurrence overtime. This is given once every 3 months. We discussed local side effects of this treatment including frequency, urgency, hematuria.

## 2024-05-17 NOTE — PROGRESS NOTES
Subjective   Patient ID: Anahi Wall is a 78 y.o. female with BCG unresponsive HG pTa NMIBC and HG upper tract stage 3 UTC s/p TURBT, nephroureterectomy and pelvic RPLND who presents for 1 month FUV.     She is doing well. She is eating and drinking normally. She denies issues with urinating or BM.   She is withholding on chemotherapy for the POUT trial or adjuvant immunotherapy.     She has long history of HG NMIBC refractory to BCG and combination Gemcitabine/Docetaxel. I took her to the OR due to the upper tract shedding that was contributing to her recurrences. She did not have chemotherapy done beforehand.   Pathology was HG NMIBC with muscle present but not invaded. She had HG upper tract UTC in her ureter. This is a stage 3 tumor as it invades into the kidney. Margins and lymph nodes were negative.    CT Urogram 12/18/2023 showed two prominent lymph nodes. One measured 1.4cm in size. Another is near renal artery which measures 1cm.     Initial dx: 2010, initially LG but recurred with HG pTa.      BCG treatment: First dose in 2013. She had induction and 1 maintenance with recurrence of HG pTa with CIS. She had more induction BCG and six cycles of maintenance BCG through 2019. She then developed recurrences in 2019 with CIS, 2020 HG pTa with chemotherapy after surgery. She was given more BCG including induction and maintenance and then had recurrence 11 months later with HG with LG pTa.      [place]/Doc treatment: Her recurrences were treated with gem/doc combination in 2022 with recurrence immediately. She had another 6 doses of induction and recurred again 3-4 months thereafter relatively diffusely.       Past medical, surgical, family and social history were reviewed and unchanged since last visit besides what is in the HPI.           Review of Systems   All other systems reviewed and are negative.      Objective   Physical Exam  Gen: No acute distress     Psych: Alert and oriented x3                    Assessment/Plan   Problem List Items Addressed This Visit             ICD-10-CM    Bladder cancer (Multi) - Primary C67.9     Pathology was HG NMIBC with muscle present but not invaded. She had HG upper tract UTC in her ureter. This is a stage 3 tumor as it invades into the kidney. Margins and lymph nodes were negative, however it is concerning that she has stage 3 tumor into the fat and kidney.    We discussed that the stage of her UTC is what is driving her disease and so I would recommend she focus on treatment of this prior to treatment of her NMIBC. She is not moving forward with chemotherapy for POUT trail or adjuvant immunotherapy with Dr. Rojas.     We had a long discussion regarding intravesical Adstilardrin which was recently FDA approved. This is a virus vector used to deliver interferon into the bladder that must be held for 1 hour. We discussed prescribing medication to help her relax her bladder to hold the medication. This has reasonable efficacy to prevent recurrence overtime. This is given once every 3 months. We discussed local side effects of this treatment including frequency, urgency, hematuria.                      Plan:  Schedule Adstilardrin treatment   Put in consent  Will get this arranged  Dr. Rojas to orders scans +/- ctDNA          Scribe Attestation  By signing my name below, I, Katelyn Casalla, Scribe   attest that this documentation has been prepared under the direction and in the presence of Jorge A De Leon MD MPH.

## 2024-05-29 ENCOUNTER — OFFICE VISIT (OUTPATIENT)
Dept: PRIMARY CARE | Facility: CLINIC | Age: 79
End: 2024-05-29
Payer: MEDICARE

## 2024-05-29 VITALS — HEART RATE: 80 BPM | DIASTOLIC BLOOD PRESSURE: 68 MMHG | SYSTOLIC BLOOD PRESSURE: 120 MMHG | OXYGEN SATURATION: 96 %

## 2024-05-29 DIAGNOSIS — I10 HTN (HYPERTENSION), BENIGN: ICD-10-CM

## 2024-05-29 DIAGNOSIS — M17.12 PRIMARY OSTEOARTHRITIS OF LEFT KNEE: Primary | ICD-10-CM

## 2024-05-29 PROCEDURE — 1157F ADVNC CARE PLAN IN RCRD: CPT | Performed by: INTERNAL MEDICINE

## 2024-05-29 PROCEDURE — 99213 OFFICE O/P EST LOW 20 MIN: CPT | Performed by: INTERNAL MEDICINE

## 2024-05-29 PROCEDURE — 3074F SYST BP LT 130 MM HG: CPT | Performed by: INTERNAL MEDICINE

## 2024-05-29 PROCEDURE — 3078F DIAST BP <80 MM HG: CPT | Performed by: INTERNAL MEDICINE

## 2024-05-29 PROCEDURE — 1159F MED LIST DOCD IN RCRD: CPT | Performed by: INTERNAL MEDICINE

## 2024-05-29 PROCEDURE — 1160F RVW MEDS BY RX/DR IN RCRD: CPT | Performed by: INTERNAL MEDICINE

## 2024-05-29 NOTE — PROGRESS NOTES
Subjective   Patient ID: Anahi Wall is a 79 y.o. female who presents for No chief complaint on file..    Patient is here for follow-up just to review her recent medical history.  Patient underwent a robotic nephrectomy on March 28 due to a small transitional cell cancer that was picked up on the ureteroscope.  The tumor was too small to be seen by CAT scan.  She had an excellent clinical outcome and is now on watchful waiting.  She sees oncology on a regular basis for surveillance.  She has had increasing pain in her knees bilaterally and wonders about getting an orthopedic evaluation.  Pain is extremely severe especially going up and down stairs she is able to manage generally with moderate pain otherwise.  She denies any clicking or locking.  She has not had any x-rays in about 3 to 4 years.  She has some mild constipation she tried some Ex-Lax which gave her diarrhea she has been supplementing herself with fiber recently and has been doing better with that.  She has lost about 20 pounds over the last 6 months due to decreased oral intake.  Labs done in about 3 weeks ago are all within normal limits.    Today's visit was strictly counseling session no new changes in treatment will be instituted did recommend she increase her fiber supplements and avoid harsh laxatives.  She will follow-up with me for general checkup in the near future.         Review of Systems    Objective   /68   Pulse 80   SpO2 96%     Physical Exam    Assessment/Plan

## 2024-06-05 ENCOUNTER — TELEPHONE (OUTPATIENT)
Dept: HEMATOLOGY/ONCOLOGY | Facility: CLINIC | Age: 79
End: 2024-06-05
Payer: MEDICARE

## 2024-06-05 DIAGNOSIS — C67.3 MALIGNANT NEOPLASM OF ANTERIOR WALL OF URINARY BLADDER (MULTI): Primary | ICD-10-CM

## 2024-06-05 NOTE — TELEPHONE ENCOUNTER
Updated   Called  agreed with out contrast  New order for ct placed w/o contrast  And labs ordered

## 2024-06-10 ENCOUNTER — HOSPITAL ENCOUNTER (OUTPATIENT)
Dept: RADIOLOGY | Facility: CLINIC | Age: 79
Discharge: HOME | End: 2024-06-10
Payer: MEDICARE

## 2024-06-10 ENCOUNTER — LAB (OUTPATIENT)
Dept: LAB | Facility: LAB | Age: 79
End: 2024-06-10
Payer: MEDICARE

## 2024-06-10 DIAGNOSIS — M17.12 PRIMARY OSTEOARTHRITIS OF LEFT KNEE: ICD-10-CM

## 2024-06-10 DIAGNOSIS — C67.3 MALIGNANT NEOPLASM OF ANTERIOR WALL OF URINARY BLADDER (MULTI): ICD-10-CM

## 2024-06-10 LAB
ALBUMIN SERPL BCP-MCNC: 4.5 G/DL (ref 3.4–5)
ALP SERPL-CCNC: 71 U/L (ref 33–136)
ALT SERPL W P-5'-P-CCNC: 10 U/L (ref 7–45)
ANION GAP SERPL CALC-SCNC: 14 MMOL/L (ref 10–20)
AST SERPL W P-5'-P-CCNC: 14 U/L (ref 9–39)
BASOPHILS # BLD AUTO: 0.02 X10*3/UL (ref 0–0.1)
BASOPHILS NFR BLD AUTO: 0.3 %
BILIRUB SERPL-MCNC: 0.3 MG/DL (ref 0–1.2)
BUN SERPL-MCNC: 34 MG/DL (ref 6–23)
CALCIUM SERPL-MCNC: 9.7 MG/DL (ref 8.6–10.6)
CHLORIDE SERPL-SCNC: 104 MMOL/L (ref 98–107)
CO2 SERPL-SCNC: 27 MMOL/L (ref 21–32)
CREAT SERPL-MCNC: 1.46 MG/DL (ref 0.5–1.05)
EGFRCR SERPLBLD CKD-EPI 2021: 36 ML/MIN/1.73M*2
EOSINOPHIL # BLD AUTO: 0.1 X10*3/UL (ref 0–0.4)
EOSINOPHIL NFR BLD AUTO: 1.4 %
ERYTHROCYTE [DISTWIDTH] IN BLOOD BY AUTOMATED COUNT: 14.3 % (ref 11.5–14.5)
GLUCOSE SERPL-MCNC: 96 MG/DL (ref 74–99)
HCT VFR BLD AUTO: 35.4 % (ref 36–46)
HGB BLD-MCNC: 11.1 G/DL (ref 12–16)
IMM GRANULOCYTES # BLD AUTO: 0.03 X10*3/UL (ref 0–0.5)
IMM GRANULOCYTES NFR BLD AUTO: 0.4 % (ref 0–0.9)
LYMPHOCYTES # BLD AUTO: 1.52 X10*3/UL (ref 0.8–3)
LYMPHOCYTES NFR BLD AUTO: 22 %
MCH RBC QN AUTO: 29.4 PG (ref 26–34)
MCHC RBC AUTO-ENTMCNC: 31.4 G/DL (ref 32–36)
MCV RBC AUTO: 94 FL (ref 80–100)
MONOCYTES # BLD AUTO: 0.43 X10*3/UL (ref 0.05–0.8)
MONOCYTES NFR BLD AUTO: 6.2 %
NEUTROPHILS # BLD AUTO: 4.8 X10*3/UL (ref 1.6–5.5)
NEUTROPHILS NFR BLD AUTO: 69.7 %
NRBC BLD-RTO: 0 /100 WBCS (ref 0–0)
PLATELET # BLD AUTO: 172 X10*3/UL (ref 150–450)
POTASSIUM SERPL-SCNC: 4.2 MMOL/L (ref 3.5–5.3)
PROT SERPL-MCNC: 6.9 G/DL (ref 6.4–8.2)
RBC # BLD AUTO: 3.78 X10*6/UL (ref 4–5.2)
SODIUM SERPL-SCNC: 141 MMOL/L (ref 136–145)
WBC # BLD AUTO: 6.9 X10*3/UL (ref 4.4–11.3)

## 2024-06-10 PROCEDURE — 73565 X-RAY EXAM OF KNEES: CPT | Mod: BILATERAL PROCEDURE | Performed by: RADIOLOGY

## 2024-06-10 PROCEDURE — 36415 COLL VENOUS BLD VENIPUNCTURE: CPT

## 2024-06-10 PROCEDURE — 80053 COMPREHEN METABOLIC PANEL: CPT

## 2024-06-10 PROCEDURE — 85025 COMPLETE CBC W/AUTO DIFF WBC: CPT

## 2024-06-10 PROCEDURE — 73565 X-RAY EXAM OF KNEES: CPT

## 2024-06-17 DIAGNOSIS — C67.9 MALIGNANT NEOPLASM OF URINARY BLADDER, UNSPECIFIED SITE (MULTI): Primary | ICD-10-CM

## 2024-06-17 RX ORDER — FAMOTIDINE 10 MG/ML
20 INJECTION INTRAVENOUS ONCE AS NEEDED
OUTPATIENT
Start: 2024-07-09

## 2024-06-17 RX ORDER — EPINEPHRINE 0.3 MG/.3ML
0.3 INJECTION SUBCUTANEOUS EVERY 5 MIN PRN
OUTPATIENT
Start: 2024-07-09

## 2024-06-17 RX ORDER — OXYBUTYNIN CHLORIDE 5 MG/1
5 TABLET ORAL ONCE
OUTPATIENT
Start: 2024-07-09

## 2024-06-17 RX ORDER — DIPHENHYDRAMINE HYDROCHLORIDE 50 MG/ML
50 INJECTION INTRAMUSCULAR; INTRAVENOUS AS NEEDED
OUTPATIENT
Start: 2024-07-09

## 2024-06-17 RX ORDER — ALBUTEROL SULFATE 0.83 MG/ML
3 SOLUTION RESPIRATORY (INHALATION) AS NEEDED
OUTPATIENT
Start: 2024-07-09

## 2024-06-19 ENCOUNTER — OFFICE VISIT (OUTPATIENT)
Dept: ORTHOPEDIC SURGERY | Facility: HOSPITAL | Age: 79
End: 2024-06-19
Payer: MEDICARE

## 2024-06-19 DIAGNOSIS — M17.11 PRIMARY OSTEOARTHRITIS OF RIGHT KNEE: ICD-10-CM

## 2024-06-19 DIAGNOSIS — M17.12 PRIMARY OSTEOARTHRITIS OF LEFT KNEE: Primary | ICD-10-CM

## 2024-06-19 PROCEDURE — 20610 DRAIN/INJ JOINT/BURSA W/O US: CPT | Mod: LT | Performed by: STUDENT IN AN ORGANIZED HEALTH CARE EDUCATION/TRAINING PROGRAM

## 2024-06-19 PROCEDURE — 2500000005 HC RX 250 GENERAL PHARMACY W/O HCPCS: Performed by: STUDENT IN AN ORGANIZED HEALTH CARE EDUCATION/TRAINING PROGRAM

## 2024-06-19 PROCEDURE — 99214 OFFICE O/P EST MOD 30 MIN: CPT | Performed by: STUDENT IN AN ORGANIZED HEALTH CARE EDUCATION/TRAINING PROGRAM

## 2024-06-19 PROCEDURE — 2500000004 HC RX 250 GENERAL PHARMACY W/ HCPCS (ALT 636 FOR OP/ED): Performed by: STUDENT IN AN ORGANIZED HEALTH CARE EDUCATION/TRAINING PROGRAM

## 2024-06-19 ASSESSMENT — PAIN SCALES - GENERAL: PAINLEVEL_OUTOF10: 4

## 2024-06-19 ASSESSMENT — PAIN - FUNCTIONAL ASSESSMENT: PAIN_FUNCTIONAL_ASSESSMENT: 0-10

## 2024-06-19 ASSESSMENT — PAIN DESCRIPTION - DESCRIPTORS: DESCRIPTORS: ACHING;THROBBING

## 2024-06-19 NOTE — PROGRESS NOTES
Melanie Olguin MD   Adult Reconstruction and Joint Replacement Surgery  Phone: [phone]     Fax: [phone]     INITIAL CONSULTATION    Name: Anahi Wall  : 1945  Date of Visit:  2024    CC: Left > Right knee pain    Clinical History:  Anahi Wall is a 79 y.o. female who presents with 10 years of BILATERAL knee and pain. They were referred by Dr. Conrad.    Patient has tried the following Ice, Tylenol (arthritis dosing) , Activity modification, Corticosteroid injections , and Xray. Date of last steroid injection: 2023 - helped, left knee. Patient does occasionally have pain at night. Patient is able to walk 2-3 blocks. Patient is currently using nothing and cane as assistive device. Primarily complains of anterior and lateral  pain. Patient has difficulty with climbing stairs, descending stairs, walking , and walking on unlevel surfaces . The pain is significantly impacting her ability to perform activities of daily living. Patient reports no longer able to do activities such as walking longer distances.     Focused History  PMH: Reviewed and PE/DVT: no . Solitary kidney (removed for carcinoma). Bladder cancer (has been treating for 14 years).  PSH: Reviewed , Hip/Knee replacement: no, Hip/Knee surgery: no, Anesthesia complications: no, Spine surgery: no, Surgical infection: no, and Weight loss surgery: no  Meds: Reviewed, Current Anticoagulants: no, Weight loss medication: no, and Current Opioids: no  Allergies: Reviewed , NSAIDs: avoids due to gastritis reaction, and The patient reports no contraindications or allergies to cephalosporins, aspirin, NSAIDs or opioids, except as noted above.  FH: No family history of any bleeding or clotting disorders.  SHx: Reviewed, Occupation: retired, previously  at [employer], Current smoker: no, EtOH intake weekly: minimal , Social support: TBD, and Preferred physical activities: walking  Dental Hx: Last routine  cleaning: TBD and Discussed that all invasive dental work must be completed at least 3 months prior to joint replacement surgery. Patient understands they are to avoid any invasive dental work 3-6 months post-surgically.   Pentecostal: no    PROMs   No questionnaires on file.     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    Breast cancer (Multi)     left sided lumpectomy, s/p xrt also completed 1996    Carpal tunnel syndrome     s/p Left wrist surgery    Cataract     removed    Cramp and spasm     Cramp of limb    Depression     controlled with medication    GERD (gastroesophageal reflux disease)     Managed with Prilosec    Hearing aid worn     HL (hearing loss)     wears B/L hearing aids    Hyperlipidemia     F/W PCP    Joint pain     Nephrolithiasis     Osteoporosis     Other conditions influencing health status     Acute Meniscal Tear Of Right Knee    Trochanteric bursitis, unspecified hip     Urothelial cancer (Multi)     Vision loss     wears glasses     Documented in chart and reviewed.     Past Surgical History:   Procedure Laterality Date    BLADDER SURGERY  09/03/2019    bladder bx    BREAST LUMPECTOMY Left     Breast Surgery Lumpectomy x 2 1996    CARPAL TUNNEL RELEASE Left 2003    Neuroplasty Decompression Median Nerve At Carpal Tunnel    CATARACT EXTRACTION      CATARACT EXTRACTION EXTRACAPSULAR W/ INTRAOCULAR LENS IMPLANTATION Bilateral 2017    COLONOSCOPY      CYSTOSCOPY      multiple    HYSTERECTOMY  02/10/2014    Hysterectomy    NEPHRECTOMY Right 03/28/2024    TURBT, right nephroureterectomy, RPLND    OTHER SURGICAL HISTORY  2007    bladder sling    OTHER SURGICAL HISTORY Left     post cataract laser surgery 2021    OTHER SURGICAL HISTORY Left     left shoulder surgery to remove bone spur       Allergies: She is allergic to aspirin, nsaids (non-steroidal  anti-inflammatory drug), and penicillins.     Medications:  Current Outpatient Medications   Medication Instructions    alendronate (FOSAMAX) 70 mg, oral, Every 7 days, Takes on     ALPRAZolam (XANAX) 0.25 mg, oral, 3 times daily PRN    cholecalciferol (Vitamin D-3) 50 MCG (2000) tablet 1 tablet, oral, Daily    fluticasone (Flonase) 50 mcg/actuation nasal spray 2 sprays, nasal, Daily    minoxidil (Loniten) 2.5 mg tablet 0.5 tablets, oral, Daily    mirtazapine (REMERON) 30 mg, oral, Nightly    nitrofurantoin, macrocrystal-monohydrate, (Macrobid) 100 mg capsule One dose today when she gets home from procedure and again in morning    omeprazole (PRILOSEC) 20 mg, oral, Daily    oxybutynin XL (DITROPAN-XL) 15 mg, oral, Daily    sertraline (ZOLOFT) 200 mg, oral, Daily       Family History   Problem Relation Name Age of Onset    Diabetes Mother      Multiple sclerosis Mother      Hypertension Father      Stroke Father      Heart disease Father      Bipolar disorder Brother      Cardiomyopathy Brother       Documented in chart and reviewed.     Social History     Tobacco Use    Smoking status: Former     Current packs/day: 0.00     Average packs/day: 0.5 packs/day for 2.0 years (1.0 ttl pk-yrs)     Types: Cigarettes     Start date:      Quit date:      Years since quittin.5    Smokeless tobacco: Never   Substance Use Topics    Alcohol use: Yes     Alcohol/week: 1.0 standard drink of alcohol     Types: 1 Glasses of wine per week     Comment: occasionally       Review of Systems: Review of systems completed with medical assistant intake. Please refer to this note.     Falls: The patient denies any recent falls or fall-related injuries.    Physical Exam:  BMI: 28, which is abnormal. Encouraged to lose weight and to follow up with PCP.    Constitutional: The patient is well-appearing and well groomed.     Neurological/Psychiatric: The patient is alert and oriented to person, place and time. The patient has  a normal mood and affect.    Skin Examination: The skin over the right lower extremity, left lower extremity, right upper extremity, and left upper extremity is intact without any evidence of infection or rash.    Cardiovascular Examination: There are no varicosities and the skin is normal temperature, capillary refill normal, arterial pulses normal, no edema.    Lymphatic Examination: There is no lymphatic swelling or palpable lymph nodes present around the involved joint.    Neurological Examination: Bilateral lower extremities are grossly neurologically intact. Sensation normal, motor function normal.    Gait: The patient ambulates with an antalgic    Gait.     Lumbar spine:    No tenderness to palpation midline.    Negative straight leg raise bilaterally.    Right Hip Examination:  The skin is intact over the hip.    There is no tenderness over the greater trochanter.    Range of motion is full extension to 100 degrees of flexion.    The hip is stable without subluxation or dislocation.    The hip internally rotates to 15 degrees and externally rotates to 45 degrees.    There is no pain with hip motion.    Left Hip Examination:  The skin is intact over the hip.    There is no tenderness over the greater trochanter.    Range of motion is full extension to 100 degrees of flexion.    The hip is stable without subluxation or dislocation.    The hip internally rotates to 15 degrees and externally rotates to 45 degrees.    There is no pain with hip motion.    Right Knee Examination:  Examination of the knee reveals the skin to be intact.    There is a moderate effusion in the knee.    The alignment of the knee is Valgus.    This deformity is not correctable.    There is tenderness to palpation over the joint line.    There is significant quadriceps atrophy.    Range of Motion: 10 to 110 degrees of flexion.    The knee is stable to varus-valgus stress and anterior-posterior stress.     There is moderate grinding with  "range of motion.    There is moderate patellofemoral crepitus.    Left Knee Examination:  Examination of the knee reveals the skin to be intact.    There is a moderate effusion in the knee.    The alignment of the knee is Valgus.    This deformity is not correctable.    There is tenderness to palpation over the joint line.    There is significant quadriceps atrophy.    Range of Motion: 5 to 110 degrees of flexion.    The knee is stable to varus-valgus stress and anterior-posterior stress.     There is moderate grinding with range of motion.    There is moderate patellofemoral crepitus.    Prior Labs:   Lab Results   Component Value Date    WBC 6.9 06/10/2024    HGB 11.1 (L) 06/10/2024    HCT 35.4 (L) 06/10/2024    MCV 94 06/10/2024     06/10/2024      Lab Results   Component Value Date    INR 1.1 03/11/2024    INR 1.0 11/20/2023    INR 1.0 01/17/2023    PROTIME 12.1 03/11/2024    PROTIME 10.2 11/20/2023    PROTIME 11.7 01/17/2023         Lab Results   Component Value Date    GLUCOSE 96 06/10/2024    CALCIUM 9.7 06/10/2024     06/10/2024    K 4.2 06/10/2024    CO2 27 06/10/2024     06/10/2024    BUN 34 (H) 06/10/2024    CREATININE 1.46 (H) 06/10/2024      No results found for: \"CKTOTAL\", \"CKMB\", \"CKMBINDEX\", \"TROPONINI\"   No results found for: \"HGBA1C\"      No results found for: \"CRP\"   Lab Results   Component Value Date    SEDRATE 15 03/01/2024        Radiographs:  Radiographs were personally reviewed today. There is evidence of severe BILATERAL knee osteoarthritis with LATERAL  bone on bone apposition.    Impression:  79 y.o. female presents with severe BILATERAL knee osteoarthritis with bone on bone apposition. Patient has tried and failed appropriate conservative measures and now has limitation in ADL's.     Diagnosis:  Primary osteoarthritis of left knee    Primary osteoarthritis of right knee    Recommendations / Plan:    I have discussed the options in detail with the patient. We have " discussed anti-inflammatory medication, activity modification, physical therapy, corticosteroid injections, viscosupplementation injections, partial knee replacement surgery and total knee replacement surgery. The patient has not yet exhausted all conservative treatment measures.    The risks and benefits of all these treatment options have been discussed in detail.     The patient has tried at least 3 months of the above conservative treatments and continues to have disabling pain, impaired activities of daily living and worsened quality of life.  Reviewed the surgical optimization steps to optimize their chances for a successful joint replacement surgery.      A physical therapy prescription was ordered for the patient.  Patient will continue their home exercise program. Strategies for pain management using over-the-counter anti-inflammatory medications reviewed.  The patient elects for a steroid injection, which was provided according to procedure note below.  Discussed the potential use of knee sleeve or brace for comfort. Encouraged them to maintain range of motion and strength around the knee joints.  They will continue to implement these strategies in addressing their pain.       Recommend the patient continue optimizing nonsurgical treatment interventions as outlined above for management of their knee arthritis.  I would be happy to see them again at any point to discuss surgery if they are more optimized or to review progress with nonsurgical treatment of arthritis. The patient verbalizes understanding with the recommendations and treatment plan as outlined above and is in agreement.  Questions were addressed.    Large Joint Injection [id number]: Knee  Consent given by: Patient  Timeout: Immediately prior to procedure the correct patient, procedure, and site was verified. Sterile gloves and semi-sterile technique were used.   Indications: Knee pain and joint swelling.   Location: LEFT knee  Needle size: 22  G  Approach: Lateral    Medications administered: please see medical assistant note for lot number and expiration date.   Subcutaneous   4 ml of 1% lidocaine     Deep   4 ml of 1% lidocaine   4 mL 0.5% marcaine   2 mL of 40 mg kenalog     Patient tolerance: Dressing applied. Patient tolerated the procedure well with no immediate complications.    L Inj/Asp: L knee on 6/19/2024 4:53 PM  Indications: pain and joint swelling  Details: 22 G needle, lateral approach  Medications: 80 mg triamcinolone acetonide 40 mg/mL; 8 mL lidocaine 10 mg/mL (1 %); 4 mL BUPivacaine HCl 0.5 % (5 mg/mL)  Outcome: tolerated well, no immediate complications  Procedure, treatment alternatives, risks and benefits explained, specific risks discussed. Consent was given by the patient. Immediately prior to procedure a time out was called to verify the correct patient, procedure, equipment, support staff and site/side marked as required. Patient was prepped and draped in the usual sterile fashion.             _____________  Melanie Olguin MD   Attending Orthopaedic Surgeon  [facility]    [facility]    Approximately 45 minutes were spent on the following tasks:              Preparing for the patient              Reviewing medical records              Taking a patient history              Performing a physical exam              Reviewing treatment options with the patient              Explaining the risks, potential benefits, and alternative to surgery  Explaining the expected rehabilitation after each treatment option  Explaining the potential long term expectations  Evaluating the diagnostic imaging      This office note was transcribed with dictation software.  Please excuse any typographical errors, program misunderstandings leading to inadvertent insertions or deletions of inappropriate wording, pronoun errors and other unintentional transcription errors not noticed on  proof-reading.

## 2024-06-19 NOTE — LETTER
2024     Jose Luis Conrad MD  [address] [place] [address] [place] [address]    Patient: Anahi Wall   YOB: 1945   Date of Visit: 2024       Dear Dr. Jose Luis Conrad MD:    Thank you for referring Anahi Wall to me for evaluation. Below are my notes for this consultation.  If you have questions, please do not hesitate to call me. I look forward to following your patient along with you.       Sincerely,     Melanie Olguin MD      CC: No Recipients  ______________________________________________________________________________________     Melanie Olguin MD   Adult Reconstruction and Joint Replacement Surgery  Phone: [phone]     Fax: [phone]     INITIAL CONSULTATION    Name: Anahi Wall  : 1945  Date of Visit:  2024    CC: Left > Right knee pain    Clinical History:  Anahi Wall is a 79 y.o. female who presents with 10 years of BILATERAL knee and pain. They were referred by Dr. Conrad.    Patient has tried the following Ice, Tylenol (arthritis dosing) , Activity modification, Corticosteroid injections , and Xray. Date of last steroid injection: 2023 - helped, left knee. Patient does occasionally have pain at night. Patient is able to walk 2-3 blocks. Patient is currently using nothing and cane as assistive device. Primarily complains of anterior and lateral  pain. Patient has difficulty with climbing stairs, descending stairs, walking , and walking on unlevel surfaces . The pain is significantly impacting her ability to perform activities of daily living. Patient reports no longer able to do activities such as walking longer distances.     Focused History  PMH: Reviewed and PE/DVT: no . Solitary kidney (removed for carcinoma). Bladder cancer (has been treating for 14 years).  PSH: Reviewed , Hip/Knee replacement: no, Hip/Knee surgery: no, Anesthesia complications: no, Spine surgery: no, Surgical infection: no, and Weight loss surgery: no  Meds: Reviewed,  Current Anticoagulants: no, Weight loss medication: no, and Current Opioids: no  Allergies: Reviewed , NSAIDs: avoids due to gastritis reaction, and The patient reports no contraindications or allergies to cephalosporins, aspirin, NSAIDs or opioids, except as noted above.  FH: No family history of any bleeding or clotting disorders.  SHx: Reviewed, Occupation: retired, previously  at [employer] Current smoker: no, EtOH intake weekly: minimal , Social support: TBD, and Preferred physical activities: walking  Dental Hx: Last routine cleaning: TBD and Discussed that all invasive dental work must be completed at least 3 months prior to joint replacement surgery. Patient understands they are to avoid any invasive dental work 3-6 months post-surgically.   Jewish: no    PROMs   No questionnaires on file.     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   • Breast cancer (Multi)     left sided lumpectomy, s/p xrt also completed 1996   • Carpal tunnel syndrome     s/p Left wrist surgery   • Cataract     removed   • Cramp and spasm     Cramp of limb   • Depression     controlled with medication   • GERD (gastroesophageal reflux disease)     Managed with Prilosec   • Hearing aid worn    • HL (hearing loss)     wears B/L hearing aids   • Hyperlipidemia     F/W PCP   • Joint pain    • Nephrolithiasis    • Osteoporosis    • Other conditions influencing health status     Acute Meniscal Tear Of Right Knee   • Trochanteric bursitis, unspecified hip    • Urothelial cancer (Multi)    • Vision loss     wears glasses     Documented in chart and reviewed.     Past Surgical History:   Procedure Laterality Date   • BLADDER SURGERY  09/03/2019    bladder bx   • BREAST LUMPECTOMY Left     Breast Surgery Lumpectomy x 2 1996   • CARPAL TUNNEL RELEASE Left 2003    Neuroplasty  Decompression Median Nerve At Carpal Tunnel   • CATARACT EXTRACTION     • CATARACT EXTRACTION EXTRACAPSULAR W/ INTRAOCULAR LENS IMPLANTATION Bilateral    • COLONOSCOPY     • CYSTOSCOPY      multiple   • HYSTERECTOMY  02/10/2014    Hysterectomy   • NEPHRECTOMY Right 2024    TURBT, right nephroureterectomy, RPLND   • OTHER SURGICAL HISTORY      bladder sling   • OTHER SURGICAL HISTORY Left     post cataract laser surgery    • OTHER SURGICAL HISTORY Left     left shoulder surgery to remove bone spur       Allergies: She is allergic to aspirin, nsaids (non-steroidal anti-inflammatory drug), and penicillins.     Medications:  Current Outpatient Medications   Medication Instructions   • alendronate (FOSAMAX) 70 mg, oral, Every 7 days, Takes on    • ALPRAZolam (XANAX) 0.25 mg, oral, 3 times daily PRN   • cholecalciferol (Vitamin D-3) 50 MCG (2000) tablet 1 tablet, oral, Daily   • fluticasone (Flonase) 50 mcg/actuation nasal spray 2 sprays, nasal, Daily   • minoxidil (Loniten) 2.5 mg tablet 0.5 tablets, oral, Daily   • mirtazapine (REMERON) 30 mg, oral, Nightly   • nitrofurantoin, macrocrystal-monohydrate, (Macrobid) 100 mg capsule One dose today when she gets home from procedure and again in morning   • omeprazole (PRILOSEC) 20 mg, oral, Daily   • oxybutynin XL (DITROPAN-XL) 15 mg, oral, Daily   • sertraline (ZOLOFT) 200 mg, oral, Daily       Family History   Problem Relation Name Age of Onset   • Diabetes Mother     • Multiple sclerosis Mother     • Hypertension Father     • Stroke Father     • Heart disease Father     • Bipolar disorder Brother     • Cardiomyopathy Brother       Documented in chart and reviewed.     Social History     Tobacco Use   • Smoking status: Former     Current packs/day: 0.00     Average packs/day: 0.5 packs/day for 2.0 years (1.0 ttl pk-yrs)     Types: Cigarettes     Start date:      Quit date:      Years since quittin.5   • Smokeless tobacco: Never    Substance Use Topics   • Alcohol use: Yes     Alcohol/week: 1.0 standard drink of alcohol     Types: 1 Glasses of wine per week     Comment: occasionally       Review of Systems: Review of systems completed with medical assistant intake. Please refer to this note.     Falls: The patient denies any recent falls or fall-related injuries.    Physical Exam:  BMI: 28, which is abnormal. Encouraged to lose weight and to follow up with PCP.    Constitutional: The patient is well-appearing and well groomed.     Neurological/Psychiatric: The patient is alert and oriented to person, place and time. The patient has a normal mood and affect.    Skin Examination: The skin over the right lower extremity, left lower extremity, right upper extremity, and left upper extremity is intact without any evidence of infection or rash.    Cardiovascular Examination: There are no varicosities and the skin is normal temperature, capillary refill normal, arterial pulses normal, no edema.    Lymphatic Examination: There is no lymphatic swelling or palpable lymph nodes present around the involved joint.    Neurological Examination: Bilateral lower extremities are grossly neurologically intact. Sensation normal, motor function normal.    Gait: The patient ambulates with an antalgic    Gait.     Lumbar spine:    No tenderness to palpation midline.    Negative straight leg raise bilaterally.    Right Hip Examination:  The skin is intact over the hip.    There is no tenderness over the greater trochanter.    Range of motion is full extension to 100 degrees of flexion.    The hip is stable without subluxation or dislocation.    The hip internally rotates to 15 degrees and externally rotates to 45 degrees.    There is no pain with hip motion.    Left Hip Examination:  The skin is intact over the hip.    There is no tenderness over the greater trochanter.    Range of motion is full extension to 100 degrees of flexion.    The hip is stable without  "subluxation or dislocation.    The hip internally rotates to 15 degrees and externally rotates to 45 degrees.    There is no pain with hip motion.    Right Knee Examination:  Examination of the knee reveals the skin to be intact.    There is a moderate effusion in the knee.    The alignment of the knee is Valgus.    This deformity is not correctable.    There is tenderness to palpation over the joint line.    There is significant quadriceps atrophy.    Range of Motion: 10 to 110 degrees of flexion.    The knee is stable to varus-valgus stress and anterior-posterior stress.     There is moderate grinding with range of motion.    There is moderate patellofemoral crepitus.    Left Knee Examination:  Examination of the knee reveals the skin to be intact.    There is a moderate effusion in the knee.    The alignment of the knee is Valgus.    This deformity is not correctable.    There is tenderness to palpation over the joint line.    There is significant quadriceps atrophy.    Range of Motion: 5 to 110 degrees of flexion.    The knee is stable to varus-valgus stress and anterior-posterior stress.     There is moderate grinding with range of motion.    There is moderate patellofemoral crepitus.    Prior Labs:   Lab Results   Component Value Date    WBC 6.9 06/10/2024    HGB 11.1 (L) 06/10/2024    HCT 35.4 (L) 06/10/2024    MCV 94 06/10/2024     06/10/2024      Lab Results   Component Value Date    INR 1.1 03/11/2024    INR 1.0 11/20/2023    INR 1.0 01/17/2023    PROTIME 12.1 03/11/2024    PROTIME 10.2 11/20/2023    PROTIME 11.7 01/17/2023         Lab Results   Component Value Date    GLUCOSE 96 06/10/2024    CALCIUM 9.7 06/10/2024     06/10/2024    K 4.2 06/10/2024    CO2 27 06/10/2024     06/10/2024    BUN 34 (H) 06/10/2024    CREATININE 1.46 (H) 06/10/2024      No results found for: \"CKTOTAL\", \"CKMB\", \"CKMBINDEX\", \"TROPONINI\"   No results found for: \"HGBA1C\"      No results found for: \"CRP\"   Lab " Results   Component Value Date    GREGORIORATE 15 03/01/2024        Radiographs:  Radiographs were personally reviewed today. There is evidence of severe BILATERAL knee osteoarthritis with LATERAL  bone on bone apposition.    Impression:  79 y.o. female presents with severe BILATERAL knee osteoarthritis with bone on bone apposition. Patient has tried and failed appropriate conservative measures and now has limitation in ADL's.     Diagnosis:  Primary osteoarthritis of left knee    Primary osteoarthritis of right knee    Recommendations / Plan:    I have discussed the options in detail with the patient. We have discussed anti-inflammatory medication, activity modification, physical therapy, corticosteroid injections, viscosupplementation injections, partial knee replacement surgery and total knee replacement surgery. The patient has not yet exhausted all conservative treatment measures.    The risks and benefits of all these treatment options have been discussed in detail.     The patient has tried at least 3 months of the above conservative treatments and continues to have disabling pain, impaired activities of daily living and worsened quality of life.  Reviewed the surgical optimization steps to optimize their chances for a successful joint replacement surgery.      A physical therapy prescription was ordered for the patient.  Patient will continue their home exercise program. Strategies for pain management using over-the-counter anti-inflammatory medications reviewed.  The patient elects for a steroid injection, which was provided according to procedure note below.  Discussed the potential use of knee sleeve or brace for comfort. Encouraged them to maintain range of motion and strength around the knee joints.  They will continue to implement these strategies in addressing their pain.       Recommend the patient continue optimizing nonsurgical treatment interventions as outlined above for management of their knee  arthritis.  I would be happy to see them again at any point to discuss surgery if they are more optimized or to review progress with nonsurgical treatment of arthritis. The patient verbalizes understanding with the recommendations and treatment plan as outlined above and is in agreement.  Questions were addressed.    Large Joint Injection 20610: Knee  Consent given by: Patient  Timeout: Immediately prior to procedure the correct patient, procedure, and site was verified. Sterile gloves and semi-sterile technique were used.   Indications: Knee pain and joint swelling.   Location: LEFT knee  Needle size: 22 G  Approach: Lateral    Medications administered: please see medical assistant note for lot number and expiration date.   Subcutaneous   4 ml of 1% lidocaine     Deep   4 ml of 1% lidocaine   4 mL 0.5% marcaine   2 mL of 40 mg kenalog     Patient tolerance: Dressing applied. Patient tolerated the procedure well with no immediate complications.    L Inj/Asp: L knee on 6/19/2024 4:53 PM  Indications: pain and joint swelling  Details: 22 G needle, lateral approach  Medications: 80 mg triamcinolone acetonide 40 mg/mL; 8 mL lidocaine 10 mg/mL (1 %); 4 mL BUPivacaine HCl 0.5 % (5 mg/mL)  Outcome: tolerated well, no immediate complications  Procedure, treatment alternatives, risks and benefits explained, specific risks discussed. Consent was given by the patient. Immediately prior to procedure a time out was called to verify the correct patient, procedure, equipment, support staff and site/side marked as required. Patient was prepped and draped in the usual sterile fashion.             _____________  Melanie Olguin MD   Attending Orthopaedic Surgeon  [facility]    [facility]    Approximately 45 minutes were spent on the following tasks:              Preparing for the patient              Reviewing medical records              Taking a patient history               Performing a physical exam              Reviewing treatment options with the patient              Explaining the risks, potential benefits, and alternative to surgery  Explaining the expected rehabilitation after each treatment option  Explaining the potential long term expectations  Evaluating the diagnostic imaging      This office note was transcribed with dictation software.  Please excuse any typographical errors, program misunderstandings leading to inadvertent insertions or deletions of inappropriate wording, pronoun errors and other unintentional transcription errors not noticed on proof-reading.

## 2024-06-22 RX ORDER — TRIAMCINOLONE ACETONIDE 40 MG/ML
80 INJECTION, SUSPENSION INTRA-ARTICULAR; INTRAMUSCULAR
Status: COMPLETED | OUTPATIENT
Start: 2024-06-19 | End: 2024-06-19

## 2024-06-22 RX ORDER — BUPIVACAINE HYDROCHLORIDE 5 MG/ML
4 INJECTION, SOLUTION PERINEURAL
Status: COMPLETED | OUTPATIENT
Start: 2024-06-19 | End: 2024-06-19

## 2024-06-22 RX ORDER — LIDOCAINE HYDROCHLORIDE 10 MG/ML
8 INJECTION INFILTRATION; PERINEURAL
Status: COMPLETED | OUTPATIENT
Start: 2024-06-19 | End: 2024-06-19

## 2024-06-24 ENCOUNTER — APPOINTMENT (OUTPATIENT)
Dept: RADIOLOGY | Facility: CLINIC | Age: 79
End: 2024-06-24
Payer: MEDICARE

## 2024-06-24 ENCOUNTER — HOSPITAL ENCOUNTER (OUTPATIENT)
Dept: RADIOLOGY | Facility: CLINIC | Age: 79
Discharge: HOME | End: 2024-06-24
Payer: MEDICARE

## 2024-06-24 DIAGNOSIS — C67.3 MALIGNANT NEOPLASM OF ANTERIOR WALL OF URINARY BLADDER (MULTI): ICD-10-CM

## 2024-06-24 PROCEDURE — 71250 CT THORAX DX C-: CPT | Performed by: STUDENT IN AN ORGANIZED HEALTH CARE EDUCATION/TRAINING PROGRAM

## 2024-06-24 PROCEDURE — 74176 CT ABD & PELVIS W/O CONTRAST: CPT

## 2024-06-24 PROCEDURE — 74176 CT ABD & PELVIS W/O CONTRAST: CPT | Performed by: STUDENT IN AN ORGANIZED HEALTH CARE EDUCATION/TRAINING PROGRAM

## 2024-07-02 ENCOUNTER — OFFICE VISIT (OUTPATIENT)
Dept: HEMATOLOGY/ONCOLOGY | Facility: CLINIC | Age: 79
End: 2024-07-02
Payer: MEDICARE

## 2024-07-02 VITALS
HEART RATE: 77 BPM | TEMPERATURE: 96.4 F | OXYGEN SATURATION: 97 % | BODY MASS INDEX: 28.35 KG/M2 | SYSTOLIC BLOOD PRESSURE: 109 MMHG | DIASTOLIC BLOOD PRESSURE: 59 MMHG | RESPIRATION RATE: 18 BRPM | WEIGHT: 154.98 LBS

## 2024-07-02 DIAGNOSIS — C67.3 MALIGNANT NEOPLASM OF ANTERIOR WALL OF URINARY BLADDER (MULTI): ICD-10-CM

## 2024-07-02 PROCEDURE — 99214 OFFICE O/P EST MOD 30 MIN: CPT | Performed by: INTERNAL MEDICINE

## 2024-07-02 PROCEDURE — 3078F DIAST BP <80 MM HG: CPT | Performed by: INTERNAL MEDICINE

## 2024-07-02 PROCEDURE — 1126F AMNT PAIN NOTED NONE PRSNT: CPT | Performed by: INTERNAL MEDICINE

## 2024-07-02 PROCEDURE — 1157F ADVNC CARE PLAN IN RCRD: CPT | Performed by: INTERNAL MEDICINE

## 2024-07-02 PROCEDURE — 1159F MED LIST DOCD IN RCRD: CPT | Performed by: INTERNAL MEDICINE

## 2024-07-02 PROCEDURE — 3074F SYST BP LT 130 MM HG: CPT | Performed by: INTERNAL MEDICINE

## 2024-07-02 ASSESSMENT — COLUMBIA-SUICIDE SEVERITY RATING SCALE - C-SSRS
6. HAVE YOU EVER DONE ANYTHING, STARTED TO DO ANYTHING, OR PREPARED TO DO ANYTHING TO END YOUR LIFE?: NO
2. HAVE YOU ACTUALLY HAD ANY THOUGHTS OF KILLING YOURSELF?: NO
1. IN THE PAST MONTH, HAVE YOU WISHED YOU WERE DEAD OR WISHED YOU COULD GO TO SLEEP AND NOT WAKE UP?: NO

## 2024-07-02 ASSESSMENT — PAIN SCALES - GENERAL: PAINLEVEL: 0-NO PAIN

## 2024-07-02 ASSESSMENT — ENCOUNTER SYMPTOMS
DEPRESSION: 0
OCCASIONAL FEELINGS OF UNSTEADINESS: 0
LOSS OF SENSATION IN FEET: 0

## 2024-07-02 ASSESSMENT — PATIENT HEALTH QUESTIONNAIRE - PHQ9
2. FEELING DOWN, DEPRESSED OR HOPELESS: NOT AT ALL
1. LITTLE INTEREST OR PLEASURE IN DOING THINGS: NOT AT ALL
SUM OF ALL RESPONSES TO PHQ9 QUESTIONS 1 AND 2: 0

## 2024-07-02 NOTE — PROGRESS NOTES
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  Treatment  -3/2010 LGTA non inv Pap   -Recurrence 2013, s/p 6 BCG, s/p BCG maintenance,   -8/2014 - non inv CIS, repeat 6 BCG, maintenance, Jan 2017  -Continued to be monitored  -7/2020 - non inv HG pap UC, s/p BCG, and maintenance into Jan 2021  -Path 6/2021 - pap UC, LG w/ focal HG features   -3 induction cycles of BCG  -cysto 10/12/2021 - multifocal lesions, s/p IV gem , path - / TURBT Urothelium, mod to severe atypia, carcinoma in situ  -refused Pembro/Cystectomy s/p induction gem/docetaxel x2   -repeat cysto - 4/22 - non inv pap UC HG, possible R UO, involvement, non specific Lns  -repeat gem/docetaxel completed 6 cycles 8/22, procedure 1/23 pap tumors at both ureteral orficies, r>l, tumor posterior trigone, wall of bladder, path c/w HG NMIBC ant wall and right ureteral, cytology concerning on left side, yet unable to evaluate, repeat gem/docetaxel x2 w/ persistent disease  -cysto / right ureteroscope - 11/23 - right UO tumor, renal pelvis high volume papillary tumors, path urinary bladder pap UC, HG, no muscle invasion, right ureter wash - highly atypical cells concerning for urothelial neoplasms, right, left, highly atypical UC present, concerning for neoplasms  CT urogram 12/23- Multiple subcentimeter small filling defects in the right kidney, lower pole calyx, as well as left kidney, mid zone, lower pole, small necrosis or pap necrosis, small foci of neoplasm, acute subacute fracture of L5, enhancing mass left gluteus gume [id number], stable, right pelvic sidewall LAD, malignant, or neoplastic, 14mm,  grade 1 anterolisthesis, refused NAC chemotherapy  -s/p TURBT and robotic right-sided nephro ureterectomy and lymph node dissection March 30, 2024 with pathology showing in the bladder high-grade noninvasive  papillary urothelial carcinoma lymph nodes were negative multifocal papillary urothelial carcinoma high-grade involving the renal pelvis and ureter invasion into the muscularis and perivesical fat and involving the renal parenchyma papillary adenoma 1.5 cm pathological T3  -refused any adjuvant therapy and decision to monitor , planned Adstilardrin (virus vector/interferon) planned with urology for NMIBC component    Breast Cancer - left sided lumpectomy, s/p xrt also completed 1996    Other contributing history  HL, Broken Wrist, Overactive bladder issues, Depression, Uterine Fibroids, Carpal tunnel syndrome, cataract,   Anxiety, HTN,     HPI  Follow up  She is accompanied by her .  She is overall doing fairly well and tolerated the surgery and continues to recover.  She denies any other major new symptoms.  She denies any nausea, vomiting, fevers, chills, easy bruising or bleeding denies any chest pain or chest tightness appetite and energy is otherwise stable.    ROS:  10-pt ROS reviewed and negative except as mentioned above.    Medications: below was reviewed and is accurate  Current Outpatient Medications   Medication Instructions    alendronate (FOSAMAX) 70 mg, oral, Every 7 days, Takes on Sunday    ALPRAZolam (XANAX) 0.25 mg, oral, 3 times daily PRN    cholecalciferol (Vitamin D-3) 50 MCG (2000 UT) tablet 1 tablet, oral, Daily    fluticasone (Flonase) 50 mcg/actuation nasal spray 2 sprays, nasal, Daily    minoxidil (Loniten) 2.5 mg tablet 0.5 tablets, oral, Daily    mirtazapine (REMERON) 30 mg, oral, Nightly    nitrofurantoin, macrocrystal-monohydrate, (Macrobid) 100 mg capsule One dose today when she gets home from procedure and again in morning    omeprazole (PRILOSEC) 20 mg, oral, Daily    oxybutynin XL (DITROPAN-XL) 15 mg, oral, Daily    sertraline (ZOLOFT) 200 mg, oral, Daily        Past Medical History / Social / Surgical / Family history reviewed and updated    Physical exam:  Vitals:     07/02/24 1111   BP: 109/59   Pulse: 77   Resp: 18   Temp: 35.8 °C (96.4 °F)   SpO2: 97%   Weight: 70.3 kg (154 lb 15.7 oz)       GEN: NAD, well-appearing  HEENT: no clear lesions seen  NECK: no clear masses  LYMPH: no palpable adenopathy  SKIN: no concerning new lesions  LUNGS: CTA  CV: RRR no clear R/G  ABD: Soft, NTND. No rebound or guarding.  Well healed scars  EXT:  trace edema bilaterally   NEURO: Grossly intact. No focal deficits.  PSYCH: Normal mood and affect  No clear masses in the gluteal region    Radiology review  Reviewed in detail personally and for detail see results in EPIC  CT chest abdomen pelvis wo IV contrast  Result Date: 6/25/2024  Impression: Chest 1. Stable left upper lobe anteromedial 0.7 cm and left lower lobe 0.5 cm pulmonary nodules. No new suspicious pulmonary nodules. For continued surveillance. 2. Persistent and stable multifocal bilateral patchy airspace opacities which could be infectious or inflammatory. 3. No new enlarged intrathoracic lymphadenopathy. 4. Right thyroid nodule measuring 1.7 cm. This could be further assessed by dedicated nonemergent thyroid ultrasound if clinically desired.   Abdomen-Pelvis 1. Status post right radical nephrectomy with no definite locoregional recurrence within the limitation of this unenhanced study. No metastatic disease in the abdomen or pelvis. 2. Status post right pelvic lymph node dissection with no grossly enlarged lymphadenopathy. Stable few indeterminate subcentimeter retroperitoneal and iliac chain lymph nodes noted. 3. Left renal pelvis left lower pole stable renal calculi as in prior with mild nonspecific over filling of the renal pelvis.       Genomics Data    Laboratory review  Reviewed in detail personally and for detail see results in [facility]  Lab Results   Component Value Date    WBC 6.9 06/10/2024    HGB 11.1 (L) 06/10/2024    HCT 35.4 (L) 06/10/2024    MCV 94 06/10/2024     06/10/2024     Lab Results   Component Value Date     "GLUCOSE 96 06/10/2024    CALCIUM 9.7 06/10/2024     06/10/2024    K 4.2 06/10/2024    CO2 27 06/10/2024     06/10/2024    BUN 34 (H) 06/10/2024    CREATININE 1.46 (H) 06/10/2024     No results found for: \"PSA\"  Lab Results   Component Value Date    ALT 10 06/10/2024    AST 14 06/10/2024    ALKPHOS 71 06/10/2024    BILITOT 0.3 06/10/2024       ASSESSMENT AND PLAN     Stage III Upper Tract Disease - discussed options and scans stable, no e/o recurrence, discussed interval scans every 3 to 6 months prefers 6 months we will arrange follow-up in December she will contact us for any other new complaints.  NMIBC -urology planning  Adstiladrin continue on for treatments for refractory BCG refractory nonmuscle invasive bladder cancer continue follow-up with urology planning on treatment next week.  Chronic kidney disease-role of nephrology team, discussed, she wanted to hold off for now we will continue to monitor.  Anemia-likely post op and ACD, discussed options, she prefers to follow-up with her primary care physician  Nonspecific CT findings including thyroid findings-discussed thyroid ultrasound and wanted to hold off for now we will continue to monitor with serial scans.    Marcos Rojas MD  Senior Attending Physician/  in Medicine [employer]  [facility] / [facility]  Patient line [phone]  Fax [phone]      "

## 2024-07-05 ENCOUNTER — LAB (OUTPATIENT)
Dept: LAB | Facility: LAB | Age: 79
End: 2024-07-05
Payer: MEDICARE

## 2024-07-05 DIAGNOSIS — C67.9 MALIGNANT NEOPLASM OF URINARY BLADDER, UNSPECIFIED SITE (MULTI): ICD-10-CM

## 2024-07-05 LAB
APPEARANCE UR: ABNORMAL
BACTERIA #/AREA URNS AUTO: ABNORMAL /HPF
BILIRUB UR STRIP.AUTO-MCNC: NEGATIVE MG/DL
COLOR UR: ABNORMAL
GLUCOSE UR STRIP.AUTO-MCNC: NORMAL MG/DL
KETONES UR STRIP.AUTO-MCNC: NEGATIVE MG/DL
LEUKOCYTE ESTERASE UR QL STRIP.AUTO: ABNORMAL
MUCOUS THREADS #/AREA URNS AUTO: ABNORMAL /LPF
NITRITE UR QL STRIP.AUTO: ABNORMAL
PH UR STRIP.AUTO: 5.5 [PH]
PROT UR STRIP.AUTO-MCNC: NEGATIVE MG/DL
RBC # UR STRIP.AUTO: ABNORMAL /UL
RBC #/AREA URNS AUTO: ABNORMAL /HPF
RENAL EPI CELLS #/AREA UR COMP ASSIST: ABNORMAL /HPF
SP GR UR STRIP.AUTO: 1.01
SQUAMOUS #/AREA URNS AUTO: ABNORMAL /HPF
UROBILINOGEN UR STRIP.AUTO-MCNC: NORMAL MG/DL
WBC #/AREA URNS AUTO: >50 /HPF
WBC CLUMPS #/AREA URNS AUTO: ABNORMAL /HPF

## 2024-07-05 PROCEDURE — 81001 URINALYSIS AUTO W/SCOPE: CPT

## 2024-07-05 PROCEDURE — 87186 SC STD MICRODIL/AGAR DIL: CPT

## 2024-07-05 PROCEDURE — 87086 URINE CULTURE/COLONY COUNT: CPT

## 2024-07-07 LAB — BACTERIA UR CULT: ABNORMAL

## 2024-07-08 DIAGNOSIS — G47.00 INSOMNIA, UNSPECIFIED TYPE: ICD-10-CM

## 2024-07-08 DIAGNOSIS — N39.0 ACUTE UTI: Primary | ICD-10-CM

## 2024-07-08 DIAGNOSIS — N32.81 OVERACTIVE BLADDER: ICD-10-CM

## 2024-07-08 LAB — BACTERIA UR CULT: ABNORMAL

## 2024-07-08 RX ORDER — MIRTAZAPINE 30 MG/1
30 TABLET, FILM COATED ORAL NIGHTLY
Qty: 90 TABLET | Refills: 3 | Status: SHIPPED | OUTPATIENT
Start: 2024-07-08

## 2024-07-08 RX ORDER — OXYBUTYNIN CHLORIDE 15 MG/1
15 TABLET, EXTENDED RELEASE ORAL DAILY
Qty: 90 TABLET | Refills: 3 | Status: SHIPPED | OUTPATIENT
Start: 2024-07-08

## 2024-07-08 RX ORDER — SULFAMETHOXAZOLE AND TRIMETHOPRIM 800; 160 MG/1; MG/1
1 TABLET ORAL 2 TIMES DAILY
Qty: 14 TABLET | Refills: 0 | Status: SHIPPED | OUTPATIENT
Start: 2024-07-08 | End: 2024-07-15

## 2024-07-09 ENCOUNTER — APPOINTMENT (OUTPATIENT)
Dept: UROLOGY | Facility: HOSPITAL | Age: 79
End: 2024-07-09
Payer: MEDICARE

## 2024-07-09 DIAGNOSIS — E78.5 DYSLIPIDEMIA: Primary | ICD-10-CM

## 2024-07-09 RX ORDER — ROSUVASTATIN CALCIUM 20 MG/1
20 TABLET, COATED ORAL DAILY
Qty: 90 TABLET | Refills: 3 | Status: SHIPPED | OUTPATIENT
Start: 2024-07-09 | End: 2025-08-13

## 2024-07-25 ENCOUNTER — TELEPHONE (OUTPATIENT)
Dept: UROLOGY | Facility: HOSPITAL | Age: 79
End: 2024-07-25
Payer: MEDICARE

## 2024-07-26 ENCOUNTER — LAB (OUTPATIENT)
Dept: LAB | Facility: LAB | Age: 79
End: 2024-07-26
Payer: MEDICARE

## 2024-07-26 DIAGNOSIS — C67.9 MALIGNANT NEOPLASM OF URINARY BLADDER, UNSPECIFIED SITE (MULTI): ICD-10-CM

## 2024-07-26 DIAGNOSIS — R31.0 GROSS HEMATURIA: ICD-10-CM

## 2024-07-26 DIAGNOSIS — C67.3 MALIGNANT NEOPLASM OF ANTERIOR WALL OF URINARY BLADDER (MULTI): ICD-10-CM

## 2024-07-26 LAB
APPEARANCE UR: CLEAR
BASOPHILS # BLD AUTO: 0 X10*3/UL (ref 0–0.1)
BASOPHILS NFR BLD AUTO: 0 %
BILIRUB UR STRIP.AUTO-MCNC: NEGATIVE MG/DL
COLOR UR: COLORLESS
EOSINOPHIL # BLD AUTO: 0 X10*3/UL (ref 0–0.4)
EOSINOPHIL NFR BLD AUTO: 0 %
ERYTHROCYTE [DISTWIDTH] IN BLOOD BY AUTOMATED COUNT: 14.2 % (ref 11.5–14.5)
GLUCOSE UR STRIP.AUTO-MCNC: NORMAL MG/DL
HBV CORE AB SER QL: NONREACTIVE
HBV SURFACE AB SER-ACNC: <3.1 MIU/ML
HBV SURFACE AG SERPL QL IA: NONREACTIVE
HCT VFR BLD AUTO: 38 % (ref 36–46)
HGB BLD-MCNC: 11.8 G/DL (ref 12–16)
IMM GRANULOCYTES # BLD AUTO: 0.03 X10*3/UL (ref 0–0.5)
IMM GRANULOCYTES NFR BLD AUTO: 0.5 % (ref 0–0.9)
KETONES UR STRIP.AUTO-MCNC: NEGATIVE MG/DL
LEUKOCYTE ESTERASE UR QL STRIP.AUTO: NEGATIVE
LYMPHOCYTES # BLD AUTO: 1.16 X10*3/UL (ref 0.8–3)
LYMPHOCYTES NFR BLD AUTO: 20.6 %
MCH RBC QN AUTO: 29.1 PG (ref 26–34)
MCHC RBC AUTO-ENTMCNC: 31.1 G/DL (ref 32–36)
MCV RBC AUTO: 94 FL (ref 80–100)
MONOCYTES # BLD AUTO: 0.33 X10*3/UL (ref 0.05–0.8)
MONOCYTES NFR BLD AUTO: 5.9 %
NEUTROPHILS # BLD AUTO: 4.11 X10*3/UL (ref 1.6–5.5)
NEUTROPHILS NFR BLD AUTO: 73 %
NITRITE UR QL STRIP.AUTO: NEGATIVE
NRBC BLD-RTO: 0 /100 WBCS (ref 0–0)
PH UR STRIP.AUTO: 5 [PH]
PLATELET # BLD AUTO: 151 X10*3/UL (ref 150–450)
PROT UR STRIP.AUTO-MCNC: NEGATIVE MG/DL
RBC # BLD AUTO: 4.06 X10*6/UL (ref 4–5.2)
RBC # UR STRIP.AUTO: NEGATIVE /UL
SP GR UR STRIP.AUTO: 1.01
UROBILINOGEN UR STRIP.AUTO-MCNC: NORMAL MG/DL
WBC # BLD AUTO: 5.6 X10*3/UL (ref 4.4–11.3)

## 2024-07-26 PROCEDURE — 87340 HEPATITIS B SURFACE AG IA: CPT

## 2024-07-26 PROCEDURE — 81003 URINALYSIS AUTO W/O SCOPE: CPT

## 2024-07-26 PROCEDURE — 86704 HEP B CORE ANTIBODY TOTAL: CPT

## 2024-07-26 PROCEDURE — 87086 URINE CULTURE/COLONY COUNT: CPT

## 2024-07-26 PROCEDURE — 86706 HEP B SURFACE ANTIBODY: CPT

## 2024-07-26 PROCEDURE — 85025 COMPLETE CBC W/AUTO DIFF WBC: CPT

## 2024-07-26 PROCEDURE — 36415 COLL VENOUS BLD VENIPUNCTURE: CPT

## 2024-07-27 LAB — BACTERIA UR CULT: NORMAL

## 2024-07-29 ENCOUNTER — TELEPHONE (OUTPATIENT)
Dept: UROLOGY | Facility: HOSPITAL | Age: 79
End: 2024-07-29
Payer: MEDICARE

## 2024-07-29 DIAGNOSIS — C67.3 MALIGNANT NEOPLASM OF ANTERIOR WALL OF URINARY BLADDER (MULTI): Primary | ICD-10-CM

## 2024-07-29 DIAGNOSIS — R39.9 UTI SYMPTOMS: ICD-10-CM

## 2024-07-29 NOTE — TELEPHONE ENCOUNTER
This RN confirmed appointment with pt for 7/30/25. She will contact Urology Department with any issues.

## 2024-07-30 ENCOUNTER — PROCEDURE VISIT (OUTPATIENT)
Dept: UROLOGY | Facility: HOSPITAL | Age: 79
End: 2024-07-30
Payer: MEDICARE

## 2024-07-30 VITALS
TEMPERATURE: 97.7 F | BODY MASS INDEX: 28.39 KG/M2 | RESPIRATION RATE: 20 BRPM | OXYGEN SATURATION: 95 % | SYSTOLIC BLOOD PRESSURE: 122 MMHG | HEART RATE: 89 BPM | DIASTOLIC BLOOD PRESSURE: 55 MMHG | WEIGHT: 155.2 LBS

## 2024-07-30 DIAGNOSIS — C67.9 MALIGNANT NEOPLASM OF URINARY BLADDER, UNSPECIFIED SITE (MULTI): ICD-10-CM

## 2024-07-30 PROCEDURE — 51720 TREATMENT OF BLADDER LESION: CPT | Performed by: NURSE PRACTITIONER

## 2024-07-30 PROCEDURE — 51702 INSERT TEMP BLADDER CATH: CPT | Performed by: NURSE PRACTITIONER

## 2024-07-30 PROCEDURE — 2500000004 HC RX 250 GENERAL PHARMACY W/ HCPCS (ALT 636 FOR OP/ED): Mod: JZ | Performed by: STUDENT IN AN ORGANIZED HEALTH CARE EDUCATION/TRAINING PROGRAM

## 2024-07-30 ASSESSMENT — PAIN SCALES - GENERAL: PAINLEVEL: 0-NO PAIN

## 2024-07-30 NOTE — PROGRESS NOTES
Pt here for first instillation of Adstiladrin. Informed consent completed prior to start of treatment series. 14 Fr straight tip catheter inserted without difficulty using sterile technique. Urine drained clear yellow about 25 ml. Adstiladrin 75 ml administered intravesically through catheter and removed after instillation. Pt tolerated procedure well. Pt instructed to hold medication to hold medication for one hour. Upon standing Pt had some leakage. Pt area cleansed with warm soap and water. Pt able to hold Adstiladrin for approximately 50 minutes before urinating with bleach added to toilet per protocol. Patient given information sheets and was re-educated on home going instructions with understanding verbalized.

## 2024-08-13 ENCOUNTER — OFFICE VISIT (OUTPATIENT)
Dept: PRIMARY CARE | Facility: CLINIC | Age: 79
End: 2024-08-13
Payer: MEDICARE

## 2024-08-13 VITALS — HEART RATE: 89 BPM | DIASTOLIC BLOOD PRESSURE: 76 MMHG | OXYGEN SATURATION: 98 % | SYSTOLIC BLOOD PRESSURE: 122 MMHG

## 2024-08-13 DIAGNOSIS — D17.0 LIPOMA OF NECK: Primary | ICD-10-CM

## 2024-08-13 PROCEDURE — 99213 OFFICE O/P EST LOW 20 MIN: CPT | Performed by: INTERNAL MEDICINE

## 2024-08-13 PROCEDURE — 1157F ADVNC CARE PLAN IN RCRD: CPT | Performed by: INTERNAL MEDICINE

## 2024-08-13 PROCEDURE — 3074F SYST BP LT 130 MM HG: CPT | Performed by: INTERNAL MEDICINE

## 2024-08-13 PROCEDURE — 3078F DIAST BP <80 MM HG: CPT | Performed by: INTERNAL MEDICINE

## 2024-08-13 NOTE — PROGRESS NOTES
Patient is here complaining of a lump in her right shoulder that is been present for the past several weeks.  She just tried noticing it since her shirt was having trouble staying up on her shoulder.  She is not exactly sure how long the lesion has been there.    On exam she has a proximately 2 x 3 cm lipoma in the supraclavicular space.  It is soft and rubbery and well-defined.  There is no firm elements and no pulsatile nature to it.    Most likely benign lipoma of the right shoulder.  Patient would like to have it removed so we will refer her to plastic surgery for further evaluation.

## 2024-08-15 DIAGNOSIS — K21.9 GASTROESOPHAGEAL REFLUX DISEASE WITHOUT ESOPHAGITIS: ICD-10-CM

## 2024-08-15 RX ORDER — OMEPRAZOLE 20 MG/1
20 CAPSULE, DELAYED RELEASE ORAL DAILY
Qty: 90 CAPSULE | Refills: 3 | Status: SHIPPED | OUTPATIENT
Start: 2024-08-15

## 2024-09-13 ENCOUNTER — OFFICE VISIT (OUTPATIENT)
Dept: ORTHOPEDIC SURGERY | Facility: HOSPITAL | Age: 79
End: 2024-09-13
Payer: MEDICARE

## 2024-09-13 DIAGNOSIS — M18.12 LOCALIZED PRIMARY OSTEOARTHRITIS OF CARPOMETACARPAL JOINT OF LEFT THUMB: Primary | ICD-10-CM

## 2024-09-13 PROCEDURE — 2500000005 HC RX 250 GENERAL PHARMACY W/O HCPCS: Performed by: ORTHOPAEDIC SURGERY

## 2024-09-13 PROCEDURE — 99213 OFFICE O/P EST LOW 20 MIN: CPT | Performed by: ORTHOPAEDIC SURGERY

## 2024-09-13 PROCEDURE — 1157F ADVNC CARE PLAN IN RCRD: CPT | Performed by: ORTHOPAEDIC SURGERY

## 2024-09-13 PROCEDURE — 20600 DRAIN/INJ JOINT/BURSA W/O US: CPT | Mod: LT | Performed by: ORTHOPAEDIC SURGERY

## 2024-09-13 PROCEDURE — 2500000004 HC RX 250 GENERAL PHARMACY W/ HCPCS (ALT 636 FOR OP/ED): Performed by: ORTHOPAEDIC SURGERY

## 2024-09-13 PROCEDURE — 1160F RVW MEDS BY RX/DR IN RCRD: CPT | Performed by: ORTHOPAEDIC SURGERY

## 2024-09-13 PROCEDURE — 1036F TOBACCO NON-USER: CPT | Performed by: ORTHOPAEDIC SURGERY

## 2024-09-13 PROCEDURE — 1159F MED LIST DOCD IN RCRD: CPT | Performed by: ORTHOPAEDIC SURGERY

## 2024-09-13 NOTE — PROGRESS NOTES
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  I reviewed the options for management, including observation, nonsteroidals, activity modification, splinting, oral steroids, cortisone injection, or surgical joint reconstruction, along with the likely success rates of each.  I reviewed the risks of cortisone injection, including infection, hypopigmentation, and fat atrophy, along with the expected diminishing returns from repeat injections.  The transient effect on blood glucose measurement was also reviewed, and the patient wished to proceed.    After sterile prep, I injected 20 mg of Kenalog and 0.5 cc of 1% lidocaine, plain to the left first carpometacarpal joint from a dorsal approach.    Patient will continue with splints and nonsteroidals as needed and followup in 4 weeks if still symptomatic, or with any concerns.        HISTORY OF PRESENT ILLNESS       Patient returns today, 14 months after last visit with me.  She had excellent response to her previous CMC cortisone injection.  Symptoms have been back for just a couple of months.  No interval trauma.  No numbness or tingling.  No popping, clicking, or subjective instability.  No other active upper extremity concerns.  She is requesting another cortisone injection today.      PHYSICAL EXAM       She remains well-developed, well-nourished female in no acute distress.  She appears younger than her stated age and has a pleasant affect.  Mild CMC shoulder sign on the left.  Positive CMC grind, reproducing her chief complaint.  Thumb opposition to station 8.  Full composite finger flexion extension with good intrinsic plus minus posture.  Symmetric wrist and forearm motion.  Negative Finkelstein.  No triggering.  Negative Amezcua.  Negative  midcarpal shift.  Sensation intact to light touch in all distributions.  Capillary refill less than 2 seconds.  2+ radial and ulnar pulses today.  No cortisone stigmata.      IMAGING / LABS / EMGs    None today      PROCEDURE    S Inj/Asp: L thumb CMC on 9/13/2024 10:35 AM  Indications: pain  Details: 25 G needle, dorsal approach  Medications: 20 mg triamcinolone acetonide 40 mg/mL; 0.5 mL lidocaine 10 mg/mL (1 %)  Outcome: tolerated well, no immediate complications  Procedure, treatment alternatives, risks and benefits explained, specific risks discussed. Consent was given by the patient.             Electronically Signed      SANCHEZ Verma MD      Orthopaedic Hand Surgery      [phone]

## 2024-09-13 NOTE — LETTER
September 14, 2024     Jose Luis Conrad MD  [address] [place] [address]    Patient: Anahi Wall   YOB: 1945   Date of Visit: 9/13/2024       Dear Dr. Jose Luis Conrad MD:    Thank you for referring Anahi Wall to me for evaluation. Below are my notes for this consultation.  If you have questions, please do not hesitate to call me. I look forward to following your patient along with you.       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  I reviewed the options for management, including observation, nonsteroidals, activity modification, splinting, oral steroids, cortisone injection, or surgical joint reconstruction, along with the likely success rates of each.  I reviewed the risks of cortisone injection, including infection, hypopigmentation, and fat atrophy, along with the expected diminishing returns from repeat injections.  The transient effect on blood glucose measurement was also reviewed, and the patient wished to proceed.    After sterile prep, I injected 20 mg of Kenalog and 0.5 cc of 1% lidocaine, plain to the left first carpometacarpal joint from a dorsal approach.    Patient will continue with splints and nonsteroidals as needed and followup in 4 weeks if still symptomatic, or with any concerns.        HISTORY OF PRESENT ILLNESS       Patient returns today, 14 months after last visit with me.  She had excellent response to her previous CMC cortisone injection.  Symptoms have been back for just a couple of months.  No interval trauma.  No numbness or tingling.  No popping, clicking, or subjective instability.  No other  active upper extremity concerns.  She is requesting another cortisone injection today.      PHYSICAL EXAM       She remains well-developed, well-nourished female in no acute distress.  She appears younger than her stated age and has a pleasant affect.  Mild CMC shoulder sign on the left.  Positive CMC grind, reproducing her chief complaint.  Thumb opposition to station 8.  Full composite finger flexion extension with good intrinsic plus minus posture.  Symmetric wrist and forearm motion.  Negative Finkelstein.  No triggering.  Negative Amezcua.  Negative midcarpal shift.  Sensation intact to light touch in all distributions.  Capillary refill less than 2 seconds.  2+ radial and ulnar pulses today.  No cortisone stigmata.      IMAGING / LABS / EMGs    None today      PROCEDURE    S Inj/Asp: L thumb CMC on 9/13/2024 10:35 AM  Indications: pain  Details: 25 G needle, dorsal approach  Medications: 20 mg triamcinolone acetonide 40 mg/mL; 0.5 mL lidocaine 10 mg/mL (1 %)  Outcome: tolerated well, no immediate complications  Procedure, treatment alternatives, risks and benefits explained, specific risks discussed. Consent was given by the patient.             Electronically Signed      SANCHEZ Verma MD      Orthopaedic Hand Surgery      [phone]

## 2024-09-14 RX ORDER — TRIAMCINOLONE ACETONIDE 40 MG/ML
20 INJECTION, SUSPENSION INTRA-ARTICULAR; INTRAMUSCULAR
Status: COMPLETED | OUTPATIENT
Start: 2024-09-13 | End: 2024-09-13

## 2024-09-14 RX ORDER — LIDOCAINE HYDROCHLORIDE 10 MG/ML
0.5 INJECTION INFILTRATION; PERINEURAL
Status: COMPLETED | OUTPATIENT
Start: 2024-09-13 | End: 2024-09-13

## 2024-10-01 ENCOUNTER — PROCEDURE VISIT (OUTPATIENT)
Dept: UROLOGY | Facility: HOSPITAL | Age: 79
End: 2024-10-01
Payer: MEDICARE

## 2024-10-01 VITALS
SYSTOLIC BLOOD PRESSURE: 125 MMHG | WEIGHT: 160.05 LBS | RESPIRATION RATE: 20 BRPM | HEART RATE: 82 BPM | BODY MASS INDEX: 29.27 KG/M2 | TEMPERATURE: 97 F | OXYGEN SATURATION: 98 % | DIASTOLIC BLOOD PRESSURE: 61 MMHG

## 2024-10-01 DIAGNOSIS — R31.21 ASYMPTOMATIC MICROSCOPIC HEMATURIA: ICD-10-CM

## 2024-10-01 DIAGNOSIS — D49.4 BLADDER TUMOR: ICD-10-CM

## 2024-10-01 DIAGNOSIS — R34 ANURIA AND OLIGURIA: ICD-10-CM

## 2024-10-01 DIAGNOSIS — C67.9 MALIGNANT NEOPLASM OF URINARY BLADDER, UNSPECIFIED SITE (MULTI): Primary | ICD-10-CM

## 2024-10-01 DIAGNOSIS — C68.9 UROTHELIAL CARCINOMA (MULTI): ICD-10-CM

## 2024-10-01 PROCEDURE — 52000 CYSTOURETHROSCOPY: CPT | Performed by: STUDENT IN AN ORGANIZED HEALTH CARE EDUCATION/TRAINING PROGRAM

## 2024-10-01 PROCEDURE — 88112 CYTOPATH CELL ENHANCE TECH: CPT | Mod: TC,MCY | Performed by: STUDENT IN AN ORGANIZED HEALTH CARE EDUCATION/TRAINING PROGRAM

## 2024-10-01 RX ORDER — CHLORHEXIDINE GLUCONATE 40 MG/ML
SOLUTION TOPICAL DAILY PRN
OUTPATIENT
Start: 2024-10-01

## 2024-10-01 RX ORDER — SODIUM CHLORIDE, SODIUM LACTATE, POTASSIUM CHLORIDE, CALCIUM CHLORIDE 600; 310; 30; 20 MG/100ML; MG/100ML; MG/100ML; MG/100ML
20 INJECTION, SOLUTION INTRAVENOUS CONTINUOUS
OUTPATIENT
Start: 2024-10-01

## 2024-10-01 ASSESSMENT — PAIN SCALES - GENERAL: PAINLEVEL: 0-NO PAIN

## 2024-10-01 ASSESSMENT — ENCOUNTER SYMPTOMS
FREQUENCY: 0
DIFFICULTY URINATING: 0

## 2024-10-01 NOTE — PROGRESS NOTES
Subjective   Patient ID: Anahi Wall is a 79 y.o. female who presents for CYSTOSCOPY.    She has a longstanding history of UTC.  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  She has failed BCG and Enid Doce.  She had a UTUC recurrence which was pT3 N0 disease.  She was just given Adstiladrin.  She is here for cystoscopy.    She tolerated the medication okay. A bit hard to hold.  Her bladder symptoms were worse after Nephroureterectomy but are getting better.     Past medical, surgical, family and social history were reviewed and unchanged since last visit besides what is in the HPI.        Today:    Procedure: Cystoscopy    Indication: HG NMIBC    Complications: None    Time Out: Performed with 2 patient identifiers    EBL: None    Abx: None    Procedure:  The patient was brought to the procedure room.  Informed consent was obtained.  The genitals were sterilely prepped and draped once they were placed on the procedure table.      Procedure:  After informed consent was obtained, the patient was taken to the procedure room for cystoscopy due to PMHx of bladder cancer.     Cystoscopy     Procedure Note:    A sterile prep and drape was performed in standard fashion. Lidocaine was used for topical anesthesia. A flexible cystoscope was inserted into the urethra without difficulty revealing normal urethra.     Then entered the bladder revealing bladder mucosa with right ureter replaced with scarring. Her left urterer is intact and present. The posterior wall of the bladder shows small papillary tumors. The ureteral orifices were visualized bilaterally. These were orthotopic in location and effluxing clear urine. No masses were seen on retroflexion.     The scope was then removed, again inspecting the urethra on the way out.     Post-Procedure:   The cystoscope was removed. The vital signs were stable . The patient tolerated the procedure well. There were no complications.     Review of Systems    Genitourinary:  Negative for difficulty urinating, frequency and urgency.   All other systems reviewed and are negative.      Objective   /61 (BP Location: Left arm, Patient Position: Sitting, BP Cuff Size: Adult)   Pulse 82   Temp 36.1 °C (97 °F) (Temporal)   Resp 20   Wt 72.6 kg (160 lb 0.9 oz)   SpO2 98%   BMI 29.27 kg/m²     Physical Exam  Constitutional:       Appearance: Normal appearance. She is normal weight.   Neurological:      Mental Status: She is alert and oriented to person, place, and time. Mental status is at baseline.   Psychiatric:         Mood and Affect: Mood normal.         Behavior: Behavior normal.         Thought Content: Thought content normal.         Judgment: Judgment normal.         Assessment/Plan          1. Malignant neoplasm of urinary bladder, unspecified site (Multi)        2. Bladder tumor        3. Urothelial carcinoma (Multi)          We will hold your next medication dose, Adstiladrin    We can see you next month for TURBT procedure at [place]. She is contemplating that date as close to Thanksgiving.  She will consider coming to [facility] for this or if she would like me to ask one of my partners to do the procedure at [facility] on a day that is not so close to Thanksgiving.        Scribe Attestation  By signing my name below, I, Katarina Chaudhry, Scrmalena   attest that this documentation has been prepared under the direction and in the presence of Jorge A De Leon MD MPH.    This note has been transcribed using a medical scribe and there is a possibility of unintentional typing misprints.

## 2024-10-02 ENCOUNTER — PREP FOR PROCEDURE (OUTPATIENT)
Dept: UROLOGY | Facility: CLINIC | Age: 79
End: 2024-10-02
Payer: MEDICARE

## 2024-10-16 ENCOUNTER — OFFICE VISIT (OUTPATIENT)
Dept: PRIMARY CARE | Facility: CLINIC | Age: 79
End: 2024-10-16
Payer: COMMERCIAL

## 2024-10-16 ENCOUNTER — LAB (OUTPATIENT)
Dept: LAB | Facility: LAB | Age: 79
End: 2024-10-16
Payer: COMMERCIAL

## 2024-10-16 VITALS — SYSTOLIC BLOOD PRESSURE: 122 MMHG | DIASTOLIC BLOOD PRESSURE: 74 MMHG | OXYGEN SATURATION: 97 % | HEART RATE: 83 BPM

## 2024-10-16 DIAGNOSIS — Z00.00 HEALTH CARE MAINTENANCE: ICD-10-CM

## 2024-10-16 DIAGNOSIS — C64.9 MALIGNANT NEOPLASM OF KIDNEY, UNSPECIFIED LATERALITY (MULTI): ICD-10-CM

## 2024-10-16 DIAGNOSIS — R53.82 CHRONIC FATIGUE: Primary | ICD-10-CM

## 2024-10-16 LAB
BASOPHILS # BLD AUTO: 0.02 X10*3/UL (ref 0–0.1)
BASOPHILS NFR BLD AUTO: 0.3 %
EOSINOPHIL # BLD AUTO: 0.09 X10*3/UL (ref 0–0.4)
EOSINOPHIL NFR BLD AUTO: 1.4 %
ERYTHROCYTE [DISTWIDTH] IN BLOOD BY AUTOMATED COUNT: 13.7 % (ref 11.5–14.5)
ERYTHROCYTE [SEDIMENTATION RATE] IN BLOOD BY WESTERGREN METHOD: 27 MM/H (ref 0–30)
HCT VFR BLD AUTO: 37 % (ref 36–46)
HGB BLD-MCNC: 11.6 G/DL (ref 12–16)
IMM GRANULOCYTES # BLD AUTO: 0.08 X10*3/UL (ref 0–0.5)
IMM GRANULOCYTES NFR BLD AUTO: 1.2 % (ref 0–0.9)
LYMPHOCYTES # BLD AUTO: 1.17 X10*3/UL (ref 0.8–3)
LYMPHOCYTES NFR BLD AUTO: 18.2 %
MCH RBC QN AUTO: 28.9 PG (ref 26–34)
MCHC RBC AUTO-ENTMCNC: 31.4 G/DL (ref 32–36)
MCV RBC AUTO: 92 FL (ref 80–100)
MONOCYTES # BLD AUTO: 0.45 X10*3/UL (ref 0.05–0.8)
MONOCYTES NFR BLD AUTO: 7 %
NEUTROPHILS # BLD AUTO: 4.62 X10*3/UL (ref 1.6–5.5)
NEUTROPHILS NFR BLD AUTO: 71.9 %
NRBC BLD-RTO: 0 /100 WBCS (ref 0–0)
PLATELET # BLD AUTO: 147 X10*3/UL (ref 150–450)
RBC # BLD AUTO: 4.01 X10*6/UL (ref 4–5.2)
VIT B12 SERPL-MCNC: 430 PG/ML (ref 211–911)
WBC # BLD AUTO: 6.4 X10*3/UL (ref 4.4–11.3)

## 2024-10-16 PROCEDURE — 85025 COMPLETE CBC W/AUTO DIFF WBC: CPT

## 2024-10-16 PROCEDURE — 85652 RBC SED RATE AUTOMATED: CPT

## 2024-10-16 ASSESSMENT — ENCOUNTER SYMPTOMS: FATIGUE: 1

## 2024-10-16 NOTE — ASSESSMENT & PLAN NOTE
Vague lower abdominal discomfort in this patient who is now 7 months status post nephrectomy.  She also has recurrent bladder tumors and recently was seen for a cystoscopy she is due for a repeat CT scan December but will get it now to rule out any significant abnormalities.  Her clinical exam is unremarkable also check a CBC chemistries and a sed rate today.

## 2024-10-16 NOTE — ASSESSMENT & PLAN NOTE
Generalized fatigue of unclear etiology.  Patient is pretty comfortable at this is not symptomatic of her depression.  She denies any specific complaints.  We will obtain lab work today as well as a vitamin B12 level.  I suspect a lot of this is due to slight increase in creatinine and generalized lack of activity.  Did encourage her to get out and start doing more exercise.  She has been doing almost nothing.

## 2024-10-16 NOTE — PROGRESS NOTES
Subjective   Patient ID: Anahi Wall is a 79 y.o. female who presents for Fatigue (Abdominal discomfort).    Patient is here today with 2 major complaints.  1.  She is complaining of 3 weeks worth of vague lower abdominal discomfort.  Patient describes a lower abdominal ache which is been present for the last 3 weeks.  It seems to come and go on its own.  There is no obvious association with eating.  There is no association with any nausea vomiting diarrhea or constipation.  Patient is now 7 months status post nephrectomy for renal cancer.  She had a CT scan in June which was unremarkable.  She denies any fever chills night sweats or weight loss.  2.  She complains of a 6 to 9-month history of generalized fatigue.  Patient describes generalized fatigue which predates her renal surgery she has difficulty completing normal daily tasks due to fatigue.  She denies any chest pain or shortness of breath she denies any fever chills night sweats or weight loss.  She feels that her depression is under fairly good control and does not think that the symptoms are all tied to depression.  She has had no significant change in medication.  She sleeps very well and has no trouble with snoring.  She has no true daytime somnolence and does enjoy activities such as reading and watching TV.    Fatigue  Associated symptoms include fatigue.        Review of Systems   Constitutional:  Positive for fatigue.       Objective   /74   Pulse 83   SpO2 97%     Physical Exam  Constitutional:       Appearance: Normal appearance. She is normal weight. She is not ill-appearing.   Neck:      Comments: No thyromegaly  Cardiovascular:      Rate and Rhythm: Normal rate and regular rhythm.      Heart sounds: Normal heart sounds.   Pulmonary:      Breath sounds: Normal breath sounds.   Abdominal:      General: Bowel sounds are normal.      Palpations: Abdomen is soft. There is no mass.      Tenderness: There is no abdominal tenderness.       Hernia: No hernia is present.   Musculoskeletal:      Cervical back: No tenderness.   Lymphadenopathy:      Cervical: No cervical adenopathy.   Skin:     General: Skin is warm and dry.   Neurological:      General: No focal deficit present.      Mental Status: Mental status is at baseline.         Assessment/Plan   Problem List Items Addressed This Visit             ICD-10-CM    Malignant neoplasm of kidney (Multi) C64.9     Vague lower abdominal discomfort in this patient who is now 7 months status post nephrectomy.  She also has recurrent bladder tumors and recently was seen for a cystoscopy she is due for a repeat CT scan December but will get it now to rule out any significant abnormalities.  Her clinical exam is unremarkable also check a CBC chemistries and a sed rate today.         Relevant Orders    CBC and Auto Differential    Amylase    Lipase    Chronic fatigue - Primary R53.82     Generalized fatigue of unclear etiology.  Patient is pretty comfortable at this is not symptomatic of her depression.  She denies any specific complaints.  We will obtain lab work today as well as a vitamin B12 level.  I suspect a lot of this is due to slight increase in creatinine and generalized lack of activity.  Did encourage her to get out and start doing more exercise.  She has been doing almost nothing.          Other Visit Diagnoses         Codes    Health care maintenance     Z00.00    Relevant Orders    Flu vaccine, trivalent, preservative free, HIGH-DOSE, age 65y+ (Fluzone)    CBC and Auto Differential    Comprehensive Metabolic Panel    TSH with reflex to Free T4 if abnormal    Vitamin B12    Sedimentation Rate    CT abdomen and pelvis w oral contrast only

## 2024-10-21 ENCOUNTER — HOSPITAL ENCOUNTER (OUTPATIENT)
Dept: RADIOLOGY | Facility: HOSPITAL | Age: 79
Discharge: HOME | End: 2024-10-21
Payer: MEDICARE

## 2024-10-21 DIAGNOSIS — Z00.00 HEALTH CARE MAINTENANCE: ICD-10-CM

## 2024-10-21 PROCEDURE — 74176 CT ABD & PELVIS W/O CONTRAST: CPT

## 2024-10-21 PROCEDURE — 74176 CT ABD & PELVIS W/O CONTRAST: CPT | Performed by: RADIOLOGY

## 2024-10-21 PROCEDURE — 2550000001 HC RX 255 CONTRASTS: Performed by: INTERNAL MEDICINE

## 2024-10-21 PROCEDURE — A9698 NON-RAD CONTRAST MATERIALNOC: HCPCS | Performed by: INTERNAL MEDICINE

## 2024-10-22 ENCOUNTER — TELEMEDICINE (OUTPATIENT)
Dept: UROLOGY | Facility: CLINIC | Age: 79
End: 2024-10-22
Payer: MEDICARE

## 2024-10-22 ENCOUNTER — TELEPHONE (OUTPATIENT)
Dept: HEMATOLOGY/ONCOLOGY | Facility: CLINIC | Age: 79
End: 2024-10-22

## 2024-10-22 DIAGNOSIS — N20.0 URIC ACID NEPHROLITHIASIS: Primary | ICD-10-CM

## 2024-10-22 PROCEDURE — G2211 COMPLEX E/M VISIT ADD ON: HCPCS | Performed by: UROLOGY

## 2024-10-22 PROCEDURE — 99214 OFFICE O/P EST MOD 30 MIN: CPT | Performed by: UROLOGY

## 2024-10-22 PROCEDURE — 1157F ADVNC CARE PLAN IN RCRD: CPT | Performed by: UROLOGY

## 2024-10-22 RX ORDER — TAMSULOSIN HYDROCHLORIDE 0.4 MG/1
0.4 CAPSULE ORAL DAILY
Qty: 30 CAPSULE | Refills: 0 | Status: SHIPPED | OUTPATIENT
Start: 2024-10-22 | End: 2024-11-21

## 2024-10-22 RX ORDER — POTASSIUM CITRATE 10 MEQ/1
10 TABLET, EXTENDED RELEASE ORAL
Qty: 60 TABLET | Refills: 1 | Status: SHIPPED | OUTPATIENT
Start: 2024-10-22 | End: 2024-12-21

## 2024-10-22 NOTE — PROGRESS NOTES
Subjective     This visit was completed via telemedicine. All issues as below were discussed and addressed but no physical exam was performed unless allowed by visual confirmation. If it was felt that the patient should be evaluated in clinic, then they were directed there. Patient verbally consented to visit.      Anahi Wall is a 79 y.o. female presenting as a new patient today, kindly referred by Dr. Conrad due to nephrolithiasis. Patient has history of BCG unresponsive high-grade urothelial carcinoma s/p TURBT, nephroureterectomy and pelvic RPLND. CT A&P from 10/21/2024 showed distal left ureter stone within approximately 3 cm of the left UVJ. The stone is low in attenuation, does not exceed 300 HU. Mild upstream hydroureteronephrosis. Patient reports slight left sided discomfort ongoing for a few days. Denies any recent gross hematuria, fevers, chills, urinary retention, nausea or vomiting.          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    Breast cancer (Multi)     left sided lumpectomy, s/p xrt also completed 1996    Carpal tunnel syndrome     s/p Left wrist surgery    Cataract     removed    Cramp and spasm     Cramp of limb    Depression     controlled with medication    GERD (gastroesophageal reflux disease)     Managed with Prilosec    Hearing aid worn     HL (hearing loss)     wears B/L hearing aids    Hyperlipidemia     F/W PCP    Joint pain     Nephrolithiasis     Osteoporosis     Other conditions influencing health status     Acute Meniscal Tear Of Right Knee    Trochanteric bursitis, unspecified hip     Urothelial cancer (Multi)     Vision loss     wears glasses     Past Surgical History:   Procedure Laterality Date    BLADDER SURGERY  09/03/2019    bladder bx    BREAST LUMPECTOMY Left     Breast Surgery Lumpectomy x 2 1996    CARPAL TUNNEL RELEASE Left 2003     Neuroplasty Decompression Median Nerve At Carpal Tunnel    CATARACT EXTRACTION      CATARACT EXTRACTION EXTRACAPSULAR W/ INTRAOCULAR LENS IMPLANTATION Bilateral 2017    COLONOSCOPY      CYSTOSCOPY      multiple    HYSTERECTOMY  02/10/2014    Hysterectomy    NEPHRECTOMY Right 03/28/2024    TURBT, right nephroureterectomy, RPLND    OTHER SURGICAL HISTORY  2007    bladder sling    OTHER SURGICAL HISTORY Left     post cataract laser surgery 2021    OTHER SURGICAL HISTORY Left     left shoulder surgery to remove bone spur     Family History   Problem Relation Name Age of Onset    Diabetes Mother      Multiple sclerosis Mother      Hypertension Father      Stroke Father      Heart disease Father      Bipolar disorder Brother      Cardiomyopathy Brother       Current Outpatient Medications   Medication Sig Dispense Refill    alendronate (Fosamax) 70 mg tablet Take 1 tablet (70 mg) by mouth every 7 days. Takes on Sunday 12 tablet 3    ALPRAZolam (Xanax) 0.25 mg tablet Take 1 tablet (0.25 mg) by mouth 3 times a day as needed for anxiety. 10 tablet 0    cholecalciferol (Vitamin D-3) 50 MCG (2000 UT) tablet Take 1 tablet (2,000 Units) by mouth once daily.      fluticasone (Flonase) 50 mcg/actuation nasal spray Administer 2 sprays into affected nostril(s) once daily.      minoxidil (Loniten) 2.5 mg tablet Take 0.5 tablets (1.25 mg) by mouth once daily.      mirtazapine (Remeron) 30 mg tablet Take 1 tablet (30 mg) by mouth once daily at bedtime. 90 tablet 3    nitrofurantoin, macrocrystal-monohydrate, (Macrobid) 100 mg capsule One dose today when she gets home from procedure and again in morning 2 capsule 0    omeprazole (PriLOSEC) 20 mg DR capsule Take 1 capsule (20 mg) by mouth once daily. 90 capsule 3    oxybutynin XL (Ditropan-XL) 15 mg 24 hr tablet Take 1 tablet (15 mg) by mouth once daily. 90 tablet 3    rosuvastatin (Crestor) 20 mg tablet Take 1 tablet (20 mg) by mouth once daily. 90 tablet 3    sertraline (Zoloft) 100 mg  tablet Take 2 tablets (200 mg) by mouth once daily. 180 tablet 3     No current facility-administered medications for this visit.     Allergies   Allergen Reactions    Aspirin Nausea Only    Nsaids (Non-Steroidal Anti-Inflammatory Drug) Nausea Only    Penicillins Rash     Social History     Socioeconomic History    Marital status:      Spouse name: Not on file    Number of children: Not on file    Years of education: Not on file    Highest education level: Not on file   Occupational History    Not on file   Tobacco Use    Smoking status: Former     Current packs/day: 0.00     Average packs/day: 0.5 packs/day for 2.0 years (1.0 ttl pk-yrs)     Types: Cigarettes     Start date:      Quit date:      Years since quittin.8    Smokeless tobacco: Never   Vaping Use    Vaping status: Never Used   Substance and Sexual Activity    Alcohol use: Yes     Alcohol/week: 1.0 standard drink of alcohol     Types: 1 Glasses of wine per week     Comment: occasionally    Drug use: Never    Sexual activity: Defer   Other Topics Concern    Not on file   Social History Narrative    Not on file     Social Drivers of Health     Financial Resource Strain: Low Risk  (3/29/2024)    Overall Financial Resource Strain (CARDIA)     Difficulty of Paying Living Expenses: Not hard at all   Food Insecurity: Not on file   Transportation Needs: No Transportation Needs (3/31/2024)    OASIS : Transportation     Lack of Transportation (Medical): No     Lack of Transportation (Non-Medical): No     Patient Unable or Declines to Respond: No   Physical Activity: Not on file   Stress: Not on file   Social Connections: Feeling Socially Integrated (3/31/2024)    OASIS : Social Isolation     Frequency of experiencing loneliness or isolation: Never   Intimate Partner Violence: Not on file   Housing Stability: Low Risk  (3/29/2024)    Housing Stability Vital Sign     Unable to Pay for Housing in the Last Year: No     Number of Places  Lived in the Last Year: 1     Unstable Housing in the Last Year: No       Review of Systems  Pertinent items are noted in HPI.    Objective       Lab Review  Lab Results   Component Value Date    WBC 6.4 10/16/2024    RBC 4.01 10/16/2024    HGB 11.6 (L) 10/16/2024    HCT 37.0 10/16/2024     (L) 10/16/2024      Lab Results   Component Value Date    BUN 34 (H) 06/10/2024    CREATININE 1.46 (H) 06/10/2024              Assessment/Plan   There are no diagnoses linked to this encounter.    Nephrolithiasis   Distal left ureter stone - likely uric acid    I reviewed CT A&P from 10/21/2024 which showed distal left ureter stone within approximately 3 cm of the left UVJ. The stone is low in attenuation, does not exceed 300 HU.  Mild upstream hydroureteronephrosis.     Patient's urine pH is 5.0.     This is likely uric acid stone and hopefully can be dissolved to avoid surgical intervention.    We will start patient on K citrate 10 mEq BID to alkalinize urine and Tamsulosin for MET.     We will check urine pH levels in two days and check potassium levels.    The role of medical expulsive therapy (MET) with alpha-blockers was discussed with the patient. The risks and benefits of MET were discussed extensively including the risk of renal loss or damage secondary to recurrent, persistent or silent hydronephrosis. Based on this patient's stone size and location I believe the patient is a good candidate for this therapy. The need for close monitoring and follow-up has been stressed to avoid infectious and functional complications.    Patient was advised to go to the ER if she develops fevers, chills, nausea, or flank pain that is not controlled with oral pain medication.     All questions were answered to the patient's satisfaction. Patient agrees with the plan and wishes to proceed. Follow-up will be scheduled appropriately.     E&M visit today is associated with current or anticipated ongoing medical care services related  to a patient's single, serious condition or a complex condition.    Scribed for Dr. Covarrubias by Sarah Abel. I , Dr Covarrubias, have personally reviewed and agreed with the information entered by the Virtual Scribe.

## 2024-10-22 NOTE — TELEPHONE ENCOUNTER
Updated primary care doctor ordered CT scan because of increasing abdominal pain concern for kidney stones follow-up with primary care physician and did discuss in detail with the patient's  who is working with a urologist to help with the kidney stone issues no other signs concerning for cancer discussed the pros and cons of getting a repeat scan that is scheduled in December he will talk with his wife moving forward

## 2024-10-23 ENCOUNTER — HOSPITAL ENCOUNTER (EMERGENCY)
Facility: HOSPITAL | Age: 79
Discharge: HOME | End: 2024-10-23
Attending: EMERGENCY MEDICINE
Payer: MEDICARE

## 2024-10-23 ENCOUNTER — APPOINTMENT (OUTPATIENT)
Dept: CARDIOLOGY | Facility: HOSPITAL | Age: 79
End: 2024-10-23
Payer: MEDICARE

## 2024-10-23 ENCOUNTER — APPOINTMENT (OUTPATIENT)
Dept: RADIOLOGY | Facility: HOSPITAL | Age: 79
End: 2024-10-23
Payer: MEDICARE

## 2024-10-23 VITALS
HEART RATE: 79 BPM | RESPIRATION RATE: 18 BRPM | OXYGEN SATURATION: 97 % | DIASTOLIC BLOOD PRESSURE: 71 MMHG | SYSTOLIC BLOOD PRESSURE: 121 MMHG

## 2024-10-23 DIAGNOSIS — R07.9 CHEST PAIN, UNSPECIFIED TYPE: Primary | ICD-10-CM

## 2024-10-23 PROCEDURE — 93005 ELECTROCARDIOGRAM TRACING: CPT

## 2024-10-23 PROCEDURE — 99281 EMR DPT VST MAYX REQ PHY/QHP: CPT

## 2024-10-23 ASSESSMENT — PAIN SCALES - GENERAL: PAINLEVEL_OUTOF10: 0 - NO PAIN

## 2024-10-23 ASSESSMENT — PAIN - FUNCTIONAL ASSESSMENT: PAIN_FUNCTIONAL_ASSESSMENT: 0-10

## 2024-10-23 NOTE — ED TRIAGE NOTES
Pt arrived from home via EMS for CP. Pt had eaten a PBJ and a tangerine and felt CP. Pt stated to EMS that she felt better already en route to ER. Pt has hx of GERD. VSS.

## 2024-10-27 LAB
ATRIAL RATE: 79 BPM
P AXIS: 44 DEGREES
P OFFSET: 185 MS
P ONSET: 126 MS
PR INTERVAL: 170 MS
Q ONSET: 211 MS
QRS COUNT: 13 BEATS
QRS DURATION: 94 MS
QT INTERVAL: 412 MS
QTC CALCULATION(BAZETT): 472 MS
QTC FREDERICIA: 451 MS
R AXIS: -48 DEGREES
T AXIS: 33 DEGREES
T OFFSET: 417 MS
VENTRICULAR RATE: 79 BPM

## 2024-10-28 ENCOUNTER — LAB (OUTPATIENT)
Dept: LAB | Facility: LAB | Age: 79
End: 2024-10-28
Payer: MEDICARE

## 2024-10-28 DIAGNOSIS — C67.9 MALIGNANT NEOPLASM OF URINARY BLADDER, UNSPECIFIED SITE (MULTI): ICD-10-CM

## 2024-10-28 DIAGNOSIS — R31.21 ASYMPTOMATIC MICROSCOPIC HEMATURIA: ICD-10-CM

## 2024-10-28 DIAGNOSIS — R34 ANURIA AND OLIGURIA: ICD-10-CM

## 2024-10-28 DIAGNOSIS — N20.0 URIC ACID NEPHROLITHIASIS: ICD-10-CM

## 2024-10-28 LAB
ANION GAP SERPL CALC-SCNC: 14 MMOL/L (ref 10–20)
APPEARANCE UR: ABNORMAL
APTT PPP: 29 SECONDS (ref 27–38)
BILIRUB UR STRIP.AUTO-MCNC: NEGATIVE MG/DL
BUN SERPL-MCNC: 45 MG/DL (ref 6–23)
CALCIUM SERPL-MCNC: 9.6 MG/DL (ref 8.6–10.6)
CHLORIDE SERPL-SCNC: 104 MMOL/L (ref 98–107)
CO2 SERPL-SCNC: 28 MMOL/L (ref 21–32)
COLOR UR: ABNORMAL
CREAT SERPL-MCNC: 2.02 MG/DL (ref 0.5–1.05)
EGFRCR SERPLBLD CKD-EPI 2021: 25 ML/MIN/1.73M*2
ERYTHROCYTE [DISTWIDTH] IN BLOOD BY AUTOMATED COUNT: 13.9 % (ref 11.5–14.5)
GLUCOSE SERPL-MCNC: 111 MG/DL (ref 74–99)
GLUCOSE UR STRIP.AUTO-MCNC: NORMAL MG/DL
HCT VFR BLD AUTO: 34.2 % (ref 36–46)
HGB BLD-MCNC: 10.9 G/DL (ref 12–16)
HOLD SPECIMEN: NORMAL
INR PPP: 1.1 (ref 0.9–1.1)
KETONES UR STRIP.AUTO-MCNC: NEGATIVE MG/DL
LEUKOCYTE ESTERASE UR QL STRIP.AUTO: ABNORMAL
MCH RBC QN AUTO: 29.1 PG (ref 26–34)
MCHC RBC AUTO-ENTMCNC: 31.9 G/DL (ref 32–36)
MCV RBC AUTO: 91 FL (ref 80–100)
NITRITE UR QL STRIP.AUTO: NEGATIVE
NRBC BLD-RTO: 0 /100 WBCS (ref 0–0)
PH UR STRIP.AUTO: 5 [PH]
PLATELET # BLD AUTO: 135 X10*3/UL (ref 150–450)
POTASSIUM SERPL-SCNC: 5.1 MMOL/L (ref 3.5–5.3)
PROT UR STRIP.AUTO-MCNC: ABNORMAL MG/DL
PROTHROMBIN TIME: 12.4 SECONDS (ref 9.8–12.8)
RBC # BLD AUTO: 3.75 X10*6/UL (ref 4–5.2)
RBC # UR STRIP.AUTO: ABNORMAL /UL
RBC #/AREA URNS AUTO: NORMAL /HPF
SODIUM SERPL-SCNC: 141 MMOL/L (ref 136–145)
SP GR UR STRIP.AUTO: 1.01
UROBILINOGEN UR STRIP.AUTO-MCNC: NORMAL MG/DL
WBC # BLD AUTO: 6.9 X10*3/UL (ref 4.4–11.3)
WBC #/AREA URNS AUTO: NORMAL /HPF

## 2024-10-28 PROCEDURE — 87086 URINE CULTURE/COLONY COUNT: CPT

## 2024-10-28 PROCEDURE — 85610 PROTHROMBIN TIME: CPT

## 2024-10-28 PROCEDURE — 80048 BASIC METABOLIC PNL TOTAL CA: CPT

## 2024-10-28 PROCEDURE — 81001 URINALYSIS AUTO W/SCOPE: CPT

## 2024-10-28 PROCEDURE — 36415 COLL VENOUS BLD VENIPUNCTURE: CPT

## 2024-10-28 PROCEDURE — 85027 COMPLETE CBC AUTOMATED: CPT

## 2024-10-28 PROCEDURE — 85730 THROMBOPLASTIN TIME PARTIAL: CPT

## 2024-10-29 ENCOUNTER — APPOINTMENT (OUTPATIENT)
Dept: RADIOLOGY | Facility: HOSPITAL | Age: 79
End: 2024-10-29
Payer: MEDICARE

## 2024-10-29 ENCOUNTER — APPOINTMENT (OUTPATIENT)
Dept: UROLOGY | Facility: CLINIC | Age: 79
End: 2024-10-29
Payer: MEDICARE

## 2024-10-29 ENCOUNTER — APPOINTMENT (OUTPATIENT)
Dept: UROLOGY | Facility: HOSPITAL | Age: 79
End: 2024-10-29
Payer: MEDICARE

## 2024-10-29 ENCOUNTER — HOSPITAL ENCOUNTER (EMERGENCY)
Facility: HOSPITAL | Age: 79
Discharge: HOME | End: 2024-10-29
Attending: EMERGENCY MEDICINE
Payer: MEDICARE

## 2024-10-29 VITALS
HEART RATE: 95 BPM | WEIGHT: 155 LBS | HEIGHT: 62 IN | RESPIRATION RATE: 18 BRPM | TEMPERATURE: 97 F | BODY MASS INDEX: 28.52 KG/M2 | DIASTOLIC BLOOD PRESSURE: 70 MMHG | OXYGEN SATURATION: 95 % | SYSTOLIC BLOOD PRESSURE: 129 MMHG

## 2024-10-29 DIAGNOSIS — N28.9 WORSENING RENAL FUNCTION: ICD-10-CM

## 2024-10-29 DIAGNOSIS — N20.0 URIC ACID NEPHROLITHIASIS: Primary | ICD-10-CM

## 2024-10-29 DIAGNOSIS — N13.30 HYDRONEPHROSIS, UNSPECIFIED HYDRONEPHROSIS TYPE: Primary | ICD-10-CM

## 2024-10-29 LAB
ABO GROUP (TYPE) IN BLOOD: NORMAL
ALBUMIN SERPL BCP-MCNC: 4 G/DL (ref 3.4–5)
ALP SERPL-CCNC: 53 U/L (ref 33–136)
ALT SERPL W P-5'-P-CCNC: 13 U/L (ref 7–45)
ANION GAP SERPL CALC-SCNC: 11 MMOL/L (ref 10–20)
ANTIBODY SCREEN: NORMAL
APPEARANCE UR: CLEAR
AST SERPL W P-5'-P-CCNC: 18 U/L (ref 9–39)
BACTERIA UR CULT: NO GROWTH
BASOPHILS # BLD AUTO: 0 X10*3/UL (ref 0–0.1)
BASOPHILS NFR BLD AUTO: 0 %
BILIRUB SERPL-MCNC: 0.3 MG/DL (ref 0–1.2)
BILIRUB UR STRIP.AUTO-MCNC: NEGATIVE MG/DL
BUN SERPL-MCNC: 46 MG/DL (ref 6–23)
CALCIUM SERPL-MCNC: 9.5 MG/DL (ref 8.6–10.3)
CHLORIDE SERPL-SCNC: 109 MMOL/L (ref 98–107)
CO2 SERPL-SCNC: 27 MMOL/L (ref 21–32)
COLOR UR: COLORLESS
CREAT SERPL-MCNC: 1.69 MG/DL (ref 0.5–1.05)
CREAT SERPL-MCNC: 1.69 MG/DL (ref 0.5–1.05)
EGFRCR SERPLBLD CKD-EPI 2021: 30 ML/MIN/1.73M*2
EGFRCR SERPLBLD CKD-EPI 2021: 30 ML/MIN/1.73M*2
EOSINOPHIL # BLD AUTO: 0.03 X10*3/UL (ref 0–0.4)
EOSINOPHIL NFR BLD AUTO: 0.5 %
ERYTHROCYTE [DISTWIDTH] IN BLOOD BY AUTOMATED COUNT: 13.8 % (ref 11.5–14.5)
GLUCOSE SERPL-MCNC: 103 MG/DL (ref 74–99)
GLUCOSE UR STRIP.AUTO-MCNC: NORMAL MG/DL
HCT VFR BLD AUTO: 31.3 % (ref 36–46)
HGB BLD-MCNC: 10.2 G/DL (ref 12–16)
HOLD SPECIMEN: NORMAL
IMM GRANULOCYTES # BLD AUTO: 0.02 X10*3/UL (ref 0–0.5)
IMM GRANULOCYTES NFR BLD AUTO: 0.4 % (ref 0–0.9)
INR PPP: 1.1 (ref 0.9–1.1)
KETONES UR STRIP.AUTO-MCNC: NEGATIVE MG/DL
LEUKOCYTE ESTERASE UR QL STRIP.AUTO: NEGATIVE
LYMPHOCYTES # BLD AUTO: 0.57 X10*3/UL (ref 0.8–3)
LYMPHOCYTES NFR BLD AUTO: 10.3 %
MCH RBC QN AUTO: 29.7 PG (ref 26–34)
MCHC RBC AUTO-ENTMCNC: 32.6 G/DL (ref 32–36)
MCV RBC AUTO: 91 FL (ref 80–100)
MONOCYTES # BLD AUTO: 0.41 X10*3/UL (ref 0.05–0.8)
MONOCYTES NFR BLD AUTO: 7.4 %
MUCOUS THREADS #/AREA URNS AUTO: NORMAL /LPF
NEUTROPHILS # BLD AUTO: 4.48 X10*3/UL (ref 1.6–5.5)
NEUTROPHILS NFR BLD AUTO: 81.4 %
NITRITE UR QL STRIP.AUTO: NEGATIVE
NRBC BLD-RTO: 0 /100 WBCS (ref 0–0)
PH UR STRIP.AUTO: 5.5 [PH]
PLATELET # BLD AUTO: 112 X10*3/UL (ref 150–450)
POTASSIUM SERPL-SCNC: 4.6 MMOL/L (ref 3.5–5.3)
PROT SERPL-MCNC: 6.7 G/DL (ref 6.4–8.2)
PROT UR STRIP.AUTO-MCNC: NEGATIVE MG/DL
PROTHROMBIN TIME: 12.3 SECONDS (ref 9.8–12.8)
RBC # BLD AUTO: 3.43 X10*6/UL (ref 4–5.2)
RBC # UR STRIP.AUTO: ABNORMAL /UL
RBC #/AREA URNS AUTO: NORMAL /HPF
RH FACTOR (ANTIGEN D): NORMAL
SODIUM SERPL-SCNC: 142 MMOL/L (ref 136–145)
SP GR UR STRIP.AUTO: 1.01
UROBILINOGEN UR STRIP.AUTO-MCNC: NORMAL MG/DL
WBC # BLD AUTO: 5.5 X10*3/UL (ref 4.4–11.3)
WBC #/AREA URNS AUTO: NORMAL /HPF

## 2024-10-29 PROCEDURE — 86901 BLOOD TYPING SEROLOGIC RH(D): CPT

## 2024-10-29 PROCEDURE — 82565 ASSAY OF CREATININE: CPT | Mod: 59

## 2024-10-29 PROCEDURE — 85610 PROTHROMBIN TIME: CPT

## 2024-10-29 PROCEDURE — 74176 CT ABD & PELVIS W/O CONTRAST: CPT | Performed by: RADIOLOGY

## 2024-10-29 PROCEDURE — 82565 ASSAY OF CREATININE: CPT

## 2024-10-29 PROCEDURE — 81001 URINALYSIS AUTO W/SCOPE: CPT

## 2024-10-29 PROCEDURE — 1157F ADVNC CARE PLAN IN RCRD: CPT | Performed by: UROLOGY

## 2024-10-29 PROCEDURE — 2500000004 HC RX 250 GENERAL PHARMACY W/ HCPCS (ALT 636 FOR OP/ED)

## 2024-10-29 PROCEDURE — 99443 PR PHYS/QHP TELEPHONE EVALUATION 21-30 MIN: CPT | Performed by: UROLOGY

## 2024-10-29 PROCEDURE — 36415 COLL VENOUS BLD VENIPUNCTURE: CPT

## 2024-10-29 PROCEDURE — 74176 CT ABD & PELVIS W/O CONTRAST: CPT

## 2024-10-29 PROCEDURE — 85025 COMPLETE CBC W/AUTO DIFF WBC: CPT

## 2024-10-29 PROCEDURE — 99284 EMERGENCY DEPT VISIT MOD MDM: CPT | Mod: 25

## 2024-10-29 PROCEDURE — 96361 HYDRATE IV INFUSION ADD-ON: CPT

## 2024-10-29 PROCEDURE — 96360 HYDRATION IV INFUSION INIT: CPT

## 2024-10-29 ASSESSMENT — PAIN SCALES - GENERAL
PAINLEVEL_OUTOF10: 0 - NO PAIN

## 2024-10-29 ASSESSMENT — COLUMBIA-SUICIDE SEVERITY RATING SCALE - C-SSRS
2. HAVE YOU ACTUALLY HAD ANY THOUGHTS OF KILLING YOURSELF?: NO
1. IN THE PAST MONTH, HAVE YOU WISHED YOU WERE DEAD OR WISHED YOU COULD GO TO SLEEP AND NOT WAKE UP?: NO
6. HAVE YOU EVER DONE ANYTHING, STARTED TO DO ANYTHING, OR PREPARED TO DO ANYTHING TO END YOUR LIFE?: NO

## 2024-10-29 ASSESSMENT — PAIN - FUNCTIONAL ASSESSMENT: PAIN_FUNCTIONAL_ASSESSMENT: 0-10

## 2024-10-30 ENCOUNTER — TELEPHONE (OUTPATIENT)
Dept: PRIMARY CARE | Facility: CLINIC | Age: 79
End: 2024-10-30
Payer: MEDICARE

## 2024-10-30 DIAGNOSIS — D64.9 ANEMIA, UNSPECIFIED TYPE: Primary | ICD-10-CM

## 2024-10-30 DIAGNOSIS — N13.30 HYDRONEPHROSIS OF LEFT KIDNEY: ICD-10-CM

## 2024-11-01 NOTE — ED PROVIDER NOTES
HPI   Chief Complaint   Patient presents with    Heartburn       Chief complaint: Chest pain    History of present illness: Patient is an 79-year-old female presenting to the emergency department with complaints of chest pain.  The patient has a history of hypertension hyperlipidemia.  The patient states that prior to arrival in the emergency department she was eating a PB&J sandwich and ate a tangerine.  At that time, the patient began to experience chest pain.  EMS was called.  En route to the hospital, the patient began to experience improvement of her chest pain and by the time the patient arrived in the emergency department, the patient's chest pain had resolved.  The patient has no other complaints at this time she denies any shortness of breath diaphoresis when this occurred.      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    Breast cancer (Multi)     left sided lumpectomy, s/p xrt also completed 1996    Carpal tunnel syndrome     s/p Left wrist surgery    Cataract     removed    Cramp and spasm     Cramp of limb    Depression     controlled with medication    GERD (gastroesophageal reflux disease)     Managed with Prilosec    Hearing aid worn     HL (hearing loss)     wears B/L hearing aids    Hyperlipidemia     F/W PCP    Joint pain     Nephrolithiasis     Osteoporosis     Other conditions influencing health status     Acute Meniscal Tear Of Right Knee    Trochanteric bursitis, unspecified hip     Urothelial cancer (Multi)     Vision loss     wears glasses     Past Surgical History:   Procedure Laterality Date    BLADDER SURGERY  09/03/2019    bladder bx    BREAST LUMPECTOMY Left     Breast Surgery Lumpectomy x 2 1996    CARPAL TUNNEL RELEASE Left 2003    Neuroplasty Decompression Median Nerve At  Carpal Tunnel    CATARACT EXTRACTION      CATARACT EXTRACTION EXTRACAPSULAR W/ INTRAOCULAR LENS IMPLANTATION Bilateral 2017    COLONOSCOPY      CYSTOSCOPY      multiple    HYSTERECTOMY  02/10/2014    Hysterectomy    NEPHRECTOMY Right 2024    TURBT, right nephroureterectomy, RPLND    OTHER SURGICAL HISTORY      bladder sling    OTHER SURGICAL HISTORY Left     post cataract laser surgery     OTHER SURGICAL HISTORY Left     left shoulder surgery to remove bone spur     Family History   Problem Relation Name Age of Onset    Diabetes Mother      Multiple sclerosis Mother      Hypertension Father      Stroke Father      Heart disease Father      Bipolar disorder Brother      Cardiomyopathy Brother       Social History     Tobacco Use    Smoking status: Former     Current packs/day: 0.00     Average packs/day: 0.5 packs/day for 2.0 years (1.0 ttl pk-yrs)     Types: Cigarettes     Start date:      Quit date:      Years since quittin.8    Smokeless tobacco: Never   Vaping Use    Vaping status: Never Used   Substance Use Topics    Alcohol use: Yes     Alcohol/week: 1.0 standard drink of alcohol     Types: 1 Glasses of wine per week     Comment: occasionally    Drug use: Never       Physical Exam   ED Triage Vitals [10/23/24 1630]   Temp Heart Rate Respirations BP   -- 79 18 121/71      Pulse Ox Temp src Heart Rate Source Patient Position   97 % -- Monitor Lying      BP Location FiO2 (%)     Right arm --       Physical Exam  Constitutional:       Appearance: Normal appearance.   HENT:      Head: Normocephalic and atraumatic.      Right Ear: External ear normal.      Left Ear: External ear normal.      Nose: Nose normal.      Mouth/Throat:      Mouth: Mucous membranes are moist.   Eyes:      General: Lids are normal.      Extraocular Movements: Extraocular movements intact.      Pupils: Pupils are equal, round, and reactive to light.   Cardiovascular:      Rate and Rhythm: Normal rate and regular  rhythm.      Heart sounds: Normal heart sounds.   Pulmonary:      Effort: Pulmonary effort is normal.      Breath sounds: Normal breath sounds.   Abdominal:      General: Abdomen is flat.      Palpations: Abdomen is soft.      Tenderness: There is no abdominal tenderness. There is no guarding or rebound.   Musculoskeletal:         General: No deformity. Normal range of motion.      Cervical back: Normal range of motion and neck supple.   Skin:     General: Skin is warm.      Capillary Refill: Capillary refill takes less than 2 seconds.      Coloration: Skin is not jaundiced.   Neurological:      General: No focal deficit present.      Mental Status: She is alert and oriented to person, place, and time.   Psychiatric:         Mood and Affect: Mood normal.         Behavior: Behavior normal.           ED Course & MDM   Diagnoses as of 11/01/24 1842   Chest pain, unspecified type                 No data recorded                                 Medical Decision Making  Medical decision making: On arrival to the emergency department, an EKG was performed which demonstrated a normal sinus rhythm with a rate of 79 bpm isoelectric ST segments narrow QRS complexes and a QTc of 472.    Patient presents to the emergency department with complaints of chest pain.  On arrival to the emergency department, the patient was seen by her primary care physician Dr. Burton after Dr. Burton spoke with the patient, he and the patient both agree that she would not benefit from any workup here in the emergency department and are comfortable with the patient being discharged home with instructions to return if she has any worsening symptoms have no objections to this course of action.  The patient was then discharged home in otherwise stable condition.    Amount and/or Complexity of Data Reviewed  ECG/medicine tests: ordered and independent interpretation performed. Decision-making details documented in ED  Course.        Procedure  Procedures     Nik Cabrera MD  11/01/24 5651

## 2024-11-04 ENCOUNTER — LAB (OUTPATIENT)
Dept: LAB | Facility: LAB | Age: 79
End: 2024-11-04
Payer: MEDICARE

## 2024-11-04 ENCOUNTER — HOSPITAL ENCOUNTER (OUTPATIENT)
Dept: RADIOLOGY | Facility: CLINIC | Age: 79
Discharge: HOME | End: 2024-11-04
Payer: MEDICARE

## 2024-11-04 DIAGNOSIS — N13.30 HYDRONEPHROSIS OF LEFT KIDNEY: ICD-10-CM

## 2024-11-04 DIAGNOSIS — D64.9 ANEMIA, UNSPECIFIED TYPE: ICD-10-CM

## 2024-11-04 LAB
ANION GAP SERPL CALC-SCNC: 11 MMOL/L (ref 10–20)
BUN SERPL-MCNC: 38 MG/DL (ref 6–23)
CALCIUM SERPL-MCNC: 9.2 MG/DL (ref 8.6–10.6)
CHLORIDE SERPL-SCNC: 104 MMOL/L (ref 98–107)
CO2 SERPL-SCNC: 32 MMOL/L (ref 21–32)
CREAT SERPL-MCNC: 1.49 MG/DL (ref 0.5–1.05)
EGFRCR SERPLBLD CKD-EPI 2021: 35 ML/MIN/1.73M*2
FOLATE SERPL-MCNC: >24 NG/ML
GLUCOSE SERPL-MCNC: 78 MG/DL (ref 74–99)
IRON SATN MFR SERPL: 15 % (ref 25–45)
IRON SERPL-MCNC: 45 UG/DL (ref 35–150)
POTASSIUM SERPL-SCNC: 4.7 MMOL/L (ref 3.5–5.3)
SODIUM SERPL-SCNC: 142 MMOL/L (ref 136–145)
TIBC SERPL-MCNC: 304 UG/DL (ref 240–445)
UIBC SERPL-MCNC: 259 UG/DL (ref 110–370)

## 2024-11-04 PROCEDURE — 83550 IRON BINDING TEST: CPT

## 2024-11-04 PROCEDURE — 76770 US EXAM ABDO BACK WALL COMP: CPT | Performed by: RADIOLOGY

## 2024-11-04 PROCEDURE — 83540 ASSAY OF IRON: CPT

## 2024-11-04 PROCEDURE — 82746 ASSAY OF FOLIC ACID SERUM: CPT

## 2024-11-04 PROCEDURE — 36415 COLL VENOUS BLD VENIPUNCTURE: CPT

## 2024-11-04 PROCEDURE — 80048 BASIC METABOLIC PNL TOTAL CA: CPT

## 2024-11-04 PROCEDURE — 76770 US EXAM ABDO BACK WALL COMP: CPT

## 2024-11-06 ENCOUNTER — APPOINTMENT (OUTPATIENT)
Dept: UROLOGY | Facility: CLINIC | Age: 79
End: 2024-11-06
Payer: MEDICARE

## 2024-11-06 DIAGNOSIS — N20.0 URIC ACID NEPHROLITHIASIS: Primary | ICD-10-CM

## 2024-11-06 PROCEDURE — G2211 COMPLEX E/M VISIT ADD ON: HCPCS | Performed by: UROLOGY

## 2024-11-06 PROCEDURE — 1157F ADVNC CARE PLAN IN RCRD: CPT | Performed by: UROLOGY

## 2024-11-06 PROCEDURE — 99213 OFFICE O/P EST LOW 20 MIN: CPT | Performed by: UROLOGY

## 2024-11-06 NOTE — PROGRESS NOTES
Subjective     This visit was completed via telemedicine. All issues as below were discussed and addressed but no physical exam was performed unless allowed by visual confirmation. If it was felt that the patient should be evaluated in clinic, then they were directed there. Patient verbally consented to visit.      Anahi Wall is a 79 y.o. female with history of nephrolithiasis, high-grade urothelial carcinoma s/p TURBT, nephroureterectomy and pelvic RPLND. CT A&P from 10/21/2024 showed a distal left ureter stone that is likely uric acid. She was started on K citrate 10 mEq BID to alkalinize urine and Tamsulosin for MET. During her last visit, patient's labs showed worsening renal function. She was advised to go to the ER for further evaluation and workup.     Patient was seen at [facility] ER and workup showed improvement in renal function. Repeat CT showed passage or dissolution of ureteral stone.    She was subsequently discharged. She presents today to review renal US and renal function.       LAST VISIT (10/29/2024):  Anahi Wall is a 79 y.o. female with history of nephrolithiasis, high-grade urothelial carcinoma s/p TURBT, nephroureterectomy and pelvic RPLND. CT A&P from 10/21/2024 showed a distal left ureter stone that is likely uric acid. She was started on K citrate 10 mEq BID to alkalinize urine and Tamsulosin for MET. She presents today for a follow up visit. Patient feels like she passed the stone, however, she did not visualize stone passage. Denies any recent gross hematuria, fevers, chills, urinary retention, intractable flank or abdominal pain, nausea or vomiting.        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    Breast cancer (Multi)     left sided lumpectomy, s/p xrt also completed 1996    Carpal tunnel syndrome     s/p Left wrist surgery    Cataract     removed    Cramp  and spasm     Cramp of limb    Depression     controlled with medication    GERD (gastroesophageal reflux disease)     Managed with Prilosec    Hearing aid worn     HL (hearing loss)     wears B/L hearing aids    Hyperlipidemia     F/W PCP    Joint pain     Nephrolithiasis     Osteoporosis     Other conditions influencing health status     Acute Meniscal Tear Of Right Knee    Trochanteric bursitis, unspecified hip     Urothelial cancer (Multi)     Vision loss     wears glasses     Past Surgical History:   Procedure Laterality Date    BLADDER SURGERY  09/03/2019    bladder bx    BREAST LUMPECTOMY Left     Breast Surgery Lumpectomy x 2 1996    CARPAL TUNNEL RELEASE Left 2003    Neuroplasty Decompression Median Nerve At Carpal Tunnel    CATARACT EXTRACTION      CATARACT EXTRACTION EXTRACAPSULAR W/ INTRAOCULAR LENS IMPLANTATION Bilateral 2017    COLONOSCOPY      CYSTOSCOPY      multiple    HYSTERECTOMY  02/10/2014    Hysterectomy    NEPHRECTOMY Right 03/28/2024    TURBT, right nephroureterectomy, RPLND    OTHER SURGICAL HISTORY  2007    bladder sling    OTHER SURGICAL HISTORY Left     post cataract laser surgery 2021    OTHER SURGICAL HISTORY Left     left shoulder surgery to remove bone spur     Family History   Problem Relation Name Age of Onset    Diabetes Mother      Multiple sclerosis Mother      Hypertension Father      Stroke Father      Heart disease Father      Bipolar disorder Brother      Cardiomyopathy Brother       Current Outpatient Medications   Medication Sig Dispense Refill    alendronate (Fosamax) 70 mg tablet Take 1 tablet (70 mg) by mouth every 7 days. Takes on Sunday 12 tablet 3    ALPRAZolam (Xanax) 0.25 mg tablet Take 1 tablet (0.25 mg) by mouth 3 times a day as needed for anxiety. 10 tablet 0    cholecalciferol (Vitamin D-3) 50 MCG (2000 UT) tablet Take 1 tablet (2,000 Units) by mouth once daily.      fluticasone (Flonase) 50 mcg/actuation nasal spray Administer 2 sprays into affected nostril(s)  once daily.      minoxidil (Loniten) 2.5 mg tablet Take 0.5 tablets (1.25 mg) by mouth once daily.      mirtazapine (Remeron) 30 mg tablet Take 1 tablet (30 mg) by mouth once daily at bedtime. 90 tablet 3    nitrofurantoin, macrocrystal-monohydrate, (Macrobid) 100 mg capsule One dose today when she gets home from procedure and again in morning 2 capsule 0    omeprazole (PriLOSEC) 20 mg DR capsule Take 1 capsule (20 mg) by mouth once daily. 90 capsule 3    oxybutynin XL (Ditropan-XL) 15 mg 24 hr tablet Take 1 tablet (15 mg) by mouth once daily. 90 tablet 3    potassium citrate CR (Urocit-K-10) 10 mEq ER tablet Take 1 tablet (10 mEq) by mouth 2 times daily (morning and late afternoon). Do not crush, chew, or split. 60 tablet 1    rosuvastatin (Crestor) 20 mg tablet Take 1 tablet (20 mg) by mouth once daily. 90 tablet 3    sertraline (Zoloft) 100 mg tablet Take 2 tablets (200 mg) by mouth once daily. 180 tablet 3    tamsulosin (Flomax) 0.4 mg 24 hr capsule Take 1 capsule (0.4 mg) by mouth once daily. 30 capsule 0     No current facility-administered medications for this visit.     Allergies   Allergen Reactions    Aspirin Nausea Only    Nsaids (Non-Steroidal Anti-Inflammatory Drug) Nausea Only    Penicillins Rash     Social History     Socioeconomic History    Marital status:      Spouse name: Not on file    Number of children: Not on file    Years of education: Not on file    Highest education level: Not on file   Occupational History    Not on file   Tobacco Use    Smoking status: Former     Current packs/day: 0.00     Average packs/day: 0.5 packs/day for 2.0 years (1.0 ttl pk-yrs)     Types: Cigarettes     Start date:      Quit date: 1968     Years since quittin.8    Smokeless tobacco: Never   Vaping Use    Vaping status: Never Used   Substance and Sexual Activity    Alcohol use: Yes     Alcohol/week: 1.0 standard drink of alcohol     Types: 1 Glasses of wine per week     Comment: occasionally     Drug use: Never    Sexual activity: Defer   Other Topics Concern    Not on file   Social History Narrative    Not on file     Social Drivers of Health     Financial Resource Strain: Low Risk  (3/29/2024)    Overall Financial Resource Strain (CARDIA)     Difficulty of Paying Living Expenses: Not hard at all   Food Insecurity: Not on file   Transportation Needs: No Transportation Needs (3/31/2024)    OASIS : Transportation     Lack of Transportation (Medical): No     Lack of Transportation (Non-Medical): No     Patient Unable or Declines to Respond: No   Physical Activity: Not on file   Stress: Not on file   Social Connections: Feeling Socially Integrated (3/31/2024)    OASIS : Social Isolation     Frequency of experiencing loneliness or isolation: Never   Intimate Partner Violence: Not on file   Housing Stability: Low Risk  (3/29/2024)    Housing Stability Vital Sign     Unable to Pay for Housing in the Last Year: No     Number of Places Lived in the Last Year: 1     Unstable Housing in the Last Year: No       Review of Systems  Pertinent items are noted in HPI.    Objective       Lab Review  Lab Results   Component Value Date    WBC 5.5 10/29/2024    RBC 3.43 (L) 10/29/2024    HGB 10.2 (L) 10/29/2024    HCT 31.3 (L) 10/29/2024     (L) 10/29/2024      Lab Results   Component Value Date    BUN 38 (H) 11/04/2024    CREATININE 1.49 (H) 11/04/2024              Assessment/Plan   There are no diagnoses linked to this encounter.    History of nephrolithiasis, likely uric acid  History of high-grade urothelial carcinoma s/p TURBT, nephroureterectomy and pelvic RPLND      I reviewed renal US from 11/4/2024 which showed left renal lower pole nonobstructing calculi. Mild left hydronephrosis was initially visualized but appeared less prominent on postvoid images.    Renal function is at baseline    Will get renal US in 6 months for surveillance.     Will refer to nephrology to monitors urinary alkalization for  stone dissolution.  Continue Kcitrate for now.    All questions were answered to the patient's satisfaction. Patient agrees with the plan and wishes to proceed. Follow-up will be scheduled appropriately.     E&M visit today is associated with current or anticipated ongoing medical care services related to a patient's single, serious condition or a complex condition.    I spent 20 minutes of dedicated E&M time, including preparation and review of records, notes, and data, time spent with patient/family, and documentation.     Scribed for Dr. Covarrubias by Katarina Berman I , Dr Covarrubias, have personally reviewed and agreed with the information entered by the Virtual Scribe.

## 2024-11-08 ENCOUNTER — PRE-ADMISSION TESTING (OUTPATIENT)
Dept: PREADMISSION TESTING | Facility: HOSPITAL | Age: 79
End: 2024-11-08
Payer: MEDICARE

## 2024-11-08 VITALS
TEMPERATURE: 98.1 F | SYSTOLIC BLOOD PRESSURE: 136 MMHG | BODY MASS INDEX: 29.74 KG/M2 | DIASTOLIC BLOOD PRESSURE: 79 MMHG | HEART RATE: 85 BPM | WEIGHT: 161.6 LBS | OXYGEN SATURATION: 97 % | HEIGHT: 62 IN | RESPIRATION RATE: 18 BRPM

## 2024-11-08 DIAGNOSIS — C67.9 MALIGNANT NEOPLASM OF URINARY BLADDER, UNSPECIFIED SITE (MULTI): ICD-10-CM

## 2024-11-08 DIAGNOSIS — D69.6 THROMBOCYTOPENIA (CMS-HCC): Primary | ICD-10-CM

## 2024-11-08 PROCEDURE — 99204 OFFICE O/P NEW MOD 45 MIN: CPT

## 2024-11-08 RX ORDER — FERROUS SULFATE 325(65) MG
325 TABLET ORAL
COMMUNITY

## 2024-11-08 ASSESSMENT — PAIN - FUNCTIONAL ASSESSMENT: PAIN_FUNCTIONAL_ASSESSMENT: 0-10

## 2024-11-08 ASSESSMENT — DUKE ACTIVITY SCORE INDEX (DASI)
CAN YOU WALK INDOORS, SUCH AS AROUND YOUR HOUSE: YES
CAN YOU CLIMB A FLIGHT OF STAIRS OR WALK UP A HILL: YES
CAN YOU DO MODERATE WORK AROUND THE HOUSE LIKE VACUUMING, SWEEPING FLOORS OR CARRYING GROCERIES: YES
CAN YOU PARTICIPATE IN MODERATE RECREATIONAL ACTIVITIES LIKE GOLF, BOWLING, DANCING, DOUBLES TENNIS OR THROWING A BASEBALL OR FOOTBALL: NO
CAN YOU TAKE CARE OF YOURSELF (EAT, DRESS, BATHE, OR USE TOILET): YES
CAN YOU DO YARD WORK LIKE RAKING LEAVES, WEEDING OR PUSHING A MOWER: YES
CAN YOU WALK A BLOCK OR TWO ON LEVEL GROUND: YES
CAN YOU DO HEAVY WORK AROUND THE HOUSE LIKE SCRUBBING FLOORS OR LIFTING AND MOVING HEAVY FURNITURE: NO
CAN YOU HAVE SEXUAL RELATIONS: YES
DASI METS SCORE: 6.3
CAN YOU PARTICIPATE IN STRENOUS SPORTS LIKE SWIMMING, SINGLES TENNIS, FOOTBALL, BASKETBALL, OR SKIING: NO
CAN YOU DO LIGHT WORK AROUND THE HOUSE LIKE DUSTING OR WASHING DISHES: YES
CAN YOU RUN A SHORT DISTANCE: NO
TOTAL_SCORE: 28.7

## 2024-11-08 ASSESSMENT — PAIN SCALES - GENERAL: PAINLEVEL_OUTOF10: 0 - NO PAIN

## 2024-11-08 NOTE — CPM/PAT H&P
CPM/PAT Evaluation       Name: Anahi Wall (Anahi Wall)  /Age: 1945/79 y.o.     In-Person       Chief Complaint: Urothelial carcinoma, Malignant neoplasm of urinary bladder     HPI  Patient is an alert and oriented 79 year old female scheduled for a cystoscopy with tissue ablation on 2024 with Dr. De Leon for urothelial carcinoma, malignant neoplasm of urinary bladder. She has no complaints of pain and denies abnormal urinary symptoms. PMHX includes bladder cancer, GERD, SERAFIN, solitary left kidney, OP, and HLD. Presents to [facility] today for perioperative risk stratification and optimization.     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    Breast cancer (Multi)     left sided lumpectomy, s/p xrt also completed     Carpal tunnel syndrome     s/p Left wrist surgery    Cataract     removed    Cramp and spasm     Cramp of limb    Depression     controlled with medication    GERD (gastroesophageal reflux disease)     Managed with Prilosec    Hearing aid worn     HL (hearing loss)     wears B/L hearing aids    Hyperlipidemia     F/W PCP    Joint pain     Nephrolithiasis     Osteoporosis     Other conditions influencing health status     Acute Meniscal Tear Of Right Knee    Trochanteric bursitis, unspecified hip     Urothelial cancer (Multi)     Vision loss     wears glasses     Past Surgical History:   Procedure Laterality Date    BLADDER SURGERY  2019    bladder bx    BREAST LUMPECTOMY Left     Breast Surgery Lumpectomy x 2     CARPAL TUNNEL RELEASE Left     Neuroplasty Decompression Median Nerve At Carpal Tunnel    CATARACT EXTRACTION      CATARACT EXTRACTION EXTRACAPSULAR W/ INTRAOCULAR LENS IMPLANTATION Bilateral 2017    COLONOSCOPY      CYSTOSCOPY      multiple    HYSTERECTOMY  02/10/2014    Hysterectomy    NEPHRECTOMY Right 2024    TURBT, right  nephroureterectomy, RPLND    OTHER SURGICAL HISTORY  2007    bladder sling    OTHER SURGICAL HISTORY Left     post cataract laser surgery 2021    OTHER SURGICAL HISTORY Left     left shoulder surgery to remove bone spur     Patient Sexual activity questions deferred to the physician.    Family History   Problem Relation Name Age of Onset    Diabetes Mother      Multiple sclerosis Mother      Hypertension Father      Stroke Father      Heart disease Father      Bipolar disorder Brother      Cardiomyopathy Brother       Allergies   Allergen Reactions    Aspirin Nausea Only    Nsaids (Non-Steroidal Anti-Inflammatory Drug) Nausea Only    Penicillins Rash     Prior to Admission medications    Medication Sig Start Date End Date Taking? Authorizing Provider   alendronate (Fosamax) 70 mg tablet Take 1 tablet (70 mg) by mouth every 7 days. Takes on Sunday 4/16/24  Yes Jose Luis Conrad MD   cholecalciferol (Vitamin D-3) 50 MCG (2000 UT) tablet Take 1 tablet (2,000 Units) by mouth once daily.   Yes Historical Provider, MD   ferrous sulfate, 325 mg ferrous sulfate, tablet Take 1 tablet (325 mg) by mouth once daily with breakfast.   Yes Historical Provider, MD   fluticasone (Flonase) 50 mcg/actuation nasal spray Administer 2 sprays into affected nostril(s) once daily as needed for allergies. 9/4/14  Yes Historical Provider, MD   minoxidil (Loniten) 2.5 mg tablet Take 0.5 tablets (1.25 mg) by mouth once daily.   Yes Historical Provider, MD   mirtazapine (Remeron) 30 mg tablet Take 1 tablet (30 mg) by mouth once daily at bedtime. 7/8/24  Yes Jose Luis Conrad MD   omeprazole (PriLOSEC) 20 mg DR capsule Take 1 capsule (20 mg) by mouth once daily. 8/15/24  Yes Jose Luis Conrad MD   oxybutynin XL (Ditropan-XL) 15 mg 24 hr tablet Take 1 tablet (15 mg) by mouth once daily. 7/8/24  Yes Jose Luis Conrad MD   potassium citrate CR (Urocit-K-10) 10 mEq ER tablet Take 1 tablet (10 mEq) by mouth 2 times daily (morning and late afternoon). Do not crush,  chew, or split. 10/22/24 12/21/24 Yes Deann Covarrubias MD   rosuvastatin (Crestor) 20 mg tablet Take 1 tablet (20 mg) by mouth once daily. 7/9/24 8/13/25 Yes Jose Luis Conrad MD   sertraline (Zoloft) 100 mg tablet Take 2 tablets (200 mg) by mouth once daily. 5/6/24  Yes Jose Luis Conrad MD   ALPRAZolam (Xanax) 0.25 mg tablet Take 1 tablet (0.25 mg) by mouth 3 times a day as needed for anxiety.  Patient not taking: Reported on 11/8/2024 4/5/24   Jose Luis Conrad MD   nitrofurantoin, macrocrystal-monohydrate, (Macrobid) 100 mg capsule One dose today when she gets home from procedure and again in morning  Patient not taking: Reported on 11/8/2024 4/5/24   ESTEFANÍA Ernandez-CNP   tamsulosin (Flomax) 0.4 mg 24 hr capsule Take 1 capsule (0.4 mg) by mouth once daily.  Patient not taking: Reported on 11/8/2024 10/22/24 11/21/24  Deann Covarrubias MD       Review of Systems   Constitutional: Negative for chills, decreased appetite, diaphoresis, fever and malaise/fatigue.   Eyes:  Negative for blurred vision and double vision.   Cardiovascular:  Negative for chest pain, claudication, cyanosis, dyspnea on exertion, irregular heartbeat, leg swelling, near-syncope and palpitations.   Respiratory:  Negative for cough, hemoptysis, shortness of breath and wheezing.    Endocrine: Negative for cold intolerance, heat intolerance, polydipsia, polyphagia and polyuria.   Gastrointestinal:  Negative for abdominal pain, constipation, diarrhea, dysphagia, nausea and vomiting.   Genitourinary:  Negative for bladder incontinence, dysuria, hematuria, incomplete emptying, nocturia, frequency, pelvic pain and urgency.   Neurological:  Negative for headaches, light-headedness, paresthesias, sensory change and weakness.   Psychiatric/Behavioral:  Negative for altered mental status.    Musculoskeletal: Negative for myalgias, arthralgias     Vitals and nursing note reviewed.     Physical exam  Constitutional:       Appearance: Normal appearance. She is Overweight.  "  HENT:      Head: Normocephalic and atraumatic.      Mouth/Throat:      Mouth: Mucous membranes are moist.      Pharynx: Oropharynx is clear.   Eyes:      Extraocular Movements: Extraocular movements intact.      Conjunctiva/sclera: Conjunctivae normal.      Pupils: Pupils are equal, round, and reactive to light.   Cardiovascular:      PMI at left midclavicular line. Normal rate. Regular rhythm. Normal S1. Normal S2.       Murmurs: There is no murmur.      No gallop.  No click. No rub.       No audible carotid bruit     No lower extremity edema on exam  Pulmonary:      Effort: Pulmonary effort is normal.      Breath sounds: Normal breath sounds.   Abdominal:      General: Abdomen is flat. Bowel sounds are normal.      Palpations: Abdomen is soft and non-tender  Musculoskeletal:      Cervical back: Normal range of motion and neck supple.   Skin:     General: Skin is warm and dry.      Capillary Refill: Capillary refill takes less than 2 seconds.   Neurological:      General: No focal deficit present.      Mental Status: She is alert and oriented to person, place, and time. Mental status is at baseline.   Psychiatric:         Mood and Affect: Mood normal.         Behavior: Behavior normal.         Thought Content: Thought content normal.         Judgment: Judgment normal.     Vitals and nursing note reviewed. Physical exam within normal limits.     Visit Vitals  /79   Pulse 85   Temp 36.7 °C (98.1 °F) (Temporal)   Resp 18   Ht 1.575 m (5' 2\")   Wt 73.3 kg (161 lb 9.6 oz)   SpO2 97%   BMI 29.56 kg/m²   OB Status Postmenopausal   Smoking Status Former   BSA 1.79 m²      DASI Risk Score      Flowsheet Row Pre-Admission Testing from 11/8/2024 in [facility] Pre-Admission Testing from 3/11/2024 in [facility]   Can you take care of yourself (eat, dress, bathe, or use toilet)?  2.75 filed at 11/08/2024 1429 2.75 filed at 03/11/2024 1037   Can you walk indoors, such as around your " house? 1.75 filed at 11/08/2024 1429 1.75 filed at 03/11/2024 1037   Can you walk a block or two on level ground?  2.75 filed at 11/08/2024 1429 2.75 filed at 03/11/2024 1037   Can you climb a flight of stairs or walk up a hill? 5.5 filed at 11/08/2024 1429 5.5 filed at 03/11/2024 1037   Can you run a short distance? 0 filed at 11/08/2024 1429 0 filed at 03/11/2024 1037   Can you do light work around the house like dusting or washing dishes? 2.7 filed at 11/08/2024 1429 2.7 filed at 03/11/2024 1037   Can you do moderate work around the house like vacuuming, sweeping floors or carrying groceries? 3.5 filed at 11/08/2024 1429 3.5 filed at 03/11/2024 1037   Can you do heavy work around the house like scrubbing floors or lifting and moving heavy furniture?  0 filed at 11/08/2024 1429 0 filed at 03/11/2024 1037   Can you do yard work like raking leaves, weeding or pushing a mower? 4.5 filed at 11/08/2024 1429 4.5 filed at 03/11/2024 1037   Can you have sexual relations? 5.25 filed at 11/08/2024 1429 5.25 filed at 03/11/2024 1037   Can you participate in moderate recreational activities like golf, bowling, dancing, doubles tennis or throwing a baseball or football? 0 filed at 11/08/2024 1429 0 filed at 03/11/2024 1037   Can you participate in strenous sports like swimming, singles tennis, football, basketball, or skiing? 0 filed at 11/08/2024 1429 0 filed at 03/11/2024 1037   DASI SCORE 28.7 filed at 11/08/2024 1429 28.7 filed at 03/11/2024 1037   METS Score (Will be calculated only when all the questions are answered) 6.3 filed at 11/08/2024 1429 6.3 filed at 03/11/2024 1037          Caprini DVT Assessment      Flowsheet Row Pre-Admission Testing from 11/8/2024 in [facility] Admission (Discharged) from 3/28/2024 in [facility] 5 with Jorge A De Leon MD MPH   DVT Score 7 filed at 11/08/2024 1428 10 filed at 03/28/2024 0707   Medical Factors Present cancer, chemotherapy, or previous malignancy  filed at 11/08/2024 1428 --   Surgical Factors Major surgery planned, including arthroscopic and laproscopic (1-2 hours) filed at 11/08/2024 1428 --   BMI 30 or less filed at 11/08/2024 1428 30 or less filed at 03/28/2024 0707   RETIRED: Current Status -- Major surgery planned, lasting over 3 hours filed at 03/28/2024 0707   RETIRED: History -- Prior major surgery filed at 03/28/2024 0707   RETIRED: Age -- Over 75 years filed at 03/28/2024 0707          Modified Frailty Index      Flowsheet Row Pre-Admission Testing from 11/8/2024 in [facility] Pre-Admission Testing from 3/11/2024 in [facility]   Non-independent functional status (problems with dressing, bathing, personal grooming, or cooking) 0 filed at 11/08/2024 1429 0 filed at 03/11/2024 1045   History of diabetes mellitus  0 filed at 11/08/2024 1429 0 filed at 03/11/2024 1045   History of COPD 0 filed at 11/08/2024 1429 0 filed at 03/11/2024 1045   History of CHF No filed at 11/08/2024 1429 No filed at 03/11/2024 1045   History of MI 0 filed at 11/08/2024 1429 0 filed at 03/11/2024 1045   History of Percutaneous Coronary Intervention, Cardiac Surgery, or Angina No filed at 11/08/2024 1429 No filed at 03/11/2024 1045   Hypertension requiring the use of medication  0.0909 filed at 11/08/2024 1429 0.0909 filed at 03/11/2024 1045   Peripheral vascular disease 0 filed at 11/08/2024 1429 0 filed at 03/11/2024 1045   Impaired sensorium (cognitive impairement or loss, clouding, or delirium) 0 filed at 11/08/2024 1429 0 filed at 03/11/2024 1045   TIA or CVA withouy residual deficit 0 filed at 11/08/2024 1429 0 filed at 03/11/2024 1045   Cerebrovascular accident with deficit 0 filed at 11/08/2024 1429 0 filed at 03/11/2024 1045   Modified Frailty Index Calculator .0909 filed at 11/08/2024 1429 .0909 filed at 03/11/2024 1045          CHADS2 Stroke Risk  Current as of 41 minutes ago        N/A 3 to 100%: High Risk   2 to < 3%: Medium Risk    0 to < 2%: Low Risk     Last Change: N/A          This score determines the patient's risk of having a stroke if the patient has atrial fibrillation.        This score is not applicable to this patient. Components are not calculated.          Revised Cardiac Risk Index      Flowsheet Row Pre-Admission Testing from 11/8/2024 in [facility] Pre-Admission Testing from 3/11/2024 in [facility]   High-Risk Surgery (Intraperitoneal, Intrathoracic,Suprainguinal vascular) 0 filed at 11/08/2024 1429 0 filed at 03/11/2024 1044   History of ischemic heart disease (History of MI, History of positive exercuse test, Current chest paint considered due to myocardial ischemia, Use of nitrate therapy, ECG with pathological Q Waves) 0 filed at 11/08/2024 1429 0 filed at 03/11/2024 1044   History of congestive heart failure (pulmonary edemia, bilateral rales or S3 gallop, Paroxysmal nocturnal dyspnea, CXR showing pulmonary vascular redistribution) 0 filed at 11/08/2024 1429 0 filed at 03/11/2024 1044   History of cerebrovascular disease (Prior TIA or stroke) 0 filed at 11/08/2024 1429 0 filed at 03/11/2024 1044   Pre-operative insulin treatment 0 filed at 11/08/2024 1429 0 filed at 03/11/2024 1044   Pre-operative creatinine>2 mg/dl 0 filed at 11/08/2024 1429 0 filed at 03/11/2024 1044   Revised Cardiac Risk Calculator 0 filed at 11/08/2024 1429 0 filed at 03/11/2024 1044          Apfel Simplified Score      Flowsheet Row Pre-Admission Testing from 3/11/2024 in [facility]   Smoking status 1 filed at 03/11/2024 1045   History of motion sickness or PONV  0 filed at 03/11/2024 1045   Use of postoperative opioids 1 filed at 03/11/2024 1045   Apfel Simplified Score Calculator 3 filed at 03/11/2024 1045          Risk Analysis Index Results This Encounter    No data found in the last 10 encounters.       Stop Bang Score      Flowsheet Row Pre-Admission Testing from 11/8/2024 in [facility]  [facility] Pre-Admission Testing from 3/11/2024 in [facility]   Do you snore loudly? 1 filed at 11/08/2024 1400 1 filed at 03/11/2024 1037   Do you often feel tired or fatigued after your sleep? 0 filed at 11/08/2024 1400 0 filed at 03/11/2024 1037   Has anyone ever observed you stop breathing in your sleep? 0 filed at 11/08/2024 1400 0 filed at 03/11/2024 1037   Do you have or are you being treated for high blood pressure? 0 filed at 11/08/2024 1400 0 filed at 03/11/2024 1037   Recent BMI (Calculated) 29.6 filed at 11/08/2024 1400 29.3 filed at 03/11/2024 1037   Is BMI greater than 35 kg/m2? 0=No filed at 11/08/2024 1400 0=No filed at 03/11/2024 1037   Age older than 50 years old? 1=Yes filed at 11/08/2024 1400 1=Yes filed at 03/11/2024 1037   Is your neck circumference greater than 17 inches (Male) or 16 inches (Female)? 0 filed at 11/08/2024 1400 0 filed at 03/11/2024 1037   Gender - Male 0=No filed at 11/08/2024 1400 0=No filed at 03/11/2024 1037   STOP-BANG Total Score 2 filed at 11/08/2024 1400 2 filed at 03/11/2024 1037          Prodigy: High Risk  Total Score: 16              Prodigy Age Score           ARISCAT Score for Postoperative Pulmonary Complications      Flowsheet Row Pre-Admission Testing from 11/8/2024 in [facility]   Age, years  3 filed at 11/08/2024 1429   Preoperative SpO2 0 filed at 11/08/2024 1429   Respiratory infection in the last month Either upper or lower (i.e., URI, bronchitis, pneumonia), with fever and antibiotic treatment 0 filed at 11/08/2024 1429   Preoperative anemoa (Hgb less than 10 g/dl) 0 filed at 11/08/2024 1429   Surgical incision  0 filed at 11/08/2024 1429   Duration of surgery  0 filed at 11/08/2024 1429   Emergency Procedure  0 filed at 11/08/2024 1429   ARISCAT Total Score  3 filed at 11/08/2024 1429          Mari Perioperative Risk for Myocardial Infarction or Cardiac Arrest (LILLIANA)      Flowsheet Row Pre-Admission Testing from  11/8/2024 in [facility]   Age 1.6 filed at 11/08/2024 1430   Functional Status  0 filed at 11/08/2024 1430   ASA Class  -3.29 filed at 11/08/2024 1430   Creatinine 0 filed at 11/08/2024 1430   Type of Procedure  -0.26 filed at 11/08/2024 1430   LILLIANA Total Score  -7.2 filed at 11/08/2024 1430   LILLIANA % 0.07 filed at 11/08/2024 1430          Assessment & Plan:    Neuro:  No diagnosis or significant findings on chart review or clinical presentation and evaluation.     HEENT/Airway:  No diagnosis or significant findings on chart review or clinical presentation and evaluation.   STOP-BANG Score-2 points low risk for JESSICA    Mallampati::  III    TM distance::  >3 FB    Neck ROM::  Full  Dentures-denies  Crowns-reports x 2  Implants-denies    Cardiovascular:  No significant findings on chart review or clinical presentation and evaluation.   History of Hyperlipidemia-Managed with Rosuvastatin  METS: 6.3  RCRI: 0 points, 3.9%  risk for postoperative MACE   LILLIANA: 0.07% risk for postoperative MACE  EKG -Completed 10/23/2024  Normal sinus rhythm  Pulmonary disease pattern  Left anterior fascicular block  Abnormal ECG  When compared with ECG of 11-MAR-2024 11:21,  No significant change was found  See ED provider note for full interpretation and clinical correlation  Confirmed by Linda Chang ([id number]) on 10/27/2024 9:44:13 PM    Pulmonary:  No diagnosis or significant findings on chart review or clinical presentation and evaluation.   ARISCAT: <26 points, 1.6% risk of in-hospital postoperative pulmonary complication  PRODIGY: Low risk for opioid induced respiratory depression  Smoking History-She has never smoked.  Discussed smoking cessation and deep breathing handout given    Renal/Urinary: No significant findings on chart review or clinical presentation and evaluation, however, the patient is at increased risk of perioperative renal complications secondary to age>/= 56, HTN, and renal insuff (preop creat  >1.2 mg/dl). Preventative measures include BP monitoring, medication compliance, and hydration management.   History of a solitary left kidney-Had right sided nephrectomy completed due to renal cancer.   History of Bladder cancer-States she has had 12 bladder tumor resections, BCG, and chemotherapy. No new urinary symptoms in PAT.   BMP-Reviewed, stable  Creatinine-1.49  GFR-35    Endocrine:  No diagnosis or significant findings on chart review or clinical presentation and evaluation.     Hematologic/Immunology:  No significant findings on chart review or clinical presentation and evaluation.  History of Iron deficiency anemia-Managed with Iron supplementation.   The patient is not a Jehovah’s witness and will accept blood and blood products if medically indicated.   History of previous blood transfusions No  CBC-Reviewed, stable  Positive for Anemia-HGB 11.3. Normocytic, normochromic.   Positive for Thrombocytopenia-mild, Platelets 149. Value fluctuates  Caprini Score 7, patient at High risk for postoperative DVT. Pt supplied education/VTE handout  Anticoagulation use: No     Gastrointestinal:   No significant findings on chart review or clinical presentation and evaluation.   History of GERD-Managed with Omeprazole  Recreational drug use: Drug use No  Alcohol use social drinker    Infectious disease:   No diagnosis or significant findings on chart review or clinical presentation and evaluation.     Musculoskeletal:   No diagnosis or significant findings on chart review or clinical presentation and evaluation.   JHFRAT score-6 points. moderate risk for falls    Anesthesia:  ASA 2 - Patient with mild systemic disease with no functional limitations  Anticipated anesthesia-General  History of General anesthesia- yes  Complications- No anesthesia complications  No family history of anesthesia complications    Abnormalities noted on PAT evaluation: No    Labs & Imaging ordered:  NA  Nickel/metal  allergy-negative  Shellfish allergy-negative    Overall, patient Moderate Risk for the scheduled Moderate Risk surgery. Discussed with patient medication instructions, NPO guidelines, and any questions or concerns.     Face to face time-30 minutes

## 2024-11-08 NOTE — H&P (VIEW-ONLY)
CPM/PAT Evaluation       Name: Anahi Wall (Anahi aWll)  /Age: 1945/79 y.o.     In-Person       Chief Complaint: Urothelial carcinoma, Malignant neoplasm of urinary bladder     HPI  Patient is an alert and oriented 79 year old female scheduled for a cystoscopy with tissue ablation on 2024 with Dr. De Leon for urothelial carcinoma, malignant neoplasm of urinary bladder. She has no complaints of pain and denies abnormal urinary symptoms. PMHX includes bladder cancer, GERD, SERAFIN, solitary left kidney, OP, and HLD. Presents to [facility] PAT today for perioperative risk stratification and optimization.     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    Breast cancer (Multi)     left sided lumpectomy, s/p xrt also completed     Carpal tunnel syndrome     s/p Left wrist surgery    Cataract     removed    Cramp and spasm     Cramp of limb    Depression     controlled with medication    GERD (gastroesophageal reflux disease)     Managed with Prilosec    Hearing aid worn     HL (hearing loss)     wears B/L hearing aids    Hyperlipidemia     F/W PCP    Joint pain     Nephrolithiasis     Osteoporosis     Other conditions influencing health status     Acute Meniscal Tear Of Right Knee    Trochanteric bursitis, unspecified hip     Urothelial cancer (Multi)     Vision loss     wears glasses     Past Surgical History:   Procedure Laterality Date    BLADDER SURGERY  2019    bladder bx    BREAST LUMPECTOMY Left     Breast Surgery Lumpectomy x 2     CARPAL TUNNEL RELEASE Left     Neuroplasty Decompression Median Nerve At Carpal Tunnel    CATARACT EXTRACTION      CATARACT EXTRACTION EXTRACAPSULAR W/ INTRAOCULAR LENS IMPLANTATION Bilateral 2017    COLONOSCOPY      CYSTOSCOPY      multiple    HYSTERECTOMY  02/10/2014    Hysterectomy    NEPHRECTOMY Right 2024    TURBT, right  nephroureterectomy, RPLND    OTHER SURGICAL HISTORY  2007    bladder sling    OTHER SURGICAL HISTORY Left     post cataract laser surgery 2021    OTHER SURGICAL HISTORY Left     left shoulder surgery to remove bone spur     Patient Sexual activity questions deferred to the physician.    Family History   Problem Relation Name Age of Onset    Diabetes Mother      Multiple sclerosis Mother      Hypertension Father      Stroke Father      Heart disease Father      Bipolar disorder Brother      Cardiomyopathy Brother       Allergies   Allergen Reactions    Aspirin Nausea Only    Nsaids (Non-Steroidal Anti-Inflammatory Drug) Nausea Only    Penicillins Rash     Prior to Admission medications    Medication Sig Start Date End Date Taking? Authorizing Provider   alendronate (Fosamax) 70 mg tablet Take 1 tablet (70 mg) by mouth every 7 days. Takes on Sunday 4/16/24  Yes Jose Luis Conrad MD   cholecalciferol (Vitamin D-3) 50 MCG (2000 UT) tablet Take 1 tablet (2,000 Units) by mouth once daily.   Yes Historical Provider, MD   ferrous sulfate, 325 mg ferrous sulfate, tablet Take 1 tablet (325 mg) by mouth once daily with breakfast.   Yes Historical Provider, MD   fluticasone (Flonase) 50 mcg/actuation nasal spray Administer 2 sprays into affected nostril(s) once daily as needed for allergies. 9/4/14  Yes Historical Provider, MD   minoxidil (Loniten) 2.5 mg tablet Take 0.5 tablets (1.25 mg) by mouth once daily.   Yes Historical Provider, MD   mirtazapine (Remeron) 30 mg tablet Take 1 tablet (30 mg) by mouth once daily at bedtime. 7/8/24  Yes Jose Luis Conrad MD   omeprazole (PriLOSEC) 20 mg DR capsule Take 1 capsule (20 mg) by mouth once daily. 8/15/24  Yes Jose Luis Conrad MD   oxybutynin XL (Ditropan-XL) 15 mg 24 hr tablet Take 1 tablet (15 mg) by mouth once daily. 7/8/24  Yes Jose Luis Conrad MD   potassium citrate CR (Urocit-K-10) 10 mEq ER tablet Take 1 tablet (10 mEq) by mouth 2 times daily (morning and late afternoon). Do not crush,  chew, or split. 10/22/24 12/21/24 Yes Deann Covarrubias MD   rosuvastatin (Crestor) 20 mg tablet Take 1 tablet (20 mg) by mouth once daily. 7/9/24 8/13/25 Yes Jose Luis Conrad MD   sertraline (Zoloft) 100 mg tablet Take 2 tablets (200 mg) by mouth once daily. 5/6/24  Yes Jose Luis Conrad MD   ALPRAZolam (Xanax) 0.25 mg tablet Take 1 tablet (0.25 mg) by mouth 3 times a day as needed for anxiety.  Patient not taking: Reported on 11/8/2024 4/5/24   Jose Luis Conrad MD   nitrofurantoin, macrocrystal-monohydrate, (Macrobid) 100 mg capsule One dose today when she gets home from procedure and again in morning  Patient not taking: Reported on 11/8/2024 4/5/24   ESTEFANÍA Ernandez-CNP   tamsulosin (Flomax) 0.4 mg 24 hr capsule Take 1 capsule (0.4 mg) by mouth once daily.  Patient not taking: Reported on 11/8/2024 10/22/24 11/21/24  Deann Covarrubias MD       Review of Systems   Constitutional: Negative for chills, decreased appetite, diaphoresis, fever and malaise/fatigue.   Eyes:  Negative for blurred vision and double vision.   Cardiovascular:  Negative for chest pain, claudication, cyanosis, dyspnea on exertion, irregular heartbeat, leg swelling, near-syncope and palpitations.   Respiratory:  Negative for cough, hemoptysis, shortness of breath and wheezing.    Endocrine: Negative for cold intolerance, heat intolerance, polydipsia, polyphagia and polyuria.   Gastrointestinal:  Negative for abdominal pain, constipation, diarrhea, dysphagia, nausea and vomiting.   Genitourinary:  Negative for bladder incontinence, dysuria, hematuria, incomplete emptying, nocturia, frequency, pelvic pain and urgency.   Neurological:  Negative for headaches, light-headedness, paresthesias, sensory change and weakness.   Psychiatric/Behavioral:  Negative for altered mental status.    Musculoskeletal: Negative for myalgias, arthralgias     Vitals and nursing note reviewed.     Physical exam  Constitutional:       Appearance: Normal appearance. She is Overweight.  "  HENT:      Head: Normocephalic and atraumatic.      Mouth/Throat:      Mouth: Mucous membranes are moist.      Pharynx: Oropharynx is clear.   Eyes:      Extraocular Movements: Extraocular movements intact.      Conjunctiva/sclera: Conjunctivae normal.      Pupils: Pupils are equal, round, and reactive to light.   Cardiovascular:      PMI at left midclavicular line. Normal rate. Regular rhythm. Normal S1. Normal S2.       Murmurs: There is no murmur.      No gallop.  No click. No rub.       No audible carotid bruit     No lower extremity edema on exam  Pulmonary:      Effort: Pulmonary effort is normal.      Breath sounds: Normal breath sounds.   Abdominal:      General: Abdomen is flat. Bowel sounds are normal.      Palpations: Abdomen is soft and non-tender  Musculoskeletal:      Cervical back: Normal range of motion and neck supple.   Skin:     General: Skin is warm and dry.      Capillary Refill: Capillary refill takes less than 2 seconds.   Neurological:      General: No focal deficit present.      Mental Status: She is alert and oriented to person, place, and time. Mental status is at baseline.   Psychiatric:         Mood and Affect: Mood normal.         Behavior: Behavior normal.         Thought Content: Thought content normal.         Judgment: Judgment normal.     Vitals and nursing note reviewed. Physical exam within normal limits.     Visit Vitals  /79   Pulse 85   Temp 36.7 °C (98.1 °F) (Temporal)   Resp 18   Ht 1.575 m (5' 2\")   Wt 73.3 kg (161 lb 9.6 oz)   SpO2 97%   BMI 29.56 kg/m²   OB Status Postmenopausal   Smoking Status Former   BSA 1.79 m²      DASI Risk Score      Flowsheet Row Pre-Admission Testing from 11/8/2024 in [facility] Pre-Admission Testing from 3/11/2024 in [facility]   Can you take care of yourself (eat, dress, bathe, or use toilet)?  2.75 filed at 11/08/2024 1429 2.75 filed at 03/11/2024 1037   Can you walk indoors, such as around your " house? 1.75 filed at 11/08/2024 1429 1.75 filed at 03/11/2024 1037   Can you walk a block or two on level ground?  2.75 filed at 11/08/2024 1429 2.75 filed at 03/11/2024 1037   Can you climb a flight of stairs or walk up a hill? 5.5 filed at 11/08/2024 1429 5.5 filed at 03/11/2024 1037   Can you run a short distance? 0 filed at 11/08/2024 1429 0 filed at 03/11/2024 1037   Can you do light work around the house like dusting or washing dishes? 2.7 filed at 11/08/2024 1429 2.7 filed at 03/11/2024 1037   Can you do moderate work around the house like vacuuming, sweeping floors or carrying groceries? 3.5 filed at 11/08/2024 1429 3.5 filed at 03/11/2024 1037   Can you do heavy work around the house like scrubbing floors or lifting and moving heavy furniture?  0 filed at 11/08/2024 1429 0 filed at 03/11/2024 1037   Can you do yard work like raking leaves, weeding or pushing a mower? 4.5 filed at 11/08/2024 1429 4.5 filed at 03/11/2024 1037   Can you have sexual relations? 5.25 filed at 11/08/2024 1429 5.25 filed at 03/11/2024 1037   Can you participate in moderate recreational activities like golf, bowling, dancing, doubles tennis or throwing a baseball or football? 0 filed at 11/08/2024 1429 0 filed at 03/11/2024 1037   Can you participate in strenous sports like swimming, singles tennis, football, basketball, or skiing? 0 filed at 11/08/2024 1429 0 filed at 03/11/2024 1037   DASI SCORE 28.7 filed at 11/08/2024 1429 28.7 filed at 03/11/2024 1037   METS Score (Will be calculated only when all the questions are answered) 6.3 filed at 11/08/2024 1429 6.3 filed at 03/11/2024 1037          Caprini DVT Assessment      Flowsheet Row Pre-Admission Testing from 11/8/2024 in [facility] Admission (Discharged) from 3/28/2024 in [facility] 5 with Jorge A De Leon MD MPH   DVT Score 7 filed at 11/08/2024 1428 10 filed at 03/28/2024 0707   Medical Factors Present cancer, chemotherapy, or previous malignancy  filed at 11/08/2024 1428 --   Surgical Factors Major surgery planned, including arthroscopic and laproscopic (1-2 hours) filed at 11/08/2024 1428 --   BMI 30 or less filed at 11/08/2024 1428 30 or less filed at 03/28/2024 0707   RETIRED: Current Status -- Major surgery planned, lasting over 3 hours filed at 03/28/2024 0707   RETIRED: History -- Prior major surgery filed at 03/28/2024 0707   RETIRED: Age -- Over 75 years filed at 03/28/2024 0707          Modified Frailty Index      Flowsheet Row Pre-Admission Testing from 11/8/2024 in [facility] Pre-Admission Testing from 3/11/2024 in [facility]   Non-independent functional status (problems with dressing, bathing, personal grooming, or cooking) 0 filed at 11/08/2024 1429 0 filed at 03/11/2024 1045   History of diabetes mellitus  0 filed at 11/08/2024 1429 0 filed at 03/11/2024 1045   History of COPD 0 filed at 11/08/2024 1429 0 filed at 03/11/2024 1045   History of CHF No filed at 11/08/2024 1429 No filed at 03/11/2024 1045   History of MI 0 filed at 11/08/2024 1429 0 filed at 03/11/2024 1045   History of Percutaneous Coronary Intervention, Cardiac Surgery, or Angina No filed at 11/08/2024 1429 No filed at 03/11/2024 1045   Hypertension requiring the use of medication  0.0909 filed at 11/08/2024 1429 0.0909 filed at 03/11/2024 1045   Peripheral vascular disease 0 filed at 11/08/2024 1429 0 filed at 03/11/2024 1045   Impaired sensorium (cognitive impairement or loss, clouding, or delirium) 0 filed at 11/08/2024 1429 0 filed at 03/11/2024 1045   TIA or CVA withouy residual deficit 0 filed at 11/08/2024 1429 0 filed at 03/11/2024 1045   Cerebrovascular accident with deficit 0 filed at 11/08/2024 1429 0 filed at 03/11/2024 1045   Modified Frailty Index Calculator .0909 filed at 11/08/2024 1429 .0909 filed at 03/11/2024 1045          CHADS2 Stroke Risk  Current as of 41 minutes ago        N/A 3 to 100%: High Risk   2 to < 3%: Medium Risk    0 to < 2%: Low Risk     Last Change: N/A          This score determines the patient's risk of having a stroke if the patient has atrial fibrillation.        This score is not applicable to this patient. Components are not calculated.          Revised Cardiac Risk Index      Flowsheet Row Pre-Admission Testing from 11/8/2024 in [facility] Pre-Admission Testing from 3/11/2024 in [facility]   High-Risk Surgery (Intraperitoneal, Intrathoracic,Suprainguinal vascular) 0 filed at 11/08/2024 1429 0 filed at 03/11/2024 1044   History of ischemic heart disease (History of MI, History of positive exercuse test, Current chest paint considered due to myocardial ischemia, Use of nitrate therapy, ECG with pathological Q Waves) 0 filed at 11/08/2024 1429 0 filed at 03/11/2024 1044   History of congestive heart failure (pulmonary edemia, bilateral rales or S3 gallop, Paroxysmal nocturnal dyspnea, CXR showing pulmonary vascular redistribution) 0 filed at 11/08/2024 1429 0 filed at 03/11/2024 1044   History of cerebrovascular disease (Prior TIA or stroke) 0 filed at 11/08/2024 1429 0 filed at 03/11/2024 1044   Pre-operative insulin treatment 0 filed at 11/08/2024 1429 0 filed at 03/11/2024 1044   Pre-operative creatinine>2 mg/dl 0 filed at 11/08/2024 1429 0 filed at 03/11/2024 1044   Revised Cardiac Risk Calculator 0 filed at 11/08/2024 1429 0 filed at 03/11/2024 1044          Apfel Simplified Score      Flowsheet Row Pre-Admission Testing from 3/11/2024 in [facility]   Smoking status 1 filed at 03/11/2024 1045   History of motion sickness or PONV  0 filed at 03/11/2024 1045   Use of postoperative opioids 1 filed at 03/11/2024 1045   Apfel Simplified Score Calculator 3 filed at 03/11/2024 1045          Risk Analysis Index Results This Encounter    No data found in the last 10 encounters.       Stop Bang Score      Flowsheet Row Pre-Admission Testing [facility] 11/8/2024 in [facility] Pre-Admission Testing from 3/11/2024 in [facility]   Do you snore loudly? 1 filed at 11/08/2024 1400 1 filed at 03/11/2024 1037   Do you often feel tired or fatigued after your sleep? 0 filed at 11/08/2024 1400 0 filed at 03/11/2024 1037   Has anyone ever observed you stop breathing in your sleep? 0 filed at 11/08/2024 1400 0 filed at 03/11/2024 1037   Do you have or are you being treated for high blood pressure? 0 filed at 11/08/2024 1400 0 filed at 03/11/2024 1037   Recent BMI (Calculated) 29.6 filed at 11/08/2024 1400 29.3 filed at 03/11/2024 1037   Is BMI greater than 35 kg/m2? 0=No filed at 11/08/2024 1400 0=No filed at 03/11/2024 1037   Age older than 50 years old? 1=Yes filed at 11/08/2024 1400 1=Yes filed at 03/11/2024 1037   Is your neck circumference greater than 17 inches (Male) or 16 inches (Female)? 0 filed at 11/08/2024 1400 0 filed at 03/11/2024 1037   Gender - Male 0=No filed at 11/08/2024 1400 0=No filed at 03/11/2024 1037   STOP-BANG Total Score 2 filed at 11/08/2024 1400 2 filed at 03/11/2024 1037          Prodigy: High Risk  Total Score: 16              Prodigy Age Score           ARISCAT Score for Postoperative Pulmonary Complications      Flowsheet Row Pre-Admission Testing from 11/8/2024 in [facility]   Age, years  3 filed at 11/08/2024 1429   Preoperative SpO2 0 filed at 11/08/2024 1429   Respiratory infection in the last month Either upper or lower (i.e., URI, bronchitis, pneumonia), with fever and antibiotic treatment 0 filed at 11/08/2024 1429   Preoperative anemoa (Hgb less than 10 g/dl) 0 filed at 11/08/2024 1429   Surgical incision  0 filed at 11/08/2024 1429   Duration of surgery  0 filed at 11/08/2024 1429   Emergency Procedure  0 filed at 11/08/2024 1429   ARISCAT Total Score  3 filed at 11/08/2024 1429          Mari Perioperative Risk for Myocardial Infarction or Cardiac Arrest (LILLIANA)      Flowsheet Row Pre-Admission Testing from  11/8/2024 in [facility]   Age 1.6 filed at 11/08/2024 1430   Functional Status  0 filed at 11/08/2024 1430   ASA Class  -3.29 filed at 11/08/2024 1430   Creatinine 0 filed at 11/08/2024 1430   Type of Procedure  -0.26 filed at 11/08/2024 1430   LILLIANA Total Score  -7.2 filed at 11/08/2024 1430   LILLIANA % 0.07 filed at 11/08/2024 1430          Assessment & Plan:    Neuro:  No diagnosis or significant findings on chart review or clinical presentation and evaluation.     HEENT/Airway:  No diagnosis or significant findings on chart review or clinical presentation and evaluation.   STOP-BANG Score-2 points low risk for JESSICA    Mallampati::  III    TM distance::  >3 FB    Neck ROM::  Full  Dentures-denies  Crowns-reports x 2  Implants-denies    Cardiovascular:  No significant findings on chart review or clinical presentation and evaluation.   History of Hyperlipidemia-Managed with Rosuvastatin  METS: 6.3  RCRI: 0 points, 3.9%  risk for postoperative MACE   LILLIANA: 0.07% risk for postoperative MACE  EKG -Completed 10/23/2024  Normal sinus rhythm  Pulmonary disease pattern  Left anterior fascicular block  Abnormal ECG  When compared with ECG of 11-MAR-2024 11:21,  No significant change was found  See ED provider note for full interpretation and clinical correlation  Confirmed by Linda Chang ([id number]) on 10/27/2024 9:44:13 PM    Pulmonary:  No diagnosis or significant findings on chart review or clinical presentation and evaluation.   ARISCAT: <26 points, 1.6% risk of in-hospital postoperative pulmonary complication  PRODIGY: Low risk for opioid induced respiratory depression  Smoking History-She has never smoked.  Discussed smoking cessation and deep breathing handout given    Renal/Urinary: No significant findings on chart review or clinical presentation and evaluation, however, the patient is at increased risk of perioperative renal complications secondary to age>/= 56, HTN, and renal insuff (preop creat  >1.2 mg/dl). Preventative measures include BP monitoring, medication compliance, and hydration management.   History of a solitary left kidney-Had right sided nephrectomy completed due to renal cancer.   History of Bladder cancer-States she has had 12 bladder tumor resections, BCG, and chemotherapy. No new urinary symptoms in PAT.   BMP-Reviewed, stable  Creatinine-1.49  GFR-35    Endocrine:  No diagnosis or significant findings on chart review or clinical presentation and evaluation.     Hematologic/Immunology:  No significant findings on chart review or clinical presentation and evaluation.  History of Iron deficiency anemia-Managed with Iron supplementation.   The patient is not a Jehovah’s witness and will accept blood and blood products if medically indicated.   History of previous blood transfusions No  CBC-Reviewed, stable  Positive for Anemia-HGB 11.3. Normocytic, normochromic.   Positive for Thrombocytopenia-mild, Platelets 149. Value fluctuates  Caprini Score 7, patient at High risk for postoperative DVT. Pt supplied education/VTE handout  Anticoagulation use: No     Gastrointestinal:   No significant findings on chart review or clinical presentation and evaluation.   History of GERD-Managed with Omeprazole  Recreational drug use: Drug use No  Alcohol use social drinker    Infectious disease:   No diagnosis or significant findings on chart review or clinical presentation and evaluation.     Musculoskeletal:   No diagnosis or significant findings on chart review or clinical presentation and evaluation.   JHFRAT score-6 points. moderate risk for falls    Anesthesia:  ASA 2 - Patient with mild systemic disease with no functional limitations  Anticipated anesthesia-General  History of General anesthesia- yes  Complications- No anesthesia complications  No family history of anesthesia complications    Abnormalities noted on PAT evaluation: No    Labs & Imaging ordered:  NA  Nickel/metal  allergy-negative  Shellfish allergy-negative    Overall, patient Moderate Risk for the scheduled Moderate Risk surgery. Discussed with patient medication instructions, NPO guidelines, and any questions or concerns.     Face to face time-30 minutes

## 2024-11-08 NOTE — PREPROCEDURE INSTRUCTIONS
Medication List            Accurate as of November 8, 2024  2:08 PM. Always use your most recent med list.                alendronate 70 mg tablet  Commonly known as: Fosamax  Take 1 tablet (70 mg) by mouth every 7 days. Takes on Sunday  Medication Adjustments for Surgery: Do Not take on the morning of surgery  Notes to patient: Last dose preoperatively 11/25/2024     ALPRAZolam 0.25 mg tablet  Commonly known as: Xanax  Take 1 tablet (0.25 mg) by mouth 3 times a day as needed for anxiety.  Medication Adjustments for Surgery: Take/Use as prescribed     cholecalciferol 50 MCG (2000 UT) tablet  Commonly known as: Vitamin D-3  Additional Medication Adjustments for Surgery: Take last dose 7 days before surgery  Notes to patient: Last dose preoperatively 11/18/2024     ferrous sulfate (325 mg ferrous sulfate) tablet  Medication Adjustments for Surgery: Do Not take on the morning of surgery  Notes to patient: Last dose preoperatively 11/25/2024     fluticasone 50 mcg/actuation nasal spray  Commonly known as: Flonase  Medication Adjustments for Surgery: Take/Use as prescribed     minoxidil 2.5 mg tablet  Commonly known as: Loniten  Medication Adjustments for Surgery: Take/Use as prescribed     mirtazapine 30 mg tablet  Commonly known as: Remeron  Take 1 tablet (30 mg) by mouth once daily at bedtime.  Medication Adjustments for Surgery: Take/Use as prescribed     nitrofurantoin (macrocrystal-monohydrate) 100 mg capsule  Commonly known as: Macrobid  One dose today when she gets home from procedure and again in morning  Medication Adjustments for Surgery: Take/Use as prescribed     omeprazole 20 mg DR capsule  Commonly known as: PriLOSEC  Take 1 capsule (20 mg) by mouth once daily.  Medication Adjustments for Surgery: Take/Use as prescribed     oxybutynin XL 15 mg 24 hr tablet  Commonly known as: Ditropan-XL  Take 1 tablet (15 mg) by mouth once daily.  Medication Adjustments for Surgery: Do Not take on the morning of  surgery  Notes to patient: Last dose preoperatively 11/25/2024     potassium citrate CR 10 mEq ER tablet  Commonly known as: Urocit-K-10  Take 1 tablet (10 mEq) by mouth 2 times daily (morning and late afternoon). Do not crush, chew, or split.  Medication Adjustments for Surgery: Do Not take on the morning of surgery  Notes to patient: Last dose preoperatively 11/25/2024     rosuvastatin 20 mg tablet  Commonly known as: Crestor  Take 1 tablet (20 mg) by mouth once daily.  Medication Adjustments for Surgery: Take/Use as prescribed     sertraline 100 mg tablet  Commonly known as: Zoloft  Take 2 tablets (200 mg) by mouth once daily.  Medication Adjustments for Surgery: Take/Use as prescribed     tamsulosin 0.4 mg 24 hr capsule  Commonly known as: Flomax  Take 1 capsule (0.4 mg) by mouth once daily.  Medication Adjustments for Surgery: Take/Use as prescribed            NPO Instructions:     Do not eat any food after midnight the night before your surgery/procedure.  You may have clear liquids until TWO hours before surgery/procedure. This includes water, black tea/coffee, (no milk or cream) apple juice and electrolyte drinks (Gatorade).  You may chew gum up to TWO hours before your surgery/procedure.     Additional Instructions:      Seven/Six Days before Surgery:  Review your medication instructions, stop indicated medications  Five Days before Surgery:  Review your medication instructions, stop indicated medications  Three Days before Surgery:  Review your medication instructions, stop indicated medications  The Day before Surgery:  Start using 0.12% CHG mouthwash  No smoking or alcohol use 24 hours before surgery  Review your medication instructions, stop indicated medications  You will be contacted regarding the time of your arrival to facility and surgery time  Do not eat any food after Midnight  Day of Surgery:  Review your medication instructions, take indicated medications  If you have diabetes, please check  your fasting blood sugar upon awakening.  If fasting blood sugar is <80 mg/dl, drink 100 ml of apple juice, time limit of 2 hours before  You may have clear liquids until TWO hours before surgery/procedure.  This includes water, black tea/coffee, (no milk or cream) apple juice and electrolyte drinks (Gatorade)  You may chew gum up to TWO hours before your surgery/procedure  Wear  comfortable loose fitting clothing  Do not use moisturizers, creams, lotions or perfume  All jewelry and valuables should be left at home     CONTACT SURGEON'S OFFICE IF YOU DEVELOP:  * Fever = 100.4 F   * New respiratory symptoms (e.g. cough, shortness of breath, respiratory distress, sore throat)  * Recent loss of taste or smell  *Flu like symptoms such as headache, fatigue or gastrointestinal symptoms  * You develop any open sores, shingles, burning or painful urination   AND/OR:  * You no longer wish to have the surgery.  * Any other personal circumstances change that may lead to the need to cancel or defer this surgery.  *You were admitted to any hospital within one week of your planned procedure.     SMOKING:  *Quitting smoking can make a huge difference to your health and recovery from surgery.    *If you need help with quitting, call 0-272-QUIT-NOW.     THE DAY BEFORE SURGERY:  *Do not eat any food after midnight the night before your surgery.   *You may have up to TEN OUNCES of clear liquids until TWO hours before your instructed ARRIVAL TIME to hospital. This includes water, black tea/coffee, (no milk or cream) apple juice, clear broth and electrolyte drinks (Gatorade). Please avoid clear liquids that are red in color.   *You may chew gum/mints up to TWO hours before your surgery/procedure.     SURGICAL TIME:  *You will be contacted between 2 p.m. and 3 p.m. the business day before your surgery with your arrival time.  *If you haven't received a call by 3pm, call [phone]  *Scheduled surgery times may change and you will be  notified if this occurs-check your personal voicemail for any updates.     ON THE MORNING OF SURGERY:  *Wear comfortable, loose fitting clothing.   *Do not use moisturizers, creams, lotions or perfume.  *All jewelry and valuables should be left at home.  *Prosthetic devices such as contact lenses, hearing aids, dentures, eyelash extensions, hairpins and body piercing must be removed before surgery.     BRING WITH YOU:  *Photo ID and insurance card  *Current list of medications and allergies  *Pacemaker/Defibrillator/Heart stent cards  *CPAP machine and mask  *Slings/splints/crutches  *Copy of your complete Advanced Directive/DHPOA-if applicable  *Neurostimulator implant remote     PARKING AND ARRIVAL:  *Check in at the Main Entrance desk and let them know you are here for surgery.     IF YOU ARE HAVING OUTPATIENT/SAME DAY SURGERY:  *A responsible adult MUST accompany you at the time of discharge and stay with you for 24 hours after your surgery.  *You may NOT drive yourself home after surgery.  *You may use a taxi or ride sharing service (Lyft, Uber) to return home ONLY if you are accompanied by a friend or family member.  *Instructions for resuming your medications will be provided by your surgeon.     Thank you for coming to Pre Admission testing.      If I have prescribe medication please don't forget to  at your pharmacy.      Any questions about today's visit call [phone] and leave a message in the general mailbox.     Patient instructed to ambulate as soon as possible postoperatively to decrease thromboembolic risk.     Diego Jay, APRN-CNP     Thank you for visiting [facility].  If you have any changes to your health condition, please call the surgeons office to alert them and give them details of your symptoms.        Preoperative Fasting Guidelines     Why must I stop eating and drinking near surgery time?  With sedation, food or liquid in your stomach can enter  your lungs causing serious complications  Increases nausea and vomiting     When do I need to stop eating and drinking before my surgery?  Do not eat any food after midnight the night before your surgery/procedure.  You may have up to TEN ounces of clear liquid until TWO hours before your instructed arrival time to the hospital.  This includes water, black tea/coffee, (no milk or cream) apple juice, and electrolyte drinks (Gatorade)  You may chew gum until TWO hours before your surgery/procedure        Additional Instructions:      The Day before Surgery:  -Review your medication instructions, stop indicated medications  -You will be contacted in the evening regarding the time of your arrival to facility and surgery time     Day of Surgery:  -Review your medication instructions, take indicated medications  -Wear comfortable loose fitting clothing  -Do not use moisturizers, creams, lotions or perfume  -All jewelry and valuables should be left at home                   Preoperative Brain Exercises     What are brain exercises?  A brain exercise is any activity that engages your thinking (cognitive) skills.     What types of activities are considered brain exercises?  Jigsaw puzzles, crossword puzzles, word jumble, memory games, word search, and many more.  Many can be found free online or on your phone via a mobile darshana.     Why should I do brain exercises before my surgery?  More recent research has shown brain exercise before surgery can lower the risk of postoperative delirium (confusion) which can be especially important for older adults.  Patients who did brain exercises for 5 to 10 hours the days before surgery, cut their risk of postoperative delirium in half up to 1 week after surgery.                         [facility]     Preoperative Deep Breathing Exercises     Why it is important to do deep breathing exercises before my surgery?  Deep breathing exercises strengthen your breathing  muscles.  This helps you to recover after your surgery and decreases the chance of breathing complications.        How are the deep breathing exercises done?  Sit straight with your back supported.  Breathe in deeply and slowly through your nose. Your lower rib cage should expand and your abdomen may move forward.  Hold that breath for 3 to 5 seconds.  Breathe out through pursed lips, slowly and completely.  Rest and repeat 10 times every hour while awake.  Rest longer if you become dizzy or lightheaded.                      [facility]     Preoperative Deep Breathing Exercises     Why it is important to do deep breathing exercises before my surgery?  Deep breathing exercises strengthen your breathing muscles.  This helps you to recover after your surgery and decreases the chance of breathing complications.        How are the deep breathing exercises done?  Sit straight with your back supported.  Breathe in deeply and slowly through your nose. Your lower rib cage should expand and your abdomen may move forward.  Hold that breath for 3 to 5 seconds.  Breathe out through pursed lips, slowly and completely.  Rest and repeat 10 times every hour while awake.  Rest longer if you become dizzy or lightheaded.        Patient Information: Incentive Spirometer  What is an incentive spirometer?  An incentive spirometer is a device used before and after surgery to “exercise” your lungs.  It helps you to take deeper breaths to expand your lungs.  Below is an example of a basic incentive spirometer.  The device you receive may differ slightly but they all function the same.    Why do I need to use an incentive spirometer?  Using your incentive spirometer prepares your lungs for surgery and helps prevent lung problems after surgery.  How do I use my incentive spirometer?  When you're using your incentive spirometer, make sure to breathe through your mouth. If you breathe through your nose, the incentive  spirometer won't work properly. You can hold your nose if you have trouble.  If you feel dizzy at any time, stop and rest. Try again at a later time.  Follow the steps below:  Set up your incentive spirometer, expand the flexible tubing and connect to the outlet.  Sit upright in a chair or bed. Hold the incentive spirometer at eye level.   Put the mouthpiece in your mouth and close your lips tightly around it. Slowly breathe out (exhale) completely.  Breathe in (inhale) slowly through your mouth as deeply as you can. As you take a breath, you will see the piston rise inside the large column. While the piston rises, the indicator should move upwards. It should stay in between the 2 arrows (see Figure).  Try to get the piston as high as you can, while keeping the indicator between the arrows.   If the indicator doesn't stay between the arrows, you're breathing either too fast or too slow.  When you get it as high as you can, hold your breath for 10 seconds, or as long as possible. While you're holding your breath, the piston will slowly fall to the base of the spirometer.  Once the piston reaches the bottom of the spirometer, breathe out slowly through your mouth. Rest for a few seconds.  Repeat 10 times. Try to get the piston to the same level with each breath.  Repeat every hour while awake  You can carefully clean the outside of the mouthpiece with an alcohol wipe or soap and water.       Patient and Family Education             Ways You Can Help Prevent Blood Clots                    This handout explains some simple things you can do to help prevent blood clots.      Blood clots are blockages that can form in the body's veins. When a blood clot forms in your deep veins, it may be called a deep vein thrombosis, or DVT for short. Blood clots can happen in any part of the body where blood flows, but they are most common in the arms and legs. If a piece of a blood clot breaks free and travels to the lungs, it is  called a pulmonary embolus (PE). A PE can be a very serious problem.         Being in the hospital or having surgery can raise your chances of getting a blood clot because you may not be well enough to move around as much as you normally do.         Ways you can help prevent blood clots in the hospital           Wearing SCDs. SCDs stands for Sequential Compression Devices.   SCDs are special sleeves that wrap around your legs  They attach to a pump that fills them with air to gently squeeze your legs every few minutes.   This helps return the blood in your legs to your heart.   SCDs should only be taken off when walking or bathing.   SCDs may not be comfortable, but they can help save your life.                                            Wearing compression stockings - if your doctor orders them. These special snug fitting stockings gently squeeze your legs to help blood flow.       Walking. Walking helps move the blood in your legs.   If your doctor says it is ok, try walking the halls at least   5 times a day. Ask us to help you get up, so you don't fall.      Taking any blood thinning medicines your doctor orders.        Page 1 of 2            [facility]; 3/23   Ways you can help prevent blood clots at home         Wearing compression stockings - if your doctor orders them. ? Walking - to help move the blood in your legs.       Taking any blood thinning medicines your doctor orders.      Signs of a blood clot or PE        Tell your doctor or nurse know right away if you have of the problems listed below.    If you are at home, seek medical care right away. Call 911 for chest pain or problems breathing.                Signs of a blood clot (DVT) - such as pain,  swelling, redness or warmth in your arm or leg      Signs of a pulmonary embolism (PE) - such as chest pain or feeling short of breath

## 2024-11-18 ENCOUNTER — LAB (OUTPATIENT)
Dept: LAB | Facility: LAB | Age: 79
End: 2024-11-18
Payer: MEDICARE

## 2024-11-18 DIAGNOSIS — D69.6 THROMBOCYTOPENIA (CMS-HCC): ICD-10-CM

## 2024-11-18 LAB
BASOPHILS # BLD AUTO: 0.02 X10*3/UL (ref 0–0.1)
BASOPHILS NFR BLD AUTO: 0.3 %
EOSINOPHIL # BLD AUTO: 0.1 X10*3/UL (ref 0–0.4)
EOSINOPHIL NFR BLD AUTO: 1.4 %
ERYTHROCYTE [DISTWIDTH] IN BLOOD BY AUTOMATED COUNT: 14.1 % (ref 11.5–14.5)
HCT VFR BLD AUTO: 35.3 % (ref 36–46)
HGB BLD-MCNC: 11.3 G/DL (ref 12–16)
IMM GRANULOCYTES # BLD AUTO: 0.02 X10*3/UL (ref 0–0.5)
IMM GRANULOCYTES NFR BLD AUTO: 0.3 % (ref 0–0.9)
LYMPHOCYTES # BLD AUTO: 1.19 X10*3/UL (ref 0.8–3)
LYMPHOCYTES NFR BLD AUTO: 16.5 %
MCH RBC QN AUTO: 29.4 PG (ref 26–34)
MCHC RBC AUTO-ENTMCNC: 32 G/DL (ref 32–36)
MCV RBC AUTO: 92 FL (ref 80–100)
MONOCYTES # BLD AUTO: 0.57 X10*3/UL (ref 0.05–0.8)
MONOCYTES NFR BLD AUTO: 7.9 %
NEUTROPHILS # BLD AUTO: 5.3 X10*3/UL (ref 1.6–5.5)
NEUTROPHILS NFR BLD AUTO: 73.6 %
NRBC BLD-RTO: 0 /100 WBCS (ref 0–0)
PLATELET # BLD AUTO: 149 X10*3/UL (ref 150–450)
RBC # BLD AUTO: 3.85 X10*6/UL (ref 4–5.2)
WBC # BLD AUTO: 7.2 X10*3/UL (ref 4.4–11.3)

## 2024-11-18 PROCEDURE — 85025 COMPLETE CBC W/AUTO DIFF WBC: CPT

## 2024-11-18 PROCEDURE — 36415 COLL VENOUS BLD VENIPUNCTURE: CPT

## 2024-11-20 ENCOUNTER — HOSPITAL ENCOUNTER (OUTPATIENT)
Dept: RADIOLOGY | Facility: CLINIC | Age: 79
Discharge: HOME | End: 2024-11-20
Payer: MEDICARE

## 2024-11-20 ENCOUNTER — CLINICAL SUPPORT (OUTPATIENT)
Dept: AUDIOLOGY | Facility: CLINIC | Age: 79
End: 2024-11-20
Payer: MEDICARE

## 2024-11-20 ENCOUNTER — OFFICE VISIT (OUTPATIENT)
Dept: URGENT CARE | Age: 79
End: 2024-11-20
Payer: MEDICARE

## 2024-11-20 VITALS
RESPIRATION RATE: 17 BRPM | HEART RATE: 90 BPM | OXYGEN SATURATION: 97 % | DIASTOLIC BLOOD PRESSURE: 83 MMHG | SYSTOLIC BLOOD PRESSURE: 135 MMHG | TEMPERATURE: 98.2 F

## 2024-11-20 DIAGNOSIS — S29.9XXA RIB INJURY: ICD-10-CM

## 2024-11-20 DIAGNOSIS — S29.9XXA RIB INJURY: Primary | ICD-10-CM

## 2024-11-20 DIAGNOSIS — H90.3 SENSORINEURAL HEARING LOSS OF BOTH EARS: Primary | ICD-10-CM

## 2024-11-20 PROCEDURE — 92557 COMPREHENSIVE HEARING TEST: CPT | Performed by: AUDIOLOGIST

## 2024-11-20 PROCEDURE — 71101 X-RAY EXAM UNILAT RIBS/CHEST: CPT | Mod: RIGHT SIDE | Performed by: RADIOLOGY

## 2024-11-20 PROCEDURE — 71101 X-RAY EXAM UNILAT RIBS/CHEST: CPT | Mod: RT

## 2024-11-20 PROCEDURE — 92550 TYMPANOMETRY & REFLEX THRESH: CPT | Performed by: AUDIOLOGIST

## 2024-11-20 ASSESSMENT — PATIENT HEALTH QUESTIONNAIRE - PHQ9
1. LITTLE INTEREST OR PLEASURE IN DOING THINGS: NOT AT ALL
SUM OF ALL RESPONSES TO PHQ9 QUESTIONS 1 AND 2: 0
2. FEELING DOWN, DEPRESSED OR HOPELESS: NOT AT ALL

## 2024-11-20 ASSESSMENT — PAIN SCALES - GENERAL: PAINLEVEL_OUTOF10: 0 - NO PAIN

## 2024-11-20 ASSESSMENT — PAIN - FUNCTIONAL ASSESSMENT: PAIN_FUNCTIONAL_ASSESSMENT: 0-10

## 2024-11-20 NOTE — PROGRESS NOTES
HPI:  Patient states that yesterday she was trying to get her cat in a carrier to take to the vet and was leaning close to the floor with cat wrapped in a blanket close to the carrier when the cat suddenly got loose and pt fall on her right side on the carpeted surface.  Pt denies head injury or LOC.  Pt states that she has no SOB, but has some pain with taking a deep breath. Pt states that she also has pain with certain movements.  No hx/o rib fracture in the past.         ROS:  No fever/chills  No n/v  No sob  +rib pain    PE:    A&O x3  NCAT  No conjunctival erythema  Sinus rhythm  CTAB  Mild tndop over right lower lateral ribcage w/o swelling/bruising/deformity  +pain with back rotation  MOEx4  No focal deficit  Judgement normal    Results:  Xray right side ribs with chest: pending    A/P:   Rib contusion    We will call you with xray results if you need further treatment.  Ice.  Rest.  Tylenol as needed for pain.  Follow up with your doctor for further evaluation in 2 weeks if symptoms persist.  Keep a diary of symptoms.  Go to the ER if starts getting worse.

## 2024-11-20 NOTE — PROGRESS NOTES
HISTORY:  Anahi Wall, 79 y.o. was seen in audiology on 11/20/2024 for an annual audiologic evaluation. Previous audiologic evaluation on 11/27/2023 revealed normal sloping to moderately severe sensorineural hearing loss in the right ear and severe sensorineural hearing loss in the left ear. Today, Anahi denies any concern for changes with her ears or hearing. She denies any dizziness, tinnitus or otalgia at this time.     Fit 9/11/15  Phonak Audeo V70 312 T bilaterally.   Right # [id number]  Left # [id number]   Warranty expires 12/01/18     RESULTS:  Otoscopic inspection showed clear ear canals bilaterally.    Immittance testing showed normal tympanograms bilaterally.   Ipsilateral acoustic reflexes were tested at 500-4000Hz in both ears.  They were present at 500-4000Hz in both ears.    Pure tone air and bone conduction testing showed normal hearing from 125-500 Hz sloping to a severe sensorineural hearing loss in both ears.    Word discrimination scores were excellent bilaterally.    IMPRESSIONS:  Audiometric testing revealed normal sloping to several sensorineural hearing loss in both ears.   This represent stable hearing thresholds since Anahi's last visit on 11/27/2023.     Due to age of Anahi's current hearing devices, she inquired about obtaining new devices from [employer]. It was recommended that she try hearing aids from [employer] to evaluate interest and possible benefit in which she will likely be able to return devices if unsatisfied.     Treatment Plan:   Annual audiologic evaluation to monitor hearing, or sooner if concerns arise. (Appointment scheduled for 12/4/2025 at 10:30 AM)  Continued use of hearing devices   Consider obtaining new hearing technology, if interested    TIME:  5103-8530

## 2024-11-26 ENCOUNTER — ANESTHESIA (OUTPATIENT)
Dept: OPERATING ROOM | Facility: HOSPITAL | Age: 79
End: 2024-11-26
Payer: MEDICARE

## 2024-11-26 ENCOUNTER — ANESTHESIA EVENT (OUTPATIENT)
Dept: OPERATING ROOM | Facility: HOSPITAL | Age: 79
End: 2024-11-26
Payer: MEDICARE

## 2024-11-26 ENCOUNTER — HOSPITAL ENCOUNTER (OUTPATIENT)
Facility: HOSPITAL | Age: 79
Setting detail: OUTPATIENT SURGERY
Discharge: HOME | End: 2024-11-26
Attending: STUDENT IN AN ORGANIZED HEALTH CARE EDUCATION/TRAINING PROGRAM | Admitting: STUDENT IN AN ORGANIZED HEALTH CARE EDUCATION/TRAINING PROGRAM
Payer: MEDICARE

## 2024-11-26 VITALS
TEMPERATURE: 97.7 F | HEART RATE: 69 BPM | OXYGEN SATURATION: 96 % | WEIGHT: 159.8 LBS | DIASTOLIC BLOOD PRESSURE: 75 MMHG | SYSTOLIC BLOOD PRESSURE: 150 MMHG | HEIGHT: 62 IN | RESPIRATION RATE: 16 BRPM | BODY MASS INDEX: 29.4 KG/M2

## 2024-11-26 DIAGNOSIS — C68.9 UROTHELIAL CARCINOMA (MULTI): ICD-10-CM

## 2024-11-26 DIAGNOSIS — N20.0 URIC ACID NEPHROLITHIASIS: ICD-10-CM

## 2024-11-26 DIAGNOSIS — C67.9 MALIGNANT NEOPLASM OF URINARY BLADDER, UNSPECIFIED SITE (MULTI): ICD-10-CM

## 2024-11-26 PROCEDURE — 3600000003 HC OR TIME - INITIAL BASE CHARGE - PROCEDURE LEVEL THREE: Performed by: STUDENT IN AN ORGANIZED HEALTH CARE EDUCATION/TRAINING PROGRAM

## 2024-11-26 PROCEDURE — 7100000001 HC RECOVERY ROOM TIME - INITIAL BASE CHARGE: Performed by: STUDENT IN AN ORGANIZED HEALTH CARE EDUCATION/TRAINING PROGRAM

## 2024-11-26 PROCEDURE — 88307 TISSUE EXAM BY PATHOLOGIST: CPT | Mod: TC,SUR,BEALAB | Performed by: STUDENT IN AN ORGANIZED HEALTH CARE EDUCATION/TRAINING PROGRAM

## 2024-11-26 PROCEDURE — 88307 TISSUE EXAM BY PATHOLOGIST: CPT | Performed by: STUDENT IN AN ORGANIZED HEALTH CARE EDUCATION/TRAINING PROGRAM

## 2024-11-26 PROCEDURE — 7100000009 HC PHASE TWO TIME - INITIAL BASE CHARGE: Performed by: STUDENT IN AN ORGANIZED HEALTH CARE EDUCATION/TRAINING PROGRAM

## 2024-11-26 PROCEDURE — 7100000010 HC PHASE TWO TIME - EACH INCREMENTAL 1 MINUTE: Performed by: STUDENT IN AN ORGANIZED HEALTH CARE EDUCATION/TRAINING PROGRAM

## 2024-11-26 PROCEDURE — 2720000007 HC OR 272 NO HCPCS: Performed by: STUDENT IN AN ORGANIZED HEALTH CARE EDUCATION/TRAINING PROGRAM

## 2024-11-26 PROCEDURE — 3700000002 HC GENERAL ANESTHESIA TIME - EACH INCREMENTAL 1 MINUTE: Performed by: STUDENT IN AN ORGANIZED HEALTH CARE EDUCATION/TRAINING PROGRAM

## 2024-11-26 PROCEDURE — 7100000002 HC RECOVERY ROOM TIME - EACH INCREMENTAL 1 MINUTE: Performed by: STUDENT IN AN ORGANIZED HEALTH CARE EDUCATION/TRAINING PROGRAM

## 2024-11-26 PROCEDURE — 52240 CYSTOSCOPY AND TREATMENT: CPT | Performed by: STUDENT IN AN ORGANIZED HEALTH CARE EDUCATION/TRAINING PROGRAM

## 2024-11-26 PROCEDURE — A52235 PR CYSTOURETHROSCOPY,FULGUR 2-5 CM LESN: Performed by: ANESTHESIOLOGY

## 2024-11-26 PROCEDURE — 99100 ANES PT EXTEME AGE<1 YR&>70: CPT | Performed by: ANESTHESIOLOGY

## 2024-11-26 PROCEDURE — 2500000005 HC RX 250 GENERAL PHARMACY W/O HCPCS: Performed by: NURSE ANESTHETIST, CERTIFIED REGISTERED

## 2024-11-26 PROCEDURE — A52235 PR CYSTOURETHROSCOPY,FULGUR 2-5 CM LESN: Performed by: NURSE ANESTHETIST, CERTIFIED REGISTERED

## 2024-11-26 PROCEDURE — 2500000004 HC RX 250 GENERAL PHARMACY W/ HCPCS (ALT 636 FOR OP/ED): Performed by: NURSE ANESTHETIST, CERTIFIED REGISTERED

## 2024-11-26 PROCEDURE — 2500000004 HC RX 250 GENERAL PHARMACY W/ HCPCS (ALT 636 FOR OP/ED): Mod: JZ | Performed by: STUDENT IN AN ORGANIZED HEALTH CARE EDUCATION/TRAINING PROGRAM

## 2024-11-26 PROCEDURE — 3600000008 HC OR TIME - EACH INCREMENTAL 1 MINUTE - PROCEDURE LEVEL THREE: Performed by: STUDENT IN AN ORGANIZED HEALTH CARE EDUCATION/TRAINING PROGRAM

## 2024-11-26 PROCEDURE — 2500000004 HC RX 250 GENERAL PHARMACY W/ HCPCS (ALT 636 FOR OP/ED): Performed by: ANESTHESIOLOGY

## 2024-11-26 PROCEDURE — 3700000001 HC GENERAL ANESTHESIA TIME - INITIAL BASE CHARGE: Performed by: STUDENT IN AN ORGANIZED HEALTH CARE EDUCATION/TRAINING PROGRAM

## 2024-11-26 RX ORDER — ALBUTEROL SULFATE 0.83 MG/ML
2.5 SOLUTION RESPIRATORY (INHALATION) ONCE AS NEEDED
Status: DISCONTINUED | OUTPATIENT
Start: 2024-11-26 | End: 2024-11-26 | Stop reason: HOSPADM

## 2024-11-26 RX ORDER — FENTANYL CITRATE 50 UG/ML
INJECTION, SOLUTION INTRAMUSCULAR; INTRAVENOUS AS NEEDED
Status: DISCONTINUED | OUTPATIENT
Start: 2024-11-26 | End: 2024-11-26

## 2024-11-26 RX ORDER — ONDANSETRON HYDROCHLORIDE 2 MG/ML
INJECTION, SOLUTION INTRAVENOUS AS NEEDED
Status: DISCONTINUED | OUTPATIENT
Start: 2024-11-26 | End: 2024-11-26

## 2024-11-26 RX ORDER — NITROFURANTOIN 25; 75 MG/1; MG/1
100 CAPSULE ORAL DAILY
Qty: 7 CAPSULE | Refills: 0 | Status: SHIPPED | OUTPATIENT
Start: 2024-11-26 | End: 2024-12-03

## 2024-11-26 RX ORDER — ACETAMINOPHEN 500 MG
500 TABLET ORAL EVERY 6 HOURS PRN
COMMUNITY

## 2024-11-26 RX ORDER — SODIUM CHLORIDE, SODIUM LACTATE, POTASSIUM CHLORIDE, CALCIUM CHLORIDE 600; 310; 30; 20 MG/100ML; MG/100ML; MG/100ML; MG/100ML
20 INJECTION, SOLUTION INTRAVENOUS CONTINUOUS
Status: DISCONTINUED | OUTPATIENT
Start: 2024-11-26 | End: 2024-11-26 | Stop reason: HOSPADM

## 2024-11-26 RX ORDER — LIDOCAINE HYDROCHLORIDE 10 MG/ML
INJECTION, SOLUTION EPIDURAL; INFILTRATION; INTRACAUDAL; PERINEURAL AS NEEDED
Status: DISCONTINUED | OUTPATIENT
Start: 2024-11-26 | End: 2024-11-26

## 2024-11-26 RX ORDER — TAMSULOSIN HYDROCHLORIDE 0.4 MG/1
0.4 CAPSULE ORAL DAILY
Qty: 360 CAPSULE | Refills: 0 | Status: SHIPPED | OUTPATIENT
Start: 2024-11-26 | End: 2025-11-26

## 2024-11-26 RX ORDER — ONDANSETRON HYDROCHLORIDE 2 MG/ML
4 INJECTION, SOLUTION INTRAVENOUS ONCE AS NEEDED
Status: DISCONTINUED | OUTPATIENT
Start: 2024-11-26 | End: 2024-11-26 | Stop reason: HOSPADM

## 2024-11-26 RX ORDER — CHLORHEXIDINE GLUCONATE 40 MG/ML
SOLUTION TOPICAL DAILY PRN
Status: DISCONTINUED | OUTPATIENT
Start: 2024-11-26 | End: 2024-11-26 | Stop reason: HOSPADM

## 2024-11-26 RX ORDER — HYDROMORPHONE HYDROCHLORIDE 0.2 MG/ML
0.1 INJECTION INTRAMUSCULAR; INTRAVENOUS; SUBCUTANEOUS EVERY 5 MIN PRN
Status: DISCONTINUED | OUTPATIENT
Start: 2024-11-26 | End: 2024-11-26 | Stop reason: HOSPADM

## 2024-11-26 RX ORDER — SODIUM CHLORIDE, SODIUM LACTATE, POTASSIUM CHLORIDE, CALCIUM CHLORIDE 600; 310; 30; 20 MG/100ML; MG/100ML; MG/100ML; MG/100ML
100 INJECTION, SOLUTION INTRAVENOUS CONTINUOUS
Status: DISCONTINUED | OUTPATIENT
Start: 2024-11-26 | End: 2024-11-26 | Stop reason: HOSPADM

## 2024-11-26 RX ORDER — PROPOFOL 10 MG/ML
INJECTION, EMULSION INTRAVENOUS AS NEEDED
Status: DISCONTINUED | OUTPATIENT
Start: 2024-11-26 | End: 2024-11-26

## 2024-11-26 RX ORDER — ROCURONIUM BROMIDE 10 MG/ML
INJECTION, SOLUTION INTRAVENOUS AS NEEDED
Status: DISCONTINUED | OUTPATIENT
Start: 2024-11-26 | End: 2024-11-26

## 2024-11-26 RX ORDER — CEFAZOLIN SODIUM 2 G/100ML
2 INJECTION, SOLUTION INTRAVENOUS ONCE
Status: COMPLETED | OUTPATIENT
Start: 2024-11-26 | End: 2024-11-26

## 2024-11-26 RX ORDER — LIDOCAINE HYDROCHLORIDE 10 MG/ML
0.1 INJECTION, SOLUTION EPIDURAL; INFILTRATION; INTRACAUDAL; PERINEURAL ONCE
Status: DISCONTINUED | OUTPATIENT
Start: 2024-11-26 | End: 2024-11-26 | Stop reason: HOSPADM

## 2024-11-26 RX ORDER — HYDROMORPHONE HYDROCHLORIDE 0.2 MG/ML
0.2 INJECTION INTRAMUSCULAR; INTRAVENOUS; SUBCUTANEOUS EVERY 5 MIN PRN
Status: DISCONTINUED | OUTPATIENT
Start: 2024-11-26 | End: 2024-11-26 | Stop reason: HOSPADM

## 2024-11-26 RX ORDER — HYDRALAZINE HYDROCHLORIDE 20 MG/ML
5 INJECTION INTRAMUSCULAR; INTRAVENOUS EVERY 30 MIN PRN
Status: DISCONTINUED | OUTPATIENT
Start: 2024-11-26 | End: 2024-11-26 | Stop reason: HOSPADM

## 2024-11-26 RX ORDER — NORETHINDRONE AND ETHINYL ESTRADIOL 0.5-0.035
KIT ORAL AS NEEDED
Status: DISCONTINUED | OUTPATIENT
Start: 2024-11-26 | End: 2024-11-26

## 2024-11-26 RX ORDER — NITROFURANTOIN 25; 75 MG/1; MG/1
100 CAPSULE ORAL DAILY
Qty: 5 CAPSULE | Refills: 0 | Status: SHIPPED | OUTPATIENT
Start: 2024-11-26 | End: 2024-11-26

## 2024-11-26 SDOH — HEALTH STABILITY: MENTAL HEALTH: CURRENT SMOKER: 0

## 2024-11-26 ASSESSMENT — PAIN - FUNCTIONAL ASSESSMENT
PAIN_FUNCTIONAL_ASSESSMENT: 0-10

## 2024-11-26 ASSESSMENT — PAIN SCALES - GENERAL
PAINLEVEL_OUTOF10: 3
PAINLEVEL_OUTOF10: 0 - NO PAIN
PAINLEVEL_OUTOF10: 6
PAINLEVEL_OUTOF10: 4
PAINLEVEL_OUTOF10: 6
PAINLEVEL_OUTOF10: 0 - NO PAIN
PAINLEVEL_OUTOF10: 3
PAINLEVEL_OUTOF10: 5 - MODERATE PAIN
PAINLEVEL_OUTOF10: 3
PAINLEVEL_OUTOF10: 6

## 2024-11-26 ASSESSMENT — PAIN DESCRIPTION - DESCRIPTORS
DESCRIPTORS: ACHING;BURNING;STABBING
DESCRIPTORS: BURNING
DESCRIPTORS: DISCOMFORT
DESCRIPTORS: STABBING

## 2024-11-26 NOTE — NURSING NOTE
Patient in Phase 2; dressed and up to chair with RN assist. Tolerating po fluids, minimal complaint of pain and no complaint of nausea.     Significant other at bedside; discussed discharge instructions with patient and Significant other. All questions at this time answered.     Patient clinically appropriate for discharge. IV removed and patient transported to discharge area via wheelchair.     Patient and  instructed on rodriguez catheter home care and removal.  Questions answered.  Understanding verbalized

## 2024-11-26 NOTE — ANESTHESIA PROCEDURE NOTES
Airway  Date/Time: 11/26/2024 8:05 AM  Urgency: elective    Airway not difficult    Staffing  Performed: CRNA   Authorized by: Sumanth Palmer MD    Performed by: ESTEFANÍA Rock-MIHAI  Patient location during procedure: OR    Indications and Patient Condition  Indications for airway management: anesthesia  Spontaneous ventilation: present  Sedation level: deep  Preoxygenated: yes  Patient position: sniffing  MILS maintained throughout  Mask difficulty assessment: 1 - vent by mask    Final Airway Details  Final airway type: endotracheal airway      Successful airway: ETT  Cuffed: yes   Successful intubation technique: direct laryngoscopy  Endotracheal tube insertion site: oral  Blade: Marcus  Blade size: #3  ETT size (mm): 7.0  Cormack-Lehane Classification: grade IIa - partial view of glottis  Placement verified by: capnometry   Cuff volume (mL): 5  Measured from: lips  Number of attempts at approach: 1

## 2024-11-26 NOTE — DISCHARGE INSTRUCTIONS
[facility] [place]    DISCHARGE INSTRUCTIONS       Transurethral resection of Bladder Tumor (TURBT)    Anahi Wall      Call [phone] during regular daytime business hours (8:00 am - 5:00 pm) and after 5:00 pm and ask for the Urology resident with any questions or concerns.      If it is a life-threatening situation, proceed to the nearest emergency department.        Follow-up appointment:  12/20/2024     Thank you for the opportunity to care for you today.  Your health and healing are very important to us.  We hope we made you feel as comfortable as possible and are committed to your recovery and continued well-being.      The following is a brief overview of your transurethral bladder tumor resection today. Some of the information contained on this summary may be confidential.  This information should be kept in your records and should be shared with your regular doctor.    Physicians:   Dr. De Leon      Procedure performed: bladder tumor resection/biopsy  Pending results:   pathology of tissue taken from your bladder (we will go over these results at your post-operative appointment.)    What to Expect During your Recovery and Home Care  Anesthesia Side Effects   You received anesthesia today.  You may feel sleepy, tired, or have a sore throat.   You may also feel drowsiness, dizziness, or inability to think clearly.  For your safety, do not drive, drink alcoholic beverages, take any unprescribed medication or make any important decisions for 24 hours.  A responsible adult should be with you for 24 hours.        Activity and Recovery    No heavy lifting over ten pounds. Limit activity if you have a urinary catheter in place. Avoid activities that would cause pulling or tugging on your catheter.    Do not drive or operate heavy machinery while taking narcotic pain medications as these medications can alter perception, impair judgement, and slow reaction times.      Pain Control  Unfortunately, you may  experience pain after your procedure.  Adequate management can include alternative measures to help ease your pain and can include over the counter Tylenol  can be taken as prescribed as needed for breakthrough pain. Do not take more than 4,000mg of Tylenol in a 24-hour period.      Please take (prescribed or not) stool softeners when taking this medication to prevent constipation.    You may also experience bladder spasms due to the catheter.     Nausea/Vomiting   Clear liquids are best tolerated at first. Start slow, advance your diet as tolerated to normal foods. Avoid spicy, greasy, heavy foods at first. Also, you may feel nauseous or like you need to vomit if you take any type of medication on an empty stomach.  Call your physician if you are unable to eat or drink and have persistent vomiting.    Signs of Bleeding   You are going to have some blood in your urine. Your urine will be light pink to yellow. If you have a catheter you always want to look at the urine in the tubing of your catheter and not in the large urine collection bag to check for bleeding. If urine becomes thick dark jessica red, has large clots or stops draining, please notify your physician.    Treatment/wound care:   Keep area(s) clean and dry. Clean around insertion site of catheter daily with mild soap and water.  It is okay to shower 24 hours after time of surgery.    Do not submerge your catheter in standing water until catheter removal (no tub bathing, swimming, or hot tubs).      Signs of Infection  Signs of infection can include fever, drainage(green/yellow), chills, burning sensation with passing of urine, catheter leakage, or severe abdominal pain.  If you see any of these occur, please contact your doctor's office at [phone].  Any fever higher than 100.4, especially if associated with an ill feeling, abdominal pain, chills, or nausea should be reported to your surgeon.        Assist in bowel movements/urination  Increase fiber  in diet  Increase water (6 to 8 glasses)  Increase walking   Urination should occur within 6 hours of anesthesia  If you have tried these methods and your bladder still feels full and you cannot use the bathroom, please go to your nearest Emergency room.    Additional Instructions: CATHETER CARE  You can remove your catheter Monday 12/2/2024    Always keep the catheters tubing and drainage bag below the level of your bladder.  Avoid loops and kinks in the catheter tubing.  NOTIFY your physician if catheter falls out or catheter seems clogged and urine is not draining.   Do not wear the small leg bag to bed you should be provided with a larger overnight bag that you should wear to bed and can hang over the side of the bed.  We recommend wearing the large bag in the shower as well as this is easy to dry, and you do not get your leg straps wet from your leg bag.   Your catheter should be secured to your upper thigh, do not allow it to hang or dangle.    Instructions for removing the catheter  Follow the directions closely. If the catheter doesn’t come out with gentle pulling, stop and call your healthcare provider right away.  Empty the bag of urine if needed.  Wash your hands with soap and clean, running water. Dry them well.  Gather your supplies. This includes a wastebasket, a towel, and a syringe that was given to you by your healthcare provider.  Put the syringe into the balloon port on the catheter. The syringe fits tightly into the port with a firm push and twist motion.  Wait as the water from the balloon empties into the syringe. Depending on how large the balloon is, you may need to repeat this process several times until all of the water is out of the balloon.  Once the balloon is emptied, gently pull out the catheter.  Put the used catheter in the wastebasket. Also throw away the syringe.  Use the towel to wipe up any spilled water or urine if needed.  Wash your hands again.

## 2024-11-26 NOTE — ANESTHESIA PREPROCEDURE EVALUATION
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    Breast cancer (Multi)     left sided lumpectomy, s/p xrt also completed 1996    Carpal tunnel syndrome     s/p Left wrist surgery    Cataract     removed    Cramp and spasm     Cramp of limb    Depression     controlled with medication    GERD (gastroesophageal reflux disease)     Managed with Prilosec    Hearing aid worn     HL (hearing loss)     wears B/L hearing aids    Hyperlipidemia     F/W PCP    Joint pain     Nephrolithiasis     Osteoporosis     Other conditions influencing health status     Acute Meniscal Tear Of Right Knee    Trochanteric bursitis, unspecified hip     Urothelial cancer (Multi)     Vision loss     wears glasses      Relevant Problems   Cardiac   (+) Chest pain   (+) HTN (hypertension), benign   (+) Hyperlipidemia      GI   (+) Acid reflux      /Renal   (+) Malignant neoplasm of kidney (Multi)   (+) Uric acid nephrolithiasis   (+) Urothelial carcinoma of kidney, right (Multi)      Musculoskeletal   (+) Degeneration of lumbar intervertebral disc   (+) Disc displacement, lumbar   (+) Localized primary osteoarthritis of carpometacarpal joint of left thumb   (+) Lumbosacral spinal stenosis   (+) Primary osteoarthritis of left knee      HEENT   (+) Sensorineural hearing loss of both ears     Past Surgical History:   Procedure Laterality Date    BLADDER SURGERY  09/03/2019    bladder bx    BREAST LUMPECTOMY Left     Breast Surgery Lumpectomy x 2 1996    CARPAL TUNNEL RELEASE Left 2003    Neuroplasty Decompression Median Nerve At Carpal Tunnel    CATARACT EXTRACTION      CATARACT EXTRACTION EXTRACAPSULAR  W/ INTRAOCULAR LENS IMPLANTATION Bilateral 2017    COLONOSCOPY      CYSTOSCOPY      multiple    HYSTERECTOMY  02/10/2014    Hysterectomy    NEPHRECTOMY Right 03/28/2024    TURBT, right nephroureterectomy, RPLND    OTHER SURGICAL HISTORY  2007    bladder sling    OTHER SURGICAL HISTORY Left     post cataract laser surgery 2021    OTHER SURGICAL HISTORY Left     left shoulder surgery to remove bone spur      Clinical information reviewed:   Tobacco  Allergies  Meds   Med Hx  Surg Hx   Fam Hx  Soc Hx        NPO Detail:  No data recorded     Physical Exam    Airway  Mallampati: III  TM distance: >3 FB  Neck ROM: full     Cardiovascular    Dental    Pulmonary    Abdominal            Anesthesia Plan    History of general anesthesia?: yes  History of complications of general anesthesia?: no    ASA 3     general     The patient is not a current smoker.  Patient was not previously instructed to abstain from smoking on day of procedure.  Patient did not smoke on day of procedure.    intravenous induction   Postoperative administration of opioids is intended.  Anesthetic plan and risks discussed with patient.    Plan discussed with attending.

## 2024-11-26 NOTE — PERIOPERATIVE NURSING NOTE
Pt did well in recovery. Pt tolerates po well. No c/o ponv. Pain is tolerable at this time. Catheter in place with pink clear urine. Pt ready for phase 2.

## 2024-11-26 NOTE — ANESTHESIA POSTPROCEDURE EVALUATION
Patient: Anahi Wall    Procedure Summary       Date: 11/26/24 Room / Location: [facility] / [facility]    Anesthesia Start: 0756 Anesthesia Stop: 0903    Procedure: Cystoscopy with Ablation Tissue ( Holmium Laser ) Diagnosis:       Malignant neoplasm of urinary bladder, unspecified site (Multi)      Urothelial carcinoma (Multi)      (Malignant neoplasm of urinary bladder, unspecified site (Multi) [C67.9])      (Urothelial carcinoma (Multi) [C68.9])    Surgeons: Jorge A De Leon MD MPH Responsible Provider: Sumanth Palmer MD    Anesthesia Type: general ASA Status: 3            Anesthesia Type: general    Vitals Value Taken Time   /66 11/26/24 0857   Temp 36.0 11/26/24 0904   Pulse 82 11/26/24 0857   Resp 14 11/26/24 0857   SpO2 96 % 11/26/24 0857       Anesthesia Post Evaluation    Patient location during evaluation: PACU  Patient participation: complete - patient participated  Level of consciousness: awake and alert  Pain management: adequate  Airway patency: patent  Cardiovascular status: acceptable  Respiratory status: acceptable  Hydration status: acceptable  Postoperative Nausea and Vomiting: none        There were no known notable events for this encounter.

## 2024-11-26 NOTE — OP NOTE
Cystoscopy with Ablation Tissue ( Holmium Laser ) Operative Note     Date: 2024  OR Location: [facility]    Name: Anahi Wall, : 1945, Age: 79 y.o., MRN: [id number], Sex: female    Diagnosis  Pre-op Diagnosis      * Malignant neoplasm of urinary bladder, unspecified site (Multi) [C67.9]     * Urothelial carcinoma (Multi) [C68.9] Post-op Diagnosis     * Malignant neoplasm of urinary bladder, unspecified site (Multi) [C67.9]     * Urothelial carcinoma (Multi) [C68.9]     Procedures  Cystoscopy with Ablation Tissue ( Holmium Laser )  59827 - CT CYSTOURETHROSCOPY W/DEST &/RMVL MED BLADDER ELSA    CT CYSTO W/URETEROSCOPY W/LITHOTRIPSY [01343]  CHG UROGRAPHY RETROGRADE WITH/WO KUB [18364]  Surgeons      * Jorge A De Leon - Primary    Resident/Fellow/Other Assistant:  Surgeons and Role:  * No surgeons found with a matching role *    Staff:   Circulator: Evie Randhawa Person: Telly    Anesthesia Staff: Anesthesiologist: Sumanth Palmer MD  CRNA: ESTEFANÍA Rock-CRNA    Procedure Summary  Anesthesia: General  ASA: III  Estimated Blood Loss: 10 mL  Intra-op Medications:   Administrations occurring from 0805 to 0910 on 24:   Medication Name Total Dose   HYDROmorphone PF (Dilaudid) injection 0.2 mg 0.2 mg   lactated Ringer's infusion 120 mL   dexAMETHasone (Decadron) injection 4 mg/mL 4 mg   ePHEDrine injection 15 mg   fentaNYL (Sublimaze) injection 50 mcg/mL 50 mcg   ondansetron (Zofran) 2 mg/mL injection 4 mg   propofol (Diprivan) injection 10 mg/mL 50 mg   rocuronium (ZeMuron) 50 mg/5 mL injection 10 mg   sugammadex (Bridion) 200 mg/2 mL injection 200 mg              Anesthesia Record               Intraprocedure I/O Totals       None           Specimen:   ID Type Source Tests Collected by Time   1 : BLADDER TUMOR Tissue BLADDER TRANSURETHRAL RESECTION SURGICAL PATHOLOGY EXAM Jorge A De Leon MD [facility] 2024 0818                 Drains and/or Catheters:       Findings: Multifocal tumor.  Largest  tumor 3 cm.  Total area resected > 5 cm.  Multiple areas where thin on the bladder.  The left UO was clear.    Indications: Anahi Wall is an 79 y.o. female who is having surgery for Malignant neoplasm of urinary bladder, unspecified site (Multi) [C67.9]  Urothelial carcinoma (Multi) [C68.9]. She has a longstanding history of UTC.  She has had HG NMIBC and failed BCG, geme/doce and immediately recurred after adstiladrin now.  She also has UTUC and had a right nephroureterectomy and RPLND for pT3 disease.  She recently had passed a stone on her solitary kidney.  She has a bladder recurrence.    The patient was seen in the preoperative area. The risks, benefits, complications, treatment options, non-operative alternatives, expected recovery and outcomes were discussed with the patient. The possibilities of reaction to medication, pulmonary aspiration, injury to surrounding structures, bleeding, recurrent infection, the need for additional procedures, failure to diagnose a condition, and creating a complication requiring transfusion or operation were discussed with the patient. The patient concurred with the proposed plan, giving informed consent.  The site of surgery was properly noted/marked if necessary per policy. The patient has been actively warmed in preoperative area. Preoperative antibiotics have been ordered and given within 1 hours of incision. Venous thrombosis prophylaxis have been ordered including bilateral sequential compression devices    Procedure Details:     The patient was brought to the OR.  A time out was done. General ETT was established.  Antibiotics were given.  The patient was placed in dorsal lithotomy.  Genitals were prepped an draped.    A time out was completed.  A 26 F resectoscope inserted.  She has multifocal disease throughout her bladder.  Her tumors were too numerous to count.  The left UO was free of disease.  The loop was inserted and the tumors were resected and or fulgrated.   These were thoughout her bladder.  They appeared superficial.  They appeared with no CIS.  There were areas that were thin.  Muscle was visible and included.    The specimens were obtained and sent.  Hemostasis was obtained.  The scope was removed as no further mucosal abnormalities were seen.   Complications:  None; patient tolerated the procedure well.    Disposition: PACU - hemodynamically stable.  Condition: stable                 Additional Details:     Attending Attestation:     Jorge A De Leon  Phone Number: [phone]

## 2024-11-28 ENCOUNTER — TELEPHONE (OUTPATIENT)
Dept: PRIMARY CARE | Facility: CLINIC | Age: 79
End: 2024-11-28
Payer: MEDICARE

## 2024-11-28 DIAGNOSIS — C67.9 MALIGNANT NEOPLASM OF URINARY BLADDER, UNSPECIFIED SITE (MULTI): Primary | ICD-10-CM

## 2024-11-28 RX ORDER — PHENAZOPYRIDINE HYDROCHLORIDE 200 MG/1
200 TABLET, FILM COATED ORAL 3 TIMES DAILY PRN
Qty: 10 TABLET | Refills: 0 | Status: SHIPPED | OUTPATIENT
Start: 2024-11-28 | End: 2024-12-01

## 2024-12-02 ENCOUNTER — APPOINTMENT (OUTPATIENT)
Dept: RADIOLOGY | Facility: CLINIC | Age: 79
End: 2024-12-02
Payer: MEDICARE

## 2024-12-04 ENCOUNTER — APPOINTMENT (OUTPATIENT)
Dept: HEMATOLOGY/ONCOLOGY | Facility: CLINIC | Age: 79
End: 2024-12-04
Payer: MEDICARE

## 2024-12-04 ENCOUNTER — TELEPHONE (OUTPATIENT)
Dept: PRIMARY CARE | Facility: CLINIC | Age: 79
End: 2024-12-04
Payer: MEDICARE

## 2024-12-04 ENCOUNTER — APPOINTMENT (OUTPATIENT)
Dept: AUDIOLOGY | Facility: CLINIC | Age: 79
End: 2024-12-04
Payer: MEDICARE

## 2024-12-04 ENCOUNTER — LAB (OUTPATIENT)
Dept: LAB | Facility: LAB | Age: 79
End: 2024-12-04
Payer: MEDICARE

## 2024-12-04 DIAGNOSIS — N30.00 ACUTE CYSTITIS WITHOUT HEMATURIA: ICD-10-CM

## 2024-12-04 DIAGNOSIS — N30.00 ACUTE CYSTITIS WITHOUT HEMATURIA: Primary | ICD-10-CM

## 2024-12-04 LAB
APPEARANCE UR: CLEAR
BILIRUB UR STRIP.AUTO-MCNC: NEGATIVE MG/DL
COLOR UR: YELLOW
GLUCOSE UR STRIP.AUTO-MCNC: NORMAL MG/DL
KETONES UR STRIP.AUTO-MCNC: NEGATIVE MG/DL
LEUKOCYTE ESTERASE UR QL STRIP.AUTO: ABNORMAL
NITRITE UR QL STRIP.AUTO: NEGATIVE
PH UR STRIP.AUTO: 7 [PH]
PROT UR STRIP.AUTO-MCNC: ABNORMAL MG/DL
RBC # UR STRIP.AUTO: ABNORMAL /UL
RBC #/AREA URNS AUTO: NORMAL /HPF
SP GR UR STRIP.AUTO: 1.01
UROBILINOGEN UR STRIP.AUTO-MCNC: NORMAL MG/DL
WBC #/AREA URNS AUTO: NORMAL /HPF

## 2024-12-04 PROCEDURE — 81001 URINALYSIS AUTO W/SCOPE: CPT

## 2024-12-04 PROCEDURE — 87086 URINE CULTURE/COLONY COUNT: CPT

## 2024-12-04 RX ORDER — NITROFURANTOIN 25; 75 MG/1; MG/1
100 CAPSULE ORAL 2 TIMES DAILY
Qty: 10 CAPSULE | Refills: 0 | Status: SHIPPED | OUTPATIENT
Start: 2024-12-04 | End: 2024-12-09

## 2024-12-04 NOTE — TELEPHONE ENCOUNTER
Patient complains of UTI. She took a home test that came up positive. She is requesting nitrofurantoin.

## 2024-12-05 DIAGNOSIS — Z90.6 HISTORY OF NEPHROURETERECTOMY: ICD-10-CM

## 2024-12-05 DIAGNOSIS — R35.0 URINARY FREQUENCY: Primary | ICD-10-CM

## 2024-12-05 DIAGNOSIS — Z90.5 HISTORY OF NEPHROURETERECTOMY: ICD-10-CM

## 2024-12-05 LAB — BACTERIA UR CULT: NORMAL

## 2024-12-05 RX ORDER — TRAMADOL HYDROCHLORIDE 50 MG/1
50 TABLET ORAL EVERY 6 HOURS PRN
Qty: 30 TABLET | Refills: 0 | Status: SHIPPED | OUTPATIENT
Start: 2024-12-05 | End: 2024-12-13

## 2024-12-05 RX ORDER — MIRABEGRON 50 MG/1
50 TABLET, FILM COATED, EXTENDED RELEASE ORAL DAILY
Qty: 30 TABLET | Refills: 0 | Status: SHIPPED | OUTPATIENT
Start: 2024-12-05 | End: 2025-01-04

## 2024-12-07 ENCOUNTER — TELEPHONE (OUTPATIENT)
Dept: PRIMARY CARE | Facility: CLINIC | Age: 79
End: 2024-12-07
Payer: MEDICARE

## 2024-12-07 DIAGNOSIS — C68.9 UROTHELIAL CARCINOMA (MULTI): Primary | ICD-10-CM

## 2024-12-07 RX ORDER — CIPROFLOXACIN 250 MG/1
250 TABLET, FILM COATED ORAL 2 TIMES DAILY
Qty: 6 TABLET | Refills: 0 | Status: SHIPPED | OUTPATIENT
Start: 2024-12-07 | End: 2024-12-10

## 2024-12-10 LAB
LABORATORY COMMENT REPORT: NORMAL
PATH REPORT.FINAL DX SPEC: NORMAL
PATH REPORT.GROSS SPEC: NORMAL
PATH REPORT.RELEVANT HX SPEC: NORMAL
PATH REPORT.TOTAL CANCER: NORMAL
RESIDENT REVIEW: NORMAL

## 2024-12-13 ENCOUNTER — TELEMEDICINE (OUTPATIENT)
Dept: UROLOGY | Facility: CLINIC | Age: 79
End: 2024-12-13
Payer: MEDICARE

## 2024-12-13 DIAGNOSIS — C67.9 MALIGNANT NEOPLASM OF URINARY BLADDER, UNSPECIFIED SITE (MULTI): Primary | ICD-10-CM

## 2024-12-13 NOTE — PROGRESS NOTES
Subjective    Anahi Wall is a 79 y.o. female with NMIBC s/p TURBT who presents for POV.    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  She has had significant urinary symptoms since. Her symptoms are likely due to years of bladder issues, TURBTs and treatments.  She is having urgency and incontinence.  This is not really getting better.    She is not yet on mirabegron.     She has a longstanding history of UTC. 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 She has failed BCG and [place] Doce. She had a UTUC recurrence which was pT3 N0 disease. She was on Adstiladrin.     Review of Systems    All systems were reviewed. Anything negative was noted in the HPI.    Objective   Physical Exam  Gen: No acute distress      Psych: Alert and oriented x3      Neuro:  Normal ROM     Resp: Nonlabored respirations      CV: Regular rate and rhythm      Abd: S, NT, ND.     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    Breast cancer (Multi)     left sided lumpectomy, s/p xrt also completed 1996    Carpal tunnel syndrome     s/p Left wrist surgery    Cataract     removed    Cramp and spasm     Cramp of limb    Depression     controlled with medication    GERD (gastroesophageal reflux disease)     Managed with Prilosec    Hearing aid worn     HL (hearing loss)     wears B/L hearing aids    Hyperlipidemia     F/W PCP    Joint pain     Nephrolithiasis     Osteoporosis     Other conditions influencing health status     Acute Meniscal Tear Of Right Knee    Trochanteric bursitis, unspecified hip     Urothelial cancer (Multi)     Vision loss     wears glasses         Past Surgical History:   Procedure Laterality Date    BLADDER SURGERY  09/03/2019    bladder bx    BREAST LUMPECTOMY  Left     Breast Surgery Lumpectomy x 2 1996    CARPAL TUNNEL RELEASE Left 2003    Neuroplasty Decompression Median Nerve At Carpal Tunnel    CATARACT EXTRACTION      CATARACT EXTRACTION EXTRACAPSULAR W/ INTRAOCULAR LENS IMPLANTATION Bilateral 2017    COLONOSCOPY      CYSTOSCOPY      multiple    HYSTERECTOMY  02/10/2014    Hysterectomy    NEPHRECTOMY Right 03/28/2024    TURBT, right nephroureterectomy, RPLND    OTHER SURGICAL HISTORY  2007    bladder sling    OTHER SURGICAL HISTORY Left     post cataract laser surgery 2021    OTHER SURGICAL HISTORY Left     left shoulder surgery to remove bone spur     FINAL DIAGNOSIS   A. BLADDER TUMOR, TRANSURETHRAL RESECTION:   -- NON-INVASIVE HIGH GRADE PAPILLARY UROTHELIAL CARCINOMA.  -- MUSCULARIS PROPRIA IS PRESENT AND NOT INVOLVED BY CARCINOMA.       Assessment & Plan  Malignant neoplasm of urinary bladder, unspecified site (Multi)  She is s/p re TURBT that showed a non invasive high grade papillary UC.  She has had significant urinary symptoms since. Her symptoms are likely due to years of bladder issues.     PLAN:  Will see if we have samples of gemtesa in [place] and if we do, we will supply her with some samples on Monday when we are there.  We will try to get prior authorization for Myrbetriq.  I had not seen this request and we will investigate and get this done.  FUV in 3 weeks and try to get authorization for Adstiladrin pending of her bladder is doing.                                     Scribe Attestation  By signing my name below, I, Soraida Gates   attest that this documentation has been prepared under the direction and in the presence of Jorge A De Leon MD MPH

## 2024-12-13 NOTE — ASSESSMENT & PLAN NOTE
She is s/p re TURBT that showed a non invasive high grade papillary UC.  She has had significant urinary symptoms since. Her symptoms are likely due to years of bladder issues.     PLAN:  Will see if we have samples of gemtesa in [place] and if we do, we will supply her with some samples on Monday when we are there.  We will try to get prior authorization for Myrbetriq.  I had not seen this request and we will investigate and get this done.  FUV in 3 weeks and try to get authorization for Adstiladrin pending of her bladder is doing.

## 2024-12-17 ENCOUNTER — APPOINTMENT (OUTPATIENT)
Dept: UROLOGY | Facility: HOSPITAL | Age: 79
End: 2024-12-17
Payer: MEDICARE

## 2024-12-20 ENCOUNTER — APPOINTMENT (OUTPATIENT)
Dept: UROLOGY | Facility: CLINIC | Age: 79
End: 2024-12-20
Payer: MEDICARE

## 2025-01-07 ENCOUNTER — TELEMEDICINE (OUTPATIENT)
Dept: UROLOGY | Facility: HOSPITAL | Age: 80
End: 2025-01-07
Payer: MEDICARE

## 2025-01-07 DIAGNOSIS — C67.9 MALIGNANT NEOPLASM OF URINARY BLADDER, UNSPECIFIED SITE (MULTI): Primary | ICD-10-CM

## 2025-01-07 PROCEDURE — G2211 COMPLEX E/M VISIT ADD ON: HCPCS | Performed by: STUDENT IN AN ORGANIZED HEALTH CARE EDUCATION/TRAINING PROGRAM

## 2025-01-07 PROCEDURE — 1157F ADVNC CARE PLAN IN RCRD: CPT | Performed by: STUDENT IN AN ORGANIZED HEALTH CARE EDUCATION/TRAINING PROGRAM

## 2025-01-07 PROCEDURE — 99213 OFFICE O/P EST LOW 20 MIN: CPT | Performed by: STUDENT IN AN ORGANIZED HEALTH CARE EDUCATION/TRAINING PROGRAM

## 2025-01-07 NOTE — ASSESSMENT & PLAN NOTE
79 y.o. female with NMIBC s/p TURBT.    Her urinary symptoms are improving. She has no long has incontinence. She is on gemstesa and reports this is helping managed her symptoms well. She is getting Adstiladrin in 2 weeks.     PLAN:  Complete Gemtesa as the sample is running out. Once she finishes the samples she can either stay off it and evaluate symptoms or start Myrbetriq as it has similar efficacy for her urge and urge related incontinence  Adstiladrin again in 2 weeks.    FUV in 2 months with cystoscopy to reevaluate for recurrences.

## 2025-01-07 NOTE — PROGRESS NOTES
Subjective    Anahi Wall is a 79 y.o. female with NMIBC s/p TURBT who presents for FUV.    Her urinary symptoms are improving. She has no long has incontinence. She is on gemstesa and reports this is helping managed her symptoms well. She is getting Adstiladrin in 2 weeks.      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  She has had significant urinary symptoms since. Her symptoms are likely due to years of bladder issues, TURBTs and treatments.       She has a longstanding history of UTC. 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 She has failed BCG and [place] Doce. She had a UTUC recurrence which was pT3 N0 disease. She was on Adstiladrin.       Review of Systems    All systems were reviewed. Anything negative was noted in the HPI.    Objective   Physical Exam  Gen: No acute distress      Psych: Alert and oriented x3      Neuro:  Normal ROM     Resp: Nonlabored respirations      CV: Regular rate and rhythm      Abd: S, NT, ND.     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    Breast cancer (Multi)     left sided lumpectomy, s/p xrt also completed 1996    Carpal tunnel syndrome     s/p Left wrist surgery    Cataract     removed    Cramp and spasm     Cramp of limb    Depression     controlled with medication    GERD (gastroesophageal reflux disease)     Managed with Prilosec    Hearing aid worn     HL (hearing loss)     wears B/L hearing aids    Hyperlipidemia     F/W PCP    Joint pain     Nephrolithiasis     Osteoporosis     Other conditions influencing health status     Acute Meniscal Tear Of Right Knee    Trochanteric bursitis, unspecified hip     Urothelial cancer (Multi)     Vision loss     wears glasses         Past Surgical History:   Procedure  Laterality Date    BLADDER SURGERY  09/03/2019    bladder bx    BREAST LUMPECTOMY Left     Breast Surgery Lumpectomy x 2 1996    CARPAL TUNNEL RELEASE Left 2003    Neuroplasty Decompression Median Nerve At Carpal Tunnel    CATARACT EXTRACTION      CATARACT EXTRACTION EXTRACAPSULAR W/ INTRAOCULAR LENS IMPLANTATION Bilateral 2017    COLONOSCOPY      CYSTOSCOPY      multiple    HYSTERECTOMY  02/10/2014    Hysterectomy    NEPHRECTOMY Right 03/28/2024    TURBT, right nephroureterectomy, RPLND    OTHER SURGICAL HISTORY  2007    bladder sling    OTHER SURGICAL HISTORY Left     post cataract laser surgery 2021    OTHER SURGICAL HISTORY Left     left shoulder surgery to remove bone spur         Assessment & Plan  Malignant neoplasm of urinary bladder, unspecified site (Multi)  79 y.o. female with NMIBC s/p TURBT.    Her urinary symptoms are improving. She has no long has incontinence. She is on gemstesa and reports this is helping managed her symptoms well. She is getting Adstiladrin in 2 weeks.     PLAN:  Complete Gemtesa as the sample is running out. Once she finishes the samples she can either stay off it and evaluate symptoms or start Myrbetriq as it has similar efficacy for her urge and urge related incontinence  Adstiladrin again in 2 weeks.    FUV in 2 months with cystoscopy to reevaluate for recurrences.                                     Scribe Attestation  By signing my name below, I, Soraida Gates   attest that this documentation has been prepared under the direction and in the presence of Jorge A De Leon MD MPH

## 2025-01-16 ENCOUNTER — LAB (OUTPATIENT)
Dept: LAB | Facility: LAB | Age: 80
End: 2025-01-16
Payer: MEDICARE

## 2025-01-16 DIAGNOSIS — C67.3 MALIGNANT NEOPLASM OF ANTERIOR WALL OF URINARY BLADDER (MULTI): ICD-10-CM

## 2025-01-16 DIAGNOSIS — R39.9 UTI SYMPTOMS: ICD-10-CM

## 2025-01-16 LAB
ALBUMIN SERPL BCP-MCNC: 4.4 G/DL (ref 3.4–5)
ALP SERPL-CCNC: 70 U/L (ref 33–136)
ALT SERPL W P-5'-P-CCNC: 15 U/L (ref 7–45)
ANION GAP SERPL CALC-SCNC: 13 MMOL/L (ref 10–20)
APPEARANCE UR: ABNORMAL
AST SERPL W P-5'-P-CCNC: 19 U/L (ref 9–39)
BASOPHILS # BLD AUTO: 0.03 X10*3/UL (ref 0–0.1)
BASOPHILS NFR BLD AUTO: 0.3 %
BILIRUB SERPL-MCNC: 0.3 MG/DL (ref 0–1.2)
BILIRUB UR STRIP.AUTO-MCNC: NEGATIVE MG/DL
BUN SERPL-MCNC: 29 MG/DL (ref 6–23)
CALCIUM SERPL-MCNC: 9.5 MG/DL (ref 8.6–10.6)
CHLORIDE SERPL-SCNC: 108 MMOL/L (ref 98–107)
CO2 SERPL-SCNC: 26 MMOL/L (ref 21–32)
COLOR UR: ABNORMAL
CREAT SERPL-MCNC: 1.2 MG/DL (ref 0.5–1.05)
EGFRCR SERPLBLD CKD-EPI 2021: 46 ML/MIN/1.73M*2
EOSINOPHIL # BLD AUTO: 0.09 X10*3/UL (ref 0–0.4)
EOSINOPHIL NFR BLD AUTO: 0.9 %
ERYTHROCYTE [DISTWIDTH] IN BLOOD BY AUTOMATED COUNT: 14.4 % (ref 11.5–14.5)
GLUCOSE SERPL-MCNC: 56 MG/DL (ref 74–99)
GLUCOSE UR STRIP.AUTO-MCNC: NORMAL MG/DL
HCT VFR BLD AUTO: 35.6 % (ref 36–46)
HGB BLD-MCNC: 11.6 G/DL (ref 12–16)
IMM GRANULOCYTES # BLD AUTO: 0.05 X10*3/UL (ref 0–0.5)
IMM GRANULOCYTES NFR BLD AUTO: 0.5 % (ref 0–0.9)
KETONES UR STRIP.AUTO-MCNC: NEGATIVE MG/DL
LEUKOCYTE ESTERASE UR QL STRIP.AUTO: ABNORMAL
LYMPHOCYTES # BLD AUTO: 2.02 X10*3/UL (ref 0.8–3)
LYMPHOCYTES NFR BLD AUTO: 20.6 %
MCH RBC QN AUTO: 29.8 PG (ref 26–34)
MCHC RBC AUTO-ENTMCNC: 32.6 G/DL (ref 32–36)
MCV RBC AUTO: 92 FL (ref 80–100)
MONOCYTES # BLD AUTO: 0.78 X10*3/UL (ref 0.05–0.8)
MONOCYTES NFR BLD AUTO: 8 %
NEUTROPHILS # BLD AUTO: 6.83 X10*3/UL (ref 1.6–5.5)
NEUTROPHILS NFR BLD AUTO: 69.7 %
NITRITE UR QL STRIP.AUTO: NEGATIVE
NRBC BLD-RTO: 0 /100 WBCS (ref 0–0)
PH UR STRIP.AUTO: 5.5 [PH]
PLATELET # BLD AUTO: 186 X10*3/UL (ref 150–450)
POTASSIUM SERPL-SCNC: 4 MMOL/L (ref 3.5–5.3)
PROT SERPL-MCNC: 7.2 G/DL (ref 6.4–8.2)
PROT UR STRIP.AUTO-MCNC: ABNORMAL MG/DL
RBC # BLD AUTO: 3.89 X10*6/UL (ref 4–5.2)
RBC # UR STRIP.AUTO: ABNORMAL /UL
RBC #/AREA URNS AUTO: NORMAL /HPF
SODIUM SERPL-SCNC: 143 MMOL/L (ref 136–145)
SP GR UR STRIP.AUTO: 1.02
UROBILINOGEN UR STRIP.AUTO-MCNC: NORMAL MG/DL
WBC # BLD AUTO: 9.8 X10*3/UL (ref 4.4–11.3)
WBC #/AREA URNS AUTO: NORMAL /HPF

## 2025-01-16 PROCEDURE — 85025 COMPLETE CBC W/AUTO DIFF WBC: CPT

## 2025-01-16 PROCEDURE — 80053 COMPREHEN METABOLIC PANEL: CPT

## 2025-01-16 PROCEDURE — 81001 URINALYSIS AUTO W/SCOPE: CPT

## 2025-01-16 PROCEDURE — 87086 URINE CULTURE/COLONY COUNT: CPT

## 2025-01-17 ENCOUNTER — TELEPHONE (OUTPATIENT)
Dept: UROLOGY | Facility: CLINIC | Age: 80
End: 2025-01-17
Payer: MEDICARE

## 2025-01-17 ENCOUNTER — TELEPHONE (OUTPATIENT)
Dept: HEMATOLOGY/ONCOLOGY | Facility: CLINIC | Age: 80
End: 2025-01-17
Payer: MEDICARE

## 2025-01-17 LAB — BACTERIA UR CULT: NORMAL

## 2025-01-17 NOTE — TELEPHONE ENCOUNTER
Updated  Labs  Recommend   F/up with pcp re low glu  Discussed need for ct scan q3-6 months  No f/up with me arranged they will call us to schedule  Aware of risks of progression  HGB and creatinine improved  Continue to f/up with urology

## 2025-01-20 ENCOUNTER — APPOINTMENT (OUTPATIENT)
Dept: UROLOGY | Facility: HOSPITAL | Age: 80
End: 2025-01-20
Payer: MEDICARE

## 2025-01-20 ENCOUNTER — PROCEDURE VISIT (OUTPATIENT)
Dept: UROLOGY | Facility: HOSPITAL | Age: 80
End: 2025-01-20
Payer: MEDICARE

## 2025-01-20 VITALS
RESPIRATION RATE: 14 BRPM | DIASTOLIC BLOOD PRESSURE: 61 MMHG | OXYGEN SATURATION: 99 % | BODY MASS INDEX: 28.91 KG/M2 | TEMPERATURE: 96.8 F | SYSTOLIC BLOOD PRESSURE: 135 MMHG | HEART RATE: 95 BPM | WEIGHT: 158.1 LBS

## 2025-01-20 DIAGNOSIS — C67.9 MALIGNANT NEOPLASM OF URINARY BLADDER, UNSPECIFIED SITE (MULTI): Primary | ICD-10-CM

## 2025-01-20 PROCEDURE — 2500000004 HC RX 250 GENERAL PHARMACY W/ HCPCS (ALT 636 FOR OP/ED): Mod: JZ,TB | Performed by: NURSE PRACTITIONER

## 2025-01-20 PROCEDURE — 51720 TREATMENT OF BLADDER LESION: CPT | Performed by: NURSE PRACTITIONER

## 2025-01-20 PROCEDURE — 51702 INSERT TEMP BLADDER CATH: CPT | Performed by: NURSE PRACTITIONER

## 2025-01-20 RX ORDER — OXYBUTYNIN CHLORIDE 5 MG/1
5 TABLET ORAL ONCE
Status: DISCONTINUED | OUTPATIENT
Start: 2025-01-20 | End: 2025-01-20 | Stop reason: HOSPADM

## 2025-01-20 RX ORDER — FAMOTIDINE 10 MG/ML
20 INJECTION INTRAVENOUS ONCE AS NEEDED
Status: DISCONTINUED | OUTPATIENT
Start: 2025-01-20 | End: 2025-01-20

## 2025-01-20 RX ORDER — ALBUTEROL SULFATE 0.83 MG/ML
3 SOLUTION RESPIRATORY (INHALATION) AS NEEDED
Status: DISCONTINUED | OUTPATIENT
Start: 2025-01-20 | End: 2025-01-20

## 2025-01-20 RX ORDER — EPINEPHRINE 0.3 MG/.3ML
0.3 INJECTION SUBCUTANEOUS EVERY 5 MIN PRN
Status: DISCONTINUED | OUTPATIENT
Start: 2025-01-20 | End: 2025-01-20

## 2025-01-20 RX ORDER — EPINEPHRINE 0.3 MG/.3ML
0.3 INJECTION SUBCUTANEOUS EVERY 5 MIN PRN
Status: DISCONTINUED | OUTPATIENT
Start: 2025-01-20 | End: 2025-01-20 | Stop reason: HOSPADM

## 2025-01-20 RX ORDER — ALBUTEROL SULFATE 0.83 MG/ML
3 SOLUTION RESPIRATORY (INHALATION) AS NEEDED
Status: DISCONTINUED | OUTPATIENT
Start: 2025-01-20 | End: 2025-01-20 | Stop reason: HOSPADM

## 2025-01-20 RX ORDER — OXYBUTYNIN CHLORIDE 5 MG/1
5 TABLET ORAL ONCE
Status: DISCONTINUED | OUTPATIENT
Start: 2025-01-20 | End: 2025-01-20

## 2025-01-20 RX ORDER — FAMOTIDINE 10 MG/ML
20 INJECTION INTRAVENOUS ONCE AS NEEDED
Status: DISCONTINUED | OUTPATIENT
Start: 2025-01-20 | End: 2025-01-20 | Stop reason: HOSPADM

## 2025-01-20 RX ORDER — DIPHENHYDRAMINE HYDROCHLORIDE 50 MG/ML
50 INJECTION INTRAMUSCULAR; INTRAVENOUS AS NEEDED
Status: DISCONTINUED | OUTPATIENT
Start: 2025-01-20 | End: 2025-01-20 | Stop reason: HOSPADM

## 2025-01-20 RX ORDER — DIPHENHYDRAMINE HYDROCHLORIDE 50 MG/ML
50 INJECTION INTRAMUSCULAR; INTRAVENOUS AS NEEDED
Status: DISCONTINUED | OUTPATIENT
Start: 2025-01-20 | End: 2025-01-20

## 2025-01-20 RX ADMIN — NADOFARAGENE FIRADENOVEC-VNCG 75 ML: 300000000000 SUSPENSION INTRAVESICAL at 13:05

## 2025-01-20 ASSESSMENT — PAIN SCALES - GENERAL: PAINLEVEL_OUTOF10: 0-NO PAIN

## 2025-01-20 NOTE — PROGRESS NOTES
Pt here for second instillation of Adstiladrin. Informed consent completed prior to start of treatment series. 14 Fr straight tip catheter inserted without difficulty using sterile technique. Urine drained clear yellow about 30 ml. Adstiladrin 75 ml administered intravesically. Catheter plugged. Pt to retain medication for one hour. Pt experienced some leakage and area was cleaned and dried. Pt able to hold Adstiladrin for one one hour. Catheter removed. Bleach added to toilet per protocol. Pt tolerated procedure well. Home going instructions reviewed, understanding verbalized. Pt will call with any questions or concerns.

## 2025-01-28 ENCOUNTER — APPOINTMENT (OUTPATIENT)
Dept: UROLOGY | Facility: HOSPITAL | Age: 80
End: 2025-01-28
Payer: MEDICARE

## 2025-02-17 DIAGNOSIS — C67.9 MALIGNANT NEOPLASM OF URINARY BLADDER, UNSPECIFIED SITE (MULTI): Primary | ICD-10-CM

## 2025-02-17 RX ORDER — MIRABEGRON 25 MG/1
25 TABLET, FILM COATED, EXTENDED RELEASE ORAL DAILY
Qty: 30 TABLET | Refills: 11 | Status: SHIPPED | OUTPATIENT
Start: 2025-02-17 | End: 2026-02-17

## 2025-03-11 ENCOUNTER — APPOINTMENT (OUTPATIENT)
Dept: UROLOGY | Facility: HOSPITAL | Age: 80
End: 2025-03-11
Payer: MEDICARE

## 2025-03-11 VITALS
HEART RATE: 88 BPM | DIASTOLIC BLOOD PRESSURE: 59 MMHG | BODY MASS INDEX: 28.86 KG/M2 | SYSTOLIC BLOOD PRESSURE: 124 MMHG | OXYGEN SATURATION: 95 % | RESPIRATION RATE: 16 BRPM | WEIGHT: 157.85 LBS | TEMPERATURE: 97 F

## 2025-03-11 DIAGNOSIS — C67.9 MALIGNANT NEOPLASM OF URINARY BLADDER, UNSPECIFIED SITE (MULTI): ICD-10-CM

## 2025-03-11 PROCEDURE — 88112 CYTOPATH CELL ENHANCE TECH: CPT | Mod: TC,MCY | Performed by: STUDENT IN AN ORGANIZED HEALTH CARE EDUCATION/TRAINING PROGRAM

## 2025-03-11 PROCEDURE — 52000 CYSTOURETHROSCOPY: CPT | Performed by: STUDENT IN AN ORGANIZED HEALTH CARE EDUCATION/TRAINING PROGRAM

## 2025-03-11 PROCEDURE — 99213 OFFICE O/P EST LOW 20 MIN: CPT | Mod: 25 | Performed by: STUDENT IN AN ORGANIZED HEALTH CARE EDUCATION/TRAINING PROGRAM

## 2025-03-11 PROCEDURE — 99213 OFFICE O/P EST LOW 20 MIN: CPT | Performed by: STUDENT IN AN ORGANIZED HEALTH CARE EDUCATION/TRAINING PROGRAM

## 2025-03-11 RX ORDER — MIRABEGRON 25 MG/1
25 TABLET, FILM COATED, EXTENDED RELEASE ORAL DAILY
Qty: 30 TABLET | Refills: 11 | Status: SHIPPED | OUTPATIENT
Start: 2025-03-11 | End: 2026-03-11

## 2025-03-11 ASSESSMENT — PAIN SCALES - GENERAL: PAINLEVEL_OUTOF10: 0-NO PAIN

## 2025-03-11 NOTE — PROGRESS NOTES
Subjective    Anahi Wall is a 79 y.o. female with NMIBC s/p TURBT who presents for surveillance cystoscopy.     Her urinary symptoms are improving. She has no long has incontinence. She is on gemstesa and reports this is helping managed her symptoms well. She received her second dose of Adstiladrin.     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  She has had significant urinary symptoms since. Her symptoms are likely due to years of bladder issues, TURBTs and treatments.       She has a longstanding history of UTC. 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 She has failed BCG and [place] Doce. She had a UTUC recurrence which was pT3 N0 disease.       Review of Systems    All systems were reviewed. Anything negative was noted in the HPI.    Objective   Physical Exam  Gen: No acute distress      Psych: Alert and oriented x3      Neuro:  Normal ROM     Resp: Nonlabored respirations      CV: Regular rate and rhythm      Abd: S, NT, ND.     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    Breast cancer (Multi)     left sided lumpectomy, s/p xrt also completed 1996    Carpal tunnel syndrome     s/p Left wrist surgery    Cataract     removed    Cramp and spasm     Cramp of limb    Depression     controlled with medication    GERD (gastroesophageal reflux disease)     Managed with Prilosec    Hearing aid worn     HL (hearing loss)     wears B/L hearing aids    Hyperlipidemia     F/W PCP    Joint pain     Nephrolithiasis     Osteoporosis     Other conditions influencing health status     Acute Meniscal Tear Of Right Knee    Trochanteric bursitis, unspecified hip     Urothelial cancer (Multi)     Vision loss     wears glasses         Past Surgical History:   Procedure  Laterality Date    BLADDER SURGERY  09/03/2019    bladder bx    BREAST LUMPECTOMY Left     Breast Surgery Lumpectomy x 2 1996    CARPAL TUNNEL RELEASE Left 2003    Neuroplasty Decompression Median Nerve At Carpal Tunnel    CATARACT EXTRACTION      CATARACT EXTRACTION EXTRACAPSULAR W/ INTRAOCULAR LENS IMPLANTATION Bilateral 2017    COLONOSCOPY      CYSTOSCOPY      multiple    HYSTERECTOMY  02/10/2014    Hysterectomy    NEPHRECTOMY Right 03/28/2024    TURBT, right nephroureterectomy, RPLND    OTHER SURGICAL HISTORY  2007    bladder sling    OTHER SURGICAL HISTORY Left     post cataract laser surgery 2021    OTHER SURGICAL HISTORY Left     left shoulder surgery to remove bone spur     Patient ID: Anahi Wall is a 79 y.o. female.    Cystoscopy    Date/Time: 3/11/2025 2:34 PM    Performed by: Jorge A De Leon MD MPH  Authorized by: Jorge A De Leon MD MPH    Procedure - Bladder Cystoscopy:     Procedure details: cystoscopy    Post-procedure:     Patient tolerance: Patient tolerated the procedure well with no immediate complications      Comments:      Few small papillary left lateral wall and large one on left posterior wall. Her bladder looks better but reoccurrence is there        Assessment & Plan  Malignant neoplasm of urinary bladder, unspecified site (Multi)  79 y.o. female with NMIBC s/p TURBT.  She received her second dose of Adstiladrin. She tolerated her cystoscopy well. She is improving on a Adstiladrin, reoccurrence is still present on cystoscopy.    PLAN:  Scans next week    myrbetriq renewed   Virtual FUV in 2 weeks to discuss plan  Very nervous for continuing to do more TURBT at her bladder capacity and symptoms will only get worse  Best approach is cystectomy and patient today for first time may be okay with that.  Lets first see her scans.  I will ask if anyone has continued adstiladrin with response but not CR.  I doubt we will continue that.    Orders:    Cytology Consultation (Non-Gynecologic);  Future    Cystoscopy    mirabegron (Myrbetriq) 25 mg tablet extended release 24 hr 24 hr tablet; Take 1 tablet (25 mg) by mouth once daily.                Scribe Attestation  By signing my name below, I, Soraida Gates   attest that this documentation has been prepared under the direction and in the presence of Jorge A De Leon MD MPH

## 2025-03-11 NOTE — ASSESSMENT & PLAN NOTE
79 y.o. female with NMIBC s/p TURBT.  She received her second dose of Adstiladrin. She tolerated her cystoscopy well. She is improving on a Adstiladrin, reoccurrence is still present on cystoscopy.    PLAN:  Scans next week    myrbetriq renewed   Virtual FUV in 2 weeks to discuss plan  Very nervous for continuing to do more TURBT at her bladder capacity and symptoms will only get worse  Best approach is cystectomy and patient today for first time may be okay with that.  Lets first see her scans.  I will ask if anyone has continued adstiladrin with response but not CR.  I doubt we will continue that.    Orders:    Cytology Consultation (Non-Gynecologic); Future    Cystoscopy    mirabegron (Myrbetriq) 25 mg tablet extended release 24 hr 24 hr tablet; Take 1 tablet (25 mg) by mouth once daily.

## 2025-03-12 ENCOUNTER — APPOINTMENT (OUTPATIENT)
Dept: RADIOLOGY | Facility: CLINIC | Age: 80
End: 2025-03-12
Payer: MEDICARE

## 2025-03-12 DIAGNOSIS — C67.3 MALIGNANT NEOPLASM OF ANTERIOR WALL OF URINARY BLADDER (MULTI): Primary | ICD-10-CM

## 2025-03-12 LAB
LABORATORY COMMENT REPORT: NORMAL
LABORATORY COMMENT REPORT: NORMAL
PATH REPORT.FINAL DX SPEC: NORMAL
PATH REPORT.GROSS SPEC: NORMAL
PATH REPORT.RELEVANT HX SPEC: NORMAL
PATH REPORT.TOTAL CANCER: NORMAL
RESIDENT REVIEW: NORMAL

## 2025-03-19 ENCOUNTER — HOSPITAL ENCOUNTER (OUTPATIENT)
Dept: RADIOLOGY | Facility: CLINIC | Age: 80
End: 2025-03-19
Payer: MEDICARE

## 2025-03-19 ENCOUNTER — HOSPITAL ENCOUNTER (OUTPATIENT)
Dept: RADIOLOGY | Facility: CLINIC | Age: 80
Discharge: HOME | End: 2025-03-19
Payer: MEDICARE

## 2025-03-19 DIAGNOSIS — C67.3 MALIGNANT NEOPLASM OF ANTERIOR WALL OF URINARY BLADDER (MULTI): ICD-10-CM

## 2025-03-19 PROCEDURE — 71250 CT THORAX DX C-: CPT

## 2025-03-19 PROCEDURE — A9698 NON-RAD CONTRAST MATERIALNOC: HCPCS | Performed by: INTERNAL MEDICINE

## 2025-03-19 PROCEDURE — 2550000001 HC RX 255 CONTRASTS: Performed by: INTERNAL MEDICINE

## 2025-03-19 RX ADMIN — IOHEXOL 500 ML: 12 SOLUTION ORAL at 13:15

## 2025-03-25 ENCOUNTER — APPOINTMENT (OUTPATIENT)
Dept: HEMATOLOGY/ONCOLOGY | Facility: CLINIC | Age: 80
End: 2025-03-25
Payer: MEDICARE

## 2025-03-28 ENCOUNTER — APPOINTMENT (OUTPATIENT)
Dept: UROLOGY | Facility: CLINIC | Age: 80
End: 2025-03-28
Payer: MEDICARE

## 2025-03-28 DIAGNOSIS — C67.9 MALIGNANT NEOPLASM OF URINARY BLADDER, UNSPECIFIED SITE (MULTI): Primary | ICD-10-CM

## 2025-03-28 PROCEDURE — 99214 OFFICE O/P EST MOD 30 MIN: CPT | Performed by: STUDENT IN AN ORGANIZED HEALTH CARE EDUCATION/TRAINING PROGRAM

## 2025-03-28 PROCEDURE — G2211 COMPLEX E/M VISIT ADD ON: HCPCS | Performed by: STUDENT IN AN ORGANIZED HEALTH CARE EDUCATION/TRAINING PROGRAM

## 2025-03-28 PROCEDURE — 1157F ADVNC CARE PLAN IN RCRD: CPT | Performed by: STUDENT IN AN ORGANIZED HEALTH CARE EDUCATION/TRAINING PROGRAM

## 2025-03-28 NOTE — PROGRESS NOTES
Subjective    Anahi Wall is a 79 y.o. female with NMIBC s/p TURBT who presents for virtual FUV.      She reports feeling well. She is on gemstesa and reports this is helping managed her symptoms well. She received her second dose of Adstiladrin. She is not interested in a cystectomy as of now but is considering.     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  She has had significant urinary symptoms since. Her symptoms are likely due to years of bladder issues, TURBTs and treatments.       She has a longstanding history of UTC. 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 She has failed BCG and [place] Doce. She had a UTUC recurrence which was pT3 N0 disease.     Recent scans are FAHEEM.    Review of Systems    All systems were reviewed. Anything negative was noted in the HPI.    Objective   Physical Exam  Gen: No acute distress      Psych: Alert and oriented x3      Neuro:  Normal ROM     Resp: Nonlabored respirations      CV: Regular rate and rhythm      Abd: S, NT, ND.     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    Breast cancer (Multi)     left sided lumpectomy, s/p xrt also completed 1996    Carpal tunnel syndrome     s/p Left wrist surgery    Cataract     removed    Cramp and spasm     Cramp of limb    Depression     controlled with medication    GERD (gastroesophageal reflux disease)     Managed with Prilosec    Hearing aid worn     HL (hearing loss)     wears B/L hearing aids    Hyperlipidemia     F/W PCP    Joint pain     Nephrolithiasis     Osteoporosis     Other conditions influencing health status     Acute Meniscal Tear Of Right Knee    Trochanteric bursitis, unspecified hip     Urothelial cancer (Multi)     Vision loss     wears glasses          Past Surgical History:   Procedure Laterality Date    BLADDER SURGERY  09/03/2019    bladder bx    BREAST LUMPECTOMY Left     Breast Surgery Lumpectomy x 2 1996    CARPAL TUNNEL RELEASE Left 2003    Neuroplasty Decompression Median Nerve At Carpal Tunnel    CATARACT EXTRACTION      CATARACT EXTRACTION EXTRACAPSULAR W/ INTRAOCULAR LENS IMPLANTATION Bilateral 2017    COLONOSCOPY      CYSTOSCOPY      multiple    HYSTERECTOMY  02/10/2014    Hysterectomy    NEPHRECTOMY Right 03/28/2024    TURBT, right nephroureterectomy, RPLND    OTHER SURGICAL HISTORY  2007    bladder sling    OTHER SURGICAL HISTORY Left     post cataract laser surgery 2021    OTHER SURGICAL HISTORY Left     left shoulder surgery to remove bone spur         Assessment & Plan  Malignant neoplasm of urinary bladder, unspecified site (Multi)  79 y.o. female with NMIBC s/p TURBT. She also has pT3 N0 UTUC recently resected on the right.    We went over her case.  She has failed BCG years ago,  she failed gem/doce, she now has failed adstiladrin.    After last TURBT she has extensive symptoms.    We went over distinct options here for her.  These are bladder saving vs bladder removal.  SOC is the latter which she has not been interested in.    Options to me:  Re-TURBT to clear bladder.  If terrible symptoms again which are to be expected may not be able to hold BCG+IL-15 in bladder which could be an option.  This is also an incredibly expensive option which has recently got FDA approval.  We could consider doing this.  We could also consider re-TURBT followed by Emy which actually may make more sense given her pT3 UTUC history.  Cystectomy and conduit    PLAN:  TURBT at some point of time she will call to schedule.                                    Scribe Attestation  By signing my name below, IGhislaine Scribe   attest that this documentation has been prepared under the direction and in the presence of Jorge A De Leon MD MPH

## 2025-03-28 NOTE — ASSESSMENT & PLAN NOTE
79 y.o. female with NMIBC s/p TURBT. She also has pT3 N0 UTUC recently resected on the right.    We went over her case.  She has failed BCG years ago,  she failed gem/doce, she now has failed adstiladrin.    After last TURBT she has extensive symptoms.    We went over distinct options here for her.  These are bladder saving vs bladder removal.  SOC is the latter which she has not been interested in.    Options to me:  Re-TURBT to clear bladder.  If terrible symptoms again which are to be expected may not be able to hold BCG+IL-15 in bladder which could be an option.  This is also an incredibly expensive option which has recently got FDA approval.  We could consider doing this.  We could also consider re-TURBT followed by P[facility] which actually may make more sense given her pT3 UTUC history.  Cystectomy and conduit    PLAN:  TURBT at some point of time she will call to schedule.

## 2025-04-02 ENCOUNTER — OFFICE VISIT (OUTPATIENT)
Dept: HEMATOLOGY/ONCOLOGY | Facility: CLINIC | Age: 80
End: 2025-04-02
Payer: MEDICARE

## 2025-04-02 VITALS
DIASTOLIC BLOOD PRESSURE: 78 MMHG | BODY MASS INDEX: 28.71 KG/M2 | WEIGHT: 157 LBS | HEART RATE: 78 BPM | OXYGEN SATURATION: 95 % | RESPIRATION RATE: 16 BRPM | SYSTOLIC BLOOD PRESSURE: 125 MMHG | TEMPERATURE: 96.8 F

## 2025-04-02 DIAGNOSIS — C67.3 MALIGNANT NEOPLASM OF ANTERIOR WALL OF URINARY BLADDER (MULTI): Primary | ICD-10-CM

## 2025-04-02 PROCEDURE — 99214 OFFICE O/P EST MOD 30 MIN: CPT | Performed by: INTERNAL MEDICINE

## 2025-04-02 PROCEDURE — 1126F AMNT PAIN NOTED NONE PRSNT: CPT | Performed by: INTERNAL MEDICINE

## 2025-04-02 PROCEDURE — 1157F ADVNC CARE PLAN IN RCRD: CPT | Performed by: INTERNAL MEDICINE

## 2025-04-02 PROCEDURE — 3078F DIAST BP <80 MM HG: CPT | Performed by: INTERNAL MEDICINE

## 2025-04-02 PROCEDURE — 3074F SYST BP LT 130 MM HG: CPT | Performed by: INTERNAL MEDICINE

## 2025-04-02 PROCEDURE — 1159F MED LIST DOCD IN RCRD: CPT | Performed by: INTERNAL MEDICINE

## 2025-04-02 ASSESSMENT — ENCOUNTER SYMPTOMS
OCCASIONAL FEELINGS OF UNSTEADINESS: 0
LOSS OF SENSATION IN FEET: 0
DEPRESSION: 0

## 2025-04-02 ASSESSMENT — PAIN SCALES - GENERAL: PAINLEVEL_OUTOF10: 0-NO PAIN

## 2025-04-02 NOTE — PROGRESS NOTES
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  Treatment  -3/2010 LGTA non inv Pap   -Recurrence 2013, s/p 6 BCG, s/p BCG maintenance,   -8/2014 - non inv CIS, repeat 6 BCG, maintenance, Jan 2017  -Continued to be monitored  -7/2020 - non inv HG pap UC, s/p BCG, and maintenance into Jan 2021  -Path 6/2021 - pap UC, LG w/ focal HG features   -3 induction cycles of BCG  -cysto 10/12/2021 - multifocal lesions, s/p IV gem , path - / TURBT Urothelium, mod to severe atypia, carcinoma in situ  -refused Pembro/Cystectomy s/p induction gem/docetaxel x2   -repeat cysto - 4/22 - non inv pap UC HG, possible R UO, involvement, non specific Lns  -repeat gem/docetaxel completed 6 cycles 8/22, procedure 1/23 pap tumors at both ureteral orficies, r>l, tumor posterior trigone, wall of bladder, path c/w HG NMIBC ant wall and right ureteral, cytology concerning on left side, yet unable to evaluate, repeat gem/docetaxel x2 w/ persistent disease  -cysto / right ureteroscope - 11/23 - right UO tumor, renal pelvis high volume papillary tumors, path urinary bladder pap UC, HG, no muscle invasion, right ureter wash - highly atypical cells concerning for urothelial neoplasms, right, left, highly atypical UC present, concerning for neoplasms  CT urogram 12/23- Multiple subcentimeter small filling defects in the right kidney, lower pole calyx, as well as left kidney, mid zone, lower pole, small necrosis or pap necrosis, small foci of neoplasm, acute subacute fracture of L5, enhancing mass left gluteus gume [id number], stable, right pelvic sidewall LAD, malignant, or neoplastic, 14mm,  grade 1 anterolisthesis, refused NAC chemotherapy  -s/p TURBT and robotic right-sided nephro ureterectomy and lymph node dissection March 30, 2024 with pathology showing in the bladder high-grade noninvasive  papillary urothelial carcinoma lymph nodes were negative multifocal papillary urothelial carcinoma high-grade involving the renal pelvis and ureter invasion into the muscularis and perivesical fat and involving the renal parenchyma papillary adenoma 1.5 cm pathological T3  -refused any adjuvant therapy and decision to monitor ,   -receiving  Adstilardrin (virus vector/interferon) with urology for NMIBC component, q3 months, cysto 3/25 - concerns for recurrence x2 treatments    Breast Cancer - left sided lumpectomy, s/p xrt also completed 1996    Other contributing history  HL, Broken Wrist, Overactive bladder issues, Depression, Uterine Fibroids, Carpal tunnel syndrome, cataract,  Anxiety, HTN,     HPI  Follow up  The patient tolerated the therapy fairly well with the local injections but still ongoing issues with concerns for progression.  She notes her chronic urinary symptoms are stable.  She denies any issues with nausea, vomiting, fevers, chills, easy bruising or bleeding denies any ongoing issues with chest pain or chest tightness.  Denies any worsening issues with other new complaints.  Appetite and energy is well denies any worsening hematuria.    ROS:  10-pt ROS reviewed and negative except as mentioned above.    Medications: below was reviewed and is accurate  Current Outpatient Medications   Medication Instructions    acetaminophen (TYLENOL) 500 mg, Every 6 hours PRN    alendronate (FOSAMAX) 70 mg, oral, Every 7 days, Takes on Sunday    ALPRAZolam (XANAX) 0.25 mg, oral, 3 times daily PRN    cholecalciferol (Vitamin D-3) 50 MCG (2000 UT) tablet 1 tablet, Daily    ferrous sulfate 325 mg, Daily with breakfast    fluticasone (Flonase) 50 mcg/actuation nasal spray 2 sprays, Daily PRN    minoxidil (Loniten) 2.5 mg tablet 0.5 tablets, Daily    mirabegron (MYRBETRIQ) 25 mg, oral, Daily    mirtazapine (REMERON) 30 mg, oral, Nightly    omeprazole (PRILOSEC) 20 mg, oral, Daily    oxybutynin XL (DITROPAN-XL) 15 mg,  oral, Daily    rosuvastatin (CRESTOR) 20 mg, oral, Daily    sertraline (ZOLOFT) 200 mg, oral, Daily        Past Medical History / Social / Surgical / Family history reviewed and updated    Physical exam:  Vitals:    04/02/25 1131   BP: 125/78   Pulse: 78   Resp: 16   Temp: 36 °C (96.8 °F)   TempSrc: Temporal   SpO2: 95%   Weight: 71.2 kg (157 lb)           GEN: NAD, well-appearing  SKIN: no concerning new lesions examined in upper / lower extremity   LUNGS: CTA  CV: RRR no clear R/G  ABD: Soft, NTND. No rebound or guarding.  EXT:  trace edema bilaterally   NEURO: Grossly intact. No focal deficits.  PSYCH: Normal mood and affect      Radiology review  Reviewed in detail personally and for detail see results in EPIC  CT chest abdomen pelvis w oral contrast  Addendum Date: 3/21/2025    Interpreted By:  Sahil Santiago, ADDENDUM: Old fractures involving the anterolateral aspect of the right 3rd-6th ribs. Advise correlation with history of prior trauma    Impression: Chest 1. Stable bilateral subcentimeter pulmonary nodules. No new suspicious pulmonary nodules or masses. 2. Stable bilateral patchy airspace opacities which could be related to ongoing chronic inflammatory infectious process. 3. No new enlarged mediastinal lymph nodes. 4. Stable right thyroid nodule measuring 1.8 cm.   Abdomen-Pelvis 1. Prior right nephrectomy with no definite locoregional recurrence or metastatic disease in the abdomen or pelvis within the limitation of this unenhanced study. 2. Persistent mild-to-moderate left-sided hydroureteronephrosis down to the level of the urinary bladder without discrete distal obstructive pathology identified. 3. Other chronic and ancillary findings are unchanged.         Genomics Data    Laboratory review  Reviewed in detail personally and for detail see results in [facility]  Lab Results   Component Value Date    WBC 9.8 01/16/2025    HGB 11.6 (L) 01/16/2025    HCT 35.6 (L) 01/16/2025    MCV 92 01/16/2025      "01/16/2025     Lab Results   Component Value Date    GLUCOSE 56 (L) 01/16/2025    CALCIUM 9.5 01/16/2025     01/16/2025    K 4.0 01/16/2025    CO2 26 01/16/2025     (H) 01/16/2025    BUN 29 (H) 01/16/2025    CREATININE 1.20 (H) 01/16/2025     No results found for: \"PSA\"  Lab Results   Component Value Date    ALT 15 01/16/2025    AST 19 01/16/2025    ALKPHOS 70 01/16/2025    BILITOT 0.3 01/16/2025     No results found for: \"TESTOSTERONE\"      ASSESSMENT AND PLAN     Stage III Upper Tract Disease -most recent scan does not show any signs of recurrence.  Looks like other nonspecific findings are stable.  Discussed ongoing imaging and monitoring.  Discussed every 3 months versus 6 months and prefers 6 months we will go ahead and arrange for follow-up in 6 months with scans prior she will contact us with any other new complaints.    NMIBC -status post BCG, gem docetaxel and now Adstiladrin with concerns about ongoing progression.  Limited other local therapies potential option could be Anktiva, IL-15 agonist versus potential role of immunotherapy such as pembrolizumab for refractory disease.  All options discussed in detail.  She is pretty clear she is not interested in further intervention with pembrolizumab and/or surgical intervention with a definitive cystectomy.  Follow-up with urology and follow-up with pathology results.    Chronic kidney disease-continue to monitor closely and follow-up with primary care physician    Anemia-likely post op and ACD, discussed options, she prefers to follow-up with her primary care physician      Marcos Rojas MD  Senior Attending Physician/  in Medicine [employer]  [facility]  Patient line [phone]  Fax [phone]      "

## 2025-04-08 ENCOUNTER — PREP FOR PROCEDURE (OUTPATIENT)
Dept: UROLOGY | Facility: HOSPITAL | Age: 80
End: 2025-04-08
Payer: MEDICARE

## 2025-04-08 DIAGNOSIS — R79.1 ABNORMAL COAGULATION PROFILE: ICD-10-CM

## 2025-04-08 DIAGNOSIS — N20.0 KIDNEY STONE: ICD-10-CM

## 2025-04-08 DIAGNOSIS — R31.21 ASYMPTOMATIC MICROSCOPIC HEMATURIA: ICD-10-CM

## 2025-04-08 DIAGNOSIS — C67.9 MALIGNANT NEOPLASM OF URINARY BLADDER, UNSPECIFIED SITE (MULTI): Primary | ICD-10-CM

## 2025-04-08 RX ORDER — CHLORHEXIDINE GLUCONATE 40 MG/ML
SOLUTION TOPICAL DAILY PRN
OUTPATIENT
Start: 2025-04-08

## 2025-04-09 ENCOUNTER — HOSPITAL ENCOUNTER (OUTPATIENT)
Dept: RADIOLOGY | Facility: HOSPITAL | Age: 80
Setting detail: SURGERY ADMIT
Discharge: HOME | End: 2025-04-09
Payer: MEDICARE

## 2025-04-09 ENCOUNTER — OFFICE VISIT (OUTPATIENT)
Dept: PRIMARY CARE | Facility: CLINIC | Age: 80
End: 2025-04-09
Payer: MEDICARE

## 2025-04-09 ENCOUNTER — HOSPITAL ENCOUNTER (OUTPATIENT)
Facility: HOSPITAL | Age: 80
Setting detail: SURGERY ADMIT
End: 2025-04-09
Attending: UROLOGY | Admitting: UROLOGY
Payer: MEDICARE

## 2025-04-09 ENCOUNTER — ANESTHESIA (OUTPATIENT)
Dept: OPERATING ROOM | Facility: HOSPITAL | Age: 80
End: 2025-04-09
Payer: MEDICARE

## 2025-04-09 ENCOUNTER — HOSPITAL ENCOUNTER (OUTPATIENT)
Dept: RADIOLOGY | Facility: HOSPITAL | Age: 80
Discharge: HOME | End: 2025-04-09
Payer: MEDICARE

## 2025-04-09 ENCOUNTER — HOSPITAL ENCOUNTER (OUTPATIENT)
Facility: HOSPITAL | Age: 80
Discharge: HOME | End: 2025-04-09
Attending: STUDENT IN AN ORGANIZED HEALTH CARE EDUCATION/TRAINING PROGRAM
Payer: MEDICARE

## 2025-04-09 ENCOUNTER — ANESTHESIA EVENT (OUTPATIENT)
Dept: OPERATING ROOM | Facility: HOSPITAL | Age: 80
End: 2025-04-09
Payer: MEDICARE

## 2025-04-09 ENCOUNTER — LAB REQUISITION (OUTPATIENT)
Dept: LAB | Facility: HOSPITAL | Age: 80
End: 2025-04-09

## 2025-04-09 VITALS
HEIGHT: 62 IN | OXYGEN SATURATION: 94 % | WEIGHT: 156.53 LBS | DIASTOLIC BLOOD PRESSURE: 50 MMHG | SYSTOLIC BLOOD PRESSURE: 116 MMHG | HEART RATE: 83 BPM | BODY MASS INDEX: 28.8 KG/M2 | RESPIRATION RATE: 16 BRPM | TEMPERATURE: 97.7 F

## 2025-04-09 VITALS — SYSTOLIC BLOOD PRESSURE: 130 MMHG | DIASTOLIC BLOOD PRESSURE: 78 MMHG | HEART RATE: 90 BPM | OXYGEN SATURATION: 97 %

## 2025-04-09 DIAGNOSIS — N20.0 NEPHROLITHIASIS: ICD-10-CM

## 2025-04-09 DIAGNOSIS — N20.0 KIDNEY STONE: Primary | ICD-10-CM

## 2025-04-09 DIAGNOSIS — N13.9 OBSTRUCTIVE UROPATHY: Primary | ICD-10-CM

## 2025-04-09 DIAGNOSIS — C67.9 MALIGNANT NEOPLASM OF URINARY BLADDER, UNSPECIFIED SITE (MULTI): ICD-10-CM

## 2025-04-09 DIAGNOSIS — N20.0 NEPHROLITHIASIS: Primary | ICD-10-CM

## 2025-04-09 DIAGNOSIS — N20.1 URETEROLITHIASIS: ICD-10-CM

## 2025-04-09 DIAGNOSIS — N20.0 CALCULUS OF KIDNEY: ICD-10-CM

## 2025-04-09 PROBLEM — D64.9 ANEMIA: Status: ACTIVE | Noted: 2025-04-09

## 2025-04-09 PROBLEM — N17.9 ACUTE KIDNEY INJURY (NONTRAUMATIC) (CMS-HCC): Status: ACTIVE | Noted: 2025-04-09

## 2025-04-09 PROBLEM — F41.9 ANXIETY: Status: ACTIVE | Noted: 2025-04-09

## 2025-04-09 PROBLEM — F32.A DEPRESSION: Status: ACTIVE | Noted: 2025-04-09

## 2025-04-09 PROBLEM — C50.912 MALIGNANT NEOPLASM OF LEFT BREAST: Status: ACTIVE | Noted: 2025-04-09

## 2025-04-09 PROBLEM — H54.7 VISION LOSS: Status: ACTIVE | Noted: 2025-04-09

## 2025-04-09 LAB
ALBUMIN SERPL BCP-MCNC: 4.2 G/DL (ref 3.4–5)
ALP SERPL-CCNC: 70 U/L (ref 33–136)
ALT SERPL W P-5'-P-CCNC: 14 U/L (ref 7–45)
ANION GAP SERPL CALC-SCNC: 14 MMOL/L (ref 10–20)
AST SERPL W P-5'-P-CCNC: 23 U/L (ref 9–39)
BILIRUB SERPL-MCNC: 0.4 MG/DL (ref 0–1.2)
BUN SERPL-MCNC: 52 MG/DL (ref 6–23)
CALCIUM SERPL-MCNC: 8.9 MG/DL (ref 8.6–10.3)
CHLORIDE SERPL-SCNC: 104 MMOL/L (ref 98–107)
CO2 SERPL-SCNC: 24 MMOL/L (ref 21–32)
CREAT SERPL-MCNC: 4.16 MG/DL (ref 0.5–1.05)
EGFRCR SERPLBLD CKD-EPI 2021: 10 ML/MIN/1.73M*2
ERYTHROCYTE [DISTWIDTH] IN BLOOD BY AUTOMATED COUNT: 14 % (ref 11.5–14.5)
ERYTHROCYTE [SEDIMENTATION RATE] IN BLOOD BY WESTERGREN METHOD: 12 MM/H (ref 0–30)
GLUCOSE SERPL-MCNC: 117 MG/DL (ref 74–99)
HCT VFR BLD AUTO: 34 % (ref 36–46)
HGB BLD-MCNC: 11.6 G/DL (ref 12–16)
MCH RBC QN AUTO: 30.1 PG (ref 26–34)
MCHC RBC AUTO-ENTMCNC: 34.1 G/DL (ref 32–36)
MCV RBC AUTO: 88 FL (ref 80–100)
NRBC BLD-RTO: 0 /100 WBCS (ref 0–0)
PLATELET # BLD AUTO: 108 X10*3/UL (ref 150–450)
POTASSIUM SERPL-SCNC: 4.8 MMOL/L (ref 3.5–5.3)
PROT SERPL-MCNC: 6.8 G/DL (ref 6.4–8.2)
RBC # BLD AUTO: 3.85 X10*6/UL (ref 4–5.2)
SODIUM SERPL-SCNC: 137 MMOL/L (ref 136–145)
WBC # BLD AUTO: 10.7 X10*3/UL (ref 4.4–11.3)

## 2025-04-09 PROCEDURE — 7100000002 HC RECOVERY ROOM TIME - EACH INCREMENTAL 1 MINUTE: Performed by: UROLOGY

## 2025-04-09 PROCEDURE — 99100 ANES PT EXTEME AGE<1 YR&>70: CPT | Performed by: ANESTHESIOLOGY

## 2025-04-09 PROCEDURE — 76000 FLUOROSCOPY <1 HR PHYS/QHP: CPT | Mod: 59 | Performed by: UROLOGY

## 2025-04-09 PROCEDURE — 52332 CYSTOSCOPY AND TREATMENT: CPT | Performed by: UROLOGY

## 2025-04-09 PROCEDURE — 99285 EMERGENCY DEPT VISIT HI MDM: CPT | Performed by: STUDENT IN AN ORGANIZED HEALTH CARE EDUCATION/TRAINING PROGRAM

## 2025-04-09 PROCEDURE — 7100000009 HC PHASE TWO TIME - INITIAL BASE CHARGE: Performed by: UROLOGY

## 2025-04-09 PROCEDURE — 7100000001 HC RECOVERY ROOM TIME - INITIAL BASE CHARGE: Performed by: UROLOGY

## 2025-04-09 PROCEDURE — 2500000004 HC RX 250 GENERAL PHARMACY W/ HCPCS (ALT 636 FOR OP/ED): Performed by: ANESTHESIOLOGIST ASSISTANT

## 2025-04-09 PROCEDURE — 1157F ADVNC CARE PLAN IN RCRD: CPT | Performed by: INTERNAL MEDICINE

## 2025-04-09 PROCEDURE — 76000 FLUOROSCOPY <1 HR PHYS/QHP: CPT | Mod: LT

## 2025-04-09 PROCEDURE — 2780000003 HC OR 278 NO HCPCS: Performed by: UROLOGY

## 2025-04-09 PROCEDURE — 76000 FLUOROSCOPY <1 HR PHYS/QHP: CPT | Performed by: UROLOGY

## 2025-04-09 PROCEDURE — 74420 UROGRAPHY RTRGR +-KUB: CPT | Performed by: UROLOGY

## 2025-04-09 PROCEDURE — 2550000001 HC RX 255 CONTRASTS: Performed by: UROLOGY

## 2025-04-09 PROCEDURE — 3600000003 HC OR TIME - INITIAL BASE CHARGE - PROCEDURE LEVEL THREE: Performed by: UROLOGY

## 2025-04-09 PROCEDURE — 7100000010 HC PHASE TWO TIME - EACH INCREMENTAL 1 MINUTE: Performed by: UROLOGY

## 2025-04-09 PROCEDURE — 3075F SYST BP GE 130 - 139MM HG: CPT | Performed by: INTERNAL MEDICINE

## 2025-04-09 PROCEDURE — 3600000008 HC OR TIME - EACH INCREMENTAL 1 MINUTE - PROCEDURE LEVEL THREE: Performed by: UROLOGY

## 2025-04-09 PROCEDURE — 99222 1ST HOSP IP/OBS MODERATE 55: CPT | Performed by: UROLOGY

## 2025-04-09 PROCEDURE — C2617 STENT, NON-COR, TEM W/O DEL: HCPCS | Performed by: UROLOGY

## 2025-04-09 PROCEDURE — 99214 OFFICE O/P EST MOD 30 MIN: CPT | Performed by: INTERNAL MEDICINE

## 2025-04-09 PROCEDURE — A52332 PR CYSTOSCOPY,INSERT URETERAL STENT: Performed by: ANESTHESIOLOGIST ASSISTANT

## 2025-04-09 PROCEDURE — 2500000005 HC RX 250 GENERAL PHARMACY W/O HCPCS: Performed by: UROLOGY

## 2025-04-09 PROCEDURE — 3078F DIAST BP <80 MM HG: CPT | Performed by: INTERNAL MEDICINE

## 2025-04-09 PROCEDURE — A52332 PR CYSTOSCOPY,INSERT URETERAL STENT: Performed by: ANESTHESIOLOGY

## 2025-04-09 PROCEDURE — 74176 CT ABD & PELVIS W/O CONTRAST: CPT

## 2025-04-09 PROCEDURE — C1769 GUIDE WIRE: HCPCS | Performed by: UROLOGY

## 2025-04-09 PROCEDURE — 3700000002 HC GENERAL ANESTHESIA TIME - EACH INCREMENTAL 1 MINUTE: Performed by: UROLOGY

## 2025-04-09 PROCEDURE — 52332 CYSTOSCOPY AND TREATMENT: CPT | Mod: LT | Performed by: UROLOGY

## 2025-04-09 PROCEDURE — 3700000001 HC GENERAL ANESTHESIA TIME - INITIAL BASE CHARGE: Performed by: UROLOGY

## 2025-04-09 PROCEDURE — 2720000007 HC OR 272 NO HCPCS: Performed by: UROLOGY

## 2025-04-09 DEVICE — URETERAL STENT WITH SIDE HOLES 6FX24CM
Type: IMPLANTABLE DEVICE | Site: URETER | Status: FUNCTIONAL
Brand: TRIA™ SOFT

## 2025-04-09 RX ORDER — PHENYLEPHRINE HCL IN 0.9% NACL 1 MG/10 ML
SYRINGE (ML) INTRAVENOUS AS NEEDED
Status: DISCONTINUED | OUTPATIENT
Start: 2025-04-09 | End: 2025-04-09

## 2025-04-09 RX ORDER — PROPOFOL 10 MG/ML
INJECTION, EMULSION INTRAVENOUS CONTINUOUS PRN
Status: DISCONTINUED | OUTPATIENT
Start: 2025-04-09 | End: 2025-04-09

## 2025-04-09 RX ORDER — LIDOCAINE HYDROCHLORIDE 20 MG/ML
INJECTION, SOLUTION INFILTRATION; PERINEURAL AS NEEDED
Status: DISCONTINUED | OUTPATIENT
Start: 2025-04-09 | End: 2025-04-09

## 2025-04-09 RX ORDER — SODIUM CHLORIDE, SODIUM LACTATE, POTASSIUM CHLORIDE, CALCIUM CHLORIDE 600; 310; 30; 20 MG/100ML; MG/100ML; MG/100ML; MG/100ML
INJECTION, SOLUTION INTRAVENOUS CONTINUOUS PRN
Status: DISCONTINUED | OUTPATIENT
Start: 2025-04-09 | End: 2025-04-09

## 2025-04-09 RX ORDER — SODIUM CHLORIDE 0.9 G/100ML
INJECTION, SOLUTION IRRIGATION AS NEEDED
Status: DISCONTINUED | OUTPATIENT
Start: 2025-04-09 | End: 2025-04-09 | Stop reason: HOSPADM

## 2025-04-09 RX ORDER — ONDANSETRON HYDROCHLORIDE 2 MG/ML
INJECTION, SOLUTION INTRAVENOUS AS NEEDED
Status: DISCONTINUED | OUTPATIENT
Start: 2025-04-09 | End: 2025-04-09

## 2025-04-09 RX ORDER — FENTANYL CITRATE 50 UG/ML
INJECTION, SOLUTION INTRAMUSCULAR; INTRAVENOUS CONTINUOUS PRN
Status: DISCONTINUED | OUTPATIENT
Start: 2025-04-09 | End: 2025-04-09

## 2025-04-09 RX ORDER — CEFAZOLIN 1 G/1
INJECTION, POWDER, FOR SOLUTION INTRAVENOUS AS NEEDED
Status: DISCONTINUED | OUTPATIENT
Start: 2025-04-09 | End: 2025-04-09

## 2025-04-09 RX ORDER — SUCCINYLCHOLINE CHLORIDE 20 MG/ML
INJECTION INTRAMUSCULAR; INTRAVENOUS AS NEEDED
Status: DISCONTINUED | OUTPATIENT
Start: 2025-04-09 | End: 2025-04-09

## 2025-04-09 RX ADMIN — ONDANSETRON 4 MG: 2 INJECTION INTRAMUSCULAR; INTRAVENOUS at 14:57

## 2025-04-09 RX ADMIN — FENTANYL CITRATE 50 MCG: 50 INJECTION, SOLUTION INTRAMUSCULAR; INTRAVENOUS at 14:52

## 2025-04-09 RX ADMIN — CEFAZOLIN 2 G: 330 INJECTION, POWDER, FOR SOLUTION INTRAMUSCULAR; INTRAVENOUS at 14:58

## 2025-04-09 RX ADMIN — Medication 300 MCG: at 15:06

## 2025-04-09 RX ADMIN — SODIUM CHLORIDE, POTASSIUM CHLORIDE, SODIUM LACTATE AND CALCIUM CHLORIDE: 600; 310; 30; 20 INJECTION, SOLUTION INTRAVENOUS at 14:46

## 2025-04-09 RX ADMIN — PROPOFOL 200 MG: 10 INJECTION, EMULSION INTRAVENOUS at 14:52

## 2025-04-09 RX ADMIN — LIDOCAINE HYDROCHLORIDE 5 ML: 20 INJECTION, SOLUTION INFILTRATION; PERINEURAL at 14:52

## 2025-04-09 RX ADMIN — SUCCINYLCHOLINE CHLORIDE 100 MG: 20 INJECTION, SOLUTION INTRAMUSCULAR; INTRAVENOUS at 14:52

## 2025-04-09 RX ADMIN — Medication 100 MCG: at 15:03

## 2025-04-09 RX ADMIN — DEXAMETHASONE SODIUM PHOSPHATE 4 MG: 4 INJECTION, SOLUTION INTRA-ARTICULAR; INTRALESIONAL; INTRAMUSCULAR; INTRAVENOUS; SOFT TISSUE at 14:57

## 2025-04-09 ASSESSMENT — COLUMBIA-SUICIDE SEVERITY RATING SCALE - C-SSRS
2. HAVE YOU ACTUALLY HAD ANY THOUGHTS OF KILLING YOURSELF?: NO
6. HAVE YOU EVER DONE ANYTHING, STARTED TO DO ANYTHING, OR PREPARED TO DO ANYTHING TO END YOUR LIFE?: NO
2. HAVE YOU ACTUALLY HAD ANY THOUGHTS OF KILLING YOURSELF?: NO
1. IN THE PAST MONTH, HAVE YOU WISHED YOU WERE DEAD OR WISHED YOU COULD GO TO SLEEP AND NOT WAKE UP?: NO
1. IN THE PAST MONTH, HAVE YOU WISHED YOU WERE DEAD OR WISHED YOU COULD GO TO SLEEP AND NOT WAKE UP?: NO
6. HAVE YOU EVER DONE ANYTHING, STARTED TO DO ANYTHING, OR PREPARED TO DO ANYTHING TO END YOUR LIFE?: NO

## 2025-04-09 ASSESSMENT — PAIN SCALES - GENERAL
PAINLEVEL_OUTOF10: 0 - NO PAIN
PAINLEVEL_OUTOF10: 0 - NO PAIN
PAINLEVEL_OUTOF10: 3
PAINLEVEL_OUTOF10: 0 - NO PAIN

## 2025-04-09 ASSESSMENT — PAIN - FUNCTIONAL ASSESSMENT
PAIN_FUNCTIONAL_ASSESSMENT: 0-10

## 2025-04-09 NOTE — ANESTHESIA PREPROCEDURE EVALUATION
Patient: Anahi Wall    Procedure Information       Date/Time: 04/09/25 1401    Procedure: Left CYSTOSCOPY, WITH URETERAL STENT INSERTION (Left: Groin)    Location: [facility] OR 01 / [facility]    Surgeons: Abebe Macias MD            Relevant Problems   Cardiac   (+) Chest pain   (+) HTN (hypertension), benign   (+) Hyperlipidemia      Neuro   (+) Anxiety   (+) Depression      GI   (+) Acid reflux      /Renal  Solitary L kidney   (+) Acute kidney injury (nontraumatic) (CMS-HCC) (Cr 4.1)   (+) Kidney stone   (+) Malignant neoplasm of kidney (Multi) (Bladder ca)   (+) Uric acid nephrolithiasis   (+) Urothelial carcinoma of kidney, right      Hematology   (+) Anemia      Musculoskeletal   (+) Degeneration of lumbar intervertebral disc   (+) Disc displacement, lumbar   (+) Localized primary osteoarthritis of carpometacarpal joint of left thumb   (+) Lumbosacral spinal stenosis   (+) Primary osteoarthritis of left knee      HEENT   (+) Sensorineural hearing loss of both ears   (+) Vision loss      GYN   (+) Malignant neoplasm of left breast       Clinical information reviewed:                   NPO Detail:  No data recorded     Physical Exam    Airway  Mallampati: III     Cardiovascular    Dental    Pulmonary    Abdominal            Anesthesia Plan    History of general anesthesia?: yes  History of complications of general anesthesia?: no    ASA 3     general     intravenous induction   Anesthetic plan and risks discussed with patient.

## 2025-04-09 NOTE — PROGRESS NOTES
Subjective   Patient ID: Anahi Wall is a 79 y.o. female who presents for kidney stone.    Patient is here complaining of a 24-hour history of left flank and left upper quadrant abdominal pain.  Patient describes a dull aching pain in her left upper quadrant which she says is similar to the pain she had when she has passed kidney stones in the past.  She is also not urinated in almost 24 hours.  She has had no GI complaints of any Was able to eat normally and has had a normal bowel movement today.  She is status post right nephrectomy.  Her last visit to the urologist was last week.  She was voiding normally at that time but there is still a lot of concern for recurrent disease.         Review of Systems    Objective   /78   Pulse 90   SpO2 97%     Physical Exam  Abdominal:      General: Abdomen is flat. Bowel sounds are normal. There is no distension.      Palpations: Abdomen is soft. There is no mass.      Tenderness: There is no abdominal tenderness. There is no left CVA tenderness.      Comments: No obvious bladder distention     Patient was unable to void or give a urine sample today.    Assessment/Plan   Problem List Items Addressed This Visit             ICD-10-CM    Bladder cancer (Multi) C67.9     Left upper quadrant abdominal pain associated with anuria.  I suspect she must have a upper tract obstruction.  Kidney stone versus recurrence of her tumor.  Will obtain stat lab work and a stat CT.  And then refer her to neurology urgently.  She may need a stent placed.          Other Visit Diagnoses         Codes    Nephrolithiasis    -  Primary N20.0    Relevant Orders    Comprehensive Metabolic Panel    CBC and Auto Differential    Sedimentation Rate    CT abdomen pelvis wo IV contrast

## 2025-04-09 NOTE — CONSULTS
Reason For Consult  Kidney stone    History Of Present Illness  Anahi Wall is a 79 y.o. female presenting with solitary kidney and obstructing stone.  Sent in by PCP.     Past Medical History  She has a past medical history of Anxiety, Arthritis, Bladder cancer (Multi), Breast cancer, Carpal tunnel syndrome, Cataract, Cramp and spasm, Depression, GERD (gastroesophageal reflux disease), Hearing aid worn, HL (hearing loss), Hyperlipidemia, Joint pain, Nephrolithiasis, Osteoporosis, Other conditions influencing health status, Trochanteric bursitis, unspecified hip, Urothelial cancer (Multi), and Vision loss.    Surgical History  She has a past surgical history that includes Breast lumpectomy (Left); Carpal tunnel release (Left, 2003); Hysterectomy (02/10/2014); Bladder surgery (09/03/2019); Colonoscopy; Cystoscopy; Other surgical history (2007); Cataract extraction, extracapsular w/ intraocular lens implant (Bilateral, 2017); Other surgical history (Left); Other surgical history (Left); Cataract extraction; and Nephrectomy (Right, 03/28/2024).     Social History  She reports that she quit smoking about 57 years ago. Her smoking use included cigarettes. She started smoking about 59 years ago. She has a 1 pack-year smoking history. She has never used smokeless tobacco. She reports current alcohol use of about 1.0 standard drink of alcohol per week. She reports that she does not use drugs.    Family History  Family History   Problem Relation Name Age of Onset    Diabetes Mother      Multiple sclerosis Mother      Hypertension Father      Stroke Father      Heart disease Father      Bipolar disorder Brother      Cardiomyopathy Brother          Allergies  Aspirin, Nsaids (non-steroidal anti-inflammatory drug), and Penicillins    Review of Systems  Reviewed per initial H&P     Physical Exam  NAD  Non labored resp  AxO x3     Last Recorded Vitals  Blood pressure 139/65, pulse 87, temperature 36.7 °C (98.1 °F), resp. rate  "16, height 1.575 m (5' 2\"), weight 71 kg (156 lb 8.4 oz), SpO2 100%.    Relevant Results      Results for orders placed or performed in visit on 04/09/25 (from the past 24 hours)   Comprehensive Metabolic Panel   Result Value Ref Range    Glucose 117 (H) 74 - 99 mg/dL    Sodium 137 136 - 145 mmol/L    Potassium 4.8 3.5 - 5.3 mmol/L    Chloride 104 98 - 107 mmol/L    Bicarbonate 24 21 - 32 mmol/L    Anion Gap 14 10 - 20 mmol/L    Urea Nitrogen 52 (H) 6 - 23 mg/dL    Creatinine 4.16 (H) 0.50 - 1.05 mg/dL    eGFR 10 (L) >60 mL/min/1.73m*2    Calcium 8.9 8.6 - 10.3 mg/dL    Albumin 4.2 3.4 - 5.0 g/dL    Alkaline Phosphatase 70 33 - 136 U/L    Total Protein 6.8 6.4 - 8.2 g/dL    AST 23 9 - 39 U/L    Bilirubin, Total 0.4 0.0 - 1.2 mg/dL    ALT 14 7 - 45 U/L   CBC   Result Value Ref Range    WBC 10.7 4.4 - 11.3 x10*3/uL    nRBC 0.0 0.0 - 0.0 /100 WBCs    RBC 3.85 (L) 4.00 - 5.20 x10*6/uL    Hemoglobin 11.6 (L) 12.0 - 16.0 g/dL    Hematocrit 34.0 (L) 36.0 - 46.0 %    MCV 88 80 - 100 fL    MCH 30.1 26.0 - 34.0 pg    MCHC 34.1 32.0 - 36.0 g/dL    RDW 14.0 11.5 - 14.5 %    Platelets 108 (L) 150 - 450 x10*3/uL   Sedimentation Rate   Result Value Ref Range    Sedimentation Rate 12 0 - 30 mm/h     *Note: Due to a large number of results and/or encounters for the requested time period, some results have not been displayed. A complete set of results can be found in Results Review.      FL less than 1 hour    Result Date: 4/9/2025  These images are not reportable by radiology and will not be interpreted by  Radiologists.    CT abdomen pelvis wo IV contrast    Result Date: 4/9/2025  Interpreted By:  Cleveland Griffith, STUDY: CT ABDOMEN PELVIS WO IV CONTRAST;  4/9/2025 12:33 pm   INDICATION: Signs/Symptoms:Nephrolithiasis.   COMPARISON: 03/19/2025   ACCESSION NUMBER(S): [id number]   ORDERING CLINICIAN: DEAN GAIVRIA   TECHNIQUE: CT of the abdomen and pelvis was performed.  Contiguous axial images were obtained at 3 mm slice thickness " through the abdomen and pelvis. Coronal and sagittal reconstructions at 3 mm slice thickness were performed. The patient received no intravenous contrast. Please note evaluation of the solid organs and vessels is limited in the absence of intravenous contrast. Given this caveat, the following observations are made:   FINDINGS: LOWER CHEST: Ground-glass nodules in the lung bases are similar to prior exam.   ABDOMEN: Motion degraded exam.   LIVER: Unremarkable   BILE DUCTS: Not dilated.   GALLBLADDER: No calcified stone or definite inflammation.   PANCREAS: Unremarkable   SPLEEN: Borderline enlarged measuring 13.6 cm in length.   ADRENAL GLANDS: Unremarkable   KIDNEYS AND URETERS: Right kidney is absent. A 13 mm calculus has migrated to the left ureteropelvic junction. Severe hydronephrosis has worsened compared to prior exam. A 12 mm calculus in the lower pole is still present. Perinephric stranding is increased.   PELVIS:   BLADDER: Empty.   REPRODUCTIVE ORGANS: Uterus appears to be absent.   BOWEL: Mild sigmoid diverticulosis. No findings of bowel obstruction or inflammation. Unremarkable appendix.    Unremarkable mesentery.   VESSELS: No abdominal aortic aneurysm.Moderate atherosclerosis.   PERITONEUM AND RETROPERITONEUM: No free fluid or free air. No abdominal or pelvic lymphadenopathy.   BONE AND ABDOMINAL WALL: No acute skeletal findings.  Grade 1 spondylolisthesis L4-5 and L5-S1. Multilevel degenerative changes.       13 mm calculus has migrated to the left ureteropelvic junction causing increased, now severe left hydronephrosis with increased perinephric inflammation..   MACRO: None.   Signed by: Cleveland Griffith 4/9/2025 2:08 PM Dictation workstation:   [id number]    CT chest abdomen pelvis w oral contrast    Addendum Date: 3/21/2025    Interpreted By:  Sahil Santiago, ADDENDUM: Old fractures involving the anterolateral aspect of the right 3rd-6th ribs. Advise correlation with history of prior trauma   Otherwise  findings and impression are unchanged.   Signed by: Sahil Santiago 3/21/2025 10:04 AM   -------- ORIGINAL REPORT -------- Dictation workstation:   [id number]    Result Date: 3/21/2025  Interpreted By:  Sahil Santiago, STUDY: CT CHEST ABDOMEN AND PELVIS W ORAL CONTRAST ONLY;  3/19/2025 2:01 pm   INDICATION: Signs/Symptoms:bladder.   ,C67.3 Malignant neoplasm of anterior wall of bladder (Multi)   COMPARISON: Chest and abdomen CT scan 06/24/2024. Abdomen CT scan 10/29/2024   ACCESSION NUMBER(S): [id number]   ORDERING CLINICIAN: KHADRA ROSAS   TECHNIQUE: CT of the chest, abdomen and pelvis was performed. Contiguous axial images were obtained at 3 mm slice thickness through the chest, abdomen and pelvis. Coronal and sagittal reconstructions at 3 mm slice thickness were performed.  No intravenous contrast was administered; positive oral contrast was given.   FINDINGS: Please note that the study is limited without intravenous contrast.   CHEST:     LUNG/PLEURA/LARGE AIRWAYS: Biapical fibrotic changes. Stable multifocal patchy airspace opacities. Stable anteromedial left upper lobe 0.7 cm pulmonary nodule (image 87), right lower lobe lateral pleural-based nodule measuring 0.4 cm (image 167), right lower lobe pleural-based nodule measuring 0.4 cm (image 208), left lower lobe lateral subpleural 0.5 cm pulmonary nodule (image 138). No new suspicious pulmonary nodules. No new major pulmonary consolidation, pleural effusion or pneumothorax. Central airways are patent.   VESSELS: Aorta and pulmonary arteries are normal caliber.  Moderate atherosclerotic changes are noted of the aorta and branching vessels. No coronary artery calcifications are present.   HEART: The heart is normal in size.  No pericardial effusion   MEDIASTINUM AND MICH: No new enlarged mediastinal lymph nodes. The esophagus appears normal.   CHEST WALL AND LOWER NECK: Stable right thyroid nodule measuring 1.8 cm.   ABDOMEN:   LIVER: The liver is normal in  size without evidence of focal liver lesions.   BILE DUCTS: The bile ducts are not dilated.   GALLBLADDER: Contracted   PANCREAS: No duct dilatation   SPLEEN: Borderline splenomegaly as in prior measuring 13.6 cm.   ADRENAL GLANDS: No nodules   KIDNEYS AND URETERS: Prior right nephrectomy with no gross soft tissue mass at the surgical bed within the limitation of this unenhanced study.   Left kidney is normal in size. Lower pole adjacent nephrolithiasis measuring 0.9 0.8 cm. Moderate hydroureteronephrosis down to the level of the urinary bladder.   PELVIS:   BLADDER: Decompressed   REPRODUCTIVE ORGANS: Hysterectomy   BOWEL: No bowel dilatation. Appendix is unremarkable terminating in the right lower pelvis. No ascites or pneumoperitoneum.   VESSELS: Multifocal vascular calcifications and atherosclerotic changes. Dilated splenic varices.   PERITONEUM/RETROPERITONEUM/LYMPH NODES: Stable few subcentimeter retroperitoneal and pelvic lymph nodes.   ABDOMINAL WALL: Unremarkable.   BONES: Multilevel degenerative spine changes and facet joint disease. Degenerative changes of the symphysis pubis. Stable tiny benign-looking calcific focus in the left lateral iliac wing left sacral ala.       Chest 1. Stable bilateral subcentimeter pulmonary nodules. No new suspicious pulmonary nodules or masses. 2. Stable bilateral patchy airspace opacities which could be related to ongoing chronic inflammatory infectious process. 3. No new enlarged mediastinal lymph nodes. 4. Stable right thyroid nodule measuring 1.8 cm.   Abdomen-Pelvis 1. Prior right nephrectomy with no definite locoregional recurrence or metastatic disease in the abdomen or pelvis within the limitation of this unenhanced study. 2. Persistent mild-to-moderate left-sided hydroureteronephrosis down to the level of the urinary bladder without discrete distal obstructive pathology identified. 3. Other chronic and ancillary findings are unchanged.     MACRO: None   Signed by:  Sahil Santiago 3/20/2025 5:53 PM Dictation workstation:   [id number]         Assessment/Plan     80 yo female with solitary left kidney and obstructing stone    OR emergently for cysto, stent placement    I spent 60 minutes in the professional and overall care of this patient.      Abebe Macias MD

## 2025-04-09 NOTE — ED PROVIDER NOTES
History of Present Illness     History provided by:  Coordinating physician  Limitations to History: None  External Records Reviewed with Brief Summary:  Outpatient communications regarding patient's transfer for stent placement.    HPI:  Anahi Wall is a 79 y.o. female patient with a history of a single kidney, bladder cancer, bladder tumor, malignant neoplasm of the kidney, malignant neoplasm of the left breast, acute kidney injury presents via outpatient transfer for obstructive uropathy and ureteral lithiasis.  Patient was initially evaluated by Dr. Burton who is complaining of a 24-hour history of left flank and left upper quadrant abdominal pain at which point she received a CT which showed no obstructive stone.  Patient was sent to the emergency department to be evaluated for possible stent placement.  Dr. Macias available and awaiting arrival to the emergency department to bring patient to have stent placed.    Physical Exam   Triage vitals:  T 36.7 °C (98.1 °F)  HR 92  /68  RR 18  O2 97 % None (Room air)    General: Awake, alert, in no acute distress  Eyes: Gaze conjugate.  No scleral icterus or injection  HENT: Normo-cephalic, atraumatic. No stridor  CV: Regular rate, regular rhythm.  Resp: Breathing non-labored, speaking in full sentences.  Clear to auscultation bilaterally  GI: Soft, non-distended,  MSK/Extremities: No gross bony deformities. Moving all extremities  Skin: Warm. Appropriate color  Neuro: Alert. Oriented.  Psych: Appropriate mood and affect    Medical Decision Making & ED Course   ED Course:  Diagnoses as of 25   Ureterolithiasis       Medical Decision Makin y.o. female presented with left-sided flank pain secondary to obstructive uropathy and ureterolithiasis.  On arrival patient was evaluated, labs and imaging outpatient reviewed.  Patient admitted under urology via observation for stent placement to avoid any obstructive uropathy and complications of this  with her single kidney.  ----  Discussed the case with the admitting physician who agrees with current management and accepted the patient for admission.  Patient stable at the time of admission.    Differential diagnoses considered include but are not limited to: see ED Course    EKG Independent Interpretation: see ED course     Independent Result Review and Interpretation: see ED course     Chronic conditions affecting the patient's care: as documented in ED course/MDM    The patient was discussed with the following consultants/services: see ED course    Care Considerations: as documented in ED course/MDM      Disposition   As a result of their workup, the patient will require admission to the hospital.  The patient was informed of her diagnosis.  The patient was given the opportunity to ask questions and I answered them. The patient agreed to be admitted to the hospital.    Procedures   Procedures       Obinna Marie DO  04/09/25 [id number]

## 2025-04-09 NOTE — ASSESSMENT & PLAN NOTE
Left upper quadrant abdominal pain associated with anuria.  I suspect she must have a upper tract obstruction.  Kidney stone versus recurrence of her tumor.  Will obtain stat lab work and a stat CT.  And then refer her to neurology urgently.  She may need a stent placed.

## 2025-04-09 NOTE — PATIENT CARE CONFERENCE
1515: Patient to bay with anesthesia and surgical team present. Handoff report and plan of care reviewed, all questions answered. VSS.    1520: Patient tolerating sips of water    1540: Dr Macias at bedside    1554: Patient tolerating crackers at this time    1559: Patient meets phase one discharge criteria at this time    1600: Patient to phase two at this time    1604: Patient sitting up at bedside. Peripheral IV removed with no complications.     1606: Family at bedside    1610: Patient dressed with family assistance.     1615: Discharge instructions reviewed with patient and family, no further questions at this time. Printed after visit summary with written instruction provided.    1620: 370ml emptied from rodriguez catheter    1626: Patient to main lobby via transport with all belongings in stable condition. Phase 2 complete.

## 2025-04-09 NOTE — OP NOTE
Left CYSTOSCOPY, WITH URETERAL STENT INSERTION (L) Operative Note     Date: 2025  OR Location: [facility]    Name: Anahi Wall, : 1945, Age: 79 y.o., MRN: [id number], Sex: female    Diagnosis  Pre-op Diagnosis      * Kidney stone [N20.0] Post-op Diagnosis     * Kidney stone [N20.0]     Procedures  Left CYSTOSCOPY, WITH URETERAL STENT INSERTION  90543 - MS CYSTO W/INSERT URETERAL STENT      Surgeons      * Abebe Macias - Primary    Resident/Fellow/Other Assistant:  Surgeons and Role:     * Alex Orosco MD - Resident - Assisting    Staff:   Circulator: Sofia Mcdaniel Circulator: Shelia    Anesthesia Staff: Anesthesiologist: Cornelio Melvin MD  C-AA: AIDA Lugo    Procedure Summary  Anesthesia: General  ASA: III  Estimated Blood Loss: 0mL  Intra-op Medications:   Administrations occurring from 1401 to 1501 on 25:   Medication Name Total Dose   ceFAZolin (Ancef) 1 g 2 g   dexAMETHasone (Decadron) 4 mg/mL 4 mg   fentaNYL (Sublimaze) injection 50 mcg/mL 50 mcg   LR infusion Cannot be calculated   lidocaine (Xylocaine) 2 % 5 mL   ondansetron 2 mg/mL 4 mg   propofol (Diprivan) injection 10 mg/mL 200 mg   succinylcholine 20 mg/mL vial 100 mg              Anesthesia Record               Intraprocedure I/O Totals       None           Specimen: No specimens collected              Drains and/or Catheters:   Closed/Suction Drain Abdomen Bulb (Active)       Urethral Catheter 16 Fr. (Active)       Urethral Catheter Non-latex 16 Fr. (Active)       Tourniquet Times:         Implants:  Implants       Type Name Action Serial No.      Implant STENT, TRIA URETHERAL, SOFT, 6 X  24 - SN/A - [id number] Implanted N/A              Findings: No bladder masses visualized on cystoscopy. Left UO cannulated, stone not visualized on X-ray. 6x24 JJ left ureteral stent placed (no string). Hughes left in place to be removed tomorrow 4/10/25.    Indications: Anahi Wall is an 79 y.o. female who is having surgery for  Kidney stone [N20.0].     The patient was seen in the preoperative area. The risks, benefits, complications, treatment options, non-operative alternatives, expected recovery and outcomes were discussed with the patient. The possibilities of reaction to medication, pulmonary aspiration, injury to surrounding structures, bleeding, recurrent infection, the need for additional procedures, failure to diagnose a condition, and creating a complication requiring transfusion or operation were discussed with the patient. The patient concurred with the proposed plan, giving informed consent.  The site of surgery was properly noted/marked if necessary per policy. The patient has been actively warmed in preoperative area. Preoperative antibiotics have been ordered and given within 1 hours of incision. Venous thrombosis prophylaxis have been ordered including bilateral sequential compression devices    Procedure Details:   Patient consented to procedure in preoperative area. Risks and benefits discussed. Patient was marked on the left side. Allergies were reviewed and preoperative antibiotics were administered. Patient was brought to the operating room and placed in supine position on the operating room table. A timeout was performed. All were in agreement. Patient underwent general anesthesia without complication. They were repositioned in dorsal lithotomy and prepped and draped in the usual sterile fashion. A 21 fr rigid cystoscope was used to perform cystourethroscopy. Urethra was normal. Left UO was seen in normal orthotopic position. No masses or stones were identified.  A 5Fr ureteral catheter was placed over a wire through the left UO and retrograde pyelogram was performed.   Retrograde pyelogram interpretation: Mild to moderate hydronephrosis, filling defect not appreciated in ureter suggesting stone may have been pushed back into renal pelvis by instrumentation.  The wire was replaced through the 5Fr catheter to the  level of the kidney as confirmed on fluoroscopy. A 6x 24 JJ stent was placed over the wire. Proximal curl was noted in the kidney on fluoroscopy and distal curl was seen in the bladder under direct visualization. Instruments removed. A 16Fr rodriguez was placed with 10cc sterile water used to insufflate the balloon. This concluded the procedure. Patient was awoken from anesthesia without complication and transferred to PACU in stable condition.    Complications:  None; patient tolerated the procedure well.    Disposition: PACU - hemodynamically stable.  Condition: stable     Additional Details:   - Appropriate for discharge at this time  - Rodriguez to stay on discharge, will plan to take out in clinic tomorrow 4/10  - Will trend BMP to ensure resolution of YASMIN  - Will arrange outpatient follow-up for definitive stone management      Attending Attestation:     Abebe Macias  Phone Number: [phone]

## 2025-04-09 NOTE — INTERVAL H&P NOTE
H&P reviewed. Interval CT confirmed obstructing left ureteral stone with upstream hydronephrosis. No other changes to the H&P.

## 2025-04-09 NOTE — DISCHARGE INSTRUCTIONS
DEPARTMENT OF UROLOGY  DISCHARGE INSTRUCTIONS CYSTOSCOPY STENT INSERTION/EXCHANGE  Outpatient Surgery    C O N F I D E N T I A L   I N F O R M A T I O N    Anahi Wall      Call [phone] during regular daytime business hours (8:00 am - 5:00 pm) and after 5:00 pm  and ask for the Urology resident with any questions or concerns.      If it is a life-threatening situation, proceed to the nearest emergency department.        Follow-up appointment:  The office will call you to arrange your follow-up appointment. If you have not heard from us within 2-3 business days, please call [phone].        Thank you for the opportunity to care for you today.  Your health and healing are very important to us.  We hope we made you feel as comfortable as possible and are committed to your recovery and continued well-being.      The following is a brief overview of your cystoscopy stent insertion/exchange procedure today. Some of the information contained on this summary may be confidential.  This information should be kept in your records and should be shared with your regular doctor.    Physicians:   Dr. Macias      Procedure performed: cystoscopy (looked inside your bladder) with insertion of a new stent/exchange of previous stent  Pending results:  none     What to Expect During your Recovery and Home Care  Anesthesia Side Effects   You received anesthesia today.  You may feel sleepy, tired, or have a sore throat.   You may also feel drowsiness, dizziness, or inability to think clearly.  For your safety, do not drive, drink alcoholic beverages, take any unprescribed medication or make any important decisions for 24 hours.  A responsible adult should be with you for 24 hours.        Activity and Recovery    No heavy lifting today. Rest for the next 24 hours.     Pain Control  Unfortunately, you may experience pain after your procedure.  Frequency and urgency to urinate and mild discomfort are expected. Adequate pain  management can include alternative measures to help ease your pain and that can include over the counter Tylenol and a heating pad which can be taken as prescribed as needed for breakthrough pain. Do not take more than 4,000mg of Tylenol in a 24-hour period.      You may have also been prescribed Ditropan(for bladder spasms) for pain control.    Nausea/Vomiting   Clear liquids are best tolerated at first. Start slow, advance your diet as tolerated to normal foods. Avoid spicy, greasy, heavy foods at first. Also, you may feel nauseous or like you need to vomit if you take any type of medication on an empty stomach.  Call your physician if you are unable to eat or drink and have persistent vomiting.    Signs of Bleeding   You could have some blood in your urine off and on over the next several weeks. Your urine will be light pink to yellow.    Treatment/wound care:   It is okay to shower 24 hours after time of surgery.      Signs of Infection  Signs of infection can include fever, drainage(green/yellow), chills, burning sensation with passing of urine, or severe abdominal pain.  If you see any of these occur, please contact your doctor's office at [phone].  Any fever higher than 100.4, especially if associated with an ill feeling, abdominal pain, chills, or nausea should be reported to your surgeon.      Assist in bowel movements/urination  Increase fiber in diet  Increase water (6 to 8 glasses)  Increase walking   Urination should occur within 6 hours of anesthesia  If you have tried these methods and your bladder still feels full and you cannot use the bathroom, please go to your nearest Emergency room/contact your physician.    CATHETER CARE - we will take the catheter out in the clinic tomorrow 4/10/25  Always keep the catheters tubing and drainage bag below the level of your bladder.  Avoid loops and kinks in the catheter tubing.  NOTIFY your physician if catheter falls out or catheter seems clogged and  urine is not draining.   Do not wear the small leg bag to bed you should be provided with a larger overnight bag that you should wear to bed and can hang over the side of the bed.  We recommend wearing the large bag in the shower as well as this is easy to dry, and you do not get your leg straps wet from your leg bag.   Your catheter should be secured to your upper thigh, do not allow it to hang or dangle.    Additional Instructions:   You had a stent placed today from your kidney down into your bladder. This helps keep the urinary tract open and prevents blockages of urine flow. The stent can be irritating and can cause some frequency, urgency and blood in your urine when you go to the bathroom. It is important to drink plenty of water and take medications as prescribed. You may be sent home with Pyridium which turns your urine bright orange. Applying a heating pad to your back will also help with this kind of pain.    - Please obtain the ordered bloodwork (basic metabolic panel) to trend your kidney function  - The catheter will come out tomorrow (4/10/25) in Dr Macias's clinic

## 2025-04-09 NOTE — ANESTHESIA PROCEDURE NOTES
Airway  Date/Time: 4/9/2025 2:54 PM  Urgency: elective      Staffing  Performed: AIDA   Authorized by: Cornelio Melvin MD    Performed by: AIDA Lugo  Patient location during procedure: OR    Indications and Patient Condition  Indications for airway management: anesthesia and airway protection  Spontaneous Ventilation: absent  Sedation level: deep  Preoxygenated: yes  Patient position: sniffing  Mask difficulty assessment: 1 - vent by mask  Planned trial extubation    Final Airway Details  Final airway type: endotracheal airway      Successful airway: ETT  Cuffed: yes   Successful intubation technique: direct laryngoscopy  Facilitating devices/methods: intubating stylet  Blade: Marcus  Blade size: #3  ETT size (mm): 7.0  Cormack-Lehane Classification: grade IIb - view of arytenoids or posterior of glottis only  Placement verified by: chest auscultation and capnometry   Measured from: teeth  ETT to teeth (cm): 20  Number of attempts at approach: 1    Additional Comments  RSI w/ sellick's maneuver

## 2025-04-09 NOTE — ANESTHESIA POSTPROCEDURE EVALUATION
Patient: Anahi Wall    Procedure Summary       Date: 04/09/25 Room / Location: [facility] OR 01 / Virtual [facility] OR    Anesthesia Start: 1446 Anesthesia Stop: 1517    Procedure: Left CYSTOSCOPY, WITH URETERAL STENT INSERTION (Left: Ureter) Diagnosis:       Kidney stone      (Kidney stone [N20.0])    Surgeons: Abebe Macias MD Responsible Provider: Cornelio Melvin MD    Anesthesia Type: general ASA Status: 3            Anesthesia Type: general    Vitals Value Taken Time   /51 04/09/25 1518   Temp 37.5 04/09/25 1518   Pulse 80 04/09/25 1518   Resp 11 04/09/25 1518   SpO2 100 04/09/25 1518       Anesthesia Post Evaluation    Patient location during evaluation: PACU  Patient participation: complete - patient participated  Level of consciousness: awake  Pain management: adequate  Airway patency: patent  Cardiovascular status: acceptable  Respiratory status: acceptable  Hydration status: acceptable  Postoperative Nausea and Vomiting: none      No notable events documented.

## 2025-04-10 ENCOUNTER — OFFICE VISIT (OUTPATIENT)
Dept: UROLOGY | Facility: HOSPITAL | Age: 80
End: 2025-04-10
Payer: MEDICARE

## 2025-04-10 ENCOUNTER — TELEPHONE (OUTPATIENT)
Dept: HEMATOLOGY/ONCOLOGY | Facility: CLINIC | Age: 80
End: 2025-04-10
Payer: MEDICARE

## 2025-04-10 ENCOUNTER — LAB (OUTPATIENT)
Dept: LAB | Facility: HOSPITAL | Age: 80
End: 2025-04-10
Payer: MEDICARE

## 2025-04-10 DIAGNOSIS — N13.9 OBSTRUCTIVE UROPATHY: ICD-10-CM

## 2025-04-10 DIAGNOSIS — N17.9 AKI (ACUTE KIDNEY INJURY): ICD-10-CM

## 2025-04-10 DIAGNOSIS — N20.0 URIC ACID NEPHROLITHIASIS: Primary | ICD-10-CM

## 2025-04-10 DIAGNOSIS — R79.1 ABNORMAL COAGULATION PROFILE: Primary | ICD-10-CM

## 2025-04-10 LAB
ANION GAP SERPL CALC-SCNC: 15 MMOL/L (ref 10–20)
BUN SERPL-MCNC: 56 MG/DL (ref 6–23)
CALCIUM SERPL-MCNC: 8.8 MG/DL (ref 8.6–10.6)
CHLORIDE SERPL-SCNC: 106 MMOL/L (ref 98–107)
CO2 SERPL-SCNC: 25 MMOL/L (ref 21–32)
CREAT SERPL-MCNC: 3.61 MG/DL (ref 0.5–1.05)
EGFRCR SERPLBLD CKD-EPI 2021: 12 ML/MIN/1.73M*2
ERYTHROCYTE [DISTWIDTH] IN BLOOD BY AUTOMATED COUNT: 14.2 % (ref 11.5–14.5)
GLUCOSE SERPL-MCNC: 132 MG/DL (ref 74–99)
HCT VFR BLD AUTO: 33.8 % (ref 36–46)
HGB BLD-MCNC: 10.8 G/DL (ref 12–16)
MCH RBC QN AUTO: 28.7 PG (ref 26–34)
MCHC RBC AUTO-ENTMCNC: 32 G/DL (ref 32–36)
MCV RBC AUTO: 90 FL (ref 80–100)
NRBC BLD-RTO: 0 /100 WBCS (ref 0–0)
PLATELET # BLD AUTO: 130 X10*3/UL (ref 150–450)
POTASSIUM SERPL-SCNC: 4 MMOL/L (ref 3.5–5.3)
RBC # BLD AUTO: 3.76 X10*6/UL (ref 4–5.2)
SODIUM SERPL-SCNC: 142 MMOL/L (ref 136–145)
WBC # BLD AUTO: 8.3 X10*3/UL (ref 4.4–11.3)

## 2025-04-10 PROCEDURE — 85027 COMPLETE CBC AUTOMATED: CPT

## 2025-04-10 PROCEDURE — 99214 OFFICE O/P EST MOD 30 MIN: CPT | Performed by: UROLOGY

## 2025-04-10 PROCEDURE — 85610 PROTHROMBIN TIME: CPT

## 2025-04-10 PROCEDURE — 80048 BASIC METABOLIC PNL TOTAL CA: CPT

## 2025-04-10 PROCEDURE — G2211 COMPLEX E/M VISIT ADD ON: HCPCS | Performed by: UROLOGY

## 2025-04-10 PROCEDURE — 85730 THROMBOPLASTIN TIME PARTIAL: CPT

## 2025-04-10 PROCEDURE — 1159F MED LIST DOCD IN RCRD: CPT | Performed by: UROLOGY

## 2025-04-10 PROCEDURE — 1157F ADVNC CARE PLAN IN RCRD: CPT | Performed by: UROLOGY

## 2025-04-10 RX ORDER — POTASSIUM CITRATE 1080 MG/1
10 TABLET, EXTENDED RELEASE ORAL
Qty: 90 TABLET | Refills: 11 | Status: SHIPPED | OUTPATIENT
Start: 2025-04-10 | End: 2026-04-10

## 2025-04-10 NOTE — PROGRESS NOTES
FUV    Patient had cysto, L stent placement on 4/9/25     HISTORY OF PRESENT ILLNESS:   Anahi Wall is a 79 y.o. female who is being seen today for hospital fu.      Pt with solitary left kidney who presented to ER with obstructing stone, anuria, YASMIN on 4/9/25. Cr 4.16 (baseline 1.5ish) Taken emergently to OR for stent placement.  Discharged home with rodriguez.      history of nephrolithiasis, high-grade urothelial carcinoma s/p TURBT, nephroureterectomy and pelvic RPLND follows with [facility]ford Jaeger    H/o uric acid stones.  Was on K citrate in past    CT scan from 4/9/25 - 2 large stones, one in proximal ureter, one lower pole. 200-300HU    Doing well.  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    Breast cancer     left sided lumpectomy, s/p xrt also completed 1996    Carpal tunnel syndrome     s/p Left wrist surgery    Cataract     removed    Cramp and spasm     Cramp of limb    Depression     controlled with medication    GERD (gastroesophageal reflux disease)     Managed with Prilosec    Hearing aid worn     HL (hearing loss)     wears B/L hearing aids    Hyperlipidemia     F/W PCP    Joint pain     Nephrolithiasis     Osteoporosis     Other conditions influencing health status     Acute Meniscal Tear Of Right Knee    Trochanteric bursitis, unspecified hip     Urothelial cancer (Multi)     Vision loss     wears glasses       PAST SURGICAL HISTORY:  Past Surgical History:   Procedure Laterality Date    BLADDER SURGERY  09/03/2019    bladder bx    BREAST LUMPECTOMY Left     Breast Surgery Lumpectomy x 2 1996    CARPAL TUNNEL RELEASE Left 2003    Neuroplasty Decompression Median Nerve At Carpal Tunnel    CATARACT EXTRACTION      CATARACT EXTRACTION EXTRACAPSULAR W/ INTRAOCULAR LENS IMPLANTATION Bilateral 2017     COLONOSCOPY      CYSTOSCOPY      multiple    CYSTOSCOPY Left 04/09/2025    Left CYSTOSCOPY, WITH URETERAL STENT INSERTION - Left    HYSTERECTOMY  02/10/2014    Hysterectomy    NEPHRECTOMY Right 03/28/2024    TURBT, right nephroureterectomy, RPLND    OTHER SURGICAL HISTORY  2007    bladder sling    OTHER SURGICAL HISTORY Left     post cataract laser surgery 2021    OTHER SURGICAL HISTORY Left     left shoulder surgery to remove bone spur        ALLERGIES:   Allergies   Allergen Reactions    Aspirin Nausea Only    Nsaids (Non-Steroidal Anti-Inflammatory Drug) Nausea Only    Penicillins Rash        MEDICATIONS:   Current Outpatient Medications   Medication Instructions    acetaminophen (TYLENOL) 500 mg, Every 6 hours PRN    alendronate (FOSAMAX) 70 mg, oral, Every 7 days, Takes on Sunday    cholecalciferol (Vitamin D-3) 50 MCG (2000 UT) tablet 1 tablet, Daily    ferrous sulfate 325 mg, Daily with breakfast    minoxidil (Loniten) 2.5 mg tablet 0.5 tablets, Daily    mirabegron (MYRBETRIQ) 25 mg, oral, Daily    mirtazapine (REMERON) 30 mg, oral, Nightly    omeprazole (PRILOSEC) 20 mg, oral, Daily    oxybutynin XL (DITROPAN-XL) 15 mg, oral, Daily    rosuvastatin (CRESTOR) 20 mg, oral, Daily    sertraline (ZOLOFT) 200 mg, oral, Daily        PHYSICAL EXAM:  There were no vitals taken for this visit.  Constitutional: Patient appears well-developed and well-nourished. No distress.    Pulmonary/Chest: Effort normal. No respiratory distress.   Abdominal: Soft, ND NT  : Hughes draining clear urine  Musculoskeletal: Normal range of motion.    Neurological: Alert and oriented to person, place, and time.  Psychiatric: Normal mood and affect. Behavior is normal. Thought content normal.      Labs  Lab Results   Component Value Date    CREATININE 4.16 (H) 04/09/2025     Lab Results   Component Value Date    CHOL 125 08/04/2020     Lab Results   Component Value Date    HDL 43.0 08/04/2020     Lab Results   Component Value Date    CHHDL  2.9 08/04/2020     Lab Results   Component Value Date    LDLF 64 08/04/2020     Lab Results   Component Value Date    VLDL 18 08/04/2020     Lab Results   Component Value Date    TRIG 91 08/04/2020     Lab Results   Component Value Date    HCT 34.0 (L) 04/09/2025       Imaging    Procedures      Assessment:      1. Uric acid nephrolithiasis  potassium citrate CR (Urocit-K-10) 10 mEq ER tablet      2. YASMIN (acute kidney injury)            Anahi Wall is a 79 y.o. female here for FUV     Plan:   1)  Hughes removed  2) Will get BMP  3) K Citrate, dosing and SE discussed  4) Plan for L URS    Ureteroscopic Laser Lithotripsy and Stone Extraction  The patient has been informed that this procedure has a high success rate for stones located within the ureter that are medium to small size (up to 1.2 cm) but has a lower stone free rate compared to percutaneous removal for large stones located in the upper ureter or kidney. It especially has a low stone free rate for medium or large stones located in the lower pole compared to percutaneous treatment. Advantages of this procedure include its ability to be performed in an outpatient setting and its minimally invasive nature. Given the particulars of the stone, size, location, and composition, I believe that ureteroscopy and laser lithotripsy with stone extraction is a viable treatment alternative for this patient. I discussed the risks, benefits, alternatives and complications associated with ureteroscopy with laser lithotripsy and stone extraction, including but not limited to ureteral perforation, extravasation, stricture formation, bleeding, sepsis, obstruction, inability to reach the stone, inability to fragment the stone, and inability to retrieve all stone fragments. The need for ancillary procedures such as stent placement and removal, retrograde urography, and percutaneous nephrostomy were also discussed, and the patient was given the opportunity to ask any questions.  All questions were answered to his satisfaction. The patient understands that all anti-coagulation including platelet inhibitors must be discontinued several days prior to the procedure unless other arrangements are made in conjunction with me and the patient's cardiologist or internist. The patient also understood that a ureteral stent would likely be placed and removal of the stent is critical. Failure to follow up for stent removal can result in recurrent UTIs, encrustation of the stent, loss of kidney function and need for nephrectomy.

## 2025-04-11 ENCOUNTER — TELEPHONE (OUTPATIENT)
Dept: PRIMARY CARE | Facility: CLINIC | Age: 80
End: 2025-04-11

## 2025-04-11 ENCOUNTER — APPOINTMENT (OUTPATIENT)
Dept: UROLOGY | Facility: CLINIC | Age: 80
End: 2025-04-11
Payer: MEDICARE

## 2025-04-11 DIAGNOSIS — N13.9 OBSTRUCTIVE UROPATHY: ICD-10-CM

## 2025-04-11 LAB
APTT PPP: 25 SECONDS (ref 26–36)
INR PPP: 1.2 (ref 0.9–1.1)
PROTHROMBIN TIME: 13.3 SECONDS (ref 9.8–12.4)

## 2025-04-11 NOTE — TELEPHONE ENCOUNTER
She wants to thank you for pulling everything together for her this week. She is very appreciative for all your help.

## 2025-04-14 DIAGNOSIS — N13.9 OBSTRUCTIVE UROPATHY: ICD-10-CM

## 2025-04-16 ENCOUNTER — TELEPHONE (OUTPATIENT)
Dept: PREADMISSION TESTING | Facility: HOSPITAL | Age: 80
End: 2025-04-16
Payer: MEDICARE

## 2025-04-16 DIAGNOSIS — C67.9 MALIGNANT NEOPLASM OF URINARY BLADDER, UNSPECIFIED SITE (MULTI): ICD-10-CM

## 2025-04-16 NOTE — TELEPHONE ENCOUNTER
**4/16-Called patient and she stated surgery is on 5/14 but in Epic it's for 5/7-emailed surgeon for confirmation--CDS

## 2025-04-18 DIAGNOSIS — N20.0 URIC ACID NEPHROLITHIASIS: ICD-10-CM

## 2025-04-21 ENCOUNTER — APPOINTMENT (OUTPATIENT)
Dept: UROLOGY | Facility: CLINIC | Age: 80
End: 2025-04-21
Payer: MEDICARE

## 2025-04-28 ENCOUNTER — CLINICAL SUPPORT (OUTPATIENT)
Dept: PREADMISSION TESTING | Facility: HOSPITAL | Age: 80
End: 2025-04-28
Payer: MEDICARE

## 2025-04-28 ENCOUNTER — APPOINTMENT (OUTPATIENT)
Dept: UROLOGY | Facility: HOSPITAL | Age: 80
End: 2025-04-28
Payer: MEDICARE

## 2025-04-28 DIAGNOSIS — N20.0 KIDNEY STONE: ICD-10-CM

## 2025-04-29 ENCOUNTER — APPOINTMENT (OUTPATIENT)
Dept: UROLOGY | Facility: HOSPITAL | Age: 80
End: 2025-04-29
Payer: MEDICARE

## 2025-05-01 ENCOUNTER — PRE-ADMISSION TESTING (OUTPATIENT)
Dept: PREADMISSION TESTING | Facility: HOSPITAL | Age: 80
End: 2025-05-01
Payer: MEDICARE

## 2025-05-01 ENCOUNTER — HOSPITAL ENCOUNTER (OUTPATIENT)
Dept: CARDIOLOGY | Facility: HOSPITAL | Age: 80
Discharge: HOME | End: 2025-05-01
Payer: MEDICARE

## 2025-05-01 ENCOUNTER — LAB (OUTPATIENT)
Dept: LAB | Facility: HOSPITAL | Age: 80
End: 2025-05-01
Payer: MEDICARE

## 2025-05-01 VITALS
OXYGEN SATURATION: 96 % | HEIGHT: 62 IN | SYSTOLIC BLOOD PRESSURE: 138 MMHG | WEIGHT: 156.53 LBS | TEMPERATURE: 97 F | RESPIRATION RATE: 16 BRPM | HEART RATE: 100 BPM | DIASTOLIC BLOOD PRESSURE: 58 MMHG | BODY MASS INDEX: 28.8 KG/M2

## 2025-05-01 DIAGNOSIS — R32 URINARY INCONTINENCE, UNSPECIFIED TYPE: ICD-10-CM

## 2025-05-01 DIAGNOSIS — I10 HTN (HYPERTENSION), BENIGN: Primary | ICD-10-CM

## 2025-05-01 DIAGNOSIS — I10 HTN (HYPERTENSION), BENIGN: ICD-10-CM

## 2025-05-01 DIAGNOSIS — I10 ESSENTIAL (PRIMARY) HYPERTENSION: Primary | ICD-10-CM

## 2025-05-01 LAB
ABO GROUP (TYPE) IN BLOOD: NORMAL
AMORPH CRY #/AREA UR COMP ASSIST: ABNORMAL /HPF
ANION GAP SERPL CALC-SCNC: 11 MMOL/L (ref 10–20)
ANTIBODY SCREEN: NORMAL
APPEARANCE UR: ABNORMAL
ATRIAL RATE: 96 BPM
BACTERIA #/AREA URNS AUTO: ABNORMAL /HPF
BASOPHILS # BLD AUTO: 0.01 X10*3/UL (ref 0–0.1)
BASOPHILS NFR BLD AUTO: 0.2 %
BILIRUB UR STRIP.AUTO-MCNC: NEGATIVE MG/DL
BUN SERPL-MCNC: 38 MG/DL (ref 6–23)
CALCIUM SERPL-MCNC: 9.8 MG/DL (ref 8.6–10.3)
CHLORIDE SERPL-SCNC: 106 MMOL/L (ref 98–107)
CO2 SERPL-SCNC: 31 MMOL/L (ref 21–32)
COLOR UR: ABNORMAL
CREAT SERPL-MCNC: 1.44 MG/DL (ref 0.5–1.05)
EGFRCR SERPLBLD CKD-EPI 2021: 37 ML/MIN/1.73M*2
EOSINOPHIL # BLD AUTO: 0.09 X10*3/UL (ref 0–0.4)
EOSINOPHIL NFR BLD AUTO: 1.5 %
ERYTHROCYTE [DISTWIDTH] IN BLOOD BY AUTOMATED COUNT: 14 % (ref 11.5–14.5)
GLUCOSE SERPL-MCNC: 128 MG/DL (ref 74–99)
GLUCOSE UR STRIP.AUTO-MCNC: NORMAL MG/DL
HCT VFR BLD AUTO: 33.3 % (ref 36–46)
HGB BLD-MCNC: 10.9 G/DL (ref 12–16)
IMM GRANULOCYTES # BLD AUTO: 0.03 X10*3/UL (ref 0–0.5)
IMM GRANULOCYTES NFR BLD AUTO: 0.5 % (ref 0–0.9)
KETONES UR STRIP.AUTO-MCNC: NEGATIVE MG/DL
LEUKOCYTE ESTERASE UR QL STRIP.AUTO: ABNORMAL
LYMPHOCYTES # BLD AUTO: 0.64 X10*3/UL (ref 0.8–3)
LYMPHOCYTES NFR BLD AUTO: 10.8 %
MCH RBC QN AUTO: 29.5 PG (ref 26–34)
MCHC RBC AUTO-ENTMCNC: 32.7 G/DL (ref 32–36)
MCV RBC AUTO: 90 FL (ref 80–100)
MONOCYTES # BLD AUTO: 0.48 X10*3/UL (ref 0.05–0.8)
MONOCYTES NFR BLD AUTO: 8.1 %
MUCOUS THREADS #/AREA URNS AUTO: ABNORMAL /LPF
NEUTROPHILS # BLD AUTO: 4.7 X10*3/UL (ref 1.6–5.5)
NEUTROPHILS NFR BLD AUTO: 78.9 %
NITRITE UR QL STRIP.AUTO: NEGATIVE
NRBC BLD-RTO: 0 /100 WBCS (ref 0–0)
P AXIS: 61 DEGREES
P OFFSET: 187 MS
P ONSET: 139 MS
PH UR STRIP.AUTO: 5.5 [PH]
PLATELET # BLD AUTO: 107 X10*3/UL (ref 150–450)
POTASSIUM SERPL-SCNC: 4.7 MMOL/L (ref 3.5–5.3)
PR INTERVAL: 150 MS
PROT UR STRIP.AUTO-MCNC: ABNORMAL MG/DL
Q ONSET: 214 MS
QRS COUNT: 16 BEATS
QRS DURATION: 86 MS
QT INTERVAL: 364 MS
QTC CALCULATION(BAZETT): 459 MS
QTC FREDERICIA: 425 MS
R AXIS: -57 DEGREES
RBC # BLD AUTO: 3.7 X10*6/UL (ref 4–5.2)
RBC # UR STRIP.AUTO: ABNORMAL MG/DL
RBC #/AREA URNS AUTO: >20 /HPF
RH FACTOR (ANTIGEN D): NORMAL
SODIUM SERPL-SCNC: 143 MMOL/L (ref 136–145)
SP GR UR STRIP.AUTO: 1.01
T AXIS: 42 DEGREES
T OFFSET: 396 MS
UROBILINOGEN UR STRIP.AUTO-MCNC: NORMAL MG/DL
VENTRICULAR RATE: 96 BPM
WBC # BLD AUTO: 6 X10*3/UL (ref 4.4–11.3)
WBC #/AREA URNS AUTO: >50 /HPF

## 2025-05-01 PROCEDURE — 93010 ELECTROCARDIOGRAM REPORT: CPT | Performed by: INTERNAL MEDICINE

## 2025-05-01 PROCEDURE — 80048 BASIC METABOLIC PNL TOTAL CA: CPT

## 2025-05-01 PROCEDURE — 99204 OFFICE O/P NEW MOD 45 MIN: CPT | Performed by: NURSE PRACTITIONER

## 2025-05-01 PROCEDURE — 86900 BLOOD TYPING SEROLOGIC ABO: CPT

## 2025-05-01 PROCEDURE — 85025 COMPLETE CBC W/AUTO DIFF WBC: CPT

## 2025-05-01 PROCEDURE — 86850 RBC ANTIBODY SCREEN: CPT

## 2025-05-01 PROCEDURE — 86901 BLOOD TYPING SEROLOGIC RH(D): CPT

## 2025-05-01 PROCEDURE — 93005 ELECTROCARDIOGRAM TRACING: CPT

## 2025-05-01 PROCEDURE — 81001 URINALYSIS AUTO W/SCOPE: CPT | Performed by: NURSE PRACTITIONER

## 2025-05-01 ASSESSMENT — ENCOUNTER SYMPTOMS
GASTROINTESTINAL NEGATIVE: 1
NECK NEGATIVE: 1
CONSTITUTIONAL NEGATIVE: 1
ENDOCRINE NEGATIVE: 1
NEUROLOGICAL NEGATIVE: 1
ARTHRALGIAS: 1
CARDIOVASCULAR NEGATIVE: 1
RESPIRATORY NEGATIVE: 1

## 2025-05-01 NOTE — H&P (VIEW-ONLY)
[facility]/PAT Evaluation       Name: Anahi Wall (Anahi Wall)  /Age: 1945/79 y.o.     In-Person         Date of Consult: 25    Referring Provider:  Dr. Macias    Date, Surgery, and Length:  25, cystoscopy, left ureteroscopy, holmium laser lithotripsy, ureteral stent insertion, 75 minutes      Patient presents to [facility] for perioperative risk assessment prior to scheduled surgery. Patient presets with history of with solitary left kidney who presented to ER with obstructing stone, anuria, YASMIN on 25. Cr 4.16 (baseline 1.5ish) Taken emergently to OR for stent placement.  Discharged home with rodriguez.  She has history of nephrolithiasis, high-grade urothelial carcinoma s/p TURBT, nephroureterectomy and pelvic RPLND follows with Dr De Leon, Dr. Rojas, Dr. Covarrubias. She also has h/o uric acid stones.  Was on K citrate in past     CT scan from 25 - 2 large stones, one in proximal ureter, one lower pole. 200-300HU        This note was created in part upon personal review of patient's medical records.        Pt denies any past history of anesthetic complications such as PONV, awareness, prolonged sedation, dental damage, aspiration, cardiac arrest, difficult intubation, difficult I.V. access or unexpected hospital admissions. No history of malignant hyperthermia and or pseudocholinesterase deficiency.    No history of blood transfusions.    The patient IS NOT a Judaism and will accept blood and blood products if medically indicated.     Type and screen NOT sent.      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 2023    arm    GERD (gastroesophageal reflux disease)     Managed with Prilosec    Hearing aid worn     both ears    HL (hearing loss)     Hyperlipidemia     Hypertension     Kidney stone     Plan: Cystoscopy; Left Ureteroscopy; Holmium Laser Lithotripsy; Ureteral Stent Insertion 5/14/25    Nephrolithiasis     Oncology follow-up encounter     Marcos Rojas MD LOV 4/2/25    Osteoporosis     Overweight     Tinnitus 1997    Trochanteric bursitis, unspecified hip     Urothelial cancer (Multi)     Vision loss     wears glasses    Vitamin D deficiency     daily supplement       Past Surgical History:   Procedure Laterality Date    BLADDER SURGERY  09/03/2019    bladder bx    BLADDER SUSPENSION  2007    BREAST LUMPECTOMY Left     Breast Surgery Lumpectomy x 2 1996    CARPAL TUNNEL RELEASE Left 2003    Neuroplasty Decompression Median Nerve At Carpal Tunnel    CATARACT EXTRACTION      CATARACT EXTRACTION EXTRACAPSULAR W/ INTRAOCULAR LENS IMPLANTATION Bilateral 2017    COLONOSCOPY      CYSTOSCOPY      multiple    CYSTOSCOPY Left 04/09/2025    Left CYSTOSCOPY, WITH URETERAL STENT INSERTION - Left    HYSTERECTOMY  02/10/2014    Hysterectomy    NEPHRECTOMY Right 03/28/2024    TURBT, right nephroureterectomy, RPLND    SHOULDER SURGERY Left     left shoulder surgery to remove bone spur       Family History   Problem Relation Name Age of Onset    Diabetes Mother      Multiple sclerosis Mother      Hypertension Father      Stroke Father      Heart disease Father      Bipolar disorder Brother      Cardiomyopathy Brother     Tobacco Use History[1]  Social History     Substance and Sexual Activity   Alcohol Use Not Currently    Alcohol/week: 0.0 - 1.0 standard drinks of alcohol     Social History     Substance and Sexual Activity   Drug Use Never         Allergies   Allergen Reactions    Aspirin Nausea Only    Nsaids (Non-Steroidal Anti-Inflammatory  "Drug) Nausea Only    Penicillins Rash       Current Outpatient Medications   Medication Instructions    acetaminophen (TYLENOL) 500 mg, Every 6 hours PRN    alendronate (FOSAMAX) 70 mg, oral, Every 7 days, Takes on Sunday    cholecalciferol (Vitamin D-3) 50 MCG (2000 UT) tablet 1 tablet, Daily    ferrous sulfate 325 mg, 2 times daily    minoxidil (Loniten) 2.5 mg tablet 0.5 tablets, Daily    mirabegron (MYRBETRIQ) 25 mg, oral, Daily    mirtazapine (REMERON) 30 mg, oral, Nightly    omeprazole (PRILOSEC) 20 mg, oral, Daily    potassium citrate CR (Urocit-K-10) 10 mEq ER tablet 10 mEq, oral, 3 times daily (morning, midday, late afternoon), Do not crush, chew, or split.    rosuvastatin (CRESTOR) 20 mg, oral, Daily    sertraline (ZOLOFT) 200 mg, oral, Daily       PAT ROS:   Constitutional:   neg    Neuro/Psych:   neg    Eyes:    use of corrective lenses  Ears:    hearing loss   hearing aides  Nose:   neg    Mouth:   neg    Throat:   neg    Neck:   neg    Cardio:   neg    Respiratory:   neg    Endocrine:   neg    GI:   neg    :   neg    Musculoskeletal:    Using cane for balance    arthralgias  Hematologic:   neg    Skin:  neg        Physical Exam  Vitals reviewed. Physical exam within normal limits.          PAT AIRWAY:   Airway:     Mallampati::  III    TM distance::  <3 FB    Neck ROM::  Full      Visit Vitals  /58   Pulse 100   Temp 36.1 °C (97 °F)   Resp 16   Ht 1.575 m (5' 2.01\")   Wt 71 kg (156 lb 8.4 oz)   SpO2 96%   BMI 28.62 kg/m²   OB Status Postmenopausal   Smoking Status Former   BSA 1.76 m²       Assessment and Plan:       Patient is a 79 year old female scheduled for cystoscopy, left ureteroscopy, holmium laser lithotripsy, ureteral stent insertion with Dr. Macias on 5/8/25.      Plan      Neuro:    Depression- continue Zoloft      Cardiovascular:    RCRI: 0 Risk of Mace: 0.4%    Caprini: 4     Patient denies any chest pain, tightness, heaviness, pressure, radiating pain, palpitations, irregular " heartbeats, lightheadedness, cough, congestion, shortness of breath, RENEE, PND, near syncope, weight loss or gain.    Fair functional capacity      EKG in PAT   Encounter Date: 05/01/25   ECG 12 Lead   Result Value    Ventricular Rate 96    Atrial Rate 96    NJ Interval 150    QRS Duration 86    QT Interval 364    QTC Calculation(Bazett) 459    P Axis 61    R Axis -57    T Axis 42    QRS Count 16    Q Onset 214    P Onset 139    P Offset 187    T Offset 396    QTC Fredericia 425    Narrative    Normal sinus rhythm  Biatrial enlargement  Left anterior fascicular block  Anterolateral infarct , age undetermined  Abnormal ECG  When compared with ECG of 23-OCT-2024 15:55,  Anterior infarct is now Present  Anterolateral infarct is now Present  Confirmed by Jordi Murphy ([id number]) on 5/1/2025 3:40:17 PM     HLD- continue statin    Pulmonary:    Stop Bang score is 3 placing patient at low risk for JESSICA  ARISCAT: 26-44 points, 13.3% risk of in-hospital postoperative pulmonary complication  PRODIGY: Moderate risk for opioid induced respiratory depression      Renal/endo:  Recommendations to avoid nephrotoxic drugs and carefully monitor fluid status to maintain euvolemia. Use dose adjusted medications as needed for the underlying level of renal function.    CKD stage 5- follows with nephrology, solitary kidney      GI/:    Recurrent bladder cancer    Heme:  Patient instructed to ambulate as soon as possible postoperatively to decrease thromboembolic risk.    Initiate mechanical DVT prophylaxis as soon as possible and initiate chemical prophylaxis when deemed safe from a bleeding standpoint post surgery.        Risk assessment complete.  This patient is INTERMEDIATE risk candidate undergoing MODERATE risk procedure, patient is medically optimized for surgery.        Labs/testing obtained in PAT on 5/1/25: CBC, BMP, T&S, UA FLEX, EKG    Lab Results   Component Value Date    WBC 6.0 05/01/2025    HGB 10.9 (L) 05/01/2025    HCT 33.3  (L) 2025    MCV 90 2025     (L) 2025     Lab Results   Component Value Date    GLUCOSE 128 (H) 2025    CALCIUM 9.8 2025     2025    K 4.7 2025    CO2 31 2025     2025    BUN 38 (H) 2025    CREATININE 1.44 (H) 2025           Component 6 d ago   ABO TYPE AB   Rh TYPE POS   ANTIBODY SCREEN NEG   Resulting Agency [facility]             Specimen Collected: 25 14:54 Last Resulted: 25 16:06           Follow up/communication: none      Preoperative medication instructions were provided and reviewed with the patient.  Any additional testing or evaluation was explained to the patient.  Nothing by mouth instructions were discussed and patient's questions were answered prior to conclusion to this encounter.  Patient verbalized understanding of preoperative instructions given in preadmission testing; discharge instructions available in EMR.    This note was dictated with speech recognition.  Minor errors may have been detected during use of speech recognition.             [1]   Social History  Tobacco Use   Smoking Status Former    Current packs/day: 0.00    Average packs/day: 0.5 packs/day for 2.0 years (1.0 ttl pk-yrs)    Types: Cigarettes    Start date:     Quit date:     Years since quittin.3   Smokeless Tobacco Never

## 2025-05-01 NOTE — CPM/PAT NURSE NOTE
CPM/PAT Nurse Note      Name: Anahi Wall (Anahi Wall)  /Age: 1945/79 y.o.       Medical History[1]    Surgical History[2]    Patient Sexual activity questions deferred to the physician.    Family History[3]    Allergies[4]    Prior to Admission medications    Medication Sig Start Date End Date Taking? Authorizing Provider   acetaminophen (Tylenol) 500 mg tablet Take 1 tablet (500 mg) by mouth every 6 hours if needed for mild pain (1 - 3).   Yes Historical Provider, MD   alendronate (Fosamax) 70 mg tablet Take 1 tablet (70 mg) by mouth every 7 days. Takes on 24  Yes Jose Luis Conrad MD   cholecalciferol (Vitamin D-3) 50 MCG ( UT) tablet Take 1 tablet (50 mcg) by mouth once daily.   Yes Historical Provider, MD   ferrous sulfate, 325 mg ferrous sulfate, tablet Take 1 tablet (325 mg) by mouth 2 times a day.   Yes Historical Provider, MD   minoxidil (Loniten) 2.5 mg tablet Take 0.5 tablets (1.25 mg) by mouth once daily.   Yes Historical Provider, MD   mirabegron (Myrbetriq) 25 mg tablet extended release 24 hr 24 hr tablet Take 1 tablet (25 mg) by mouth once daily. 3/11/25 3/11/26 Yes Jorge A De Leon MD MPH   mirtazapine (Remeron) 30 mg tablet Take 1 tablet (30 mg) by mouth once daily at bedtime. 24  Yes Jose Luis Conrad MD   omeprazole (PriLOSEC) 20 mg DR capsule Take 1 capsule (20 mg) by mouth once daily. 8/15/24  Yes Jose Luis Conrad MD   potassium citrate CR (Urocit-K-10) 10 mEq ER tablet Take 1 tablet (10 mEq) by mouth 3 times daily (morning, midday, late afternoon). Do not crush, chew, or split. 4/10/25 4/10/26 Yes Abebe Macias MD   rosuvastatin (Crestor) 20 mg tablet Take 1 tablet (20 mg) by mouth once daily. 24 Yes Jose Luis Conrad MD   sertraline (Zoloft) 100 mg tablet Take 2 tablets (200 mg) by mouth once daily. 5/6/24  Yes Jose Luis Conrad MD   oxybutynin XL (Ditropan-XL) 15 mg 24 hr tablet Take 1 tablet (15 mg) by mouth once daily. 24  Jose Luis Conrad MD        PAT  ROS     DASI Risk Score      Flowsheet Row Pre-Admission Testing from 11/8/2024 in [facility] Pre-Admission Testing from 3/11/2024 in [facility]   Can you take care of yourself (eat, dress, bathe, or use toilet)?  2.75 filed at 11/08/2024 1429 2.75 filed at 03/11/2024 1037   Can you walk indoors, such as around your house? 1.75 filed at 11/08/2024 1429 1.75 filed at 03/11/2024 1037   Can you walk a block or two on level ground?  2.75 filed at 11/08/2024 1429 2.75 filed at 03/11/2024 1037   Can you climb a flight of stairs or walk up a hill? 5.5 filed at 11/08/2024 1429 5.5 filed at 03/11/2024 1037   Can you run a short distance? 0 filed at 11/08/2024 1429 0 filed at 03/11/2024 1037   Can you do light work around the house like dusting or washing dishes? 2.7 filed at 11/08/2024 1429 2.7 filed at 03/11/2024 1037   Can you do moderate work around the house like vacuuming, sweeping floors or carrying groceries? 3.5 filed at 11/08/2024 1429 3.5 filed at 03/11/2024 1037   Can you do heavy work around the house like scrubbing floors or lifting and moving heavy furniture?  0 filed at 11/08/2024 1429 0 filed at 03/11/2024 1037   Can you do yard work like raking leaves, weeding or pushing a mower? 4.5 filed at 11/08/2024 1429 4.5 filed at 03/11/2024 1037   Can you have sexual relations? 5.25 filed at 11/08/2024 1429 5.25 filed at 03/11/2024 1037   Can you participate in moderate recreational activities like golf, bowling, dancing, doubles tennis or throwing a baseball or football? 0 filed at 11/08/2024 1429 0 filed at 03/11/2024 1037   Can you participate in strenous sports like swimming, singles tennis, football, basketball, or skiing? 0 filed at 11/08/2024 1429 0 filed at 03/11/2024 1037   DASI SCORE 28.7 filed at 11/08/2024 1429 28.7 filed at 03/11/2024 1037   METS Score (Will be calculated only when all the questions are answered) 6.3 filed at 11/08/2024 1429 6.3 filed at 03/11/2024 1037           Caprini DVT Assessment      Flowsheet Row Pre-Admission Testing from 11/8/2024 in [facility] Admission (Discharged) from 3/28/2024 in [facility] with Jorge A De Leon MD MPH   DVT Score (IF A SCORE IS NOT CALCULATING, MUST SELECT A BMI TO COMPLETE) 7 filed at 11/08/2024 1428 10 filed at 03/28/2024 0707   Medical Factors Present cancer, chemotherapy, or previous malignancy filed at 11/08/2024 1428 --   Surgical Factors Major surgery planned, including arthroscopic and laproscopic (1-2 hours) filed at 11/08/2024 1428 --   BMI (BMI MUST BE CHOSEN) 30 or less filed at 11/08/2024 1428 30 or less filed at 03/28/2024 0707   RETIRED: Current Status -- Major surgery planned, lasting over 3 hours filed at 03/28/2024 0707   RETIRED: History -- Prior major surgery filed at 03/28/2024 0707   RETIRED: Age -- Over 75 years filed at 03/28/2024 0707          Modified Frailty Index      Flowsheet Row Pre-Admission Testing from 11/8/2024 in [facility] Pre-Admission Testing from 3/11/2024 in [facility]   Non-independent functional status (problems with dressing, bathing, personal grooming, or cooking) 0 filed at 11/08/2024 1429 0 filed at 03/11/2024 1045   History of diabetes mellitus  0 filed at 11/08/2024 1429 0 filed at 03/11/2024 1045   History of COPD 0 filed at 11/08/2024 1429 0 filed at 03/11/2024 1045   History of CHF No filed at 11/08/2024 1429 No filed at 03/11/2024 1045   History of MI 0 filed at 11/08/2024 1429 0 filed at 03/11/2024 1045   History of Percutaneous Coronary Intervention, Cardiac Surgery, or Angina No filed at 11/08/2024 1429 No filed at 03/11/2024 1045   Hypertension requiring the use of medication  0.0909 filed at 11/08/2024 1429 0.0909 filed at 03/11/2024 1045   Peripheral vascular disease 0 filed at 11/08/2024 1429 0 filed at 03/11/2024 1045   Impaired sensorium (cognitive impairement or loss, clouding, or delirium) 0 filed at  11/08/2024 1429 0 filed at 03/11/2024 1045   TIA or CVA withouy residual deficit 0 filed at 11/08/2024 1429 0 filed at 03/11/2024 1045   Cerebrovascular accident with deficit 0 filed at 11/08/2024 1429 0 filed at 03/11/2024 1045   Modified Frailty Index Calculator .0909 filed at 11/08/2024 1429 .0909 filed at 03/11/2024 1045          [id number] Stroke Risk Points  Current as of 4 minutes ago        N/A 0 to 9 Points:      Last Change: N/A          The [id number] risk score (Lip PEYTON, et al. 2009. © 2010 [employer]) quantifies the risk of stroke for a patient with atrial fibrillation. For patients without atrial fibrillation or under the age of 18 this score appears as N/A. Higher score values generally indicate higher risk of stroke.        This score is not applicable to this patient. Components are not calculated.          Revised Cardiac Risk Index      Flowsheet Row Pre-Admission Testing from 11/8/2024 in [facility] Pre-Admission Testing from 3/11/2024 in [facility]   High-Risk Surgery (Intraperitoneal, Intrathoracic,Suprainguinal vascular) 0 filed at 11/08/2024 1429 0 filed at 03/11/2024 1044   History of ischemic heart disease (History of MI, History of positive exercuse test, Current chest paint considered due to myocardial ischemia, Use of nitrate therapy, ECG with pathological Q Waves) 0 filed at 11/08/2024 1429 0 filed at 03/11/2024 1044   History of congestive heart failure (pulmonary edemia, bilateral rales or S3 gallop, Paroxysmal nocturnal dyspnea, CXR showing pulmonary vascular redistribution) 0 filed at 11/08/2024 1429 0 filed at 03/11/2024 1044   History of cerebrovascular disease (Prior TIA or stroke) 0 filed at 11/08/2024 1429 0 filed at 03/11/2024 1044   Pre-operative insulin treatment 0 filed at 11/08/2024 1429 0 filed at 03/11/2024 1044   Pre-operative creatinine>2 mg/dl 0 filed at 11/08/2024 1429 0 filed at 03/11/2024 1044   Revised  Cardiac Risk Calculator 0 filed at 11/08/2024 1429 0 filed at 03/11/2024 1044          Apfel Simplified Score      Flowsheet Row Pre-Admission Testing from 3/11/2024 in [facility]   Smoking status 1 filed at 03/11/2024 1045   History of motion sickness or PONV  0 filed at 03/11/2024 1045   Use of postoperative opioids 1 filed at 03/11/2024 1045   Apfel Simplified Score Calculator 3 filed at 03/11/2024 1045          Risk Analysis Index Results This Encounter    No data found in the last 10 encounters.       Stop Bang Score      Flowsheet Row Admission (Discharged) from 11/26/2024 in [facility] OR with Jorge A De Leon MD MPH Pre-Admission Testing from 11/8/2024 in [facility]   Do you snore loudly? 1 filed at 11/26/2024 0730 1 filed at 11/08/2024 1400   Do you often feel tired or fatigued after your sleep? 0 filed at 11/26/2024 0730 0 filed at 11/08/2024 1400   Has anyone ever observed you stop breathing in your sleep? 0 filed at 11/26/2024 0730 0 filed at 11/08/2024 1400   Do you have or are you being treated for high blood pressure? 0 filed at 11/26/2024 0730 0 filed at 11/08/2024 1400   Recent BMI (Calculated) 29.2 filed at 11/26/2024 0730 29.6 filed at 11/08/2024 1400   Is BMI greater than 35 kg/m2? 0=No filed at 11/26/2024 0730 0=No filed at 11/08/2024 1400   Age older than 50 years old? 1=Yes filed at 11/26/2024 0730 1=Yes filed at 11/08/2024 1400   Is your neck circumference greater than 17 inches (Male) or 16 inches (Female)? 0 filed at 11/26/2024 0730 0 filed at 11/08/2024 1400   Gender - Male 0=No filed at 11/26/2024 0730 0=No filed at 11/08/2024 1400   STOP-BANG Total Score 2 filed at 11/26/2024 0730 2 filed at 11/08/2024 1400          Prodigy: High Risk  Total Score: 16              Prodigy Age Score           ARISCAT Score for Postoperative Pulmonary Complications      Flowsheet Row Pre-Admission Testing from 11/8/2024 in [facility]   Age  Calculated Score 3 filed at 11/08/2024 1429   Preoperative SpO2 0 filed at 11/08/2024 1429   Respiratory infection in the last month Either upper or lower (i.e., URI, bronchitis, pneumonia), with fever and antibiotic treatment 0 filed at 11/08/2024 1429   Preoperative anemia (Hgb less than 10 g/dl) 0 filed at 11/08/2024 1429   Surgical incision  0 filed at 11/08/2024 1429   Duration of surgery  0 filed at 11/08/2024 1429   Emergency Procedure  0 filed at 11/08/2024 1429   ARISCAT Total Score  3 filed at 11/08/2024 1429          Mari Perioperative Risk for Myocardial Infarction or Cardiac Arrest (LILLIANA)      Flowsheet Row Pre-Admission Testing from 11/8/2024 in [facility]   Calculated Age Score 1.6 filed at 11/08/2024 1430   Functional Status  0 filed at 11/08/2024 1430   ASA Class  -3.29 filed at 11/08/2024 1430   Creatinine 0 filed at 11/08/2024 1430   Type of Procedure  -0.26 filed at 11/08/2024 1430   LILLIANA Total Score  -7.2 filed at 11/08/2024 1430   LILLIANA % 0.07 filed at 11/08/2024 1430            Nurse Plan of Action:       After Visit Summary (AVS) reviewed and patient verbalized good understanding of medications and NPO instructions.            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 2023    arm    GERD (gastroesophageal reflux disease)     Managed with Prilosec    Hearing aid worn     both ears    HL (hearing loss)     Hyperlipidemia     Hypertension     Kidney stone     Plan: Cystoscopy; Left Ureteroscopy; Holmium Laser  Lithotripsy; Ureteral Stent Insertion 5/14/25    Nephrolithiasis     Oncology follow-up encounter     Marcos Rojas MD LOV 4/2/25    Osteoporosis     Overweight     Tinnitus 1997    Trochanteric bursitis, unspecified hip     Urothelial cancer (Multi)     Vision loss     wears glasses    Vitamin D deficiency     daily supplement   [2]   Past Surgical History:  Procedure Laterality Date    BLADDER SURGERY  09/03/2019    bladder bx    BLADDER SUSPENSION  2007    BREAST LUMPECTOMY Left     Breast Surgery Lumpectomy x 2 1996    CARPAL TUNNEL RELEASE Left 2003    Neuroplasty Decompression Median Nerve At Carpal Tunnel    CATARACT EXTRACTION      CATARACT EXTRACTION EXTRACAPSULAR W/ INTRAOCULAR LENS IMPLANTATION Bilateral 2017    COLONOSCOPY      CYSTOSCOPY      multiple    CYSTOSCOPY Left 04/09/2025    Left CYSTOSCOPY, WITH URETERAL STENT INSERTION - Left    HYSTERECTOMY  02/10/2014    Hysterectomy    NEPHRECTOMY Right 03/28/2024    TURBT, right nephroureterectomy, RPLND    SHOULDER SURGERY Left     left shoulder surgery to remove bone spur   [3]   Family History  Problem Relation Name Age of Onset    Diabetes Mother      Multiple sclerosis Mother      Hypertension Father      Stroke Father      Heart disease Father      Bipolar disorder Brother      Cardiomyopathy Brother     [4]   Allergies  Allergen Reactions    Aspirin Nausea Only    Nsaids (Non-Steroidal Anti-Inflammatory Drug) Nausea Only    Penicillins Rash

## 2025-05-01 NOTE — CPM/PAT H&P
CPM/PAT Evaluation       Name: Anahi Wall (Anahi Wall)  /Age: 1945/79 y.o.     In-Person         Date of Consult:     Referring Provider:      Date, Surgery, and Length:        Patient presents to [facility] for perioperative risk assessment prior to scheduled surgery.        This note was created in part upon personal review of patient's medical records.        Pt denies any past history of anesthetic complications such as PONV, awareness, prolonged sedation, dental damage, aspiration, cardiac arrest, difficult intubation, difficult I.V. access or unexpected hospital admissions. No history of malignant hyperthermia and or pseudocholinesterase deficiency.    No history of blood transfusions.    The patient IS/IS NOT a Sabianist and will accept blood and blood products if medically indicated.     Type and screen WAS/NOT sent.      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    Breast cancer     left sided lumpectomy, s/p xrt also completed     Carpal tunnel syndrome     s/p Left wrist surgery    Cataract     removed    CKD (chronic kidney disease) stage 5, GFR less than 15 ml/min (Multi)     4/10/25 Cr 3.61 GFR 12    Depression     controlled with medication    Fractures 2023    arm    GERD (gastroesophageal reflux disease)     Managed with Prilosec    Hearing aid worn     both ears    HL (hearing loss)     Hyperlipidemia     Hypertension     Kidney stone     Plan: Cystoscopy; Left Ureteroscopy; Holmium Laser Lithotripsy; Ureteral Stent Insertion 25    Nephrolithiasis     Oncology follow-up encounter     Marcos Rojas MD LOV 25    Osteoporosis     Overweight     Tinnitus     Trochanteric bursitis, unspecified hip     Urothelial cancer (Multi)     Vision loss     wears glasses    Vitamin D deficiency     daily  supplement       Past Surgical History:   Procedure Laterality Date    BLADDER SURGERY  09/03/2019    bladder bx    BLADDER SUSPENSION  2007    BREAST LUMPECTOMY Left     Breast Surgery Lumpectomy x 2 1996    CARPAL TUNNEL RELEASE Left 2003    Neuroplasty Decompression Median Nerve At Carpal Tunnel    CATARACT EXTRACTION      CATARACT EXTRACTION EXTRACAPSULAR W/ INTRAOCULAR LENS IMPLANTATION Bilateral 2017    COLONOSCOPY      CYSTOSCOPY      multiple    CYSTOSCOPY Left 04/09/2025    Left CYSTOSCOPY, WITH URETERAL STENT INSERTION - Left    HYSTERECTOMY  02/10/2014    Hysterectomy    NEPHRECTOMY Right 03/28/2024    TURBT, right nephroureterectomy, RPLND    SHOULDER SURGERY Left     left shoulder surgery to remove bone spur       Family History   Problem Relation Name Age of Onset    Diabetes Mother      Multiple sclerosis Mother      Hypertension Father      Stroke Father      Heart disease Father      Bipolar disorder Brother      Cardiomyopathy Brother         Allergies   Allergen Reactions    Aspirin Nausea Only    Nsaids (Non-Steroidal Anti-Inflammatory Drug) Nausea Only    Penicillins Rash       Current Outpatient Medications   Medication Instructions    acetaminophen (TYLENOL) 500 mg, Every 6 hours PRN    alendronate (FOSAMAX) 70 mg, oral, Every 7 days, Takes on Sunday    cholecalciferol (Vitamin D-3) 50 MCG (2000 UT) tablet 1 tablet, Daily    ferrous sulfate 325 mg, 2 times daily    minoxidil (Loniten) 2.5 mg tablet 0.5 tablets, Daily    mirabegron (MYRBETRIQ) 25 mg, oral, Daily    mirtazapine (REMERON) 30 mg, oral, Nightly    omeprazole (PRILOSEC) 20 mg, oral, Daily    potassium citrate CR (Urocit-K-10) 10 mEq ER tablet 10 mEq, oral, 3 times daily (morning, midday, late afternoon), Do not crush, chew, or split.    rosuvastatin (CRESTOR) 20 mg, oral, Daily    sertraline (ZOLOFT) 200 mg, oral, Daily       PAT ROS:   Constitutional:   neg    Neuro/Psych:   neg    Eyes:    use of corrective lenses  Ears:     "hearing loss   hearing aides  Nose:   neg    Mouth:   neg    Throat:   neg    Neck:   neg    Cardio:   neg    Respiratory:   neg    Endocrine:   neg    GI:   neg    :   neg    Musculoskeletal:    Using cane for balance    arthralgias  Hematologic:   neg    Skin:  neg        Physical Exam  Vitals reviewed. Physical exam within normal limits.          PAT AIRWAY:   Airway:     Mallampati::  III    TM distance::  <3 FB    Neck ROM::  Full      Visit Vitals  /58   Pulse 100   Temp 36.1 °C (97 °F)   Resp 16   Ht 1.575 m (5' 2.01\")   Wt 71 kg (156 lb 8.4 oz)   SpO2 96%   BMI 28.62 kg/m²   OB Status Postmenopausal   Smoking Status Former   BSA 1.76 m²       Assessment and Plan:       Patient is a      Plan      Neuro:  {CPMPATNEURO:84536}  {CPMPATDELIRIUMRISKFACTORS:37332}  {CPMPATSTROKE:16641}  {CPMPATCVARISKFACTORS:47716}        Cardiovascular:    RCRI:  Risk of Mace:    Caprini:  VTE risk:    .dcv    functional capacity      EKG in [facility]             Pulmonary:  {CPMPATPULM:84203}  {CPMPATPULMRISKFACTORS:17759}  Stop Bang score is *** placing patient at *** risk for JESSICA  ARISCAT: {ARISCAT Score:55814} risk of in-hospital postoperative pulmonary complication  PRODIGY: {Low/Medium/High:44463} risk for opioid induced respiratory depression  {CPMPATPULMEDUCATION:38289}        Renal/endo:  .ckd      GI/:      Heme:  Patient instructed to ambulate as soon as possible postoperatively to decrease thromboembolic risk.    Initiate mechanical DVT prophylaxis as soon as possible and initiate chemical prophylaxis when deemed safe from a bleeding standpoint post surgery.              Risk assessment complete.  This patient is LOW/INTERMEDIATE/HIGH risk candidate undergoing LOW/MODERATE/HIGH risk procedure, patient is medically optimized for surgery.        Labs/testing obtained in [facility] on 5/1/25: CBC, BMP, T&S, UA FLEX, EKG      Follow up/communication:      Preoperative medication instructions were provided and reviewed with " the patient.  Any additional testing or evaluation was explained to the patient.  Nothing by mouth instructions were discussed and patient's questions were answered prior to conclusion to this encounter.  Patient verbalized understanding of preoperative instructions given in preadmission testing; discharge instructions available in EMR.    This note was dictated with speech recognition.  Minor errors may have been detected during use of speech recognition.         (L) 2025    MCV 90 2025     (L) 2025     Lab Results   Component Value Date    GLUCOSE 128 (H) 2025    CALCIUM 9.8 2025     2025    K 4.7 2025    CO2 31 2025     2025    BUN 38 (H) 2025    CREATININE 1.44 (H) 2025           Component 6 d ago   ABO TYPE AB   Rh TYPE POS   ANTIBODY SCREEN NEG   Resulting Agency [facility]             Specimen Collected: 25 14:54 Last Resulted: 25 16:06           Follow up/communication: none      Preoperative medication instructions were provided and reviewed with the patient.  Any additional testing or evaluation was explained to the patient.  Nothing by mouth instructions were discussed and patient's questions were answered prior to conclusion to this encounter.  Patient verbalized understanding of preoperative instructions given in preadmission testing; discharge instructions available in EMR.    This note was dictated with speech recognition.  Minor errors may have been detected during use of speech recognition.             [1]   Social History  Tobacco Use   Smoking Status Former    Current packs/day: 0.00    Average packs/day: 0.5 packs/day for 2.0 years (1.0 ttl pk-yrs)    Types: Cigarettes    Start date:     Quit date:     Years since quittin.3   Smokeless Tobacco Never

## 2025-05-01 NOTE — PREPROCEDURE INSTRUCTIONS
Medication List            Accurate as of May 1, 2025  2:25 PM. Always use your most recent med list.                acetaminophen 500 mg tablet  Commonly known as: Tylenol  Medication Adjustments for Surgery: Take/Use as prescribed     alendronate 70 mg tablet  Commonly known as: Fosamax  Take 1 tablet (70 mg) by mouth every 7 days. Takes on Sunday  Medication Adjustments for Surgery: Do Not take on the morning of surgery     cholecalciferol 50 mcg (2,000 units) tablet  Commonly known as: Vitamin D-3  Medication Adjustments for Surgery: Do Not take on the morning of surgery     ferrous sulfate 325 mg (65 mg elemental) tablet  Medication Adjustments for Surgery: Do Not take on the morning of surgery     minoxidil 2.5 mg tablet  Commonly known as: Loniten  Medication Adjustments for Surgery: Do Not take on the morning of surgery     mirabegron 25 mg tablet extended release 24 hr  Commonly known as: Myrbetriq  Take 1 tablet (25 mg) by mouth once daily.  Medication Adjustments for Surgery: Do Not take on the morning of surgery     mirtazapine 30 mg tablet  Commonly known as: Remeron  Take 1 tablet (30 mg) by mouth once daily at bedtime.  Medication Adjustments for Surgery: Take/Use as prescribed     omeprazole 20 mg DR capsule  Commonly known as: PriLOSEC  Take 1 capsule (20 mg) by mouth once daily.  Medication Adjustments for Surgery: Take/Use as prescribed     potassium citrate CR 10 mEq ER tablet  Commonly known as: Urocit-K-10  Take 1 tablet (10 mEq) by mouth 3 times daily (morning, midday, late afternoon). Do not crush, chew, or split.  Medication Adjustments for Surgery: Do Not take on the morning of surgery     rosuvastatin 20 mg tablet  Commonly known as: Crestor  Take 1 tablet (20 mg) by mouth once daily.  Medication Adjustments for Surgery: Take/Use as prescribed     sertraline 100 mg tablet  Commonly known as: Zoloft  Take 2 tablets (200 mg) by mouth once daily.  Medication Adjustments for Surgery:  Take/Use as prescribed                Preoperative Brain Exercises    What are brain exercises?  A brain exercise is any activity that engages your thinking (cognitive) skills.    What types of activities are considered brain exercises?  Jigsaw puzzles, crossword puzzles, word jumble, memory games, word search, and many more.  Many can be found free online or on your phone via a mobile darshana.    Why should I do brain exercises before my surgery?  More recent research has shown brain exercise before surgery can lower the risk of postoperative delirium (confusion) which can be especially important for older adults.  Patients who did brain exercises for 5 to 10 hours (total) for the 7-10 days before surgery, cut their risk of postoperative delirium in half up to 1 week after surgery.;        Preoperative Deep Breathing Exercises  Why it is important to do deep breathing exercises before my surgery?  Deep breathing exercises strengthen your breathing muscles.  This helps you to recover after your surgery and decreases the chance of breathing complications.  How are the deep breathing exercises done?  Sit straight with your back supported.  Breathe in deeply and slowly through your nose. Your lower rib cage should expand and your abdomen may move forward.  Hold that breath for 3 to 5 seconds.  Breathe out through pursed lips, slowly and completely.  Rest and repeat 10 times every hour while awake.  Rest longer if you become dizzy or lightheaded.        CONTACT SURGEON'S OFFICE IF YOU DEVELOP:  * Fever = 100.4 F   * New respiratory symptoms (e.g. cough, shortness of breath, respiratory distress, sore throat)  * Recent loss of taste or smell  *Flu like symptoms such as headache, fatigue or gastrointestinal symptoms  * You develop any open sores, shingles, burning or painful urination   AND/OR:  * You no longer wish to have the surgery.  * Any other personal circumstances change that may lead to the need to cancel or defer  this surgery.  *You were admitted to any hospital within one week of your planned procedure.    SMOKING:  *Quitting smoking can make a huge difference to your health and recovery from surgery.    *If you need help with quitting, call 2-650-QUIT-NOW.    THE DAY OF SURGERY:  *Do not eat any food after midnight the night before your surgery.   *YOU MUST drink 14 OUNCES of clear liquids TWO hours before your instructed ARRIVAL TIME to the hospital. This includes water, black tea/coffee (no milk or cream), apple juice, clear broth and electrolyte drinks (Gatorade).  Please avoid clear liquids that are red in color.   *You may chew gum/mints up to TWO hours before your surgery/procedure.    SURGICAL TIME:  *You will be contacted between 2 p.m. and 6 p.m. the business day before your surgery with your arrival time.  *If you haven't received a call by 6pm, call [phone].  *Scheduled surgery times may change and you will be notified if this occurs-check your personal voicemail for any updates.    ON THE MORNING OF SURGERY:  *Wear comfortable, loose fitting clothing.   *Do not use moisturizers, creams, lotions or perfume.  *All jewelry and valuables should be left at home.  *Prosthetic devices such as contact lenses, hearing aids, dentures, eyelash extensions, hairpins and body piercing must be removed before surgery.    BRING WITH YOU:  *Photo ID and insurance card  *Current list of medications and allergies  *Pacemaker/Defibrillator/Heart stent cards  *CPAP machine and mask  *Slings/splints/crutches  *Copy of your complete Advanced Directive/DHPOA-if applicable  *Neurostimulator implant remote    PARKING AND ARRIVAL:  *Check in at the Main Entrance desk and let them know you are here for surgery.  *You will be directed to the 2nd floor surgical waiting area.    IF YOU ARE HAVING OUTPATIENT/SAME DAY SURGERY:  *A responsible adult MUST accompany you at the time of discharge and stay with you for 24 hours after your  surgery.  *You may NOT drive yourself home after surgery.  *You may use a taxi or ride sharing service ([employer], Uber) to return home ONLY if you are accompanied by a friend or family member.  *Instructions for resuming your medications will be provided by your surgeon.

## 2025-05-01 NOTE — CPM/PAT NURSE NOTE
CPM/PAT Nurse Note      Name: Anahi Wall (Anahi Wall)  /Age: 1945/79 y.o.       Medical History[1]    Surgical History[2]    Patient Sexual activity questions deferred to the physician.    Family History[3]    Allergies[4]    Prior to Admission medications    Medication Sig Start Date End Date Taking? Authorizing Provider   acetaminophen (Tylenol) 500 mg tablet Take 1 tablet (500 mg) by mouth every 6 hours if needed for mild pain (1 - 3).    Historical Provider, MD   alendronate (Fosamax) 70 mg tablet Take 1 tablet (70 mg) by mouth every 7 days. Takes on 24   Jose Luis Conrad MD   cholecalciferol (Vitamin D-3) 50 MCG ( UT) tablet Take 1 tablet (50 mcg) by mouth once daily.    Historical Provider, MD   ferrous sulfate, 325 mg ferrous sulfate, tablet Take 1 tablet (325 mg) by mouth 2 times a day.    Historical Provider, MD   minoxidil (Loniten) 2.5 mg tablet Take 0.5 tablets (1.25 mg) by mouth once daily.    Historical Provider, MD   mirabegron (Myrbetriq) 25 mg tablet extended release 24 hr 24 hr tablet Take 1 tablet (25 mg) by mouth once daily. 3/11/25 3/11/26  Jorge A De Leon MD MPH   mirtazapine (Remeron) 30 mg tablet Take 1 tablet (30 mg) by mouth once daily at bedtime. 24   Jose Luis Conrad MD   omeprazole (PriLOSEC) 20 mg DR capsule Take 1 capsule (20 mg) by mouth once daily. 8/15/24   Jose Luis Conrad MD   potassium citrate CR (Urocit-K-10) 10 mEq ER tablet Take 1 tablet (10 mEq) by mouth 3 times daily (morning, midday, late afternoon). Do not crush, chew, or split. 4/10/25 4/10/26  Abebe Macias MD   rosuvastatin (Crestor) 20 mg tablet Take 1 tablet (20 mg) by mouth once daily. 24  Jose Luis Conrad MD   sertraline (Zoloft) 100 mg tablet Take 2 tablets (200 mg) by mouth once daily. 24   Jose Luis Conrad MD   oxybutynin XL (Ditropan-XL) 15 mg 24 hr tablet Take 1 tablet (15 mg) by mouth once daily. 24  Jose Luis Conrad MD        [facility]     DASI Risk Score       Flowsheet Row Pre-Admission Testing from 11/8/2024 in [facility] Pre-Admission Testing from 3/11/2024 in [facility]   Can you take care of yourself (eat, dress, bathe, or use toilet)?  2.75 filed at 11/08/2024 1429 2.75 filed at 03/11/2024 1037   Can you walk indoors, such as around your house? 1.75 filed at 11/08/2024 1429 1.75 filed at 03/11/2024 1037   Can you walk a block or two on level ground?  2.75 filed at 11/08/2024 1429 2.75 filed at 03/11/2024 1037   Can you climb a flight of stairs or walk up a hill? 5.5 filed at 11/08/2024 1429 5.5 filed at 03/11/2024 1037   Can you run a short distance? 0 filed at 11/08/2024 1429 0 filed at 03/11/2024 1037   Can you do light work around the house like dusting or washing dishes? 2.7 filed at 11/08/2024 1429 2.7 filed at 03/11/2024 1037   Can you do moderate work around the house like vacuuming, sweeping floors or carrying groceries? 3.5 filed at 11/08/2024 1429 3.5 filed at 03/11/2024 1037   Can you do heavy work around the house like scrubbing floors or lifting and moving heavy furniture?  0 filed at 11/08/2024 1429 0 filed at 03/11/2024 1037   Can you do yard work like raking leaves, weeding or pushing a mower? 4.5 filed at 11/08/2024 1429 4.5 filed at 03/11/2024 1037   Can you have sexual relations? 5.25 filed at 11/08/2024 1429 5.25 filed at 03/11/2024 1037   Can you participate in moderate recreational activities like golf, bowling, dancing, doubles tennis or throwing a baseball or football? 0 filed at 11/08/2024 1429 0 filed at 03/11/2024 1037   Can you participate in strenous sports like swimming, singles tennis, football, basketball, or skiing? 0 filed at 11/08/2024 1429 0 filed at 03/11/2024 1037   DASI SCORE 28.7 filed at 11/08/2024 1429 28.7 filed at 03/11/2024 1037   METS Score (Will be calculated only when all the questions are answered) 6.3 filed at 11/08/2024 1429 6.3 filed at 03/11/2024 1037          Caprini DVT  Assessment      Flowsheet Row Pre-Admission Testing from 11/8/2024 in [facility] Admission (Discharged) from 3/28/2024 in [facility] with Jorge A De Leon MD MPH   DVT Score (IF A SCORE IS NOT CALCULATING, MUST SELECT A BMI TO COMPLETE) 7 filed at 11/08/2024 1428 10 filed at 03/28/2024 0707   Medical Factors Present cancer, chemotherapy, or previous malignancy filed at 11/08/2024 1428 --   Surgical Factors Major surgery planned, including arthroscopic and laproscopic (1-2 hours) filed at 11/08/2024 1428 --   BMI (BMI MUST BE CHOSEN) 30 or less filed at 11/08/2024 1428 30 or less filed at 03/28/2024 0707   RETIRED: Current Status -- Major surgery planned, lasting over 3 hours filed at 03/28/2024 0707   RETIRED: History -- Prior major surgery filed at 03/28/2024 0707   RETIRED: Age -- Over 75 years filed at 03/28/2024 0707          Modified Frailty Index      Flowsheet Row Pre-Admission Testing from 11/8/2024 in [facility] Pre-Admission Testing from 3/11/2024 in [facility]   Non-independent functional status (problems with dressing, bathing, personal grooming, or cooking) 0 filed at 11/08/2024 1429 0 filed at 03/11/2024 1045   History of diabetes mellitus  0 filed at 11/08/2024 1429 0 filed at 03/11/2024 1045   History of COPD 0 filed at 11/08/2024 1429 0 filed at 03/11/2024 1045   History of CHF No filed at 11/08/2024 1429 No filed at 03/11/2024 1045   History of MI 0 filed at 11/08/2024 1429 0 filed at 03/11/2024 1045   History of Percutaneous Coronary Intervention, Cardiac Surgery, or Angina No filed at 11/08/2024 1429 No filed at 03/11/2024 1045   Hypertension requiring the use of medication  0.0909 filed at 11/08/2024 1429 0.0909 filed at 03/11/2024 1045   Peripheral vascular disease 0 filed at 11/08/2024 1429 0 filed at 03/11/2024 1045   Impaired sensorium (cognitive impairement or loss, clouding, or delirium) 0 filed at 11/08/2024 1429 0 filed at  03/11/2024 1045   TIA or CVA withouy residual deficit 0 filed at 11/08/2024 1429 0 filed at 03/11/2024 1045   Cerebrovascular accident with deficit 0 filed at 11/08/2024 1429 0 filed at 03/11/2024 1045   Modified Frailty Index Calculator .0909 filed at 11/08/2024 1429 .0909 filed at 03/11/2024 1045          [id number] Stroke Risk Points  Current as of just now        N/A 0 to 9 Points:      Last Change: N/A          The [id number] risk score (Lip PEYTON, et al. 2009. © 2010 [employer]) quantifies the risk of stroke for a patient with atrial fibrillation. For patients without atrial fibrillation or under the age of 18 this score appears as N/A. Higher score values generally indicate higher risk of stroke.        This score is not applicable to this patient. Components are not calculated.          Revised Cardiac Risk Index      Flowsheet Row Pre-Admission Testing from 11/8/2024 in [facility] Pre-Admission Testing from 3/11/2024 in [facility]   High-Risk Surgery (Intraperitoneal, Intrathoracic,Suprainguinal vascular) 0 filed at 11/08/2024 1429 0 filed at 03/11/2024 1044   History of ischemic heart disease (History of MI, History of positive exercuse test, Current chest paint considered due to myocardial ischemia, Use of nitrate therapy, ECG with pathological Q Waves) 0 filed at 11/08/2024 1429 0 filed at 03/11/2024 1044   History of congestive heart failure (pulmonary edemia, bilateral rales or S3 gallop, Paroxysmal nocturnal dyspnea, CXR showing pulmonary vascular redistribution) 0 filed at 11/08/2024 1429 0 filed at 03/11/2024 1044   History of cerebrovascular disease (Prior TIA or stroke) 0 filed at 11/08/2024 1429 0 filed at 03/11/2024 1044   Pre-operative insulin treatment 0 filed at 11/08/2024 1429 0 filed at 03/11/2024 1044   Pre-operative creatinine>2 mg/dl 0 filed at 11/08/2024 1429 0 filed at 03/11/2024 1044   Revised Cardiac Risk Calculator 0 filed  at 11/08/2024 1429 0 filed at 03/11/2024 1044          Apfel Simplified Score      Flowsheet Row Pre-Admission Testing from 3/11/2024 in [facility]   Smoking status 1 filed at 03/11/2024 1045   History of motion sickness or PONV  0 filed at 03/11/2024 1045   Use of postoperative opioids 1 filed at 03/11/2024 1045   Apfel Simplified Score Calculator 3 filed at 03/11/2024 1045          Risk Analysis Index Results This Encounter    No data found in the last 10 encounters.       Stop Bang Score      Flowsheet Row Admission (Discharged) from 11/26/2024 in [facility] OR with Jorge A De Leon MD MPH Pre-Admission Testing from 11/8/2024 in [facility]   Do you snore loudly? 1 filed at 11/26/2024 0730 1 filed at 11/08/2024 1400   Do you often feel tired or fatigued after your sleep? 0 filed at 11/26/2024 0730 0 filed at 11/08/2024 1400   Has anyone ever observed you stop breathing in your sleep? 0 filed at 11/26/2024 0730 0 filed at 11/08/2024 1400   Do you have or are you being treated for high blood pressure? 0 filed at 11/26/2024 0730 0 filed at 11/08/2024 1400   Recent BMI (Calculated) 29.2 filed at 11/26/2024 0730 29.6 filed at 11/08/2024 1400   Is BMI greater than 35 kg/m2? 0=No filed at 11/26/2024 0730 0=No filed at 11/08/2024 1400   Age older than 50 years old? 1=Yes filed at 11/26/2024 0730 1=Yes filed at 11/08/2024 1400   Is your neck circumference greater than 17 inches (Male) or 16 inches (Female)? 0 filed at 11/26/2024 0730 0 filed at 11/08/2024 1400   Gender - Male 0=No filed at 11/26/2024 0730 0=No filed at 11/08/2024 1400   STOP-BANG Total Score 2 filed at 11/26/2024 0730 2 filed at 11/08/2024 1400          Prodigy: High Risk  Total Score: 16              Prodigy Age Score           ARISCAT Score for Postoperative Pulmonary Complications      Flowsheet Row Pre-Admission Testing from 11/8/2024 in [facility]   Age Calculated Score 3 filed at  11/08/2024 1429   Preoperative SpO2 0 filed at 11/08/2024 1429   Respiratory infection in the last month Either upper or lower (i.e., URI, bronchitis, pneumonia), with fever and antibiotic treatment 0 filed at 11/08/2024 1429   Preoperative anemia (Hgb less than 10 g/dl) 0 filed at 11/08/2024 1429   Surgical incision  0 filed at 11/08/2024 1429   Duration of surgery  0 filed at 11/08/2024 1429   Emergency Procedure  0 filed at 11/08/2024 1429   ARISCAT Total Score  3 filed at 11/08/2024 1429          Mari Perioperative Risk for Myocardial Infarction or Cardiac Arrest (LILLIANA)      Flowsheet Row Pre-Admission Testing from 11/8/2024 in [facility]   Calculated Age Score 1.6 filed at 11/08/2024 1430   Functional Status  0 filed at 11/08/2024 1430   ASA Class  -3.29 filed at 11/08/2024 1430   Creatinine 0 filed at 11/08/2024 1430   Type of Procedure  -0.26 filed at 11/08/2024 1430   LILLIANA Total Score  -7.2 filed at 11/08/2024 1430   LILLIANA % 0.07 filed at 11/08/2024 1430            Nurse Plan of Action: RN screening call complete.  Reviewed allergies, medications and pharmacy, medical, surgical and social history with patient.  Chart updated.                   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 2023    arm    GERD (gastroesophageal reflux disease)     Managed with Prilosec    Hearing aid worn     both ears    HL (hearing loss)     Hyperlipidemia     Hypertension     Kidney stone     Plan: Cystoscopy; Left Ureteroscopy; Holmium Laser  Lithotripsy; Ureteral Stent Insertion 5/14/25    Nephrolithiasis     Oncology follow-up encounter     Marcos Rojas MD LOV 4/2/25    Osteoporosis     Overweight     Tinnitus 1997    Trochanteric bursitis, unspecified hip     Urothelial cancer (Multi)     Vision loss     wears glasses    Vitamin D deficiency     daily supplement   [2]   Past Surgical History:  Procedure Laterality Date    BLADDER SURGERY  09/03/2019    bladder bx    BLADDER SUSPENSION  2007    BREAST LUMPECTOMY Left     Breast Surgery Lumpectomy x 2 1996    CARPAL TUNNEL RELEASE Left 2003    Neuroplasty Decompression Median Nerve At Carpal Tunnel    CATARACT EXTRACTION      CATARACT EXTRACTION EXTRACAPSULAR W/ INTRAOCULAR LENS IMPLANTATION Bilateral 2017    COLONOSCOPY      CYSTOSCOPY      multiple    CYSTOSCOPY Left 04/09/2025    Left CYSTOSCOPY, WITH URETERAL STENT INSERTION - Left    HYSTERECTOMY  02/10/2014    Hysterectomy    NEPHRECTOMY Right 03/28/2024    TURBT, right nephroureterectomy, RPLND    SHOULDER SURGERY Left     left shoulder surgery to remove bone spur   [3]   Family History  Problem Relation Name Age of Onset    Diabetes Mother      Multiple sclerosis Mother      Hypertension Father      Stroke Father      Heart disease Father      Bipolar disorder Brother      Cardiomyopathy Brother     [4]   Allergies  Allergen Reactions    Aspirin Nausea Only    Nsaids (Non-Steroidal Anti-Inflammatory Drug) Nausea Only    Penicillins Rash

## 2025-05-05 ENCOUNTER — APPOINTMENT (OUTPATIENT)
Dept: RADIOLOGY | Facility: HOSPITAL | Age: 80
End: 2025-05-05
Payer: MEDICARE

## 2025-05-05 ENCOUNTER — OFFICE VISIT (OUTPATIENT)
Dept: ORTHOPEDIC SURGERY | Facility: HOSPITAL | Age: 80
End: 2025-05-05
Payer: MEDICARE

## 2025-05-05 DIAGNOSIS — M25.561 ACUTE BILATERAL KNEE PAIN: ICD-10-CM

## 2025-05-05 DIAGNOSIS — M17.11 PRIMARY OSTEOARTHRITIS OF RIGHT KNEE: Primary | ICD-10-CM

## 2025-05-05 DIAGNOSIS — M25.562 ACUTE BILATERAL KNEE PAIN: ICD-10-CM

## 2025-05-05 DIAGNOSIS — M17.12 PRIMARY OSTEOARTHRITIS OF LEFT KNEE: ICD-10-CM

## 2025-05-05 PROCEDURE — 20610 DRAIN/INJ JOINT/BURSA W/O US: CPT | Mod: LT | Performed by: STUDENT IN AN ORGANIZED HEALTH CARE EDUCATION/TRAINING PROGRAM

## 2025-05-05 PROCEDURE — 20610 DRAIN/INJ JOINT/BURSA W/O US: CPT | Mod: RT | Performed by: STUDENT IN AN ORGANIZED HEALTH CARE EDUCATION/TRAINING PROGRAM

## 2025-05-05 PROCEDURE — 2500000004 HC RX 250 GENERAL PHARMACY W/ HCPCS (ALT 636 FOR OP/ED): Performed by: STUDENT IN AN ORGANIZED HEALTH CARE EDUCATION/TRAINING PROGRAM

## 2025-05-05 RX ADMIN — TRIAMCINOLONE ACETONIDE 40 MG: 40 INJECTION, SUSPENSION INTRA-ARTICULAR; INTRAMUSCULAR at 20:25

## 2025-05-05 RX ADMIN — BUPIVACAINE HYDROCHLORIDE 4 ML: 250 INJECTION, SOLUTION PERINEURAL at 20:25

## 2025-05-05 RX ADMIN — LIDOCAINE HYDROCHLORIDE 8 ML: 10 INJECTION, SOLUTION INFILTRATION; PERINEURAL at 20:25

## 2025-05-05 NOTE — LETTER
May 6, 2025     Jose Luis Conrad MD  [address] [place] [address] [address] [place] [address]    Patient: Anahi Wall   YOB: 1945   Date of Visit: 2025       Dear Dr. Jose Luis Conrad MD:    Thank you for referring Anahi Wall to me for evaluation. Below are my notes for this consultation.  If you have questions, please do not hesitate to call me. I look forward to following your patient along with you.       Sincerely,     Melanie Olguin MD      CC: No Recipients  ______________________________________________________________________________________     Melanie Olguin MD   Adult Reconstruction and Joint Replacement Surgery  Phone: [phone]     Fax: [phone]       Name: Anahi Wall  Age: 79 y.o.   : 1945   Date of Visit: 2025    Follow-up Knee    CC: Follow-up BILATERAL knee     History of Present Illness:  This patient presents with 10 years of LEFT worse than right knee pain.     They were last seen for this problem on 2024. At the last visit, the patient underwent steroid injection of the LEFT knee. The patient reports 80% relief for about 9 months.  She reports that she has more recent onset of RIGHT knee pain as well.  She is hoping for repeat injections today.  She has been taking Tylenol and as needed opioids to manage her pain.  She unfortunately is afflicted with bladder cancer and has been undergoing ongoing treatment.    Patient has tried the following Ice, Tylenol (arthritis dosing) , Activity modification, Corticosteroid injections , and Xray. Date of last steroid injection: 2023 - helped, left knee. Patient does occasionally have pain at night. Patient is able to walk 2-3 blocks. Patient is currently using nothing and cane as assistive device. Primarily complains of anterior and lateral  pain. Patient has difficulty with climbing stairs, descending stairs, walking , and walking on unlevel surfaces . The pain is significantly impacting her ability to perform  activities of daily living. Patient reports no longer able to do activities such as walking longer distances.      Focused History  PMH: Reviewed and PE/DVT: no. Solitary kidney (removed for carcinoma). Bladder cancer (has been treating for 14 years).  PSH: Reviewed , Hip/Knee replacement: no, Hip/Knee surgery: no, Anesthesia complications: no, Spine surgery: no, Surgical infection: no, and Weight loss surgery: no  Meds: Reviewed, Current Anticoagulants: no, Weight loss medication: no, and Current Opioids: no  Allergies: Reviewed , NSAIDs: avoids due to gastritis reaction, and The patient reports no contraindications or allergies to cephalosporins, aspirin, NSAIDs or opioids, except as noted above.  FH: No family history of any bleeding or clotting disorders.  SHx: Reviewed, Occupation: retired, previously  at [employer], Current smoker: no, EtOH intake weekly: minimal , Social support: TBD, and Preferred physical activities: walking  Dental Hx: Last routine cleaning: TBD and Discussed that all invasive dental work must be completed at least 3 months prior to joint replacement surgery. Patient understands they are to avoid any invasive dental work 3-6 months post-surgically.   Scientology: no    HISTORY  PROMs   No questionnaires on file.     Medical History[1]    Medical History[2]  Documented in chart and reviewed.     Surgical History[3]    Allergies: She is allergic to aspirin, nsaids (non-steroidal anti-inflammatory drug), and penicillins.     Medications:  Current Outpatient Medications   Medication Instructions   • acetaminophen (TYLENOL) 500 mg, Every 6 hours PRN   • alendronate (FOSAMAX) 70 mg, oral, Every 7 days, Takes on Sunday   • cholecalciferol (Vitamin D-3) 50 MCG (2000 UT) tablet 1 tablet, Daily   • ferrous sulfate 325 mg, 2 times daily   • minoxidil (Loniten) 2.5 mg tablet 0.5 tablets, Daily   • mirabegron (MYRBETRIQ) 25 mg, oral, Daily   • mirtazapine (REMERON) 30 mg,  oral, Nightly   • omeprazole (PRILOSEC) 20 mg, oral, Daily   • potassium citrate CR (Urocit-K-10) 10 mEq ER tablet 10 mEq, oral, 3 times daily (morning, midday, late afternoon), Do not crush, chew, or split.   • rosuvastatin (CRESTOR) 20 mg, oral, Daily   • sertraline (ZOLOFT) 200 mg, oral, Daily       Family History[4]  Documented in chart and reviewed.     Social History     Tobacco Use   • Smoking status: Former     Current packs/day: 0.00     Average packs/day: 0.5 packs/day for 2.0 years (1.0 ttl pk-yrs)     Types: Cigarettes     Start date:      Quit date:      Years since quittin.3   • Smokeless tobacco: Never   Substance Use Topics   • Alcohol use: Not Currently     Alcohol/week: 0.0 - 1.0 standard drinks of alcohol        Review of Systems: Review of systems completed with medical assistant intake. Please refer to this note.     Physical Exam:  BMI: 29.    General: The patient is well appearing and has an appropriate affect.     Neurological Examination: SILT in SPN/DPN/Sural/Saphenous/Tibial nerves. 5/5 EHL, FHL, Tibial anterior, Gastrocnemius. Coordination grossly intact.     Cardiovascular Exam: Capillary refill <2 seconds.     Lymphatic Examination: There is no obvious lymphatic swelling present around the involved joint.    Skin Exam: Skin around the pertinent joint is without evidence of infection or rash.    Gait: patient ambulates with antalgic gait.    Right Hip Examination:  Examination of the hip reveals the skin to be intact.     There is no tenderness over the greater trochanter.    Range of motion is full extension to 100 degrees of flexion.    The hip is stable without subluxation or dislocation.    The hip internally rotates to 15 degrees and externally rotates to 45 degrees.    There is no pain with hip motion.    Left Hip Examination:  Examination of the hip reveals the skin to be intact.     There is no tenderness over the greater trochanter.    Range of motion is full  extension to 100 degrees of flexion.    The hip is stable without subluxation or dislocation.    The hip internally rotates to 15 degrees and externally rotates to 45 degrees.    There is no pain with hip motion.    Left Knee Examination:  Examination of the knee reveals the skin to be intact.    There is a moderate effusion in the knee.    The alignment of the knee is Valgus.    This deformity is not correctable.    There is tenderness to palpation over the joint line.    There is moderate quadriceps atrophy.    Range of Motion: 10 to 110 degrees of flexion.    The knee is stable to varus-valgus stress and anterior-posterior stress.     There is moderate grinding with range of motion.    There is mild patellofemoral crepitus.    Right Knee Examination:  Examination of the knee reveals the skin to be intact.    There is a moderate effusion in the knee.    The alignment of the knee is Valgus.    This deformity is not correctable.    There is tenderness to palpation over the joint line.    There is moderate quadriceps atrophy.    Range of Motion: 10 to 110 degrees of flexion.    The knee is stable to varus-valgus stress and anterior-posterior stress.     There is moderate grinding with range of motion.    There is mild patellofemoral crepitus.    Imaging:    Radiographs were personally reviewed today. There is evidence of severe BILATERAL knee osteoarthritis with LATERAL  bone on bone apposition.    Impression and Plan:  This patient is here for follow-up evaluation of BILATERAL knee.    DIAGNOSIS  Primary osteoarthritis of right knee    Acute bilateral knee pain    Primary osteoarthritis of left knee     I have discussed the options in detail with the patient. We have discussed anti-inflammatory medication, activity modification, physical therapy, corticosteroid injections, viscosupplementation injections, partial knee replacement surgery and total knee replacement surgery. Patient is responding well to nonsurgical  treatment measures.    The risks and benefits of all these treatment options have been discussed in detail.     The patient has tried at least 3 months of the above conservative treatments and continues to have disabling pain, impaired activities of daily living and worsened quality of life.  Reviewed the surgical optimization steps to optimize their chances for a successful joint replacement surgery.      A new physical therapy referral was provided to the patient today.  Patient will continue their home exercise program. Strategies for pain management using over-the-counter anti-inflammatory medications reviewed.  Specifically discussed Tylenol up to 1000 mg every 8 hours, with close monitoring for potential side effects from this medication.  Patient has a solitary kidney.  The patient elects for a steroid injection, which was provided according to procedure note below. Discussed utility of brace. Will defer brace at this time.. Encouraged them to maintain range of motion and strength around the knee joints.  They will continue to implement these strategies in addressing their pain.       Recommend the patient continue optimizing nonsurgical treatment interventions as outlined above for management of their knee arthritis.  I would be happy to see them again at any point to discuss surgery if indicated or they are more optimized or to review progress with nonsurgical treatment of arthritis. The patient verbalizes understanding with the recommendations and treatment plan as outlined above and is in agreement.  Questions were addressed.    RTC: as needed    X-rays at next visit: as needed    Large Joint Injection [id number]: Knee  Consent given by: Patient  Timeout: Immediately prior to procedure the correct patient, procedure, and site was verified. Sterile gloves and semi-sterile technique were used.   Indications: Knee pain and joint swelling.   Location: BILATERAL knee  Needle size: 22 G  Approach:  Lateral    Medications administered: Please refer to medical assistant note for lot number and expiration date.   Subcutaneous   4 ml of 1% lidocaine     Deep   4 ml of 1% lidocaine   4 mL 0.5% marcaine   1 mL of 40 mg kenalog     Patient tolerance: Dressing applied. Patient tolerated the procedure well with no immediate complications.    L Inj/Asp: R knee on 5/5/2025 8:25 PM  Indications: pain and joint swelling  Details: 22 G needle, lateral approach  Medications: 40 mg triamcinolone acetonide 40 mg/mL; 8 mL lidocaine 10 mg/mL (1 %); 4 mL BUPivacaine HCl 0.5 % (5 mg/mL)  Outcome: tolerated well, no immediate complications  Procedure, treatment alternatives, risks and benefits explained, specific risks discussed. Consent was given by the patient. Immediately prior to procedure a time out was called to verify the correct patient, procedure, equipment, support staff and site/side marked as required. Patient was prepped and draped in the usual sterile fashion.       L Inj/Asp: L knee on 5/5/2025 8:25 PM  Indications: pain and joint swelling  Details: 22 G needle, lateral approach  Medications: 40 mg triamcinolone acetonide 40 mg/mL; 8 mL lidocaine 10 mg/mL (1 %); 4 mL BUPivacaine HCl 0.5 % (5 mg/mL)  Outcome: tolerated well, no immediate complications  Procedure, treatment alternatives, risks and benefits explained, specific risks discussed. Consent was given by the patient. Immediately prior to procedure a time out was called to verify the correct patient, procedure, equipment, support staff and site/side marked as required. Patient was prepped and draped in the usual sterile fashion.             _____________________  Melanie Olguin MD   Attending Orthopaedic Surgeon  [facility]    [facility]    This office note was transcribed with dictation software.  Please excuse any typographical errors, program misunderstandings leading to inadvertent insertions or  deletions of inappropriate wording, pronoun errors and other unintentional transcription errors not noticed on proof-reading.             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 2023    arm   • GERD (gastroesophageal reflux disease)     Managed with Prilosec   • Hearing aid worn     both ears   • HL (hearing loss)    • Hyperlipidemia    • Hypertension    • Kidney stone     Plan: Cystoscopy; Left Ureteroscopy; Holmium Laser Lithotripsy; Ureteral Stent Insertion 5/14/25   • Nephrolithiasis    • Oncology follow-up encounter     Marcos Rojas MD LOV 4/2/25   • Osteoporosis    • Overweight    • Tinnitus 1997   • Trochanteric bursitis, unspecified hip    • Urothelial cancer (Multi)    • Vision loss     wears glasses   • Vitamin D deficiency     daily supplement   [2]  Past Medical History:  Diagnosis Date   • Anemia     4/10/25 HGB 10.8 HCT 33.8   • Anxiety     controlled with medication   • Bladder cancer (Multi)     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 2023    arm   • GERD (gastroesophageal reflux disease)     Managed with Prilosec   • Hearing aid worn     both ears   • HL (hearing loss)    • Hyperlipidemia    • Hypertension     • Kidney stone     Plan: Cystoscopy; Left Ureteroscopy; Holmium Laser Lithotripsy; Ureteral Stent Insertion 5/14/25   • Nephrolithiasis    • Oncology follow-up encounter     Marcos Rojas MD LOV 4/2/25   • Osteoporosis    • Overweight    • Tinnitus 1997   • Trochanteric bursitis, unspecified hip    • Urothelial cancer (Multi)    • Vision loss     wears glasses   • Vitamin D deficiency     daily supplement   [3]  Past Surgical History:  Procedure Laterality Date   • BLADDER SURGERY  09/03/2019    bladder bx   • BLADDER SUSPENSION  2007   • BREAST LUMPECTOMY Left     Breast Surgery Lumpectomy x 2 1996   • CARPAL TUNNEL RELEASE Left 2003    Neuroplasty Decompression Median Nerve At Carpal Tunnel   • CATARACT EXTRACTION     • CATARACT EXTRACTION EXTRACAPSULAR W/ INTRAOCULAR LENS IMPLANTATION Bilateral 2017   • COLONOSCOPY     • CYSTOSCOPY      multiple   • CYSTOSCOPY Left 04/09/2025    Left CYSTOSCOPY, WITH URETERAL STENT INSERTION - Left   • HYSTERECTOMY  02/10/2014    Hysterectomy   • NEPHRECTOMY Right 03/28/2024    TURBT, right nephroureterectomy, RPLND   • SHOULDER SURGERY Left     left shoulder surgery to remove bone spur   [4]  Family History  Problem Relation Name Age of Onset   • Diabetes Mother     • Multiple sclerosis Mother     • Hypertension Father     • Stroke Father     • Heart disease Father     • Bipolar disorder Brother     • Cardiomyopathy Brother

## 2025-05-06 RX ORDER — TRIAMCINOLONE ACETONIDE 40 MG/ML
40 INJECTION, SUSPENSION INTRA-ARTICULAR; INTRAMUSCULAR
Status: COMPLETED | OUTPATIENT
Start: 2025-05-05 | End: 2025-05-05

## 2025-05-06 RX ORDER — LIDOCAINE HYDROCHLORIDE 10 MG/ML
8 INJECTION, SOLUTION INFILTRATION; PERINEURAL
Status: COMPLETED | OUTPATIENT
Start: 2025-05-05 | End: 2025-05-05

## 2025-05-06 RX ORDER — BUPIVACAINE HYDROCHLORIDE 5 MG/ML
4 INJECTION, SOLUTION PERINEURAL
Status: COMPLETED | OUTPATIENT
Start: 2025-05-05 | End: 2025-05-05

## 2025-05-07 ENCOUNTER — APPOINTMENT (OUTPATIENT)
Dept: UROLOGY | Facility: CLINIC | Age: 80
End: 2025-05-07
Payer: MEDICARE

## 2025-05-07 NOTE — PROGRESS NOTES
Melanie Olguin MD   Adult Reconstruction and Joint Replacement Surgery  Phone: [phone]     Fax: [phone]       Name: Anahi Wall  Age: 79 y.o.   : 1945   Date of Visit: 2025    Follow-up Knee    CC: Follow-up BILATERAL knee     History of Present Illness:  This patient presents with 10 years of LEFT worse than right knee pain.     They were last seen for this problem on 2024. At the last visit, the patient underwent steroid injection of the LEFT knee. The patient reports 80% relief for about 9 months.  She reports that she has more recent onset of RIGHT knee pain as well.  She is hoping for repeat injections today.  She has been taking Tylenol and as needed opioids to manage her pain.  She unfortunately is afflicted with bladder cancer and has been undergoing ongoing treatment.    Patient has tried the following Ice, Tylenol (arthritis dosing) , Activity modification, Corticosteroid injections , and Xray. Date of last steroid injection: 2023 - helped, left knee. Patient does occasionally have pain at night. Patient is able to walk 2-3 blocks. Patient is currently using nothing and cane as assistive device. Primarily complains of anterior and lateral  pain. Patient has difficulty with climbing stairs, descending stairs, walking , and walking on unlevel surfaces . The pain is significantly impacting her ability to perform activities of daily living. Patient reports no longer able to do activities such as walking longer distances.      Focused History  PMH: Reviewed and PE/DVT: no. Solitary kidney (removed for carcinoma). Bladder cancer (has been treating for 14 years).  PSH: Reviewed , Hip/Knee replacement: no, Hip/Knee surgery: no, Anesthesia complications: no, Spine surgery: no, Surgical infection: no, and Weight loss surgery: no  Meds: Reviewed, Current Anticoagulants: no, Weight loss medication: no, and Current Opioids: no  Allergies: Reviewed , NSAIDs: avoids due to  gastritis reaction, and The patient reports no contraindications or allergies to cephalosporins, aspirin, NSAIDs or opioids, except as noted above.  FH: No family history of any bleeding or clotting disorders.  SHx: Reviewed, Occupation: retired, previously  at [employer], Current smoker: no, EtOH intake weekly: minimal , Social support: TBD, and Preferred physical activities: walking  Dental Hx: Last routine cleaning: TBD and Discussed that all invasive dental work must be completed at least 3 months prior to joint replacement surgery. Patient understands they are to avoid any invasive dental work 3-6 months post-surgically.   Yazdanism: no    HISTORY  PROMs   No questionnaires on file.     Medical History[1]    Medical History[2]  Documented in chart and reviewed.     Surgical History[3]    Allergies: She is allergic to aspirin, nsaids (non-steroidal anti-inflammatory drug), and penicillins.     Medications:  Current Outpatient Medications   Medication Instructions    acetaminophen (TYLENOL) 500 mg, Every 6 hours PRN    alendronate (FOSAMAX) 70 mg, oral, Every 7 days, Takes on Sunday    cholecalciferol (Vitamin D-3) 50 MCG (2000 UT) tablet 1 tablet, Daily    ferrous sulfate 325 mg, 2 times daily    minoxidil (Loniten) 2.5 mg tablet 0.5 tablets, Daily    mirabegron (MYRBETRIQ) 25 mg, oral, Daily    mirtazapine (REMERON) 30 mg, oral, Nightly    omeprazole (PRILOSEC) 20 mg, oral, Daily    potassium citrate CR (Urocit-K-10) 10 mEq ER tablet 10 mEq, oral, 3 times daily (morning, midday, late afternoon), Do not crush, chew, or split.    rosuvastatin (CRESTOR) 20 mg, oral, Daily    sertraline (ZOLOFT) 200 mg, oral, Daily       Family History[4]  Documented in chart and reviewed.     Social History     Tobacco Use    Smoking status: Former     Current packs/day: 0.00     Average packs/day: 0.5 packs/day for 2.0 years (1.0 ttl pk-yrs)     Types: Cigarettes     Start date: 1966     Quit date:  1968     Years since quittin.3    Smokeless tobacco: Never   Substance Use Topics    Alcohol use: Not Currently     Alcohol/week: 0.0 - 1.0 standard drinks of alcohol        Review of Systems: Review of systems completed with medical assistant intake. Please refer to this note.     Physical Exam:  BMI: 29.    General: The patient is well appearing and has an appropriate affect.     Neurological Examination: SILT in SPN/DPN/Sural/Saphenous/Tibial nerves. 5/5 EHL, FHL, Tibial anterior, Gastrocnemius. Coordination grossly intact.     Cardiovascular Exam: Capillary refill <2 seconds.     Lymphatic Examination: There is no obvious lymphatic swelling present around the involved joint.    Skin Exam: Skin around the pertinent joint is without evidence of infection or rash.    Gait: patient ambulates with antalgic gait.    Right Hip Examination:  Examination of the hip reveals the skin to be intact.     There is no tenderness over the greater trochanter.    Range of motion is full extension to 100 degrees of flexion.    The hip is stable without subluxation or dislocation.    The hip internally rotates to 15 degrees and externally rotates to 45 degrees.    There is no pain with hip motion.    Left Hip Examination:  Examination of the hip reveals the skin to be intact.     There is no tenderness over the greater trochanter.    Range of motion is full extension to 100 degrees of flexion.    The hip is stable without subluxation or dislocation.    The hip internally rotates to 15 degrees and externally rotates to 45 degrees.    There is no pain with hip motion.    Left Knee Examination:  Examination of the knee reveals the skin to be intact.    There is a moderate effusion in the knee.    The alignment of the knee is Valgus.    This deformity is not correctable.    There is tenderness to palpation over the joint line.    There is moderate quadriceps atrophy.    Range of Motion: 10 to 110 degrees of flexion.    The knee is  stable to varus-valgus stress and anterior-posterior stress.     There is moderate grinding with range of motion.    There is mild patellofemoral crepitus.    Right Knee Examination:  Examination of the knee reveals the skin to be intact.    There is a moderate effusion in the knee.    The alignment of the knee is Valgus.    This deformity is not correctable.    There is tenderness to palpation over the joint line.    There is moderate quadriceps atrophy.    Range of Motion: 10 to 110 degrees of flexion.    The knee is stable to varus-valgus stress and anterior-posterior stress.     There is moderate grinding with range of motion.    There is mild patellofemoral crepitus.    Imaging:    Radiographs were personally reviewed today. There is evidence of severe BILATERAL knee osteoarthritis with LATERAL  bone on bone apposition.    Impression and Plan:  This patient is here for follow-up evaluation of BILATERAL knee.    DIAGNOSIS  Primary osteoarthritis of right knee    Acute bilateral knee pain    Primary osteoarthritis of left knee     I have discussed the options in detail with the patient. We have discussed anti-inflammatory medication, activity modification, physical therapy, corticosteroid injections, viscosupplementation injections, partial knee replacement surgery and total knee replacement surgery. Patient is responding well to nonsurgical treatment measures.    The risks and benefits of all these treatment options have been discussed in detail.     The patient has tried at least 3 months of the above conservative treatments and continues to have disabling pain, impaired activities of daily living and worsened quality of life.  Reviewed the surgical optimization steps to optimize their chances for a successful joint replacement surgery.      A new physical therapy referral was provided to the patient today.  Patient will continue their home exercise program. Strategies for pain management using over-the-counter  anti-inflammatory medications reviewed.  Specifically discussed Tylenol up to 1000 mg every 8 hours, with close monitoring for potential side effects from this medication.  Patient has a solitary kidney.  The patient elects for a steroid injection, which was provided according to procedure note below. Discussed utility of brace. Will defer brace at this time.. Encouraged them to maintain range of motion and strength around the knee joints.  They will continue to implement these strategies in addressing their pain.       Recommend the patient continue optimizing nonsurgical treatment interventions as outlined above for management of their knee arthritis.  I would be happy to see them again at any point to discuss surgery if indicated or they are more optimized or to review progress with nonsurgical treatment of arthritis. The patient verbalizes understanding with the recommendations and treatment plan as outlined above and is in agreement.  Questions were addressed.    RTC: as needed    X-rays at next visit: as needed    Large Joint Injection [id number]: Knee  Consent given by: Patient  Timeout: Immediately prior to procedure the correct patient, procedure, and site was verified. Sterile gloves and semi-sterile technique were used.   Indications: Knee pain and joint swelling.   Location: BILATERAL knee  Needle size: 22 G  Approach: Lateral    Medications administered: Please refer to medical assistant note for lot number and expiration date.   Subcutaneous   4 ml of 1% lidocaine     Deep   4 ml of 1% lidocaine   4 mL 0.5% marcaine   1 mL of 40 mg kenalog     Patient tolerance: Dressing applied. Patient tolerated the procedure well with no immediate complications.    L Inj/Asp: R knee on 5/5/2025 8:25 PM  Indications: pain and joint swelling  Details: 22 G needle, lateral approach  Medications: 40 mg triamcinolone acetonide 40 mg/mL; 8 mL lidocaine 10 mg/mL (1 %); 4 mL BUPivacaine HCl 0.5 % (5 mg/mL)  Outcome: tolerated  well, no immediate complications  Procedure, treatment alternatives, risks and benefits explained, specific risks discussed. Consent was given by the patient. Immediately prior to procedure a time out was called to verify the correct patient, procedure, equipment, support staff and site/side marked as required. Patient was prepped and draped in the usual sterile fashion.       L Inj/Asp: L knee on 5/5/2025 8:25 PM  Indications: pain and joint swelling  Details: 22 G needle, lateral approach  Medications: 40 mg triamcinolone acetonide 40 mg/mL; 8 mL lidocaine 10 mg/mL (1 %); 4 mL BUPivacaine HCl 0.5 % (5 mg/mL)  Outcome: tolerated well, no immediate complications  Procedure, treatment alternatives, risks and benefits explained, specific risks discussed. Consent was given by the patient. Immediately prior to procedure a time out was called to verify the correct patient, procedure, equipment, support staff and site/side marked as required. Patient was prepped and draped in the usual sterile fashion.             _____________________  Melanie Olguin MD   Attending Orthopaedic Surgeon  [facility]    [facility]    This office note was transcribed with dictation software.  Please excuse any typographical errors, program misunderstandings leading to inadvertent insertions or deletions of inappropriate wording, pronoun errors and other unintentional transcription errors not noticed on proof-reading.             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 2023    arm    GERD (gastroesophageal reflux disease)     Managed with Prilosec    Hearing aid worn     both ears    HL (hearing loss)     Hyperlipidemia     Hypertension     Kidney stone     Plan: Cystoscopy; Left Ureteroscopy; Holmium Laser Lithotripsy; Ureteral Stent Insertion 5/14/25    Nephrolithiasis     Oncology follow-up encounter     Marcos Rojas MD LOV 4/2/25    Osteoporosis     Overweight     Tinnitus 1997    Trochanteric bursitis, unspecified hip     Urothelial cancer (Multi)     Vision loss     wears glasses    Vitamin D deficiency     daily supplement   [2]   Past Medical History:  Diagnosis Date    Anemia     4/10/25 HGB 10.8 HCT 33.8    Anxiety     controlled with medication    Bladder cancer (Multi)     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    Breast cancer     left sided lumpectomy, s/p xrt also completed 1996    Carpal tunnel syndrome     s/p Left wrist surgery    Cataract     removed    CKD (chronic kidney disease) stage 5, GFR less than 15 ml/min (Multi)     4/10/25 Cr 3.61 GFR 12    Depression     controlled with medication    Fractures Feb. 2023    arm    GERD (gastroesophageal reflux disease)     Managed with Prilosec    Hearing aid worn     both ears    HL (hearing loss)     Hyperlipidemia     Hypertension     Kidney stone     Plan: Cystoscopy; Left Ureteroscopy; Holmium Laser Lithotripsy; Ureteral Stent Insertion 5/14/25    Nephrolithiasis     Oncology follow-up encounter     Marcos Rojas MD LOV 4/2/25    Osteoporosis     Overweight     Tinnitus 1997    Trochanteric bursitis, unspecified hip     Urothelial cancer (Multi)     Vision loss     wears glasses    Vitamin D deficiency     daily supplement   [3]   Past Surgical History:  Procedure Laterality Date    BLADDER SURGERY  09/03/2019    bladder bx    BLADDER SUSPENSION  2007    BREAST LUMPECTOMY Left     Breast Surgery Lumpectomy x 2 1996    CARPAL TUNNEL RELEASE Left 2003     Neuroplasty Decompression Median Nerve At Carpal Tunnel    CATARACT EXTRACTION      CATARACT EXTRACTION EXTRACAPSULAR W/ INTRAOCULAR LENS IMPLANTATION Bilateral 2017    COLONOSCOPY      CYSTOSCOPY      multiple    CYSTOSCOPY Left 04/09/2025    Left CYSTOSCOPY, WITH URETERAL STENT INSERTION - Left    HYSTERECTOMY  02/10/2014    Hysterectomy    NEPHRECTOMY Right 03/28/2024    TURBT, right nephroureterectomy, RPLND    SHOULDER SURGERY Left     left shoulder surgery to remove bone spur   [4]   Family History  Problem Relation Name Age of Onset    Diabetes Mother      Multiple sclerosis Mother      Hypertension Father      Stroke Father      Heart disease Father      Bipolar disorder Brother      Cardiomyopathy Brother

## 2025-05-12 DIAGNOSIS — F41.9 ANXIETY: ICD-10-CM

## 2025-05-12 RX ORDER — SERTRALINE HYDROCHLORIDE 100 MG/1
200 TABLET, FILM COATED ORAL DAILY
Qty: 180 TABLET | Refills: 3 | Status: SHIPPED | OUTPATIENT
Start: 2025-05-12

## 2025-05-14 ENCOUNTER — APPOINTMENT (OUTPATIENT)
Dept: RADIOLOGY | Facility: HOSPITAL | Age: 80
End: 2025-05-14
Payer: MEDICARE

## 2025-05-14 ENCOUNTER — ANESTHESIA (OUTPATIENT)
Dept: OPERATING ROOM | Facility: HOSPITAL | Age: 80
End: 2025-05-14
Payer: MEDICARE

## 2025-05-14 ENCOUNTER — ANESTHESIA EVENT (OUTPATIENT)
Dept: OPERATING ROOM | Facility: HOSPITAL | Age: 80
End: 2025-05-14
Payer: MEDICARE

## 2025-05-14 ENCOUNTER — HOSPITAL ENCOUNTER (OUTPATIENT)
Facility: HOSPITAL | Age: 80
Setting detail: OUTPATIENT SURGERY
Discharge: HOME | End: 2025-05-14
Attending: UROLOGY | Admitting: UROLOGY
Payer: MEDICARE

## 2025-05-14 ENCOUNTER — APPOINTMENT (OUTPATIENT)
Dept: RADIOLOGY | Facility: CLINIC | Age: 80
End: 2025-05-14
Payer: MEDICARE

## 2025-05-14 VITALS
OXYGEN SATURATION: 94 % | SYSTOLIC BLOOD PRESSURE: 125 MMHG | WEIGHT: 154.76 LBS | HEIGHT: 62 IN | BODY MASS INDEX: 28.48 KG/M2 | HEART RATE: 80 BPM | TEMPERATURE: 97.2 F | DIASTOLIC BLOOD PRESSURE: 71 MMHG | RESPIRATION RATE: 14 BRPM

## 2025-05-14 DIAGNOSIS — N20.0 KIDNEY STONE: Primary | ICD-10-CM

## 2025-05-14 DIAGNOSIS — G89.18 ACUTE POST-OPERATIVE PAIN: ICD-10-CM

## 2025-05-14 PROCEDURE — A4649 SURGICAL SUPPLIES: HCPCS | Performed by: UROLOGY

## 2025-05-14 PROCEDURE — C1758 CATHETER, URETERAL: HCPCS | Performed by: UROLOGY

## 2025-05-14 PROCEDURE — 2500000005 HC RX 250 GENERAL PHARMACY W/O HCPCS: Mod: JZ | Performed by: UROLOGY

## 2025-05-14 PROCEDURE — 2500000004 HC RX 250 GENERAL PHARMACY W/ HCPCS (ALT 636 FOR OP/ED): Mod: JZ

## 2025-05-14 PROCEDURE — 2720000007 HC OR 272 NO HCPCS: Performed by: UROLOGY

## 2025-05-14 PROCEDURE — 7100000009 HC PHASE TWO TIME - INITIAL BASE CHARGE: Performed by: UROLOGY

## 2025-05-14 PROCEDURE — 7100000001 HC RECOVERY ROOM TIME - INITIAL BASE CHARGE: Performed by: UROLOGY

## 2025-05-14 PROCEDURE — 3700000001 HC GENERAL ANESTHESIA TIME - INITIAL BASE CHARGE: Performed by: UROLOGY

## 2025-05-14 PROCEDURE — C2617 STENT, NON-COR, TEM W/O DEL: HCPCS | Performed by: UROLOGY

## 2025-05-14 PROCEDURE — C1769 GUIDE WIRE: HCPCS | Performed by: UROLOGY

## 2025-05-14 PROCEDURE — 3700000002 HC GENERAL ANESTHESIA TIME - EACH INCREMENTAL 1 MINUTE: Performed by: UROLOGY

## 2025-05-14 PROCEDURE — 76000 FLUOROSCOPY <1 HR PHYS/QHP: CPT | Mod: LT

## 2025-05-14 PROCEDURE — 2780000003 HC OR 278 NO HCPCS: Performed by: UROLOGY

## 2025-05-14 PROCEDURE — 7100000002 HC RECOVERY ROOM TIME - EACH INCREMENTAL 1 MINUTE: Performed by: UROLOGY

## 2025-05-14 PROCEDURE — 3600000003 HC OR TIME - INITIAL BASE CHARGE - PROCEDURE LEVEL THREE: Performed by: UROLOGY

## 2025-05-14 PROCEDURE — 52356 CYSTO/URETERO W/LITHOTRIPSY: CPT | Performed by: UROLOGY

## 2025-05-14 PROCEDURE — A52356 PR CYSTO/URETERO W/LITHOTRIPSY  AND INDWELL STENT INSRT: Performed by: ANESTHESIOLOGY

## 2025-05-14 PROCEDURE — 99100 ANES PT EXTEME AGE<1 YR&>70: CPT | Performed by: ANESTHESIOLOGY

## 2025-05-14 PROCEDURE — 82365 CALCULUS SPECTROSCOPY: CPT | Performed by: UROLOGY

## 2025-05-14 PROCEDURE — C1894 INTRO/SHEATH, NON-LASER: HCPCS | Performed by: UROLOGY

## 2025-05-14 PROCEDURE — 3600000008 HC OR TIME - EACH INCREMENTAL 1 MINUTE - PROCEDURE LEVEL THREE: Performed by: UROLOGY

## 2025-05-14 PROCEDURE — 7100000010 HC PHASE TWO TIME - EACH INCREMENTAL 1 MINUTE: Performed by: UROLOGY

## 2025-05-14 PROCEDURE — A52356 PR CYSTO/URETERO W/LITHOTRIPSY  AND INDWELL STENT INSRT

## 2025-05-14 DEVICE — STENT, TRIA URETHERAL, SOFT, 6 X  24: Type: IMPLANTABLE DEVICE | Site: URETER | Status: FUNCTIONAL

## 2025-05-14 RX ORDER — CEFAZOLIN SODIUM 2 G/50ML
2 SOLUTION INTRAVENOUS ONCE
Status: DISCONTINUED | OUTPATIENT
Start: 2025-05-14 | End: 2025-05-14 | Stop reason: HOSPADM

## 2025-05-14 RX ORDER — LIDOCAINE HYDROCHLORIDE 20 MG/ML
INJECTION, SOLUTION EPIDURAL; INFILTRATION; INTRACAUDAL; PERINEURAL AS NEEDED
Status: DISCONTINUED | OUTPATIENT
Start: 2025-05-14 | End: 2025-05-14

## 2025-05-14 RX ORDER — OXYCODONE HYDROCHLORIDE 5 MG/1
5 TABLET ORAL EVERY 6 HOURS PRN
Qty: 5 TABLET | Refills: 0 | Status: SHIPPED | OUTPATIENT
Start: 2025-05-14

## 2025-05-14 RX ORDER — SODIUM CHLORIDE 0.9 G/100ML
INJECTION, SOLUTION IRRIGATION AS NEEDED
Status: DISCONTINUED | OUTPATIENT
Start: 2025-05-14 | End: 2025-05-14 | Stop reason: HOSPADM

## 2025-05-14 RX ORDER — PROPOFOL 10 MG/ML
INJECTION, EMULSION INTRAVENOUS AS NEEDED
Status: DISCONTINUED | OUTPATIENT
Start: 2025-05-14 | End: 2025-05-14

## 2025-05-14 RX ORDER — SODIUM CHLORIDE, SODIUM LACTATE, POTASSIUM CHLORIDE, CALCIUM CHLORIDE 600; 310; 30; 20 MG/100ML; MG/100ML; MG/100ML; MG/100ML
INJECTION, SOLUTION INTRAVENOUS CONTINUOUS PRN
Status: DISCONTINUED | OUTPATIENT
Start: 2025-05-14 | End: 2025-05-14

## 2025-05-14 RX ORDER — POLYETHYLENE GLYCOL 3350 17 G/17G
17 POWDER, FOR SOLUTION ORAL DAILY
Qty: 10 PACKET | Refills: 0 | Status: SHIPPED | OUTPATIENT
Start: 2025-05-14 | End: 2025-05-24

## 2025-05-14 RX ORDER — OXYCODONE HYDROCHLORIDE 5 MG/1
5 TABLET ORAL EVERY 4 HOURS PRN
Status: DISCONTINUED | OUTPATIENT
Start: 2025-05-14 | End: 2025-05-14 | Stop reason: HOSPADM

## 2025-05-14 RX ORDER — SODIUM CHLORIDE, SODIUM LACTATE, POTASSIUM CHLORIDE, CALCIUM CHLORIDE 600; 310; 30; 20 MG/100ML; MG/100ML; MG/100ML; MG/100ML
100 INJECTION, SOLUTION INTRAVENOUS CONTINUOUS
Status: DISCONTINUED | OUTPATIENT
Start: 2025-05-14 | End: 2025-05-14 | Stop reason: HOSPADM

## 2025-05-14 RX ORDER — ACETAMINOPHEN 325 MG/1
650 TABLET ORAL EVERY 4 HOURS PRN
Status: DISCONTINUED | OUTPATIENT
Start: 2025-05-14 | End: 2025-05-14 | Stop reason: HOSPADM

## 2025-05-14 RX ORDER — LIDOCAINE HYDROCHLORIDE 10 MG/ML
0.1 INJECTION, SOLUTION EPIDURAL; INFILTRATION; INTRACAUDAL; PERINEURAL ONCE
Status: DISCONTINUED | OUTPATIENT
Start: 2025-05-14 | End: 2025-05-14 | Stop reason: HOSPADM

## 2025-05-14 RX ORDER — FENTANYL CITRATE 50 UG/ML
INJECTION, SOLUTION INTRAMUSCULAR; INTRAVENOUS AS NEEDED
Status: DISCONTINUED | OUTPATIENT
Start: 2025-05-14 | End: 2025-05-14

## 2025-05-14 RX ORDER — SULFAMETHOXAZOLE AND TRIMETHOPRIM 800; 160 MG/1; MG/1
1 TABLET ORAL 2 TIMES DAILY
Qty: 10 TABLET | Refills: 0 | Status: SHIPPED | OUTPATIENT
Start: 2025-05-14 | End: 2025-05-19

## 2025-05-14 RX ORDER — ACETAMINOPHEN 500 MG
1000 TABLET ORAL EVERY 6 HOURS PRN
Qty: 40 TABLET | Refills: 0 | Status: SHIPPED | OUTPATIENT
Start: 2025-05-14 | End: 2025-05-19

## 2025-05-14 RX ORDER — ONDANSETRON HYDROCHLORIDE 2 MG/ML
INJECTION, SOLUTION INTRAVENOUS AS NEEDED
Status: DISCONTINUED | OUTPATIENT
Start: 2025-05-14 | End: 2025-05-14

## 2025-05-14 RX ORDER — CEFAZOLIN 1 G/1
INJECTION, POWDER, FOR SOLUTION INTRAVENOUS AS NEEDED
Status: DISCONTINUED | OUTPATIENT
Start: 2025-05-14 | End: 2025-05-14

## 2025-05-14 RX ORDER — TAMSULOSIN HYDROCHLORIDE 0.4 MG/1
0.4 CAPSULE ORAL DAILY
Qty: 30 CAPSULE | Refills: 0 | Status: SHIPPED | OUTPATIENT
Start: 2025-05-14 | End: 2025-06-13

## 2025-05-14 RX ADMIN — FENTANYL CITRATE 50 MCG: 50 INJECTION, SOLUTION INTRAMUSCULAR; INTRAVENOUS at 15:00

## 2025-05-14 RX ADMIN — ONDANSETRON 4 MG: 2 INJECTION, SOLUTION INTRAMUSCULAR; INTRAVENOUS at 16:00

## 2025-05-14 RX ADMIN — DEXAMETHASONE SODIUM PHOSPHATE 4 MG: 4 INJECTION, SOLUTION INTRAMUSCULAR; INTRAVENOUS at 14:20

## 2025-05-14 RX ADMIN — PROPOFOL 150 MG: 10 INJECTION, EMULSION INTRAVENOUS at 14:15

## 2025-05-14 RX ADMIN — LIDOCAINE HYDROCHLORIDE 100 MG: 20 INJECTION, SOLUTION EPIDURAL; INFILTRATION; INTRACAUDAL; PERINEURAL at 14:15

## 2025-05-14 RX ADMIN — SODIUM CHLORIDE, POTASSIUM CHLORIDE, SODIUM LACTATE AND CALCIUM CHLORIDE: 600; 310; 30; 20 INJECTION, SOLUTION INTRAVENOUS at 14:11

## 2025-05-14 RX ADMIN — FENTANYL CITRATE 50 MCG: 50 INJECTION, SOLUTION INTRAMUSCULAR; INTRAVENOUS at 14:20

## 2025-05-14 RX ADMIN — CEFAZOLIN 2 G: 1 INJECTION, POWDER, FOR SOLUTION INTRAMUSCULAR; INTRAVENOUS at 14:17

## 2025-05-14 RX ADMIN — FENTANYL CITRATE 50 MCG: 50 INJECTION, SOLUTION INTRAMUSCULAR; INTRAVENOUS at 16:00

## 2025-05-14 RX ADMIN — PROPOFOL 50 MG: 10 INJECTION, EMULSION INTRAVENOUS at 15:02

## 2025-05-14 ASSESSMENT — PAIN SCALES - GENERAL
PAINLEVEL_OUTOF10: 0 - NO PAIN

## 2025-05-14 ASSESSMENT — PAIN - FUNCTIONAL ASSESSMENT
PAIN_FUNCTIONAL_ASSESSMENT: 0-10

## 2025-05-14 NOTE — ANESTHESIA PREPROCEDURE EVALUATION
Patient: Anahi Wall    Procedure Information       Date/Time: 05/14/25 2383    Procedure: Cystoscopy; Left Ureteroscopy; Holmium Laser Lithotripsy; Ureteral Stent Insertion (Left)    Location: [facility] OR 03 / [facility] OR    Surgeons: Abebe Macias MD          80 yo F hx obstructing stone and need for emergent OR and stent on 4/9.  Otherwise hx CKD, urothelial carcinoma s/p TURBT, nephroureterectomy and pelvic RPLND HTN, controlled GERD.    Relevant Problems   Cardiac   (+) Chest pain   (+) HTN (hypertension), benign   (+) Hyperlipidemia      Neuro   (+) Anxiety   (+) Depression      GI   (+) Acid reflux      /Renal   (+) Acute kidney injury (nontraumatic)   (+) Kidney stone   (+) Malignant neoplasm of kidney (Multi)   (+) Uric acid nephrolithiasis   (+) Urothelial carcinoma of kidney, right      Hematology   (+) Anemia      Musculoskeletal   (+) Degeneration of lumbar intervertebral disc   (+) Disc displacement, lumbar   (+) Localized primary osteoarthritis of carpometacarpal joint of left thumb   (+) Lumbosacral spinal stenosis   (+) Primary osteoarthritis of left knee      HEENT   (+) Sensorineural hearing loss of both ears   (+) Vision loss      GYN   (+) Malignant neoplasm of left breast       Clinical information reviewed:                    Medical History[1]   Surgical History[2]  Social History[3]   Current Outpatient Medications   Medication Instructions    acetaminophen (TYLENOL) 500 mg, Every 6 hours PRN    alendronate (FOSAMAX) 70 mg, oral, Every 7 days, Takes on Sunday    cholecalciferol (Vitamin D-3) 50 MCG (2000 UT) tablet 1 tablet, Daily    ferrous sulfate 325 mg, 2 times daily    minoxidil (Loniten) 2.5 mg tablet 0.5 tablets, Daily    mirabegron (MYRBETRIQ) 25 mg, oral, Daily    mirtazapine (REMERON) 30 mg, oral, Nightly    omeprazole (PRILOSEC) 20 mg, oral, Daily    potassium citrate CR (Urocit-K-10) 10 mEq ER tablet 10 mEq, oral, 3 times daily (morning, midday, late afternoon), Do not  "crush, chew, or split.    rosuvastatin (CRESTOR) 20 mg, oral, Daily    sertraline (ZOLOFT) 200 mg, oral, Daily      RX Allergies[4]     Chemistry    Lab Results   Component Value Date/Time     05/01/2025 1454    K 4.7 05/01/2025 1454     05/01/2025 1454    CO2 31 05/01/2025 1454    BUN 38 (H) 05/01/2025 1454    CREATININE 1.44 (H) 05/01/2025 1454    Lab Results   Component Value Date/Time    CALCIUM 9.8 05/01/2025 1454    ALKPHOS 70 04/09/2025 1136    AST 23 04/09/2025 1136    ALT 14 04/09/2025 1136    BILITOT 0.4 04/09/2025 1136          No results found for: \"HGBA1C\"  Lab Results   Component Value Date/Time    WBC 6.0 05/01/2025 1454    HGB 10.9 (L) 05/01/2025 1454    HCT 33.3 (L) 05/01/2025 1454     (L) 05/01/2025 1454     Lab Results   Component Value Date/Time    PROTIME 13.3 (H) 04/10/2025 1401    INR 1.2 (H) 04/10/2025 1401     No results found for: \"ABORH\"  Encounter Date: 05/01/25   ECG 12 Lead   Result Value    Ventricular Rate 96    Atrial Rate 96    WV Interval 150    QRS Duration 86    QT Interval 364    QTC Calculation(Bazett) 459    P Axis 61    R Axis -57    T Axis 42    QRS Count 16    Q Onset 214    P Onset 139    P Offset 187    T Offset 396    QTC Fredericia 425    Narrative    Normal sinus rhythm  Biatrial enlargement  Left anterior fascicular block  Anterolateral infarct , age undetermined  Abnormal ECG  When compared with ECG of 23-OCT-2024 15:55,  Anterior infarct is now Present  Anterolateral infarct is now Present  Confirmed by Jordi Murphy ([id number]) on 5/1/2025 3:40:17 PM     No results found for this or any previous visit from the past 1095 days.       Visit Vitals  OB Status Postmenopausal   Smoking Status Former     No data recorded    Physical Exam    Airway  Mallampati: III  TM distance: <3 FB  Neck ROM: full  Mouth opening: 3 or more finger widths     Cardiovascular Rate: normal     Dental   Comments: Poor dentition.  Denies loose teeth     Pulmonary - normal exam "   Abdominal            Anesthesia Plan    History of general anesthesia?: yes  History of complications of general anesthesia?: no    ASA 2     general     intravenous induction   Postoperative pain plan includes opioids.  Anesthetic plan and risks discussed with patient.             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 2023    arm    GERD (gastroesophageal reflux disease)     Managed with Prilosec    Hearing aid worn     both ears    HL (hearing loss)     Hyperlipidemia     Hypertension     Kidney stone     Plan: Cystoscopy; Left Ureteroscopy; Holmium Laser Lithotripsy; Ureteral Stent Insertion 5/14/25    Nephrolithiasis     Oncology follow-up encounter     Marcos Rojas MD LOV 4/2/25    Osteoporosis     Overweight     Tinnitus 1997    Trochanteric bursitis, unspecified hip     Urothelial cancer (Multi)     Vision loss     wears glasses    Vitamin D deficiency     daily supplement   [2]   Past Surgical History:  Procedure Laterality Date    BLADDER SURGERY  09/03/2019    bladder bx    BLADDER SUSPENSION  2007    BREAST LUMPECTOMY Left     Breast Surgery Lumpectomy x 2 1996    CARPAL TUNNEL RELEASE Left 2003    Neuroplasty Decompression Median Nerve At Carpal Tunnel    CATARACT EXTRACTION      CATARACT EXTRACTION EXTRACAPSULAR W/ INTRAOCULAR LENS IMPLANTATION Bilateral 2017    COLONOSCOPY      CYSTOSCOPY      multiple    CYSTOSCOPY Left 04/09/2025    Left CYSTOSCOPY, WITH URETERAL STENT INSERTION - Left    HYSTERECTOMY  02/10/2014    Hysterectomy     NEPHRECTOMY Right 2024    TURBT, right nephroureterectomy, RPLND    SHOULDER SURGERY Left     left shoulder surgery to remove bone spur   [3]   Social History  Tobacco Use    Smoking status: Former     Current packs/day: 0.00     Average packs/day: 0.5 packs/day for 2.0 years (1.0 ttl pk-yrs)     Types: Cigarettes     Start date:      Quit date:      Years since quittin.4    Smokeless tobacco: Never   Vaping Use    Vaping status: Never Used   Substance Use Topics    Alcohol use: Not Currently     Alcohol/week: 0.0 - 1.0 standard drinks of alcohol    Drug use: Never   [4]   Allergies  Allergen Reactions    Aspirin Nausea Only    Nsaids (Non-Steroidal Anti-Inflammatory Drug) Nausea Only    Penicillins Rash

## 2025-05-14 NOTE — ANESTHESIA PROCEDURE NOTES
Airway  Date/Time: 5/14/2025 2:18 PM  Reason: elective    Airway not difficult    Staffing  Performed: AIDA   Authorized by: Morris Tavarez MD    Performed by: AIDA [place]  Patient location during procedure: OR    Patient Condition  Indications for airway management: anesthesia  Patient position: sniffing  Sedation level: deep     Final Airway Details   Preoxygenated: yes  Final airway type: supraglottic airway  Successful airway:   Size: 4  Number of attempts at approach: 1

## 2025-05-14 NOTE — SIGNIFICANT EVENT
Patient arrived to [place] after procedure with Anesthesia and procedure RN, procedure discussed, plan reviewed, VSS

## 2025-05-14 NOTE — DISCHARGE INSTRUCTIONS
DEPARTMENT OF Urology  DISCHARGE INSTRUCTIONS     Ureteroscopy Laser Lithotripsy +/- Ureteral Stent Placement  Outpatient Surgery    C O N F I D E N T I A L   I N F O R M A T I O N    Anahi Wall    Call [phone] during regular daytime business hours (8:00 am - 5:00 pm) and after 5:00 pm ask for the Urology resident with any questions or concerns.    If it is a life-threatening situation, proceed to the nearest emergency department.        Follow-up appointment:    Stent removal will be scheduled in 1-2 weeks.    Thank you for the opportunity to care for you today.  Your health and healing are very important to us.  We hope we made you feel as comfortable as possible and are committed to your recovery and continued well-being.      The following is a brief overview of your Ureteroscopy Laser Lithotripsy procedure today. Some of the information contained on this summary may be confidential.  This information should be kept in your records and should be shared with your regular doctor.    Physicians:   Dr. Macias    Procedure performed: Lasering of your kidney stone    What to Expect During your Recovery and Home Care  Anesthesia Side Effects   You received anesthesia today.  You may feel sleepy, tired, or have a sore throat.   You may also feel drowsiness, dizziness, or inability to think clearly.  For your safety, do not drive, drink alcoholic beverages, take any unprescribed medication or make any important decisions for 24 hours.  A responsible adult should be with you for 24 hours.        Activity and Recovery    No heavy lifting today. Rest for the next 24 hours.    Pain Control  Unfortunately, you may experience pain after your procedure.  Frequency and urgency to urinate and mild discomfort are expected. Adequate pain management can include alternative measures to help ease your pain and that can include over the counter Tylenol and a heating pad or Oxycodone which can be taken as prescribed as needed  for breakthrough pain. Do not take more than 4,000mg of Tylenol in a 24-hour period.      You may have also been prescribed Pyridium (for burning sensation) and Ditropan(for bladder spasms) for pain control. Pyridium will turn your urine bright orange.    Nausea/Vomiting   Clear liquids are best tolerated at first. Start slow, advance your diet as tolerated to normal foods. Avoid spicy, greasy, heavy foods at first. Also, you may feel nauseous or like you need to vomit if you take any type of medication on an empty stomach.  Call your physician if you are unable to eat or drink and have persistent vomiting.    Signs of Bleeding   You could have some blood in your urine off and on over the next several weeks. Your urine will be light pink to yellow.    Treatment/wound care:   It is okay to shower 24 hours after time of surgery.    Signs of Infection  Signs of infection can include fever, chills, burning sensation with passing of urine, or severe abdominal pain.  If you see any of these occur, please contact your doctor's office at [phone].  Any fever higher than 100.4, especially if associated with an ill feeling, abdominal pain, chills, or nausea should be reported to your surgeon.      Assist in bowel movements/urination  Increase fiber in diet  Increase water (6 to 8 glasses)  Increase walking   Urination should occur within 6 hours of anesthesia  If you have tried these methods and your bladder still feels full and you cannot use the bathroom, please go to your nearest Emergency room/contact your physician.    Additional Instructions:   Pieces of your kidney stone may pass in the urine for a few days and may cause some mild pain. You also had a stent placed today from your kidney down into your bladder. This helps keep the urinary tract open and prevents blockages of urine flow. The stent can be irritating and can cause some frequency, urgency and blood in your urine when you go to the bathroom. It is  important to drink plenty of water and take medications as prescribed. You may be sent home with Pyridium which turns your urine bright orange. Applying a heating pad to your back will also help with this kind of pain. *** Your stent will be removed at your follow up appointment.

## 2025-05-14 NOTE — OP NOTE
Cystoscopy; Left Ureteroscopy; Holmium Laser Lithotripsy; Ureteral Stent Insertion (L) Operative Note     Date: 2025  OR Location: [facility] OR    Name: Anahi Wall : 1945, Age: 79 y.o., MRN: [id number], Sex: female    Diagnosis  Pre-op Diagnosis      * Kidney stone [N20.0] Post-op Diagnosis     * Kidney stone [N20.0]     Procedures  Cystoscopy;   Left retrograde pyelogram with intraoperative interpretation  Left Ureteroscopy; Holmium Laser Lithotripsy;   Left Ureteral Stent Exchange  07007 - GA CYSTO/URETERO W/LITHOTRIPSY &INDWELL STENT INSRT    GA CYSTOURETHROSCOPY, WITH URETEROSCOPY AND/OR PYELOSCOPY, WITH LITHOTRIPSY, AND URETERAL CATHETERIZATION FOR STEERABLE VACUUM ASPIRATION OF THE KIDNEY, COLLECTING SYSTEM, URETER, BLADDER,  []  GA CYSTO/URETERO W/LITHOTRIPSY &INDWELL STENT INSRT [40401]    Surgeons      * Abebe Macias - Primary    Resident/Fellow/Other Assistant:  Surgeons and Role:     * Dejuan Copeland MD - Resident - Assisting    Staff:   Circulator: Sofia Randhawa Person: Alvina  Relief Circulator: Zoila Mcdaniel Circulator: Emmanuelle    Anesthesia Staff: Anesthesiologist: Morris Tavarez MD  C-AA: AIDA Montgomery    Procedure Summary  Anesthesia: General  ASA: II  Estimated Blood Loss: 2mL  Intra-op Medications:   Administrations occurring from 1334 to 1449 on 25:   Medication Name Total Dose   sodium chloride 0.9 % irrigation solution 3,000 mL   ceFAZolin (Ancef) vial 1 g 2 g   dexAMETHasone (Decadron) 4 mg/mL IV Syringe 2 mL 4 mg   fentaNYL (Sublimaze) injection 50 mcg/mL 50 mcg   LR infusion Cannot be calculated   lidocaine PF (Xylocaine-MPF) local injection 2 % 100 mg   propofol (Diprivan) injection 10 mg/mL 150 mg              Anesthesia Record               Intraprocedure I/O Totals          Intake    LR infusion 700.00 mL    Total Intake 700 mL          Specimen:   ID Type Source Tests Collected by Time   A : LEFT URETERAL STONE Calculus Urine, Clean Catch  CALCULI (STONE) ANALYSIS Abebe Macias MD 5/14/2025 1531                 Drains and/or Catheters: * None in log *    Tourniquet Times:         Implants:  Implants       Type Name Action Serial No.      Implant STENT, TRIA URETHERAL, SOFT, 6 X  24 - [id number] - [id number] Implanted [id number]              Findings: two large lower pole stones dusted. Estimated combined stone burden ~2.5cm. 6x24 left JJ stent placement    Indications: Anahi Wall is an 79 y.o. female who is having surgery for Kidney stone [N20.0].     The patient was seen in the preoperative area. The risks, benefits, complications, treatment options, non-operative alternatives, expected recovery and outcomes were discussed with the patient. The possibilities of reaction to medication, pulmonary aspiration, injury to surrounding structures, bleeding, recurrent infection, the need for additional procedures, failure to diagnose a condition, and creating a complication requiring transfusion or operation were discussed with the patient. The patient concurred with the proposed plan, giving informed consent.  The site of surgery was properly noted/marked if necessary per policy. The patient has been actively warmed in preoperative area. Preoperative antibiotics have been ordered and given within 1 hours of incision. Venous thrombosis prophylaxis have been ordered including bilateral sequential compression devices    Procedure Details:   Patient consented to procedure in preoperative area. Risks and benefits discussed. Patient was marked on the left side. Allergies were reviewed and preoperative antibiotics were administered. Patient was brought to the operating room and placed in supine position on the operating room table. A timeout was performed. All were in agreement. Patient underwent general anesthesia without complication. They were repositioned in dorsal lithotomy and prepped and draped in the usual sterile fashion.     A 21 fr rigid cystoscope was  used to perform cystourethroscopy. Urethra was normal.  Sediment within the bladder was evacuated to aid in visualization. Left UO was visualized.  Alligator grasper was used to grab left sided stent and pull to urethral meatus. Through the left stent, a sensor wire was placed through the stent to the level of the kidney as confirmed on fluoroscopy. The stent was then removed. A dual lumen was placed over the wire. Retrograde pyelogram was performed.     Retrograde pyelogram interpretation: filling defect in lower pole of kidney    A second wire was advanced to the kidney through the dual lumen catheter. Then the dual lumen was removed. One wire was secured as a safety wire. A 12/14F ureteral access sheath was advanced over the second wire under fluoro. Wire and inner lumen were removed.    A [employer] CVAC aspiration ureteroscope was inserted into the sheath and pan pyeloscopy was performed. Two large stones were noted in the lower pole calyces. A 200 micron holmium laser fiber was used to dust all stones. From time to time the laser fiber and laser bridge were withdrawn and intermittent vacuum aspiration was used to extract stone fragments and dust through the CVAC aspiration system and then the fiber/bridge was reinsterted. Pyeloscopy was repeated to confirm no large stone fragments remained. Ureteral access sheath was withdrawn under direct visualization. No ureteral stones were noted on ureteroscopy. A 6x 24 JJ stent was placed over the safety wire with proximal curl noted in kidney on fluoro and distal curl in the bladder on direct visualization. Bladder was drained, instruments removed. This concluded the procedure. Patient was awoken from anesthesia without complication and transferred to PACU in stable condition.      Complications:  None; patient tolerated the procedure well.    Disposition: PACU - hemodynamically stable.  Condition: stable     Additional Details: stent removal in 1-2 weeks    Attending  Attestation:     Abebe Macias  Phone Number: [phone]

## 2025-05-15 NOTE — ANESTHESIA POSTPROCEDURE EVALUATION
Patient: Anahi Wall    Procedure Summary       Date: 05/14/25 Room / Location: [facility] OR 03 / Virtual [facility] OR    Anesthesia Start: 1411 Anesthesia Stop: 1612    Procedure: Cystoscopy; Left Ureteroscopy; Holmium Laser Lithotripsy; Ureteral Stent Insertion (Left: Ureter) Diagnosis:       Kidney stone      (Kidney stone [N20.0])    Surgeons: Abebe Macias MD Responsible Provider: Morris Tavarez MD    Anesthesia Type: general ASA Status: 2            Anesthesia Type: general    Vitals Value Taken Time   /63 05/14/25 16:30   Temp 36 °C (96.8 °F) 05/14/25 16:08   Pulse 73 05/14/25 16:30   Resp 19 05/14/25 16:30   SpO2 93 % 05/14/25 16:30       Anesthesia Post Evaluation    Patient participation: complete - patient participated  Level of consciousness: awake  Pain management: satisfactory to patient  Airway patency: patent  Cardiovascular status: acceptable and hemodynamically stable  Respiratory status: acceptable and nonlabored ventilation  Hydration status: balanced  Postoperative Nausea and Vomiting: none        No notable events documented.     Female

## 2025-05-19 LAB
APPEARANCE STONE: NORMAL
COMPN STONE: NORMAL
SPECIMEN WT: 129 MG

## 2025-05-20 ENCOUNTER — DOCUMENTATION (OUTPATIENT)
Dept: HEMATOLOGY/ONCOLOGY | Facility: CLINIC | Age: 80
End: 2025-05-20
Payer: MEDICARE

## 2025-05-20 NOTE — PROGRESS NOTES
Spoke with patient's  who denied receiving letter regarding Dr. Rojas relocating practice to [facility]. Currently would prefer to switch providers to Dr. Lara at [place] location. Aware  will be reaching out.

## 2025-05-21 ENCOUNTER — APPOINTMENT (OUTPATIENT)
Dept: UROLOGY | Facility: CLINIC | Age: 80
End: 2025-05-21
Payer: MEDICARE

## 2025-05-22 ENCOUNTER — TELEPHONE (OUTPATIENT)
Dept: PRIMARY CARE | Facility: CLINIC | Age: 80
End: 2025-05-22
Payer: MEDICARE

## 2025-05-22 NOTE — TELEPHONE ENCOUNTER
She said for 3 days she has felt under the weather. She was also running a fever but that has resolved. She just feels run down.   Her  made her call.

## 2025-05-27 ENCOUNTER — PROCEDURE VISIT (OUTPATIENT)
Dept: UROLOGY | Facility: HOSPITAL | Age: 80
End: 2025-05-27
Payer: MEDICARE

## 2025-05-27 DIAGNOSIS — Z96.0 RETAINED URETERAL STENT: Primary | ICD-10-CM

## 2025-05-27 DIAGNOSIS — N20.0 KIDNEY STONE: ICD-10-CM

## 2025-05-27 PROCEDURE — 99214 OFFICE O/P EST MOD 30 MIN: CPT | Performed by: UROLOGY

## 2025-05-27 PROCEDURE — 99214 OFFICE O/P EST MOD 30 MIN: CPT | Mod: 25 | Performed by: UROLOGY

## 2025-05-27 PROCEDURE — 52310 CYSTOSCOPY AND TREATMENT: CPT | Performed by: UROLOGY

## 2025-05-27 NOTE — PROGRESS NOTES
FUV     HISTORY OF PRESENT ILLNESS:   Anahi Wall is a 79 y.o. female who is being seen today for POV    Pt with solitary left kidney who presented to ER with obstructing stone, anuria, YASMIN on 4/9/25. Cr 4.16 (baseline 1.5ish) Taken emergently to OR for stent placement.  Discharged home with rodriguez.      history of nephrolithiasis, high-grade urothelial carcinoma s/p TURBT, nephroureterectomy and pelvic RPLND follows with Crystal Ashford Jaeger    H/o uric acid stones.  On K citrate in past    CT scan from 4/9/25 - 2 large stones, one in proximal ureter, one lower pole. 200-300HU    Pt had URS with laser litho, [facility] CVAC.  On 5/14/25. 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 2023    arm    GERD (gastroesophageal reflux disease)     Managed with Prilosec    Hearing aid worn     both ears    HL (hearing loss)     Hyperlipidemia     Hypertension     Kidney stone     Plan: Cystoscopy; Left Ureteroscopy; Holmium Laser Lithotripsy; Ureteral Stent Insertion 5/14/25    Nephrolithiasis     Oncology follow-up encounter     Marcos Rojas MD LOV 4/2/25    Osteoporosis     Overweight     Tinnitus 1997    Trochanteric bursitis, unspecified hip     Urothelial cancer (Multi)     Vision loss     wears glasses    Vitamin D deficiency     daily supplement       PAST SURGICAL HISTORY:  Past Surgical History:   Procedure Laterality Date    BLADDER SURGERY  09/03/2019    bladder bx    BLADDER SUSPENSION  2007    BREAST  LUMPECTOMY Left     Breast Surgery Lumpectomy x 2 1996    CARPAL TUNNEL RELEASE Left 2003    Neuroplasty Decompression Median Nerve At Carpal Tunnel    CATARACT EXTRACTION      CATARACT EXTRACTION EXTRACAPSULAR W/ INTRAOCULAR LENS IMPLANTATION Bilateral 2017    COLONOSCOPY      CYSTOSCOPY      multiple    CYSTOSCOPY Left 04/09/2025    Left CYSTOSCOPY, WITH URETERAL STENT INSERTION - Left    CYSTOSCOPY W/ URETERAL STENT PLACEMENT  05/14/2025    HYSTERECTOMY  02/10/2014    Hysterectomy    NEPHRECTOMY Right 03/28/2024    TURBT, right nephroureterectomy, RPLND    SHOULDER SURGERY Left     left shoulder surgery to remove bone spur        ALLERGIES:   Allergies   Allergen Reactions    Aspirin Nausea Only    Nsaids (Non-Steroidal Anti-Inflammatory Drug) Nausea Only    Penicillins Rash        MEDICATIONS:   Current Outpatient Medications   Medication Instructions    alendronate (FOSAMAX) 70 mg, oral, Every 7 days, Takes on Sunday    cholecalciferol (Vitamin D-3) 50 MCG (2000 UT) tablet 1 tablet, Daily    ferrous sulfate 325 mg, 2 times daily    minoxidil (Loniten) 2.5 mg tablet 0.5 tablets, Daily    mirabegron (MYRBETRIQ) 25 mg, oral, Daily    mirtazapine (REMERON) 30 mg, oral, Nightly    omeprazole (PRILOSEC) 20 mg, oral, Daily    oxyCODONE (ROXICODONE) 5 mg, oral, Every 6 hours PRN    potassium citrate CR (Urocit-K-10) 10 mEq ER tablet 10 mEq, oral, 3 times daily (morning, midday, late afternoon), Do not crush, chew, or split.    rosuvastatin (CRESTOR) 20 mg, oral, Daily    sertraline (ZOLOFT) 200 mg, oral, Daily    tamsulosin (FLOMAX) 0.4 mg, oral, Daily, For discomfort from ureteral stent.        PHYSICAL EXAM:  There were no vitals taken for this visit.  Constitutional: Patient appears well-developed and well-nourished. No distress.    Pulmonary/Chest: Effort normal. No respiratory distress.   Abdominal: Soft, ND NT  Musculoskeletal: Normal range of motion.    Neurological: Alert and oriented to person, place, and  time.  Psychiatric: Normal mood and affect. Behavior is normal. Thought content normal.      Labs  Lab Results   Component Value Date    CREATININE 1.44 (H) 05/01/2025     Lab Results   Component Value Date    CHOL 125 08/04/2020     Lab Results   Component Value Date    HDL 43.0 08/04/2020     Lab Results   Component Value Date    CHHDL 2.9 08/04/2020     Lab Results   Component Value Date    LDLF 64 08/04/2020     Lab Results   Component Value Date    VLDL 18 08/04/2020     Lab Results   Component Value Date    TRIG 91 08/04/2020     Lab Results   Component Value Date    HCT 33.3 (L) 05/01/2025       Procedures  Cystoscopy   After informed consent was obtained, the patient was taken to the procedure room for cystoscopy for ureteral stent removal.    Procedure Note:   A sterile prep and drape was performed in standard fashion. Lidocaine jelly was injected into the urethra. A flexible cystoscope was inserted into the bladder without difficulty.    The ureteral stent was seen and grasped using forceps.  The stent was completely removed.      Post-Procedure:  The cystoscope was removed. The vital signs were stable. The patient tolerated the procedure well. There were no complications.     Assessment:      1. Retained ureteral stent        2. Kidney stone  US renal complete    XR abdomen 1 view        Anahi Wall is a 79 y.o. female here for FUV     Plan:   1) Stent removed  2) Will get FU imaging in 3m  3) Stone prevention discussed  4) Warning signs discussed

## 2025-05-27 NOTE — H&P (VIEW-ONLY)
FUV     HISTORY OF PRESENT ILLNESS:   Anahi Wall is a 79 y.o. female who is being seen today for POV    Pt with solitary left kidney who presented to ER with obstructing stone, anuria, YASMIN on 4/9/25. Cr 4.16 (baseline 1.5ish) Taken emergently to OR for stent placement.  Discharged home with rodriguez.      history of nephrolithiasis, high-grade urothelial carcinoma s/p TURBT, nephroureterectomy and pelvic RPLND follows with Crystal Ashford Jaeger    H/o uric acid stones.  On K citrate in past    CT scan from 4/9/25 - 2 large stones, one in proximal ureter, one lower pole. 200-300HU    Pt had URS with laser litho, [facility] CVAC.  On 5/14/25. 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 2023    arm    GERD (gastroesophageal reflux disease)     Managed with Prilosec    Hearing aid worn     both ears    HL (hearing loss)     Hyperlipidemia     Hypertension     Kidney stone     Plan: Cystoscopy; Left Ureteroscopy; Holmium Laser Lithotripsy; Ureteral Stent Insertion 5/14/25    Nephrolithiasis     Oncology follow-up encounter     Marcos Rojas MD LOV 4/2/25    Osteoporosis     Overweight     Tinnitus 1997    Trochanteric bursitis, unspecified hip     Urothelial cancer (Multi)     Vision loss     wears glasses    Vitamin D deficiency     daily supplement       PAST SURGICAL HISTORY:  Past Surgical History:   Procedure Laterality Date    BLADDER SURGERY  09/03/2019    bladder bx    BLADDER SUSPENSION  2007    BREAST  LUMPECTOMY Left     Breast Surgery Lumpectomy x 2 1996    CARPAL TUNNEL RELEASE Left 2003    Neuroplasty Decompression Median Nerve At Carpal Tunnel    CATARACT EXTRACTION      CATARACT EXTRACTION EXTRACAPSULAR W/ INTRAOCULAR LENS IMPLANTATION Bilateral 2017    COLONOSCOPY      CYSTOSCOPY      multiple    CYSTOSCOPY Left 04/09/2025    Left CYSTOSCOPY, WITH URETERAL STENT INSERTION - Left    CYSTOSCOPY W/ URETERAL STENT PLACEMENT  05/14/2025    HYSTERECTOMY  02/10/2014    Hysterectomy    NEPHRECTOMY Right 03/28/2024    TURBT, right nephroureterectomy, RPLND    SHOULDER SURGERY Left     left shoulder surgery to remove bone spur        ALLERGIES:   Allergies   Allergen Reactions    Aspirin Nausea Only    Nsaids (Non-Steroidal Anti-Inflammatory Drug) Nausea Only    Penicillins Rash        MEDICATIONS:   Current Outpatient Medications   Medication Instructions    alendronate (FOSAMAX) 70 mg, oral, Every 7 days, Takes on Sunday    cholecalciferol (Vitamin D-3) 50 MCG (2000 UT) tablet 1 tablet, Daily    ferrous sulfate 325 mg, 2 times daily    minoxidil (Loniten) 2.5 mg tablet 0.5 tablets, Daily    mirabegron (MYRBETRIQ) 25 mg, oral, Daily    mirtazapine (REMERON) 30 mg, oral, Nightly    omeprazole (PRILOSEC) 20 mg, oral, Daily    oxyCODONE (ROXICODONE) 5 mg, oral, Every 6 hours PRN    potassium citrate CR (Urocit-K-10) 10 mEq ER tablet 10 mEq, oral, 3 times daily (morning, midday, late afternoon), Do not crush, chew, or split.    rosuvastatin (CRESTOR) 20 mg, oral, Daily    sertraline (ZOLOFT) 200 mg, oral, Daily    tamsulosin (FLOMAX) 0.4 mg, oral, Daily, For discomfort from ureteral stent.        PHYSICAL EXAM:  There were no vitals taken for this visit.  Constitutional: Patient appears well-developed and well-nourished. No distress.    Pulmonary/Chest: Effort normal. No respiratory distress.   Abdominal: Soft, ND NT  Musculoskeletal: Normal range of motion.    Neurological: Alert and oriented to person, place, and  time.  Psychiatric: Normal mood and affect. Behavior is normal. Thought content normal.      Labs  Lab Results   Component Value Date    CREATININE 1.44 (H) 05/01/2025     Lab Results   Component Value Date    CHOL 125 08/04/2020     Lab Results   Component Value Date    HDL 43.0 08/04/2020     Lab Results   Component Value Date    CHHDL 2.9 08/04/2020     Lab Results   Component Value Date    LDLF 64 08/04/2020     Lab Results   Component Value Date    VLDL 18 08/04/2020     Lab Results   Component Value Date    TRIG 91 08/04/2020     Lab Results   Component Value Date    HCT 33.3 (L) 05/01/2025       Procedures  Cystoscopy   After informed consent was obtained, the patient was taken to the procedure room for cystoscopy for ureteral stent removal.    Procedure Note:   A sterile prep and drape was performed in standard fashion. Lidocaine jelly was injected into the urethra. A flexible cystoscope was inserted into the bladder without difficulty.    The ureteral stent was seen and grasped using forceps.  The stent was completely removed.      Post-Procedure:  The cystoscope was removed. The vital signs were stable. The patient tolerated the procedure well. There were no complications.     Assessment:      1. Retained ureteral stent        2. Kidney stone  US renal complete    XR abdomen 1 view        Anahi Wall is a 79 y.o. female here for FUV     Plan:   1) Stent removed  2) Will get FU imaging in 3m  3) Stone prevention discussed  4) Warning signs discussed

## 2025-05-28 ENCOUNTER — LAB (OUTPATIENT)
Dept: LAB | Facility: HOSPITAL | Age: 80
End: 2025-05-28
Payer: MEDICARE

## 2025-05-28 PROCEDURE — 80048 BASIC METABOLIC PNL TOTAL CA: CPT

## 2025-05-28 PROCEDURE — 85027 COMPLETE CBC AUTOMATED: CPT

## 2025-05-29 DIAGNOSIS — C67.9 MALIGNANT NEOPLASM OF URINARY BLADDER, UNSPECIFIED SITE (MULTI): ICD-10-CM

## 2025-05-29 DIAGNOSIS — N17.9 AKI (ACUTE KIDNEY INJURY): ICD-10-CM

## 2025-05-29 DIAGNOSIS — C64.1 UROTHELIAL CARCINOMA OF KIDNEY, RIGHT: ICD-10-CM

## 2025-05-29 LAB
ANION GAP SERPL CALC-SCNC: 13 MMOL/L (ref 10–20)
BUN SERPL-MCNC: 31 MG/DL (ref 6–23)
CALCIUM SERPL-MCNC: 9.1 MG/DL (ref 8.6–10.6)
CHLORIDE SERPL-SCNC: 105 MMOL/L (ref 98–107)
CO2 SERPL-SCNC: 28 MMOL/L (ref 21–32)
CREAT SERPL-MCNC: 1.48 MG/DL (ref 0.5–1.05)
EGFRCR SERPLBLD CKD-EPI 2021: 35 ML/MIN/1.73M*2
ERYTHROCYTE [DISTWIDTH] IN BLOOD BY AUTOMATED COUNT: 14.3 % (ref 11.5–14.5)
GLUCOSE SERPL-MCNC: 93 MG/DL (ref 74–99)
HCT VFR BLD AUTO: 33.3 % (ref 36–46)
HGB BLD-MCNC: 10.3 G/DL (ref 12–16)
MCH RBC QN AUTO: 29.5 PG (ref 26–34)
MCHC RBC AUTO-ENTMCNC: 30.9 G/DL (ref 32–36)
MCV RBC AUTO: 95 FL (ref 80–100)
NRBC BLD-RTO: 0 /100 WBCS (ref 0–0)
PLATELET # BLD AUTO: 158 X10*3/UL (ref 150–450)
POTASSIUM SERPL-SCNC: 4.3 MMOL/L (ref 3.5–5.3)
RBC # BLD AUTO: 3.49 X10*6/UL (ref 4–5.2)
SODIUM SERPL-SCNC: 142 MMOL/L (ref 136–145)
WBC # BLD AUTO: 6.5 X10*3/UL (ref 4.4–11.3)

## 2025-05-31 LAB
APTT PPP: 24 SEC (ref 23–32)
INR PPP: 1
PROTHROMBIN TIME: 10.4 SEC (ref 9–11.5)

## 2025-06-02 ENCOUNTER — HOSPITAL ENCOUNTER (OUTPATIENT)
Facility: HOSPITAL | Age: 80
Setting detail: OUTPATIENT SURGERY
Discharge: HOME | End: 2025-06-02
Attending: STUDENT IN AN ORGANIZED HEALTH CARE EDUCATION/TRAINING PROGRAM | Admitting: STUDENT IN AN ORGANIZED HEALTH CARE EDUCATION/TRAINING PROGRAM
Payer: MEDICARE

## 2025-06-02 ENCOUNTER — ANESTHESIA EVENT (OUTPATIENT)
Dept: OPERATING ROOM | Facility: HOSPITAL | Age: 80
End: 2025-06-02
Payer: MEDICARE

## 2025-06-02 ENCOUNTER — ANESTHESIA (OUTPATIENT)
Dept: OPERATING ROOM | Facility: HOSPITAL | Age: 80
End: 2025-06-02
Payer: MEDICARE

## 2025-06-02 VITALS
WEIGHT: 154.54 LBS | SYSTOLIC BLOOD PRESSURE: 140 MMHG | TEMPERATURE: 96.8 F | DIASTOLIC BLOOD PRESSURE: 66 MMHG | BODY MASS INDEX: 28.44 KG/M2 | HEIGHT: 62 IN | OXYGEN SATURATION: 94 % | RESPIRATION RATE: 12 BRPM | HEART RATE: 73 BPM

## 2025-06-02 DIAGNOSIS — C67.9 MALIGNANT NEOPLASM OF URINARY BLADDER, UNSPECIFIED SITE (MULTI): Primary | ICD-10-CM

## 2025-06-02 PROBLEM — J38.5 LARYNGOSPASM: Status: ACTIVE | Noted: 2025-06-02

## 2025-06-02 PROCEDURE — 3700000002 HC GENERAL ANESTHESIA TIME - EACH INCREMENTAL 1 MINUTE: Performed by: STUDENT IN AN ORGANIZED HEALTH CARE EDUCATION/TRAINING PROGRAM

## 2025-06-02 PROCEDURE — 3600000003 HC OR TIME - INITIAL BASE CHARGE - PROCEDURE LEVEL THREE: Performed by: STUDENT IN AN ORGANIZED HEALTH CARE EDUCATION/TRAINING PROGRAM

## 2025-06-02 PROCEDURE — A52235 PR CYSTOURETHROSCOPY,FULGUR 2-5 CM LESN: Performed by: NURSE ANESTHETIST, CERTIFIED REGISTERED

## 2025-06-02 PROCEDURE — 2500000005 HC RX 250 GENERAL PHARMACY W/O HCPCS: Performed by: STUDENT IN AN ORGANIZED HEALTH CARE EDUCATION/TRAINING PROGRAM

## 2025-06-02 PROCEDURE — 2500000004 HC RX 250 GENERAL PHARMACY W/ HCPCS (ALT 636 FOR OP/ED): Performed by: NURSE ANESTHETIST, CERTIFIED REGISTERED

## 2025-06-02 PROCEDURE — 7100000009 HC PHASE TWO TIME - INITIAL BASE CHARGE: Performed by: STUDENT IN AN ORGANIZED HEALTH CARE EDUCATION/TRAINING PROGRAM

## 2025-06-02 PROCEDURE — A52235 PR CYSTOURETHROSCOPY,FULGUR 2-5 CM LESN: Performed by: ANESTHESIOLOGY

## 2025-06-02 PROCEDURE — 7100000002 HC RECOVERY ROOM TIME - EACH INCREMENTAL 1 MINUTE: Performed by: STUDENT IN AN ORGANIZED HEALTH CARE EDUCATION/TRAINING PROGRAM

## 2025-06-02 PROCEDURE — 7100000001 HC RECOVERY ROOM TIME - INITIAL BASE CHARGE: Performed by: STUDENT IN AN ORGANIZED HEALTH CARE EDUCATION/TRAINING PROGRAM

## 2025-06-02 PROCEDURE — 7100000010 HC PHASE TWO TIME - EACH INCREMENTAL 1 MINUTE: Performed by: STUDENT IN AN ORGANIZED HEALTH CARE EDUCATION/TRAINING PROGRAM

## 2025-06-02 PROCEDURE — 3700000001 HC GENERAL ANESTHESIA TIME - INITIAL BASE CHARGE: Performed by: STUDENT IN AN ORGANIZED HEALTH CARE EDUCATION/TRAINING PROGRAM

## 2025-06-02 PROCEDURE — 2500000005 HC RX 250 GENERAL PHARMACY W/O HCPCS: Performed by: ANESTHESIOLOGY

## 2025-06-02 PROCEDURE — 99100 ANES PT EXTEME AGE<1 YR&>70: CPT | Performed by: ANESTHESIOLOGY

## 2025-06-02 PROCEDURE — 3600000008 HC OR TIME - EACH INCREMENTAL 1 MINUTE - PROCEDURE LEVEL THREE: Performed by: STUDENT IN AN ORGANIZED HEALTH CARE EDUCATION/TRAINING PROGRAM

## 2025-06-02 PROCEDURE — 52214 CYSTOSCOPY AND TREATMENT: CPT | Performed by: STUDENT IN AN ORGANIZED HEALTH CARE EDUCATION/TRAINING PROGRAM

## 2025-06-02 RX ORDER — SUCCINYLCHOLINE CHLORIDE 20 MG/ML
INJECTION INTRAMUSCULAR; INTRAVENOUS AS NEEDED
Status: DISCONTINUED | OUTPATIENT
Start: 2025-06-02 | End: 2025-06-02

## 2025-06-02 RX ORDER — CEFAZOLIN 1 G/1
INJECTION, POWDER, FOR SOLUTION INTRAVENOUS AS NEEDED
Status: DISCONTINUED | OUTPATIENT
Start: 2025-06-02 | End: 2025-06-02

## 2025-06-02 RX ORDER — GLYCOPYRROLATE 0.2 MG/ML
INJECTION INTRAMUSCULAR; INTRAVENOUS AS NEEDED
Status: DISCONTINUED | OUTPATIENT
Start: 2025-06-02 | End: 2025-06-02

## 2025-06-02 RX ORDER — HYDROMORPHONE HYDROCHLORIDE 0.2 MG/ML
0.2 INJECTION INTRAMUSCULAR; INTRAVENOUS; SUBCUTANEOUS EVERY 5 MIN PRN
Status: DISCONTINUED | OUTPATIENT
Start: 2025-06-02 | End: 2025-06-02 | Stop reason: HOSPADM

## 2025-06-02 RX ORDER — PHENAZOPYRIDINE HYDROCHLORIDE 200 MG/1
200 TABLET, FILM COATED ORAL 3 TIMES DAILY PRN
Qty: 10 TABLET | Refills: 0 | Status: SHIPPED | OUTPATIENT
Start: 2025-06-02

## 2025-06-02 RX ORDER — WATER 1 ML/ML
INJECTION IRRIGATION AS NEEDED
Status: DISCONTINUED | OUTPATIENT
Start: 2025-06-02 | End: 2025-06-02 | Stop reason: HOSPADM

## 2025-06-02 RX ORDER — FENTANYL CITRATE 50 UG/ML
INJECTION, SOLUTION INTRAMUSCULAR; INTRAVENOUS AS NEEDED
Status: DISCONTINUED | OUTPATIENT
Start: 2025-06-02 | End: 2025-06-02

## 2025-06-02 RX ORDER — PROPOFOL 10 MG/ML
INJECTION, EMULSION INTRAVENOUS AS NEEDED
Status: DISCONTINUED | OUTPATIENT
Start: 2025-06-02 | End: 2025-06-02

## 2025-06-02 RX ORDER — ONDANSETRON HYDROCHLORIDE 2 MG/ML
INJECTION, SOLUTION INTRAVENOUS AS NEEDED
Status: DISCONTINUED | OUTPATIENT
Start: 2025-06-02 | End: 2025-06-02

## 2025-06-02 RX ORDER — SODIUM CHLORIDE, SODIUM LACTATE, POTASSIUM CHLORIDE, CALCIUM CHLORIDE 600; 310; 30; 20 MG/100ML; MG/100ML; MG/100ML; MG/100ML
50 INJECTION, SOLUTION INTRAVENOUS CONTINUOUS
Status: ACTIVE | OUTPATIENT
Start: 2025-06-02 | End: 2025-06-02

## 2025-06-02 RX ORDER — DROPERIDOL 2.5 MG/ML
0.62 INJECTION, SOLUTION INTRAMUSCULAR; INTRAVENOUS ONCE AS NEEDED
Status: DISCONTINUED | OUTPATIENT
Start: 2025-06-02 | End: 2025-06-02 | Stop reason: HOSPADM

## 2025-06-02 RX ORDER — DOCUSATE SODIUM 100 MG/1
200 CAPSULE, LIQUID FILLED ORAL 2 TIMES DAILY
Qty: 20 CAPSULE | Refills: 0 | Status: SHIPPED | OUTPATIENT
Start: 2025-06-02 | End: 2025-06-07

## 2025-06-02 RX ORDER — PHENYLEPHRINE HCL IN 0.9% NACL 0.4MG/10ML
SYRINGE (ML) INTRAVENOUS AS NEEDED
Status: DISCONTINUED | OUTPATIENT
Start: 2025-06-02 | End: 2025-06-02

## 2025-06-02 RX ORDER — LIDOCAINE HCL/PF 100 MG/5ML
SYRINGE (ML) INTRAVENOUS AS NEEDED
Status: DISCONTINUED | OUTPATIENT
Start: 2025-06-02 | End: 2025-06-02

## 2025-06-02 RX ORDER — LIDOCAINE HYDROCHLORIDE 10 MG/ML
0.1 INJECTION, SOLUTION EPIDURAL; INFILTRATION; INTRACAUDAL; PERINEURAL ONCE
Status: DISCONTINUED | OUTPATIENT
Start: 2025-06-02 | End: 2025-06-02 | Stop reason: HOSPADM

## 2025-06-02 RX ORDER — HYDRALAZINE HYDROCHLORIDE 20 MG/ML
5 INJECTION INTRAMUSCULAR; INTRAVENOUS EVERY 30 MIN PRN
Status: DISCONTINUED | OUTPATIENT
Start: 2025-06-02 | End: 2025-06-02 | Stop reason: HOSPADM

## 2025-06-02 RX ORDER — OXYCODONE HYDROCHLORIDE 5 MG/1
5 TABLET ORAL EVERY 4 HOURS PRN
Status: DISCONTINUED | OUTPATIENT
Start: 2025-06-02 | End: 2025-06-02 | Stop reason: HOSPADM

## 2025-06-02 RX ORDER — ACETAMINOPHEN 325 MG/1
650 TABLET ORAL EVERY 4 HOURS PRN
Status: DISCONTINUED | OUTPATIENT
Start: 2025-06-02 | End: 2025-06-02 | Stop reason: HOSPADM

## 2025-06-02 RX ORDER — LABETALOL HYDROCHLORIDE 5 MG/ML
5 INJECTION, SOLUTION INTRAVENOUS ONCE AS NEEDED
Status: DISCONTINUED | OUTPATIENT
Start: 2025-06-02 | End: 2025-06-02 | Stop reason: HOSPADM

## 2025-06-02 RX ORDER — ALBUTEROL SULFATE 0.83 MG/ML
2.5 SOLUTION RESPIRATORY (INHALATION) ONCE AS NEEDED
Status: DISCONTINUED | OUTPATIENT
Start: 2025-06-02 | End: 2025-06-02 | Stop reason: HOSPADM

## 2025-06-02 RX ORDER — FUROSEMIDE 10 MG/ML
INJECTION INTRAMUSCULAR; INTRAVENOUS AS NEEDED
Status: DISCONTINUED | OUTPATIENT
Start: 2025-06-02 | End: 2025-06-02

## 2025-06-02 RX ORDER — DIPHENHYDRAMINE HYDROCHLORIDE 50 MG/ML
12.5 INJECTION, SOLUTION INTRAMUSCULAR; INTRAVENOUS ONCE AS NEEDED
Status: DISCONTINUED | OUTPATIENT
Start: 2025-06-02 | End: 2025-06-02 | Stop reason: HOSPADM

## 2025-06-02 RX ORDER — CHLORHEXIDINE GLUCONATE 40 MG/ML
SOLUTION TOPICAL DAILY PRN
Status: DISCONTINUED | OUTPATIENT
Start: 2025-06-02 | End: 2025-06-02 | Stop reason: HOSPADM

## 2025-06-02 RX ORDER — SODIUM CHLORIDE, SODIUM LACTATE, POTASSIUM CHLORIDE, CALCIUM CHLORIDE 600; 310; 30; 20 MG/100ML; MG/100ML; MG/100ML; MG/100ML
INJECTION, SOLUTION INTRAVENOUS CONTINUOUS PRN
Status: DISCONTINUED | OUTPATIENT
Start: 2025-06-02 | End: 2025-06-02

## 2025-06-02 RX ORDER — ONDANSETRON HYDROCHLORIDE 2 MG/ML
4 INJECTION, SOLUTION INTRAVENOUS ONCE AS NEEDED
Status: DISCONTINUED | OUTPATIENT
Start: 2025-06-02 | End: 2025-06-02 | Stop reason: HOSPADM

## 2025-06-02 RX ADMIN — PROPOFOL 30 MG: 10 INJECTION, EMULSION INTRAVENOUS at 13:38

## 2025-06-02 RX ADMIN — FENTANYL CITRATE 75 MCG: 50 INJECTION, SOLUTION INTRAMUSCULAR; INTRAVENOUS at 13:16

## 2025-06-02 RX ADMIN — SUCCINYLCHOLINE CHLORIDE 10 MG: 20 INJECTION, SOLUTION INTRAMUSCULAR; INTRAVENOUS at 13:39

## 2025-06-02 RX ADMIN — LIDOCAINE HYDROCHLORIDE 20 MG: 20 INJECTION INTRAVENOUS at 12:55

## 2025-06-02 RX ADMIN — FENTANYL CITRATE 25 MCG: 50 INJECTION, SOLUTION INTRAMUSCULAR; INTRAVENOUS at 12:53

## 2025-06-02 RX ADMIN — CEFAZOLIN 2 G: 330 INJECTION, POWDER, FOR SOLUTION INTRAMUSCULAR; INTRAVENOUS at 13:03

## 2025-06-02 RX ADMIN — ONDANSETRON 4 MG: 2 INJECTION INTRAMUSCULAR; INTRAVENOUS at 13:33

## 2025-06-02 RX ADMIN — PROPOFOL 50 MG: 10 INJECTION, EMULSION INTRAVENOUS at 13:16

## 2025-06-02 RX ADMIN — DEXAMETHASONE SODIUM PHOSPHATE 4 MG: 4 INJECTION, SOLUTION INTRA-ARTICULAR; INTRALESIONAL; INTRAMUSCULAR; INTRAVENOUS; SOFT TISSUE at 13:25

## 2025-06-02 RX ADMIN — Medication 8 L/MIN: at 13:50

## 2025-06-02 RX ADMIN — PROPOFOL 100 MG: 10 INJECTION, EMULSION INTRAVENOUS at 12:54

## 2025-06-02 RX ADMIN — FUROSEMIDE 20 MG: 10 INJECTION, SOLUTION INTRAMUSCULAR; INTRAVENOUS at 13:30

## 2025-06-02 RX ADMIN — PROPOFOL 20 MG: 10 INJECTION, EMULSION INTRAVENOUS at 13:01

## 2025-06-02 RX ADMIN — LIDOCAINE HYDROCHLORIDE 40 MG: 20 INJECTION INTRAVENOUS at 12:54

## 2025-06-02 RX ADMIN — Medication 120 MCG: at 13:19

## 2025-06-02 RX ADMIN — GLYCOPYRROLATE 0.2 MG: 0.2 INJECTION INTRAMUSCULAR; INTRAVENOUS at 13:19

## 2025-06-02 RX ADMIN — Medication 80 MCG: at 13:26

## 2025-06-02 RX ADMIN — Medication 120 MCG: at 13:29

## 2025-06-02 RX ADMIN — SODIUM CHLORIDE, POTASSIUM CHLORIDE, SODIUM LACTATE AND CALCIUM CHLORIDE: 600; 310; 30; 20 INJECTION, SOLUTION INTRAVENOUS at 12:40

## 2025-06-02 ASSESSMENT — PAIN - FUNCTIONAL ASSESSMENT
PAIN_FUNCTIONAL_ASSESSMENT: 0-10

## 2025-06-02 ASSESSMENT — PAIN SCALES - GENERAL
PAINLEVEL_OUTOF10: 0 - NO PAIN

## 2025-06-02 NOTE — ANESTHESIA PREPROCEDURE EVALUATION
Patient: Anahi Wall    Procedure Information       Anesthesia Start Date/Time: 06/02/25 1244    Procedure: CYSTOSCOPY, WITH BLADDER LESION ABLATION    Location: [facility] 06 / [facility]    Surgeons: Jorge A De Leon MD MPH            Relevant Problems   Cardiac   (+) Chest pain   (+) HTN (hypertension), benign   (+) Hyperlipidemia      Neuro   (+) Anxiety   (+) Depression      GI   (+) Acid reflux      /Renal   (+) Acute kidney injury (nontraumatic)   (+) Kidney stone   (+) Malignant neoplasm of kidney (Multi)   (+) Uric acid nephrolithiasis   (+) Urothelial carcinoma of kidney, right      Hematology   (+) Anemia      Musculoskeletal   (+) Degeneration of lumbar intervertebral disc   (+) Disc displacement, lumbar   (+) Localized primary osteoarthritis of carpometacarpal joint of left thumb   (+) Lumbosacral spinal stenosis   (+) Primary osteoarthritis of left knee      HEENT   (+) Sensorineural hearing loss of both ears   (+) Vision loss      GYN   (+) Malignant neoplasm of left breast       Clinical information reviewed:   Tobacco  Allergies  Meds   Med Hx  Surg Hx   Fam Hx  Soc Hx        NPO Detail:  NPO/Void Status  Date of Last Liquid: 06/02/25  Time of Last Liquid: 0900  Date of Last Solid: 06/02/25  Time of Last Solid: 0000         Physical Exam    Airway  Mallampati: II  TM distance: >3 FB  Neck ROM: limited  Comments: Can bite upper lip  Decreased neck  motion     Cardiovascular   Rhythm: regular     Dental    Pulmonary    Abdominal            Anesthesia Plan    History of general anesthesia?: yes  History of complications of general anesthesia?: no    ASA 3     general     intravenous induction   Anesthetic plan and risks discussed with patient.    Plan discussed with CRNA and attending.

## 2025-06-02 NOTE — ANESTHESIA PROCEDURE NOTES
Airway  Date/Time: 6/2/2025 12:56 PM  Reason: elective    Airway not difficult    Staffing  Performed: CRNA   Authorized by: Edwina Barrera MD    Performed by: ESTEFANÍA Sweet-MIHAI  Patient location during procedure: OR    Patient Condition  Indications for airway management: anesthesia  Patient position: sniffing  MILS maintained throughout  Planned trial extubation  Sedation level: deep     Final Airway Details   Preoxygenated: yes  Final airway type: supraglottic airway  Successful airway: Supraglottic airway: ambu.  Size: 4   Ventilation between attempts: none  Number of attempts at approach: 1  Number of other approaches attempted: 0    Additional Comments  Lips and teeth in preanesthetic condition

## 2025-06-02 NOTE — DISCHARGE INSTRUCTIONS
Post-Operative Instructions:  Bladder Biopsy    Medications  Tylenol and/or Motrin may be given to help with generalized discomfort after surgery.   Pyridium may be taken to help if you have burning with urination - this may turn your urine orange.  You may have been prescribed additional narcotics, such as Tramadol, for pain. Use these sparingly as they can cause confusion, fatigue, nausea, and constipation. Take a stool softener to help avoid constipation.   Keep taking any medications that you have been on for urination.   If you are on blood thinners - usually these can be restarted in 2-3 days if your urine is clear or pink, unless the doctor tells you otherwise. You may continue taking Aspirin immediately.      Activity  You may resume your normal diet. You should stick to fluids and bland foods at first, as you may be nauseous after surgery.   Make sure to drink plenty of water and stay hydrated  You may shower immediately after surgery.  Avoid constipation, heavy lifting, and straining your abdomen - these can cause blood in the urine. There are no restrictions on walking, stairs, etc.     Things to expect after surgery  Blood in urine/catheter if present, passage of small clots, and spotting in your underwear.   The urge to urinate frequently or difficulty with urination.  Burning/stinging/pain with urination is normal and can last for several days after surgery.  If you have a catheter: You may remove your catheter at home on Thursday, 6/5  You may have lower abdominal spasms or feeling like you need to urinate.  Make sure to change to the larger bag at night time if you have a day time leg bag.  If your catheter stops draining urine and you feel your bladder getting full, call the office immediately.    When to Call the Surgeon:  Fever higher than 102?F  Repeated vomiting  with inability to keep down fluids  Severe pain not controlled with medication  Inability to urinate for more than 8 hours or if catheter stops draining (if one is present)  For questions or problems, call our office at [phone]  Evenings and weekends: call the hospital  [phone] and ask for the [id number].  In case of emergency, call [phone].    Follow-Up Appointment:  Please call [phone] if you do not have a follow-up visit or if you need to reschedule.

## 2025-06-02 NOTE — INTERVAL H&P NOTE
I have reviewed the patient's History and Physical Examination. I have personally seen and evaluated the patient, repeating key portions. There is no significant interval change.     Surgery is still indicated. Yes    Consent reviewed and signed by patient/family: Yes    Operative site verified and marked: n/a, bilateral    Simona Jasso MD  PGY4 Urology  [phone]

## 2025-06-02 NOTE — ANESTHESIA POSTPROCEDURE EVALUATION
Patient: Anahi Wall    Procedure Summary       Date: 06/02/25 Room / Location: [facility] OR 06 / [facility]    Anesthesia Start: 1244 Anesthesia Stop: 1359    Procedure: CYSTOSCOPY, WITH fulguration (Bladder) Diagnosis:       Malignant neoplasm of urinary bladder, unspecified site (Multi)      (Malignant neoplasm of urinary bladder, unspecified site (Multi) [C67.9])    Surgeons: Jorge A De Leon MD MPH Responsible Provider: Edwina Barrera MD    Anesthesia Type: general ASA Status: 3            Anesthesia Type: general    Vitals Value Taken Time   /61 06/02/25 14:00   Temp 36.1 °C (97 °F) 06/02/25 13:59   Pulse 79 06/02/25 14:10   Resp 17 06/02/25 14:10   SpO2 99 % 06/02/25 14:10   Vitals shown include unfiled device data.    Anesthesia Post Evaluation    Patient location during evaluation: PACU  Patient participation: complete - patient participated  Level of consciousness: sleepy but conscious  Pain management: adequate  Airway patency: patent  Cardiovascular status: acceptable  Respiratory status: acceptable  Hydration status: acceptable  Postoperative Nausea and Vomiting: none  Comments: Her bronchospasm at end of procedure required succinylcholine and positive pressure ventilation to break it. No further problem was encountered, and LMA was removed with satisfactory oxygen saturation and ventilation.        Encounter Notable Events   Notable Event Outcome Phase Comment   Laryngospasm Resolved in Room Intraprocedure resolved with after application of positive pressure, increased sedation, and administration of succinylcholine

## 2025-06-02 NOTE — PERIOPERATIVE NURSING NOTE
Rodriguez teaching reviewed with pt and , pt has had several catheters at home in the past. Both stated understanding. Supplies given for home and syringe given to take out rodriguez Thursday at home, has taken it out before at home.

## 2025-06-02 NOTE — OP NOTE
CYSTOSCOPY, WITH fulguration Operative Note     Date: 2025  OR Location: [facility] OR    Name: Anahi Wall, : 1945, Age: 80 y.o., MRN: [id number], Sex: female    Diagnosis  Pre-op Diagnosis      * Malignant neoplasm of urinary bladder, unspecified site (Multi) [C67.9] Post-op Diagnosis     * Malignant neoplasm of urinary bladder, unspecified site (Multi) [C67.9]     Procedures  CYSTOSCOPY, WITH fulguration  [id number] - [place] CYSTOURETHROSCOPY W/DEST &/RMVL MED BLADDER ELSA      Surgeons      * Jorge A De Leon - Primary    Resident/Fellow/Other Assistant:  Surgeons and Role:     * Rajni Tanner MD - Resident - Assisting    Staff:   Circulator: Jael  Scrub Person: Ivett Mcdaniel Scrub: Valencia Mcdaniel Circulator: Felicitas Mcdaniel Scrub: Lorene  Scrub Person: Heather  Circulator: Jose  Scrub Person: Jose Mcdaniel Scrub: Cliff    Anesthesia Staff: Anesthesiologist: Edwina Barrera MD  CRNA: ESTEFANÍA Sweet-CRNA    Procedure Summary  Anesthesia: General  ASA: III  Estimated Blood Loss: 2 mL  Intra-op Medications: * Intraprocedure medication information is unavailable because the case start and end events have not been set *           Anesthesia Record               Intraprocedure I/O Totals          Intake    lactated Ringer's 400.00 mL    Total Intake 400 mL          Specimen: No specimens collected              Drains and/or Catheters:   Urethral Catheter Non-latex 16 Fr. (Active)     Findings: scattered 1-2 mm papillary lesions throughout bladder including surrounding left ureteral orifice, scraped and fulgurated. Brisk efflux of urine following procedure.     Indications: Anahi Wall is an 80 y.o. female who is having surgery for Malignant neoplasm of urinary bladder, unspecified site (Multi) [C67.9]. 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 She presents today for recurrence seen on surveillance following adstiladrin.     The patient was seen in the  preoperative area. The risks, benefits, complications, treatment options, non-operative alternatives, expected recovery and outcomes were discussed with the patient. The possibilities of reaction to medication, pulmonary aspiration, injury to surrounding structures, bleeding, recurrent infection, the need for additional procedures, failure to diagnose a condition, and creating a complication requiring transfusion or operation were discussed with the patient. The patient concurred with the proposed plan, giving informed consent.  The site of surgery was properly noted/marked if necessary per policy. The patient has been actively warmed in preoperative area. Preoperative antibiotics have been ordered and given within 1 hours of incision. Venous thrombosis prophylaxis have been ordered including bilateral sequential compression devices    Procedure Details: Patient consented to procedure in preoperative area. Risks and benefits discussed. Allergies were reviewed and preoperative antibiotics were administered. Patient was brought to the operating room and placed in supine position on the operating room table. A timeout was performed. All were in agreement. Patient underwent general anesthesia without complication. They were repositioned in dorsal lithotomy and prepped and draped in the usual sterile fashion.     A 26 Fr resectoscope was used to perform cystourethroscopy. Urethra was normal. Left ureteral orifice was in orthotopic position. No stones were identified. There were several 1-2 mm papillary masses scattered throughout the bladder, including 3 surrounding the posterior and lateral aspects of the left ureteral orifice. The thin electrocautery loop was used fulgurate the tumors.     The bladder was drained. We reintroduced the resectoscope and carefully checked for hemostasis after emptying the bladder. There was no active bleeding and hemostasis was excellent. Anesthesia instilled 1ml of fluoroscein followed by  20 mg of IV lasix. Brisk efflux of fluoroscein was seen from the solitary left ureteral orifice. All instruments were removed. A 16 Fr Hughes catheter was placed and bladder was drained. Patient was awoken from anesthesia and transferred to PACU in stable condition.     Follow-up: the patient will be seen in clinic in 1-2 weeks for follow-up.    Evidence of Infection: No   Complications:  None; patient tolerated the procedure well.    Disposition: PACU - hemodynamically stable.  Condition: stable                 Additional Details: Patient to be discharged with catheter and will remove at home in 3 days.    Attending Attestation: I was present for the entire procedure.    Jorge A De Leon  Phone Number: [phone]

## 2025-06-11 ENCOUNTER — HOSPITAL ENCOUNTER (OUTPATIENT)
Dept: RADIOLOGY | Facility: HOSPITAL | Age: 80
Discharge: HOME | End: 2025-06-11
Payer: MEDICARE

## 2025-06-11 ENCOUNTER — OFFICE VISIT (OUTPATIENT)
Dept: PRIMARY CARE | Facility: CLINIC | Age: 80
End: 2025-06-11
Payer: MEDICARE

## 2025-06-11 VITALS — HEART RATE: 63 BPM | OXYGEN SATURATION: 98 % | DIASTOLIC BLOOD PRESSURE: 72 MMHG | SYSTOLIC BLOOD PRESSURE: 121 MMHG

## 2025-06-11 DIAGNOSIS — D63.1 ANEMIA DUE TO CHRONIC KIDNEY DISEASE, UNSPECIFIED CKD STAGE: ICD-10-CM

## 2025-06-11 DIAGNOSIS — R61 NIGHT SWEATS: ICD-10-CM

## 2025-06-11 DIAGNOSIS — N18.9 ANEMIA DUE TO CHRONIC KIDNEY DISEASE, UNSPECIFIED CKD STAGE: ICD-10-CM

## 2025-06-11 DIAGNOSIS — R05.3 CHRONIC COUGH: Primary | ICD-10-CM

## 2025-06-11 DIAGNOSIS — N18.31 CHRONIC KIDNEY DISEASE, STAGE 3A (MULTI): ICD-10-CM

## 2025-06-11 DIAGNOSIS — R05.3 CHRONIC COUGH: ICD-10-CM

## 2025-06-11 PROCEDURE — 71046 X-RAY EXAM CHEST 2 VIEWS: CPT | Performed by: RADIOLOGY

## 2025-06-11 PROCEDURE — 71046 X-RAY EXAM CHEST 2 VIEWS: CPT

## 2025-06-11 PROCEDURE — 3078F DIAST BP <80 MM HG: CPT | Performed by: INTERNAL MEDICINE

## 2025-06-11 PROCEDURE — 99214 OFFICE O/P EST MOD 30 MIN: CPT | Performed by: INTERNAL MEDICINE

## 2025-06-11 PROCEDURE — 3074F SYST BP LT 130 MM HG: CPT | Performed by: INTERNAL MEDICINE

## 2025-06-11 ASSESSMENT — ENCOUNTER SYMPTOMS: COUGH: 1

## 2025-06-11 NOTE — PROGRESS NOTES
Subjective   Patient ID: Anahi Wall is a 80 y.o. female who presents for Cough.    Patient is here with a 3-week history of upper respiratory infection.  Patient has not been feeling well for several months and has been battling with chronic anemia.  She has had no improvement in her symptoms since being on iron supplements which she has been taking for the last several months.  She did have lab work prior to her most recent bladder tumor ablation which showed no change in her chronic anemia is not significantly worse however.  Her creatinine remained stable.  About 3 weeks ago patient started coughing and had upper respiratory infection with low-grade fevers upper respiratory symptoms with sinus congestion pressure and drainage as well as a dry cough.  The fever and chills have resolved but the cough has been persistent it is moist but nonproductive.  Over the last week or so she has had episodic drenching night sweats at least every other night.  She has had no recurrent fever or chills she has had no weight loss, GI or  complaints.  She had a fairly uncomplicated most recent cystoscopy with bladder fulguration.  She denies any shortness of breath PND orthopnea or pedal edema.  She is also complaining of generalized malaise and lack of coordination and concentration.  She is usually very meticulous about keeping up her house and staying organized and recently that has not gone as well.  She denies any significant or specific cognitive issues.    Cough         Review of Systems   Respiratory:  Positive for cough.        Objective   /72   Pulse 63   SpO2 98%     Physical Exam  Constitutional:       Appearance: Normal appearance. She is normal weight.   Cardiovascular:      Rate and Rhythm: Normal rate and regular rhythm.      Heart sounds: Normal heart sounds.   Pulmonary:      Breath sounds: Normal breath sounds. No wheezing, rhonchi or rales.   Chest:      Chest wall: No tenderness.   Abdominal:       General: Abdomen is flat.      Palpations: There is no mass.      Tenderness: There is no abdominal tenderness.   Musculoskeletal:      Right lower leg: No edema.      Left lower leg: No edema.   Lymphadenopathy:      Cervical: No cervical adenopathy.   Neurological:      Mental Status: She is alert.         Assessment/Plan   Problem List Items Addressed This Visit           ICD-10-CM    Anemia D64.9    Persistent chronic anemia despite iron supplementation.  Will check repeat iron studies as well as B12 folate and erythropoietin levels she may have a multifactorial issue if none of these results are revealing we will refer her to hematology for further evaluation.         Relevant Orders    CBC and Auto Differential    Comprehensive Metabolic Panel    Vitamin B12    Iron and TIBC    Folate    Erythropoietin    XR chest 2 views    Arthritis Panel (CMS)    C-Reactive Protein    Chronic cough - Primary R05.3    Chronic moist cough without production without shortness of breath or wheezing.  Will check a chest x-ray to look for chronic pneumonia.  Given her night sweats I wonder about the possibility of an atypical mycobacterial or fungal infection as well.         Relevant Orders    CBC and Auto Differential    Comprehensive Metabolic Panel    Vitamin B12    Iron and TIBC    Folate    Erythropoietin    XR chest 2 views    Arthritis Panel (CMS)    C-Reactive Protein    Night sweats R61    Significant night sweats without fever chills or weight loss.  Will check labs as well as a sed rate and autoimmune profile.  Will also look at the results of her chest x-ray.  Nothing on her physical exam was revealing today.         Relevant Orders    CBC and Auto Differential    Comprehensive Metabolic Panel    Vitamin B12    Iron and TIBC    Folate    Erythropoietin    XR chest 2 views    Arthritis Panel (CMS)    C-Reactive Protein

## 2025-06-11 NOTE — ASSESSMENT & PLAN NOTE
Significant night sweats without fever chills or weight loss.  Will check labs as well as a sed rate and autoimmune profile.  Will also look at the results of her chest x-ray.  Nothing on her physical exam was revealing today.   [History reviewed] : History reviewed. [Medications and Allergies reviewed] : Medications and allergies reviewed.

## 2025-06-11 NOTE — ASSESSMENT & PLAN NOTE
Chronic moist cough without production without shortness of breath or wheezing.  Will check a chest x-ray to look for chronic pneumonia.  Given her night sweats I wonder about the possibility of an atypical mycobacterial or fungal infection as well.

## 2025-06-11 NOTE — ASSESSMENT & PLAN NOTE
Persistent chronic anemia despite iron supplementation.  Will check repeat iron studies as well as B12 folate and erythropoietin levels she may have a multifactorial issue if none of these results are revealing we will refer her to hematology for further evaluation.

## 2025-06-12 ENCOUNTER — OFFICE VISIT (OUTPATIENT)
Dept: PRIMARY CARE | Facility: CLINIC | Age: 80
End: 2025-06-12
Payer: MEDICARE

## 2025-06-12 ENCOUNTER — APPOINTMENT (OUTPATIENT)
Dept: LAB | Facility: HOSPITAL | Age: 80
End: 2025-06-12
Payer: MEDICARE

## 2025-06-12 VITALS — OXYGEN SATURATION: 97 % | SYSTOLIC BLOOD PRESSURE: 132 MMHG | DIASTOLIC BLOOD PRESSURE: 76 MMHG | HEART RATE: 82 BPM

## 2025-06-12 DIAGNOSIS — D50.9 IRON DEFICIENCY ANEMIA, UNSPECIFIED IRON DEFICIENCY ANEMIA TYPE: Primary | ICD-10-CM

## 2025-06-12 DIAGNOSIS — D50.0 IRON DEFICIENCY ANEMIA DUE TO CHRONIC BLOOD LOSS: Primary | ICD-10-CM

## 2025-06-12 DIAGNOSIS — N30.00 ACUTE CYSTITIS WITHOUT HEMATURIA: ICD-10-CM

## 2025-06-12 LAB
ALBUMIN SERPL-MCNC: 3.7 G/DL (ref 3.6–5.1)
ALP SERPL-CCNC: 79 U/L (ref 37–153)
ALT SERPL-CCNC: 15 U/L (ref 6–29)
ANA SER QL IF: NEGATIVE
ANION GAP SERPL CALCULATED.4IONS-SCNC: 9 MMOL/L (CALC) (ref 7–17)
AST SERPL-CCNC: 17 U/L (ref 10–35)
BASOPHILS # BLD AUTO: 19 CELLS/UL (ref 0–200)
BASOPHILS NFR BLD AUTO: 0.2 %
BILIRUB SERPL-MCNC: 0.3 MG/DL (ref 0.2–1.2)
BUN SERPL-MCNC: 23 MG/DL (ref 7–25)
CALCIUM SERPL-MCNC: 8.7 MG/DL (ref 8.6–10.4)
CHLORIDE SERPL-SCNC: 104 MMOL/L (ref 98–110)
CO2 SERPL-SCNC: 28 MMOL/L (ref 20–32)
CREAT SERPL-MCNC: 1.37 MG/DL (ref 0.6–0.95)
CRP SERPL-MCNC: 101 MG/L
EGFRCR SERPLBLD CKD-EPI 2021: 39 ML/MIN/1.73M2
EOSINOPHIL # BLD AUTO: 66 CELLS/UL (ref 15–500)
EOSINOPHIL NFR BLD AUTO: 0.7 %
EPO SERPL-ACNC: 27.7 MIU/ML (ref 2.6–18.5)
ERYTHROCYTE [DISTWIDTH] IN BLOOD BY AUTOMATED COUNT: 13.9 % (ref 11–15)
ERYTHROCYTE [SEDIMENTATION RATE] IN BLOOD BY WESTERGREN METHOD: 63 MM/H
FOLATE SERPL-MCNC: 5.8 NG/ML
GLUCOSE SERPL-MCNC: 104 MG/DL (ref 65–99)
HCT VFR BLD AUTO: 28.9 % (ref 35–45)
HGB BLD-MCNC: 8.8 G/DL (ref 11.7–15.5)
IRON SATN MFR SERPL: 12 % (CALC) (ref 16–45)
IRON SERPL-MCNC: 22 MCG/DL (ref 45–160)
LYMPHOCYTES # BLD AUTO: 1062 CELLS/UL (ref 850–3900)
LYMPHOCYTES NFR BLD AUTO: 11.3 %
MCH RBC QN AUTO: 28.2 PG (ref 27–33)
MCHC RBC AUTO-ENTMCNC: 30.4 G/DL (ref 32–36)
MCV RBC AUTO: 92.6 FL (ref 80–100)
MONOCYTES # BLD AUTO: 780 CELLS/UL (ref 200–950)
MONOCYTES NFR BLD AUTO: 8.3 %
NEUTROPHILS # BLD AUTO: 7473 CELLS/UL (ref 1500–7800)
NEUTROPHILS NFR BLD AUTO: 79.5 %
PLATELET # BLD AUTO: 184 THOUSAND/UL (ref 140–400)
PMV BLD REES-ECKER: 9.9 FL (ref 7.5–12.5)
POTASSIUM SERPL-SCNC: 4.1 MMOL/L (ref 3.5–5.3)
PROT SERPL-MCNC: 6.3 G/DL (ref 6.1–8.1)
RBC # BLD AUTO: 3.12 MILLION/UL (ref 3.8–5.1)
RHEUMATOID FACT SERPL-ACNC: 15 IU/ML
SODIUM SERPL-SCNC: 141 MMOL/L (ref 135–146)
TIBC SERPL-MCNC: 178 MCG/DL (CALC) (ref 250–450)
URATE SERPL-MCNC: 4.9 MG/DL (ref 2.5–7)
VIT B12 SERPL-MCNC: 279 PG/ML (ref 200–1100)
WBC # BLD AUTO: 9.4 THOUSAND/UL (ref 3.8–10.8)

## 2025-06-12 PROCEDURE — 3078F DIAST BP <80 MM HG: CPT | Performed by: INTERNAL MEDICINE

## 2025-06-12 PROCEDURE — 99213 OFFICE O/P EST LOW 20 MIN: CPT | Performed by: INTERNAL MEDICINE

## 2025-06-12 PROCEDURE — 3075F SYST BP GE 130 - 139MM HG: CPT | Performed by: INTERNAL MEDICINE

## 2025-06-12 PROCEDURE — 84165 PROTEIN E-PHORESIS SERUM: CPT

## 2025-06-12 RX ORDER — NITROFURANTOIN 25; 75 MG/1; MG/1
100 CAPSULE ORAL 2 TIMES DAILY
Qty: 10 CAPSULE | Refills: 0 | Status: SHIPPED | OUTPATIENT
Start: 2025-06-12 | End: 2025-06-17

## 2025-06-12 NOTE — PROGRESS NOTES
Patient is seen today just to get a stool specimen.  She is significantly iron deficient despite being on twice daily iron supplementation for the last couple of months.  She also has low B12.  Also of note very elevated sed rate and CRP.  The patient does complain of a UTI today which just began earlier this morning.  She has had no fever started herself on nitrofurantoin which she is taken in the past.  The night sweats which she is complained about have been present for about 3 weeks by her report.  Chest x-ray done last night showed nonspecific findings no acute infiltrates.    Stool is heme test negative today.  Stool is black due to the iron supplementation.    Will add a serum protein electrophoresis to her lab work and will speak to hematology regarding further workup and possible IV iron infusion.  She will need close follow-up and will have her return next week for repeat CBC

## 2025-06-13 DIAGNOSIS — D50.0 IRON DEFICIENCY ANEMIA SECONDARY TO BLOOD LOSS (CHRONIC): ICD-10-CM

## 2025-06-13 LAB — PROT SERPL-MCNC: 6.5 G/DL (ref 6.4–8.2)

## 2025-06-13 RX ORDER — POLYETHYLENE GLYCOL 3350, SODIUM SULFATE ANHYDROUS, SODIUM BICARBONATE, SODIUM CHLORIDE, POTASSIUM CHLORIDE 236; 22.74; 6.74; 5.86; 2.97 G/4L; G/4L; G/4L; G/4L; G/4L
POWDER, FOR SOLUTION ORAL
Qty: 4000 ML | Refills: 0 | Status: SHIPPED | OUTPATIENT
Start: 2025-06-13

## 2025-06-16 LAB
ALBUMIN: 3.2 G/DL (ref 3.4–5)
ALPHA 1 GLOBULIN: 0.5 G/DL (ref 0.2–0.6)
ALPHA 2 GLOBULIN: 1.1 G/DL (ref 0.4–1.1)
BETA GLOBULIN: 0.8 G/DL (ref 0.5–1.2)
GAMMA GLOBULIN: 0.9 G/DL (ref 0.5–1.4)
PATH REVIEW-SERUM PROTEIN ELECTROPHORESIS: ABNORMAL
PROTEIN ELECTROPHORESIS COMMENT: ABNORMAL

## 2025-06-17 ENCOUNTER — ANESTHESIA (OUTPATIENT)
Dept: GASTROENTEROLOGY | Facility: HOSPITAL | Age: 80
End: 2025-06-17
Payer: MEDICARE

## 2025-06-17 ENCOUNTER — HOSPITAL ENCOUNTER (OUTPATIENT)
Dept: GASTROENTEROLOGY | Facility: HOSPITAL | Age: 80
Discharge: HOME | End: 2025-06-17
Payer: MEDICARE

## 2025-06-17 ENCOUNTER — ANESTHESIA EVENT (OUTPATIENT)
Dept: GASTROENTEROLOGY | Facility: HOSPITAL | Age: 80
End: 2025-06-17
Payer: MEDICARE

## 2025-06-17 VITALS
BODY MASS INDEX: 27.75 KG/M2 | SYSTOLIC BLOOD PRESSURE: 140 MMHG | HEIGHT: 62 IN | TEMPERATURE: 98.4 F | HEART RATE: 70 BPM | RESPIRATION RATE: 15 BRPM | WEIGHT: 150.79 LBS | OXYGEN SATURATION: 97 % | DIASTOLIC BLOOD PRESSURE: 67 MMHG

## 2025-06-17 DIAGNOSIS — K31.7 POLYP, STOMACH: ICD-10-CM

## 2025-06-17 DIAGNOSIS — K44.9 HH (HIATUS HERNIA): Primary | ICD-10-CM

## 2025-06-17 DIAGNOSIS — D12.2 BENIGN NEOPLASM OF ASCENDING COLON: ICD-10-CM

## 2025-06-17 DIAGNOSIS — D50.0 IRON DEFICIENCY ANEMIA DUE TO CHRONIC BLOOD LOSS: ICD-10-CM

## 2025-06-17 PROCEDURE — 7100000009 HC PHASE TWO TIME - INITIAL BASE CHARGE

## 2025-06-17 PROCEDURE — 3700000002 HC GENERAL ANESTHESIA TIME - EACH INCREMENTAL 1 MINUTE

## 2025-06-17 PROCEDURE — 3700000001 HC GENERAL ANESTHESIA TIME - INITIAL BASE CHARGE

## 2025-06-17 PROCEDURE — 2500000004 HC RX 250 GENERAL PHARMACY W/ HCPCS (ALT 636 FOR OP/ED): Performed by: NURSE ANESTHETIST, CERTIFIED REGISTERED

## 2025-06-17 PROCEDURE — 45385 COLONOSCOPY W/LESION REMOVAL: CPT | Performed by: INTERNAL MEDICINE

## 2025-06-17 PROCEDURE — 7100000010 HC PHASE TWO TIME - EACH INCREMENTAL 1 MINUTE

## 2025-06-17 PROCEDURE — 43239 EGD BIOPSY SINGLE/MULTIPLE: CPT | Performed by: INTERNAL MEDICINE

## 2025-06-17 RX ORDER — ONDANSETRON HYDROCHLORIDE 2 MG/ML
4 INJECTION, SOLUTION INTRAVENOUS ONCE AS NEEDED
OUTPATIENT
Start: 2025-06-17

## 2025-06-17 RX ORDER — ALBUTEROL SULFATE 0.83 MG/ML
2.5 SOLUTION RESPIRATORY (INHALATION) ONCE AS NEEDED
OUTPATIENT
Start: 2025-06-17

## 2025-06-17 RX ORDER — METOCLOPRAMIDE HYDROCHLORIDE 5 MG/ML
10 INJECTION INTRAMUSCULAR; INTRAVENOUS ONCE AS NEEDED
OUTPATIENT
Start: 2025-06-17

## 2025-06-17 RX ORDER — OXYCODONE HYDROCHLORIDE 5 MG/1
5 TABLET ORAL EVERY 4 HOURS PRN
Refills: 0 | OUTPATIENT
Start: 2025-06-17

## 2025-06-17 RX ORDER — PROPOFOL 10 MG/ML
INJECTION, EMULSION INTRAVENOUS AS NEEDED
Status: DISCONTINUED | OUTPATIENT
Start: 2025-06-17 | End: 2025-06-17

## 2025-06-17 RX ORDER — OXYCODONE HYDROCHLORIDE 5 MG/1
10 TABLET ORAL EVERY 4 HOURS PRN
Refills: 0 | OUTPATIENT
Start: 2025-06-17

## 2025-06-17 RX ORDER — LIDOCAINE HYDROCHLORIDE 10 MG/ML
INJECTION, SOLUTION EPIDURAL; INFILTRATION; INTRACAUDAL; PERINEURAL AS NEEDED
Status: DISCONTINUED | OUTPATIENT
Start: 2025-06-17 | End: 2025-06-17

## 2025-06-17 RX ORDER — LABETALOL HYDROCHLORIDE 5 MG/ML
5 INJECTION, SOLUTION INTRAVENOUS ONCE AS NEEDED
OUTPATIENT
Start: 2025-06-17

## 2025-06-17 RX ORDER — LIDOCAINE HYDROCHLORIDE 10 MG/ML
0.1 INJECTION, SOLUTION EPIDURAL; INFILTRATION; INTRACAUDAL; PERINEURAL ONCE
OUTPATIENT
Start: 2025-06-17 | End: 2025-06-17

## 2025-06-17 RX ORDER — ACETAMINOPHEN 325 MG/1
650 TABLET ORAL EVERY 4 HOURS PRN
OUTPATIENT
Start: 2025-06-17

## 2025-06-17 RX ORDER — SODIUM CHLORIDE, SODIUM LACTATE, POTASSIUM CHLORIDE, CALCIUM CHLORIDE 600; 310; 30; 20 MG/100ML; MG/100ML; MG/100ML; MG/100ML
100 INJECTION, SOLUTION INTRAVENOUS CONTINUOUS
OUTPATIENT
Start: 2025-06-17 | End: 2025-06-17

## 2025-06-17 RX ADMIN — PROPOFOL 50 MG: 10 INJECTION, EMULSION INTRAVENOUS at 13:20

## 2025-06-17 RX ADMIN — PROPOFOL 500 MG: 10 INJECTION, EMULSION INTRAVENOUS at 12:55

## 2025-06-17 RX ADMIN — LIDOCAINE HYDROCHLORIDE 40 MG: 10 INJECTION, SOLUTION EPIDURAL; INFILTRATION; INTRACAUDAL; PERINEURAL at 12:55

## 2025-06-17 SDOH — HEALTH STABILITY: MENTAL HEALTH: CURRENT SMOKER: 0

## 2025-06-17 ASSESSMENT — ENCOUNTER SYMPTOMS
NEUROLOGICAL NEGATIVE: 1
PSYCHIATRIC NEGATIVE: 1
ENDOCRINE NEGATIVE: 1
ALLERGIC/IMMUNOLOGIC NEGATIVE: 1
HEMATOLOGIC/LYMPHATIC NEGATIVE: 1
GASTROINTESTINAL NEGATIVE: 1
MUSCULOSKELETAL NEGATIVE: 1
RESPIRATORY NEGATIVE: 1
EYES NEGATIVE: 1
CONSTITUTIONAL NEGATIVE: 1
CARDIOVASCULAR NEGATIVE: 1

## 2025-06-17 ASSESSMENT — PAIN - FUNCTIONAL ASSESSMENT
PAIN_FUNCTIONAL_ASSESSMENT: 0-10

## 2025-06-17 ASSESSMENT — PAIN SCALES - GENERAL
PAINLEVEL_OUTOF10: 0 - NO PAIN

## 2025-06-17 ASSESSMENT — COLUMBIA-SUICIDE SEVERITY RATING SCALE - C-SSRS
6. HAVE YOU EVER DONE ANYTHING, STARTED TO DO ANYTHING, OR PREPARED TO DO ANYTHING TO END YOUR LIFE?: NO
1. IN THE PAST MONTH, HAVE YOU WISHED YOU WERE DEAD OR WISHED YOU COULD GO TO SLEEP AND NOT WAKE UP?: NO
2. HAVE YOU ACTUALLY HAD ANY THOUGHTS OF KILLING YOURSELF?: NO

## 2025-06-17 NOTE — ANESTHESIA PREPROCEDURE EVALUATION
Patient: Anahi Wall    Procedure Information       Anesthesia Start Date/Time: 25 1143    Scheduled providers: Morris Chiu MD; Anaid Holliday RN; Eileen Ha MA; Min German DO    Procedures:       COLONOSCOPY      EGD    Location: [facility]            Relevant Problems   Cardiac   (+) Chest pain   (+) HTN (hypertension), benign   (+) Hyperlipidemia      Neuro   (+) Anxiety   (+) Depression      GI   (+) Acid reflux      /Renal   (+) Acute kidney injury (nontraumatic)   (+) Kidney stone   (+) Malignant neoplasm of kidney (Multi)   (+) Uric acid nephrolithiasis   (+) Urothelial carcinoma of kidney, right      Hematology   (+) Anemia      Musculoskeletal   (+) Degeneration of lumbar intervertebral disc   (+) Disc displacement, lumbar   (+) Localized primary osteoarthritis of carpometacarpal joint of left thumb   (+) Lumbosacral spinal stenosis   (+) Primary osteoarthritis of left knee      HEENT   (+) Sensorineural hearing loss of both ears   (+) Vision loss      GYN   (+) Malignant neoplasm of left breast       Clinical information reviewed:   Tobacco  Allergies  Meds   Med Hx  Surg Hx  OB Status  Fam Hx  Soc   Hx        NPO Detail:  NPO/Void Status  Date of Last Liquid: 25  Time of Last Liquid: 830  Date of Last Solid: 06/15/25  Time of Last Solid:   Last Intake Type: Clear fluids         Physical Exam    Airway  Mallampati: II  TM distance: <3 FB  Neck ROM: limited  Mouth openin finger widths     Cardiovascular   Rhythm: regular  Rate: normal     Dental - normal exam     Pulmonary Breath sounds clear to auscultation     Abdominal (+) obese  Abdomen: soft             Anesthesia Plan    History of general anesthesia?: yes  History of complications of general anesthesia?: no    ASA 2     MAC     The patient is not a current smoker.    intravenous induction   Anesthetic plan and risks discussed with patient.  Use of blood products discussed with patient who  consented to blood products.    Plan discussed with CAA and CRNA.

## 2025-06-17 NOTE — H&P
History Of Present Illness  Anahi Wall is a 80 y.o. female presenting with iron deficiency anemia.  She had a colonoscopy in 2016.  She is also noted to have low B12.  Upper endoscopy and colonoscopy due to anemia is being done today.  I reviewed her last labs as well as her previous colonoscopy..     Past Medical History  Medical History[1]    Surgical History  Surgical History[2]     Social History  She reports that she quit smoking about 57 years ago. Her smoking use included cigarettes. She started smoking about 59 years ago. She has a 1 pack-year smoking history. She has never used smokeless tobacco. She reports that she does not currently use alcohol. She reports that she does not use drugs.    Family History  Family History[3]     Allergies  Aspirin, Nsaids (non-steroidal anti-inflammatory drug), and Penicillins    Review of Systems   Constitutional: Negative.    HENT: Negative.     Eyes: Negative.    Respiratory: Negative.     Cardiovascular: Negative.    Gastrointestinal: Negative.    Endocrine: Negative.    Genitourinary: Negative.    Musculoskeletal: Negative.    Skin: Negative.    Allergic/Immunologic: Negative.    Neurological: Negative.    Hematological: Negative.    Psychiatric/Behavioral: Negative.     All other systems reviewed and are negative.       Physical Exam  Constitutional:       Appearance: Normal appearance.   Eyes:      Conjunctiva/sclera: Conjunctivae normal.   Cardiovascular:      Rate and Rhythm: Normal rate and regular rhythm.   Pulmonary:      Effort: Pulmonary effort is normal.      Breath sounds: Normal breath sounds.   Abdominal:      General: Abdomen is flat. Bowel sounds are normal.      Palpations: Abdomen is soft.   Musculoskeletal:         General: Normal range of motion.      Cervical back: Normal range of motion.   Neurological:      Mental Status: She is alert.          Last Recorded Vitals  Blood pressure 147/62, pulse 86, temperature 36.6 °C (97.9 °F), temperature  "source Temporal, resp. rate 14, height 1.575 m (5' 2\"), weight 68.4 kg (150 lb 12.7 oz), SpO2 95%.    Relevant Results             Assessment & Plan  Iron deficiency anemia due to chronic blood loss      Patient is a 80-year-old with a history of iron deficiency anemia and low B12 as well.  She is here today for colonoscopy and endoscopy.  I discussed the procedures, sedation and recovery.  She is agreeable.  We will proceed.  Further management based on findings of these procedures.    I spent 20 minutes in the professional and overall care of this patient.      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 2023    arm    GERD (gastroesophageal reflux disease)     Managed with Prilosec    Hearing aid worn     both ears    HL (hearing loss)     Hyperlipidemia     Kidney stone     Plan: Cystoscopy; Left Ureteroscopy; Holmium Laser Lithotripsy; Ureteral Stent Insertion 5/14/25    Nephrolithiasis     Oncology follow-up encounter     Marcos Rojas MD LOV 4/2/25    Osteoporosis     Overweight     Tinnitus 1997    Trochanteric bursitis, unspecified hip     Urothelial cancer (Multi)     Vision loss     wears glasses    Vitamin D deficiency     daily supplement   [2]   Past Surgical History:  Procedure Laterality Date    BLADDER SURGERY  09/03/2019    bladder bx    BLADDER SUSPENSION  2007    BREAST LUMPECTOMY Left     Breast Surgery Lumpectomy x 2 1996    CARPAL TUNNEL RELEASE Left 2003    Neuroplasty Decompression Median Nerve At " Carpal Tunnel    CATARACT EXTRACTION      CATARACT EXTRACTION EXTRACAPSULAR W/ INTRAOCULAR LENS IMPLANTATION Bilateral 2017    COLONOSCOPY      CYSTOSCOPY      multiple    CYSTOSCOPY Left 04/09/2025    Left CYSTOSCOPY, WITH URETERAL STENT INSERTION - Left    CYSTOSCOPY W/ URETERAL STENT PLACEMENT  05/14/2025    HYSTERECTOMY  02/10/2014    Hysterectomy    NEPHRECTOMY Right 03/28/2024    TURBT, right nephroureterectomy, RPLND    SHOULDER SURGERY Left     left shoulder surgery to remove bone spur   [3]   Family History  Problem Relation Name Age of Onset    Diabetes Mother      Multiple sclerosis Mother      Hypertension Father      Stroke Father      Heart disease Father      Bipolar disorder Brother      Cardiomyopathy Brother

## 2025-06-18 ENCOUNTER — TELEPHONE (OUTPATIENT)
Dept: PRIMARY CARE | Facility: CLINIC | Age: 80
End: 2025-06-18
Payer: MEDICARE

## 2025-06-18 NOTE — TELEPHONE ENCOUNTER
She called to make an appointment with hematology. The soonest appointment she could get was in December.

## 2025-06-19 DIAGNOSIS — K90.9 IDIOPATHIC STEATORRHEA (HHS-HCC): Primary | ICD-10-CM

## 2025-06-19 DIAGNOSIS — D50.0 IRON DEFICIENCY ANEMIA DUE TO CHRONIC BLOOD LOSS: ICD-10-CM

## 2025-06-19 RX ORDER — DIPHENHYDRAMINE HYDROCHLORIDE 50 MG/ML
50 INJECTION, SOLUTION INTRAMUSCULAR; INTRAVENOUS AS NEEDED
Status: CANCELLED | OUTPATIENT
Start: 2025-06-24

## 2025-06-19 RX ORDER — EPINEPHRINE 0.3 MG/.3ML
0.3 INJECTION SUBCUTANEOUS EVERY 5 MIN PRN
OUTPATIENT
Start: 2025-06-25

## 2025-06-19 RX ORDER — DIPHENHYDRAMINE HYDROCHLORIDE 50 MG/ML
50 INJECTION, SOLUTION INTRAMUSCULAR; INTRAVENOUS AS NEEDED
OUTPATIENT
Start: 2025-06-25

## 2025-06-19 RX ORDER — EPINEPHRINE 0.3 MG/.3ML
0.3 INJECTION SUBCUTANEOUS EVERY 5 MIN PRN
Status: CANCELLED | OUTPATIENT
Start: 2025-06-24

## 2025-06-19 RX ORDER — ALBUTEROL SULFATE 0.83 MG/ML
3 SOLUTION RESPIRATORY (INHALATION) AS NEEDED
OUTPATIENT
Start: 2025-06-25

## 2025-06-19 RX ORDER — FAMOTIDINE 10 MG/ML
20 INJECTION, SOLUTION INTRAVENOUS ONCE AS NEEDED
Status: CANCELLED | OUTPATIENT
Start: 2025-06-24

## 2025-06-19 RX ORDER — ALBUTEROL SULFATE 0.83 MG/ML
3 SOLUTION RESPIRATORY (INHALATION) AS NEEDED
Status: CANCELLED | OUTPATIENT
Start: 2025-06-24

## 2025-06-19 RX ORDER — FAMOTIDINE 10 MG/ML
20 INJECTION, SOLUTION INTRAVENOUS ONCE AS NEEDED
OUTPATIENT
Start: 2025-06-25

## 2025-06-23 ENCOUNTER — PATIENT MESSAGE (OUTPATIENT)
Dept: UROLOGY | Facility: HOSPITAL | Age: 80
End: 2025-06-23
Payer: MEDICARE

## 2025-06-24 ENCOUNTER — APPOINTMENT (OUTPATIENT)
Dept: HEMATOLOGY/ONCOLOGY | Facility: CLINIC | Age: 80
End: 2025-06-24
Payer: MEDICARE

## 2025-06-24 RX ORDER — HEPARIN 100 UNIT/ML
500 SYRINGE INTRAVENOUS AS NEEDED
Status: CANCELLED | OUTPATIENT
Start: 2025-06-24

## 2025-06-24 RX ORDER — HEPARIN SODIUM,PORCINE/PF 10 UNIT/ML
50 SYRINGE (ML) INTRAVENOUS AS NEEDED
Status: CANCELLED | OUTPATIENT
Start: 2025-06-24

## 2025-06-25 ENCOUNTER — INFUSION (OUTPATIENT)
Dept: HEMATOLOGY/ONCOLOGY | Facility: CLINIC | Age: 80
End: 2025-06-25
Payer: MEDICARE

## 2025-06-25 ENCOUNTER — OFFICE VISIT (OUTPATIENT)
Dept: HEMATOLOGY/ONCOLOGY | Facility: CLINIC | Age: 80
End: 2025-06-25
Payer: MEDICARE

## 2025-06-25 VITALS
DIASTOLIC BLOOD PRESSURE: 66 MMHG | RESPIRATION RATE: 16 BRPM | HEIGHT: 61 IN | BODY MASS INDEX: 28.87 KG/M2 | TEMPERATURE: 97.9 F | SYSTOLIC BLOOD PRESSURE: 108 MMHG | OXYGEN SATURATION: 95 % | HEART RATE: 81 BPM

## 2025-06-25 VITALS
BODY MASS INDEX: 28.43 KG/M2 | DIASTOLIC BLOOD PRESSURE: 71 MMHG | HEIGHT: 61 IN | HEART RATE: 88 BPM | OXYGEN SATURATION: 97 % | WEIGHT: 150.57 LBS | TEMPERATURE: 97.3 F | RESPIRATION RATE: 16 BRPM | SYSTOLIC BLOOD PRESSURE: 109 MMHG

## 2025-06-25 DIAGNOSIS — N18.30 ANEMIA DUE TO STAGE 3 CHRONIC KIDNEY DISEASE, UNSPECIFIED WHETHER STAGE 3A OR 3B CKD: ICD-10-CM

## 2025-06-25 DIAGNOSIS — K90.9 IDIOPATHIC STEATORRHEA (HHS-HCC): ICD-10-CM

## 2025-06-25 DIAGNOSIS — C68.9 UROTHELIAL CARCINOMA: ICD-10-CM

## 2025-06-25 DIAGNOSIS — D50.0 IRON DEFICIENCY ANEMIA DUE TO CHRONIC BLOOD LOSS: ICD-10-CM

## 2025-06-25 DIAGNOSIS — D63.1 ANEMIA DUE TO STAGE 3 CHRONIC KIDNEY DISEASE, UNSPECIFIED WHETHER STAGE 3A OR 3B CKD: ICD-10-CM

## 2025-06-25 DIAGNOSIS — D50.0 IRON DEFICIENCY ANEMIA DUE TO CHRONIC BLOOD LOSS: Primary | ICD-10-CM

## 2025-06-25 LAB
ERYTHROCYTE [DISTWIDTH] IN BLOOD BY AUTOMATED COUNT: 14.9 % (ref 11.5–14.5)
FERRITIN SERPL-MCNC: 287 NG/ML (ref 8–150)
HCT VFR BLD AUTO: 30 % (ref 36–46)
HGB BLD-MCNC: 9.3 G/DL (ref 12–16)
IRON SATN MFR SERPL: 7 % (ref 25–45)
IRON SERPL-MCNC: 18 UG/DL (ref 35–150)
LABORATORY COMMENT REPORT: NORMAL
MCH RBC QN AUTO: 29.3 PG (ref 26–34)
MCHC RBC AUTO-ENTMCNC: 31 G/DL (ref 32–36)
MCV RBC AUTO: 95 FL (ref 80–100)
PATH REPORT.FINAL DX SPEC: NORMAL
PATH REPORT.GROSS SPEC: NORMAL
PATH REPORT.RELEVANT HX SPEC: NORMAL
PATH REPORT.TOTAL CANCER: NORMAL
PLATELET # BLD AUTO: 127 X10*3/UL (ref 150–450)
RBC # BLD AUTO: 3.17 X10*6/UL (ref 4–5.2)
TIBC SERPL-MCNC: 248 UG/DL (ref 240–445)
UIBC SERPL-MCNC: 230 UG/DL (ref 110–370)
WBC # BLD AUTO: 5.6 X10*3/UL (ref 4.4–11.3)

## 2025-06-25 PROCEDURE — 3074F SYST BP LT 130 MM HG: CPT | Performed by: PHYSICIAN ASSISTANT

## 2025-06-25 PROCEDURE — 85027 COMPLETE CBC AUTOMATED: CPT

## 2025-06-25 PROCEDURE — 1126F AMNT PAIN NOTED NONE PRSNT: CPT | Performed by: PHYSICIAN ASSISTANT

## 2025-06-25 PROCEDURE — 96365 THER/PROPH/DIAG IV INF INIT: CPT | Mod: INF

## 2025-06-25 PROCEDURE — 83550 IRON BINDING TEST: CPT

## 2025-06-25 PROCEDURE — 82728 ASSAY OF FERRITIN: CPT

## 2025-06-25 PROCEDURE — 1160F RVW MEDS BY RX/DR IN RCRD: CPT | Performed by: PHYSICIAN ASSISTANT

## 2025-06-25 PROCEDURE — 2500000004 HC RX 250 GENERAL PHARMACY W/ HCPCS (ALT 636 FOR OP/ED): Mod: JZ,TB | Performed by: PHYSICIAN ASSISTANT

## 2025-06-25 PROCEDURE — 99215 OFFICE O/P EST HI 40 MIN: CPT | Mod: 25 | Performed by: PHYSICIAN ASSISTANT

## 2025-06-25 PROCEDURE — 1159F MED LIST DOCD IN RCRD: CPT | Performed by: PHYSICIAN ASSISTANT

## 2025-06-25 PROCEDURE — 3078F DIAST BP <80 MM HG: CPT | Performed by: PHYSICIAN ASSISTANT

## 2025-06-25 PROCEDURE — 99215 OFFICE O/P EST HI 40 MIN: CPT | Performed by: PHYSICIAN ASSISTANT

## 2025-06-25 RX ORDER — HEPARIN SODIUM,PORCINE/PF 10 UNIT/ML
50 SYRINGE (ML) INTRAVENOUS AS NEEDED
OUTPATIENT
Start: 2025-06-25

## 2025-06-25 RX ORDER — EPINEPHRINE 0.3 MG/.3ML
0.3 INJECTION SUBCUTANEOUS EVERY 5 MIN PRN
Status: DISCONTINUED | OUTPATIENT
Start: 2025-06-25 | End: 2025-06-25 | Stop reason: HOSPADM

## 2025-06-25 RX ORDER — DIPHENHYDRAMINE HYDROCHLORIDE 50 MG/ML
50 INJECTION, SOLUTION INTRAMUSCULAR; INTRAVENOUS AS NEEDED
Status: DISCONTINUED | OUTPATIENT
Start: 2025-06-25 | End: 2025-06-25 | Stop reason: HOSPADM

## 2025-06-25 RX ORDER — FAMOTIDINE 10 MG/ML
20 INJECTION, SOLUTION INTRAVENOUS ONCE AS NEEDED
Status: DISCONTINUED | OUTPATIENT
Start: 2025-06-25 | End: 2025-06-25 | Stop reason: HOSPADM

## 2025-06-25 RX ORDER — HEPARIN 100 UNIT/ML
500 SYRINGE INTRAVENOUS AS NEEDED
OUTPATIENT
Start: 2025-06-25

## 2025-06-25 RX ORDER — DIPHENHYDRAMINE HYDROCHLORIDE 50 MG/ML
50 INJECTION, SOLUTION INTRAMUSCULAR; INTRAVENOUS AS NEEDED
OUTPATIENT
Start: 2025-07-02

## 2025-06-25 RX ORDER — ALBUTEROL SULFATE 0.83 MG/ML
3 SOLUTION RESPIRATORY (INHALATION) AS NEEDED
OUTPATIENT
Start: 2025-07-02

## 2025-06-25 RX ORDER — ALBUTEROL SULFATE 0.83 MG/ML
3 SOLUTION RESPIRATORY (INHALATION) AS NEEDED
Status: DISCONTINUED | OUTPATIENT
Start: 2025-06-25 | End: 2025-06-25 | Stop reason: HOSPADM

## 2025-06-25 RX ORDER — FAMOTIDINE 10 MG/ML
20 INJECTION, SOLUTION INTRAVENOUS ONCE AS NEEDED
OUTPATIENT
Start: 2025-07-02

## 2025-06-25 RX ORDER — EPINEPHRINE 0.3 MG/.3ML
0.3 INJECTION SUBCUTANEOUS EVERY 5 MIN PRN
OUTPATIENT
Start: 2025-07-02

## 2025-06-25 RX ADMIN — SODIUM CHLORIDE 510 MG: 900 INJECTION, SOLUTION INTRAVENOUS at 14:01

## 2025-06-25 ASSESSMENT — PAIN SCALES - GENERAL: PAINLEVEL_OUTOF10: 0-NO PAIN

## 2025-06-25 NOTE — PROGRESS NOTES
Patient ID: Anahi Wall is a 80 y.o. female.  Diagnosis: Anemia, normocytic normochromic   Primary Care Provider: Jose Luis Conrad MD  Referring Provider: Dr Crystal Conrad MD  [address] [place] [address] [place] [address]  Visit Type: Initial Visit    Date of Service: 06/25/25    Diagnosis/Reason: Normocytic normochromic anemia    Subjective      Anahi Wall is a 80 y.o. female referred for consultation of normocytic normochromic anemia.  PMH:  bladder cancer, ckd stage 3, vitamin D deficiency, remote hx of breast cancer.  Pt sees Dr Rojas for bladder cancer surveillance.  In review of epic pt has had a fairly recent onset of anemia dating back about one year to march 2024.  H/H ranges 8.8 - 11 with normal WBC she has had a mild bout of thrombocytopenia with platelets ranging from 100-150 K over past year.  Most recent CT scan showed some mild splenomegaly at 13 cm.  No s/s of any active bleeding noted by pt.  Her inflammatory markers her markedly elevated with , sed rate 63, and RF factor 15.  She has had knee pain in past but recently had injection and improved.  Uses cane for ambulation and mainly sedentary lifestyle.  Her most recent labs reveal SERAFIN with transferrin saturation of 12%, iron 22, and ferritin was likely elevated due to inflammation.  Cystoscopy shows bladder reoccurrence in 3/2025.  Per Dr Rojas's note pt refused any adjuvant therapy with new reoccurrence found  on cystoscopy 3/25.  Pt had been taking oral iron bid without any improvement in iron levels.  Has been taking oral iron for past 4 months.      INTERVAL HISTORY     Pt presents with  for initial evaluation for anemia, normocytic normochromic. + fatigue difficulty performing daily tasks.  Uses cane for ambulation.  No issues with n/v/d or constipation, no melena, No hematuria  No treatment for bladder cancer at present surveillance only, refused anymore treatments  She did have one Kidney removed and on ct  scan shows severe hydronephrosis of left kidney with pernephric inflammation in 4/2025.      Review of Systems - Oncology    Patient denies weight loss, abnormal bruising and bleeding, hematuria, blood in stool, dark/black stools, epistaxis, oral/gingival bleeding, lymphadenopathy, recurrent infections, recurrent fevers, night sweats, early satiety, abdominal pain, bone pain, chest pain, palpitations, SOB, RENEE, fatigue, dizziness, lightheadedness, PICA.    Per review of records:  Cystoscopy reviewed 3/2025 likely bladder reoccurrence x 2  Colonoscopy and EGD reviewed negative for any bleeding 6/2025  Epo level elevated at 27.7 on 6/11/25    Previous Hematological Background:  Hx of hematological disorders: No - Patient denies prior hematologic history  Hx of blood transfusions: No - Patient denies prior blood transfusion  Hx of iron supplementation: was not taking it correctly taking with caffeine and dairy and not vitamin c but now knows to that   Hx of B12 supplementation: none  Hx of folate supplementation: none    Relevant medications:  Ferrous sulfate bid x 4 months without any rise in h/h   Currently using NSAID's (Naproxen, Ibuprofen etc.): none  Currently taking any supplements: none    PMHx:  Medical History[1]  Active Ambulatory Problems     Diagnosis Date Noted    Abnormal mammogram 09/14/2023    Acid reflux 09/14/2023    Allergic rhinitis 09/14/2023    Alopecia 09/14/2023    Atrophy of vagina 09/14/2023    Bladder cancer (Multi) 09/14/2023    Bladder tumor 09/14/2023    Carcinoma of bladder 09/14/2023    Chest pain 09/14/2023    Colon polyp 09/14/2023    De Quervain's tenosynovitis, right 09/14/2023    Decreased hearing 09/14/2023    Degeneration of lumbar intervertebral disc 09/14/2023    Closed fracture of proximal end of humerus 09/14/2023    Disc displacement, lumbar 09/14/2023    Dyslipidemia 09/14/2023    HTN (hypertension), benign 09/14/2023    Hyperlipidemia 09/14/2023    Impaired fasting glucose  09/14/2023    Lateral epicondylitis of right elbow 09/14/2023    Left lateral epicondylitis 09/14/2023    Localized primary osteoarthritis of carpometacarpal joint of left thumb 09/14/2023    Lumbosacral spinal stenosis 09/14/2023    Osteopenia 09/14/2023    Primary osteoarthritis of left knee 09/14/2023    Sensorineural hearing loss of both ears 09/14/2023    Shoulder fracture 09/14/2023    Fracture of humerus 09/14/2023    Subjective tinnitus 09/14/2023    Trigger thumb of left hand 09/14/2023    Urinary incontinence 09/14/2023    Vitamin D deficiency 09/14/2023    Urothelial carcinoma 02/13/2024    Urothelial carcinoma of kidney, right 03/28/2024    Malignant neoplasm of kidney (Multi) 03/28/2024    Lipoma of neck 08/13/2024    Chronic fatigue 10/16/2024    Uric acid nephrolithiasis 10/22/2024    Worsening renal function 10/29/2024    Kidney stone 04/09/2025    Acute kidney injury (nontraumatic) 04/09/2025    Anemia 04/09/2025    Malignant neoplasm of left breast 04/09/2025    Anxiety 04/09/2025    Vision loss 04/09/2025    Depression 04/09/2025    Chronic cough 06/11/2025    Night sweats 06/11/2025    Chronic kidney disease, stage 3a (Multi) 06/11/2025    Iron deficiency anemia due to chronic blood loss 06/19/2025    Idiopathic steatorrhea ([facility]) 06/19/2025     Resolved Ambulatory Problems     Diagnosis Date Noted    No Resolved Ambulatory Problems     Past Medical History:   Diagnosis Date    Breast cancer     Carpal tunnel syndrome     Cataract     CKD (chronic kidney disease) stage 5, GFR less than 15 ml/min (Multi)     Fractures Feb. 2023    GERD (gastroesophageal reflux disease)     Hearing aid worn     HL (hearing loss)     Nephrolithiasis     Oncology follow-up encounter     Osteoporosis     Overweight     Tinnitus 1997    Trochanteric bursitis, unspecified hip     Urothelial cancer (Multi)       PSHx:  Surgical History[2]     FHx:  Family History[3]     Social Hx:  Anahi NOONAN Duke    reports that she  "quit smoking about 57 years ago. Her smoking use included cigarettes. She started smoking about 59 years ago. She has a 1 pack-year smoking history. She has never used smokeless tobacco.  She  reports that she does not currently use alcohol.  She  reports no history of drug use.  Social History[4]   Living Situation: lives with    Occupation: retired   Marital Status:  46 years   Alcohol Use:  none  Smoking: quit smoking 57 years ago     Cancer Screenings:  Upper EGD: 6/17/25 hiatel hernia 1 with rahel erosions, 2 polyps  Colonoscopy: 6/17/25 3 polyps, internal hemorrhoids non bleeding    Objective      /71 (BP Location: Left arm)   Pulse 88   Temp 36.3 °C (97.3 °F)   Resp 16   Ht (!) 1.538 m (5' 0.55\")   Wt 68.3 kg (150 lb 9.2 oz)   SpO2 97%   BMI 28.87 kg/m²   BSA: 1.71 meters squared    Wt Readings from Last 5 Encounters:   06/25/25 68.3 kg (150 lb 9.2 oz)   06/17/25 68.4 kg (150 lb 12.7 oz)   06/02/25 70.1 kg (154 lb 8.7 oz)   05/14/25 70.2 kg (154 lb 12.2 oz)   05/01/25 71 kg (156 lb 8.4 oz)       PHYSICAL EXAM   Physical Exam  Constitutional:       Appearance: Normal appearance.   Cardiovascular:      Rate and Rhythm: Normal rate and regular rhythm.   Pulmonary:      Effort: Pulmonary effort is normal.      Breath sounds: Normal breath sounds.   Skin:     General: Skin is warm and dry.   Neurological:      Mental Status: She is alert and oriented to person, place, and time.      Motor: Weakness present.      Gait: Gait abnormal.      Comments: Uses cane for ambulation    Psychiatric:         Mood and Affect: Mood normal.         Behavior: Behavior normal.         Thought Content: Thought content normal.         Judgment: Judgment normal.       Allergies  RX Allergies[5]   Medications  Current Outpatient Medications   Medication Instructions    alendronate (FOSAMAX) 70 mg, oral, Every 7 days, Takes on Sunday    cholecalciferol (Vitamin D-3) 50 MCG (2000 UT) tablet 1 tablet, Daily    " "ferrous sulfate 325 mg, 2 times daily    minoxidil (Loniten) 2.5 mg tablet 0.5 tablets, Daily    mirabegron (MYRBETRIQ) 25 mg, oral, Daily    mirtazapine (REMERON) 30 mg, oral, Nightly    omeprazole (PRILOSEC) 20 mg, oral, Daily    oxyCODONE (ROXICODONE) 5 mg, oral, Every 6 hours PRN    phenazopyridine (PYRIDIUM) 200 mg, oral, 3 times daily PRN    potassium citrate CR (Urocit-K-10) 10 mEq ER tablet 10 mEq, oral, 3 times daily (morning, midday, late afternoon), Do not crush, chew, or split.    rosuvastatin (CRESTOR) 20 mg, oral, Daily    sertraline (ZOLOFT) 200 mg, oral, Daily        Diagnostic Results   RESULTS     Lab Results   Component Value Date    WBC 5.6 06/25/2025    NEUTROABS 4.70 05/01/2025    IGABSOL 0.03 05/01/2025    LYMPHSABS 0.64 (L) 05/01/2025    MONOSABS 0.48 05/01/2025    EOSABS 66 06/11/2025    BASOSABS 19 06/11/2025    RBC 3.17 (L) 06/25/2025    MCV 95 06/25/2025    MCHC 31.0 (L) 06/25/2025    HGB 9.3 (L) 06/25/2025    HCT 30.0 (L) 06/25/2025     (L) 06/25/2025     No results found for: \"RETICCTPCT\"   Lab Results   Component Value Date    CREATININE 1.37 (H) 06/11/2025    BUN 23 06/11/2025    EGFR 39 (L) 06/11/2025     06/11/2025    K 4.1 06/11/2025     06/11/2025    CO2 28 06/11/2025      Lab Results   Component Value Date    ALT 15 06/11/2025    AST 17 06/11/2025    ALKPHOS 79 06/11/2025    BILITOT 0.3 06/11/2025      Lab Results   Component Value Date    TSH 2.31 03/01/2024     Lab Results   Component Value Date    TSH 2.31 03/01/2024     Lab Results   Component Value Date    IRON 18 (L) 06/25/2025    TIBC 248 06/25/2025    FERRITIN 287 (H) 06/25/2025      Lab Results   Component Value Date    YZMLWDTU24 279 06/11/2025      Lab Results   Component Value Date    FOLATE 5.8 06/11/2025     Lab Results   Component Value Date    MARJAN NEGATIVE 06/11/2025    SEDRATE 63 (H) 06/11/2025      Lab Results   Component Value Date    .0 (H) 06/11/2025      Lab Results   Component " Value Date    SPEP Hypoalbuminemia.    06/12/2025     Lab Results   Component Value Date    HEPBCAB Nonreactive 07/26/2024    HEPBSAG Nonreactive 07/26/2024       Recent Imaging     Assessment/Plan   ASSESSMENT   Anahi Wall is a 80 y.o. female referred for consultation of normocytic normochromic anemia.  PMH:  bladder cancer, ckd stage 3, vitamin D deficiency, remote hx of breast cancer.  Pt sees Dr Rojas for bladder cancer surveillance.  In review of epic pt has had a fairly recent onset of anemia dating back about one year to march 2024.  H/H ranges 8.8 - 11 with normal WBC she has had a mild bout of thrombocytopenia with platelets ranging from 100-150 K over past year.    Discussed possible etiologies including multifactorial, nutritional deficiency, anemia of chronic disease, malabsorption, hemopathy, medications, bleeding, malignancy, etc.  Will start hematological workup today.   Pt already had iron levels drawn to show she is iron deficient along with inflammatory markers showing inflammation likely due from her active bladder cancer that is currently not being treated.  She did have Upper and lower scopes that did not show any active bleeding and she had recent cystoscopy and denies any hematuria.  Anemia is likely multifactorial and due to CKD stage 3, Chronic disease, SERAFIN.      I reviewed patient's chart including but not limited to labs, imaging, surgical/procedure notes, pathology, hospital notes, doctor's notes.    PLAN     #  normocytic normochromic anemia   Labs workup today to complete anemia workup   Replete iron iv feraheme 510 mg iv x 2 pt had been taking po iron without any improvement in iron levels possible absorption issues  Pt may benefit from epo injections in future but will maximize iron levels first   Epo level 27.7 on 6/11/25 since pt has ckd stage 3   4. Follow up with urologist, oncologist and pcp as scheduled  5. Follow up with Chel Shields NP at [facility] in 8 weeks with  lab recheck     Follow up 8 weeks     Diagnoses and all orders for this visit:  Iron deficiency anemia due to chronic blood loss (Primary)  -     Referral To Hematology and Oncology  -     Clinic Appointment Request [facility]; Future  Anemia due to stage 3 chronic kidney disease, unspecified whether stage 3a or 3b CKD  Urothelial carcinoma      Potential diagnoses, treatment options, and plan of care discussed with patient. Patient verbalizes understanding of above plan. Time provided for patient's questions. All questions answered to patient's satisfaction in office.      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 2023    arm    GERD (gastroesophageal reflux disease)     Managed with Prilosec    Hearing aid worn     both ears    HL (hearing loss)     Hyperlipidemia     Kidney stone     Plan: Cystoscopy; Left Ureteroscopy; Holmium Laser Lithotripsy; Ureteral Stent Insertion 5/14/25    Nephrolithiasis     Oncology follow-up encounter     Marcos Rojas MD LOV 4/2/25    Osteoporosis     Overweight     Tinnitus 1997    Trochanteric bursitis, unspecified hip     Urothelial cancer (Multi)     Vision loss     wears glasses    Vitamin D deficiency     daily supplement   [2]   Past Surgical History:  Procedure Laterality Date    BLADDER SURGERY  09/03/2019    bladder bx    BLADDER SUSPENSION  2007    BREAST LUMPECTOMY Left     Breast Surgery Lumpectomy x 2 1996     CARPAL TUNNEL RELEASE Left     Neuroplasty Decompression Median Nerve At Carpal Tunnel    CATARACT EXTRACTION      CATARACT EXTRACTION EXTRACAPSULAR W/ INTRAOCULAR LENS IMPLANTATION Bilateral 2017    COLONOSCOPY      CYSTOSCOPY      multiple    CYSTOSCOPY Left 2025    Left CYSTOSCOPY, WITH URETERAL STENT INSERTION - Left    CYSTOSCOPY W/ URETERAL STENT PLACEMENT  2025    HYSTERECTOMY  02/10/2014    Hysterectomy    NEPHRECTOMY Right 2024    TURBT, right nephroureterectomy, RPLND    SHOULDER SURGERY Left     left shoulder surgery to remove bone spur   [3]   Family History  Problem Relation Name Age of Onset    Diabetes Mother      Multiple sclerosis Mother      Hypertension Father      Stroke Father      Heart disease Father      Bipolar disorder Brother      Cardiomyopathy Brother     [4]   Social History  Socioeconomic History    Marital status:    Tobacco Use    Smoking status: Former     Current packs/day: 0.00     Average packs/day: 0.5 packs/day for 2.0 years (1.0 ttl pk-yrs)     Types: Cigarettes     Start date:      Quit date:      Years since quittin.5    Smokeless tobacco: Never   Vaping Use    Vaping status: Never Used   Substance and Sexual Activity    Alcohol use: Not Currently     Alcohol/week: 0.0 - 1.0 standard drinks of alcohol    Drug use: Never    Sexual activity: Defer     Social Drivers of Health     Financial Resource Strain: Low Risk  (3/29/2024)    Overall Financial Resource Strain (CARDIA)     Difficulty of Paying Living Expenses: Not hard at all   Transportation Needs: No Transportation Needs (3/31/2024)    OASIS : Transportation     Lack of Transportation (Medical): No     Lack of Transportation (Non-Medical): No     Patient Unable or Declines to Respond: No   Social Connections: Feeling Socially Integrated (3/31/2024)    OASIS : Social Isolation     Frequency of experiencing loneliness or isolation: Never   Housing Stability: Low Risk   (3/29/2024)    Housing Stability Vital Sign     Unable to Pay for Housing in the Last Year: No     Number of Places Lived in the Last Year: 1     Unstable Housing in the Last Year: No   [5]   Allergies  Allergen Reactions    Aspirin Nausea Only    Nsaids (Non-Steroidal Anti-Inflammatory Drug) Nausea Only and GI Upset    Penicillins Rash

## 2025-06-25 NOTE — PROGRESS NOTES
Patient arrived for first feraheme infusion, tolerated treatment without incident. Patient was monitored for 30 minutes post infusion, VS obtained and stable. Patient requesting next infusion to be at [facility], no appointments available next week. Per CNP rosi Browne for next feraheme infusion to be on 7/8.  Reviewed treatment schedule with patient who verbalized understanding. Anahi Duke denied any questions or concerns upon discharge.     Learner: patient and significant other  Educated on: first dose Feraheme  Readiness: acceptance  Preferred learning method: preferred: reading and listening  Method used: explanation  Response: verbalizes understanding  Barriers: None  Preferred language: English  Resources given: none

## 2025-06-30 ENCOUNTER — TELEPHONE (OUTPATIENT)
Dept: HEMATOLOGY/ONCOLOGY | Facility: CLINIC | Age: 80
End: 2025-06-30
Payer: MEDICARE

## 2025-06-30 NOTE — TELEPHONE ENCOUNTER
Updated in detail extensive GI workup which was negative unclear etiology for anemia may have some functional iron deficiency but her ferritin is slightly elevated did have evaluation with hematology they are planning on starting some IV iron if no improvement may need bone marrow biopsy and aspirate also did have some nonspecific lymph nodes in the past and discussed the pros and cons of getting a potential CT scan to look at potential lymphadenopathy to rule out other etiologies she will contact us when she decides about CT scan and follow-up with team regarding bone marrow biopsy and aspirate.

## 2025-07-01 ENCOUNTER — TELEMEDICINE (OUTPATIENT)
Dept: UROLOGY | Facility: HOSPITAL | Age: 80
End: 2025-07-01
Payer: MEDICARE

## 2025-07-01 DIAGNOSIS — C67.9 MALIGNANT NEOPLASM OF URINARY BLADDER, UNSPECIFIED SITE (MULTI): Primary | ICD-10-CM

## 2025-07-01 PROCEDURE — G2211 COMPLEX E/M VISIT ADD ON: HCPCS | Performed by: STUDENT IN AN ORGANIZED HEALTH CARE EDUCATION/TRAINING PROGRAM

## 2025-07-01 PROCEDURE — 99214 OFFICE O/P EST MOD 30 MIN: CPT | Performed by: STUDENT IN AN ORGANIZED HEALTH CARE EDUCATION/TRAINING PROGRAM

## 2025-07-01 RX ORDER — EPINEPHRINE 0.3 MG/.3ML
0.3 INJECTION SUBCUTANEOUS EVERY 5 MIN PRN
OUTPATIENT
Start: 2025-08-26

## 2025-07-01 RX ORDER — FAMOTIDINE 10 MG/ML
20 INJECTION, SOLUTION INTRAVENOUS ONCE AS NEEDED
OUTPATIENT
Start: 2025-08-12

## 2025-07-01 RX ORDER — DIPHENHYDRAMINE HYDROCHLORIDE 50 MG/ML
50 INJECTION, SOLUTION INTRAMUSCULAR; INTRAVENOUS AS NEEDED
OUTPATIENT
Start: 2025-08-12

## 2025-07-01 RX ORDER — DIPHENHYDRAMINE HYDROCHLORIDE 50 MG/ML
50 INJECTION, SOLUTION INTRAMUSCULAR; INTRAVENOUS AS NEEDED
OUTPATIENT
Start: 2025-08-19

## 2025-07-01 RX ORDER — DIPHENHYDRAMINE HYDROCHLORIDE 50 MG/ML
50 INJECTION, SOLUTION INTRAMUSCULAR; INTRAVENOUS AS NEEDED
OUTPATIENT
Start: 2025-07-29

## 2025-07-01 RX ORDER — ALBUTEROL SULFATE 0.83 MG/ML
3 SOLUTION RESPIRATORY (INHALATION) AS NEEDED
OUTPATIENT
Start: 2025-08-05

## 2025-07-01 RX ORDER — FAMOTIDINE 10 MG/ML
20 INJECTION, SOLUTION INTRAVENOUS ONCE AS NEEDED
OUTPATIENT
Start: 2025-08-26

## 2025-07-01 RX ORDER — FAMOTIDINE 10 MG/ML
20 INJECTION, SOLUTION INTRAVENOUS ONCE AS NEEDED
OUTPATIENT
Start: 2025-07-29

## 2025-07-01 RX ORDER — DIPHENHYDRAMINE HYDROCHLORIDE 50 MG/ML
50 INJECTION, SOLUTION INTRAMUSCULAR; INTRAVENOUS AS NEEDED
OUTPATIENT
Start: 2025-09-02

## 2025-07-01 RX ORDER — EPINEPHRINE 0.3 MG/.3ML
0.3 INJECTION SUBCUTANEOUS EVERY 5 MIN PRN
OUTPATIENT
Start: 2025-09-02

## 2025-07-01 RX ORDER — EPINEPHRINE 0.3 MG/.3ML
0.3 INJECTION SUBCUTANEOUS EVERY 5 MIN PRN
OUTPATIENT
Start: 2025-08-05

## 2025-07-01 RX ORDER — ALBUTEROL SULFATE 0.83 MG/ML
3 SOLUTION RESPIRATORY (INHALATION) AS NEEDED
OUTPATIENT
Start: 2025-08-12

## 2025-07-01 RX ORDER — ALBUTEROL SULFATE 0.83 MG/ML
3 SOLUTION RESPIRATORY (INHALATION) AS NEEDED
OUTPATIENT
Start: 2025-07-29

## 2025-07-01 RX ORDER — ALBUTEROL SULFATE 0.83 MG/ML
3 SOLUTION RESPIRATORY (INHALATION) AS NEEDED
OUTPATIENT
Start: 2025-08-19

## 2025-07-01 RX ORDER — DIPHENHYDRAMINE HYDROCHLORIDE 50 MG/ML
50 INJECTION, SOLUTION INTRAMUSCULAR; INTRAVENOUS AS NEEDED
OUTPATIENT
Start: 2025-08-05

## 2025-07-01 RX ORDER — FAMOTIDINE 10 MG/ML
20 INJECTION, SOLUTION INTRAVENOUS ONCE AS NEEDED
OUTPATIENT
Start: 2025-09-02

## 2025-07-01 RX ORDER — EPINEPHRINE 0.3 MG/.3ML
0.3 INJECTION SUBCUTANEOUS EVERY 5 MIN PRN
OUTPATIENT
Start: 2025-08-12

## 2025-07-01 RX ORDER — EPINEPHRINE 0.3 MG/.3ML
0.3 INJECTION SUBCUTANEOUS EVERY 5 MIN PRN
OUTPATIENT
Start: 2025-08-19

## 2025-07-01 RX ORDER — ALBUTEROL SULFATE 0.83 MG/ML
3 SOLUTION RESPIRATORY (INHALATION) AS NEEDED
OUTPATIENT
Start: 2025-08-26

## 2025-07-01 RX ORDER — FAMOTIDINE 10 MG/ML
20 INJECTION, SOLUTION INTRAVENOUS ONCE AS NEEDED
OUTPATIENT
Start: 2025-08-05

## 2025-07-01 RX ORDER — EPINEPHRINE 0.3 MG/.3ML
0.3 INJECTION SUBCUTANEOUS EVERY 5 MIN PRN
OUTPATIENT
Start: 2025-07-29

## 2025-07-01 RX ORDER — FAMOTIDINE 10 MG/ML
20 INJECTION, SOLUTION INTRAVENOUS ONCE AS NEEDED
OUTPATIENT
Start: 2025-08-19

## 2025-07-01 RX ORDER — ALBUTEROL SULFATE 0.83 MG/ML
3 SOLUTION RESPIRATORY (INHALATION) AS NEEDED
OUTPATIENT
Start: 2025-09-02

## 2025-07-01 RX ORDER — DIPHENHYDRAMINE HYDROCHLORIDE 50 MG/ML
50 INJECTION, SOLUTION INTRAMUSCULAR; INTRAVENOUS AS NEEDED
OUTPATIENT
Start: 2025-08-26

## 2025-07-01 NOTE — ASSESSMENT & PLAN NOTE
80 y.o. female with NMIBC s/p cystoscopy with fulguration on 6/2/2025. 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 She has had significant urinary symptoms since. Her symptoms are likely due to years of bladder issues, TURBTs and treatments.     She has failed BCG years ago, she failed gem/doce, she now has failed adstiladrin.     PLAN:  We will start her on Bcg and Anktiva. We discussed the r/b/a and she understands and agrees with his plan.    Orders:    Procedure Appointment Request; Future    Urine culture; Future    Urinalysis with Reflex Microscopic; Future    Procedure Appointment Request; Future    Urine culture; Future    Urinalysis with Reflex Microscopic; Future    Procedure Appointment Request; Future    Urine culture; Future    Urinalysis with Reflex Microscopic; Future    Procedure Appointment Request; Future    Urine culture; Future    Urinalysis with Reflex Microscopic; Future    Procedure Appointment Request; Future    Urine culture; Future    Urinalysis with Reflex Microscopic; Future    Procedure Appointment Request; Future    Urine culture; Future    Urinalysis with Reflex Microscopic; Future

## 2025-07-01 NOTE — PROGRESS NOTES
Subjective    Anahi Wall is a 80 y.o. female with NMIBC s/p cystoscopy with fulguration on 6/2/2025 who presents for POV.    She reports feeling well. She is recovering well. She is on gemtesa.    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 She has had significant urinary symptoms since. Her symptoms are likely due to years of bladder issues, TURBTs and treatments.     She has failed BCG years ago, she failed gem/doce, she now has failed adstiladrin.     She has a longstanding history of UTC. 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 She has failed BCG, Petros Doce, and adstiladrin. She had a [facility]UC recurrence which was pT3 N0 disease.       Review of Systems    All systems were reviewed. Anything negative was noted in the HPI.    Objective   Physical Exam  Gen: No acute distress      Psych: Alert and oriented x3      Neuro:  Normal ROM     Resp: Nonlabored respirations      CV: Regular rate and rhythm      Abd: S, NT, ND.     : Deferred     Skin: Warm, dry and intact without rashes      Lymphatics: No peripheral edema           Assessment & Plan  Malignant neoplasm of urinary bladder, unspecified site (Multi)  80 y.o. female with NMIBC s/p cystoscopy with fulguration on 6/2/2025. 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 She has had significant urinary symptoms since. Her symptoms are likely due to years of bladder issues, TURBTs and treatments.     She has failed BCG years ago, she failed gem/doce, she now has failed adstiladrin.     PLAN:  We will start her on Bcg and Anktiva. We discussed the r/b/a and she understands and agrees with his plan.    Orders:    Procedure Appointment Request; Future    Urine culture; Future    Urinalysis with Reflex Microscopic; Future    Procedure Appointment Request; Future    Urine culture; Future    Urinalysis with Reflex Microscopic; Future    Procedure Appointment Request; Future    Urine culture; Future    Urinalysis  with Reflex Microscopic; Future    Procedure Appointment Request; Future    Urine culture; Future    Urinalysis with Reflex Microscopic; Future    Procedure Appointment Request; Future    Urine culture; Future    Urinalysis with Reflex Microscopic; Future    Procedure Appointment Request; Future    Urine culture; Future    Urinalysis with Reflex Microscopic; Future                        Scribe Attestation  By signing my name below, Ghislaine GARCIA Scribe   attest that this documentation has been prepared under the direction and in the presence of Jorge A De Leon MD MPH

## 2025-07-02 ENCOUNTER — APPOINTMENT (OUTPATIENT)
Dept: HEMATOLOGY/ONCOLOGY | Facility: CLINIC | Age: 80
End: 2025-07-02
Payer: MEDICARE

## 2025-07-08 ENCOUNTER — INFUSION (OUTPATIENT)
Dept: HEMATOLOGY/ONCOLOGY | Facility: CLINIC | Age: 80
End: 2025-07-08
Payer: MEDICARE

## 2025-07-08 VITALS
TEMPERATURE: 97.7 F | DIASTOLIC BLOOD PRESSURE: 76 MMHG | OXYGEN SATURATION: 94 % | BODY MASS INDEX: 28.94 KG/M2 | HEART RATE: 81 BPM | WEIGHT: 150.91 LBS | SYSTOLIC BLOOD PRESSURE: 116 MMHG | RESPIRATION RATE: 18 BRPM

## 2025-07-08 DIAGNOSIS — K90.9 IDIOPATHIC STEATORRHEA (HHS-HCC): ICD-10-CM

## 2025-07-08 DIAGNOSIS — D50.0 IRON DEFICIENCY ANEMIA DUE TO CHRONIC BLOOD LOSS: ICD-10-CM

## 2025-07-08 PROCEDURE — 96365 THER/PROPH/DIAG IV INF INIT: CPT | Mod: INF

## 2025-07-08 PROCEDURE — 2500000004 HC RX 250 GENERAL PHARMACY W/ HCPCS (ALT 636 FOR OP/ED): Mod: JZ,TB | Performed by: PHYSICIAN ASSISTANT

## 2025-07-08 RX ORDER — EPINEPHRINE 0.3 MG/.3ML
0.3 INJECTION SUBCUTANEOUS EVERY 5 MIN PRN
Status: DISCONTINUED | OUTPATIENT
Start: 2025-07-08 | End: 2025-07-08 | Stop reason: HOSPADM

## 2025-07-08 RX ORDER — DIPHENHYDRAMINE HYDROCHLORIDE 50 MG/ML
50 INJECTION, SOLUTION INTRAMUSCULAR; INTRAVENOUS AS NEEDED
Status: DISCONTINUED | OUTPATIENT
Start: 2025-07-08 | End: 2025-07-08 | Stop reason: HOSPADM

## 2025-07-08 RX ORDER — DIPHENHYDRAMINE HYDROCHLORIDE 50 MG/ML
50 INJECTION, SOLUTION INTRAMUSCULAR; INTRAVENOUS AS NEEDED
OUTPATIENT
Start: 2025-07-09

## 2025-07-08 RX ORDER — FAMOTIDINE 10 MG/ML
20 INJECTION, SOLUTION INTRAVENOUS ONCE AS NEEDED
Status: DISCONTINUED | OUTPATIENT
Start: 2025-07-08 | End: 2025-07-08 | Stop reason: HOSPADM

## 2025-07-08 RX ORDER — ALBUTEROL SULFATE 0.83 MG/ML
3 SOLUTION RESPIRATORY (INHALATION) AS NEEDED
OUTPATIENT
Start: 2025-07-09

## 2025-07-08 RX ORDER — ALBUTEROL SULFATE 0.83 MG/ML
3 SOLUTION RESPIRATORY (INHALATION) AS NEEDED
Status: DISCONTINUED | OUTPATIENT
Start: 2025-07-08 | End: 2025-07-08 | Stop reason: HOSPADM

## 2025-07-08 RX ORDER — EPINEPHRINE 0.3 MG/.3ML
0.3 INJECTION SUBCUTANEOUS EVERY 5 MIN PRN
OUTPATIENT
Start: 2025-07-09

## 2025-07-08 RX ORDER — FAMOTIDINE 10 MG/ML
20 INJECTION, SOLUTION INTRAVENOUS ONCE AS NEEDED
OUTPATIENT
Start: 2025-07-09

## 2025-07-08 RX ADMIN — SODIUM CHLORIDE 510 MG: 9 INJECTION, SOLUTION INTRAVENOUS at 10:15

## 2025-07-08 ASSESSMENT — PAIN SCALES - GENERAL: PAINLEVEL_OUTOF10: 0-NO PAIN

## 2025-07-16 DIAGNOSIS — G47.00 INSOMNIA, UNSPECIFIED TYPE: ICD-10-CM

## 2025-07-16 DIAGNOSIS — E78.5 DYSLIPIDEMIA: ICD-10-CM

## 2025-07-17 RX ORDER — ROSUVASTATIN CALCIUM 20 MG/1
20 TABLET, COATED ORAL DAILY
Qty: 90 TABLET | Refills: 3 | Status: SHIPPED | OUTPATIENT
Start: 2025-07-17 | End: 2026-08-21

## 2025-07-17 RX ORDER — MIRTAZAPINE 30 MG/1
30 TABLET, FILM COATED ORAL NIGHTLY
Qty: 90 TABLET | Refills: 3 | Status: SHIPPED | OUTPATIENT
Start: 2025-07-17

## 2025-07-29 DIAGNOSIS — C67.9 MALIGNANT NEOPLASM OF URINARY BLADDER, UNSPECIFIED SITE (MULTI): ICD-10-CM

## 2025-08-05 DIAGNOSIS — C67.9 MALIGNANT NEOPLASM OF URINARY BLADDER, UNSPECIFIED SITE (MULTI): ICD-10-CM

## 2025-08-12 DIAGNOSIS — C67.9 MALIGNANT NEOPLASM OF URINARY BLADDER, UNSPECIFIED SITE (MULTI): ICD-10-CM

## 2025-08-13 ENCOUNTER — OFFICE VISIT (OUTPATIENT)
Dept: PRIMARY CARE | Facility: CLINIC | Age: 80
End: 2025-08-13
Payer: MEDICARE

## 2025-08-13 ENCOUNTER — TELEPHONE (OUTPATIENT)
Dept: UROLOGY | Facility: CLINIC | Age: 80
End: 2025-08-13

## 2025-08-13 VITALS — HEART RATE: 84 BPM | DIASTOLIC BLOOD PRESSURE: 70 MMHG | SYSTOLIC BLOOD PRESSURE: 122 MMHG | OXYGEN SATURATION: 98 %

## 2025-08-13 DIAGNOSIS — G89.29 CHRONIC RIGHT SHOULDER PAIN: Primary | ICD-10-CM

## 2025-08-13 DIAGNOSIS — M25.511 CHRONIC RIGHT SHOULDER PAIN: Primary | ICD-10-CM

## 2025-08-13 PROCEDURE — 3074F SYST BP LT 130 MM HG: CPT | Performed by: INTERNAL MEDICINE

## 2025-08-13 PROCEDURE — 1159F MED LIST DOCD IN RCRD: CPT | Performed by: INTERNAL MEDICINE

## 2025-08-13 PROCEDURE — 99213 OFFICE O/P EST LOW 20 MIN: CPT | Performed by: INTERNAL MEDICINE

## 2025-08-13 PROCEDURE — 3078F DIAST BP <80 MM HG: CPT | Performed by: INTERNAL MEDICINE

## 2025-08-13 RX ORDER — METHYLPREDNISOLONE 4 MG/1
TABLET ORAL
Qty: 21 TABLET | Refills: 0 | Status: SHIPPED | OUTPATIENT
Start: 2025-08-13 | End: 2025-08-19

## 2025-08-19 DIAGNOSIS — C67.9 MALIGNANT NEOPLASM OF URINARY BLADDER, UNSPECIFIED SITE (MULTI): ICD-10-CM

## 2025-08-20 ENCOUNTER — OFFICE VISIT (OUTPATIENT)
Dept: HEMATOLOGY/ONCOLOGY | Facility: CLINIC | Age: 80
End: 2025-08-20
Payer: MEDICARE

## 2025-08-20 ENCOUNTER — LAB (OUTPATIENT)
Dept: LAB | Facility: CLINIC | Age: 80
End: 2025-08-20
Payer: MEDICARE

## 2025-08-20 VITALS
WEIGHT: 152.5 LBS | OXYGEN SATURATION: 96 % | SYSTOLIC BLOOD PRESSURE: 113 MMHG | HEART RATE: 76 BPM | DIASTOLIC BLOOD PRESSURE: 59 MMHG | BODY MASS INDEX: 29.24 KG/M2 | TEMPERATURE: 97 F

## 2025-08-20 DIAGNOSIS — E53.8 FOLATE DEFICIENCY: ICD-10-CM

## 2025-08-20 DIAGNOSIS — E53.8 VITAMIN B12 DEFICIENCY: ICD-10-CM

## 2025-08-20 DIAGNOSIS — D50.9 IRON DEFICIENCY ANEMIA, UNSPECIFIED IRON DEFICIENCY ANEMIA TYPE: ICD-10-CM

## 2025-08-20 DIAGNOSIS — D69.6 THROMBOCYTOPENIA: ICD-10-CM

## 2025-08-20 DIAGNOSIS — D69.6 THROMBOCYTOPENIA: Primary | ICD-10-CM

## 2025-08-20 LAB
ALBUMIN SERPL BCP-MCNC: 4.5 G/DL (ref 3.4–5)
ALP SERPL-CCNC: 116 U/L (ref 33–136)
ALT SERPL W P-5'-P-CCNC: 12 U/L (ref 7–45)
ANION GAP SERPL CALC-SCNC: 12 MMOL/L (ref 10–20)
AST SERPL W P-5'-P-CCNC: 14 U/L (ref 9–39)
BASOPHILS # BLD AUTO: 0.02 X10*3/UL (ref 0–0.1)
BASOPHILS NFR BLD AUTO: 0.3 %
BILIRUB SERPL-MCNC: 0.3 MG/DL (ref 0–1.2)
BUN SERPL-MCNC: 41 MG/DL (ref 6–23)
CALCIUM SERPL-MCNC: 9.7 MG/DL (ref 8.6–10.6)
CHLORIDE SERPL-SCNC: 103 MMOL/L (ref 98–107)
CMV IGG AVIDITY SERPL IA-RTO: NONREACTIVE %
CO2 SERPL-SCNC: 31 MMOL/L (ref 21–32)
CREAT SERPL-MCNC: 1.42 MG/DL (ref 0.5–1.05)
CRP SERPL-MCNC: 0.33 MG/DL
EGFRCR SERPLBLD CKD-EPI 2021: 37 ML/MIN/1.73M*2
EOSINOPHIL # BLD AUTO: 0.09 X10*3/UL (ref 0–0.4)
EOSINOPHIL NFR BLD AUTO: 1.1 %
ERYTHROCYTE [DISTWIDTH] IN BLOOD BY AUTOMATED COUNT: 15.3 % (ref 11.5–14.5)
FERRITIN SERPL-MCNC: 648 NG/ML (ref 8–150)
GLUCOSE SERPL-MCNC: 105 MG/DL (ref 74–99)
HAPTOGLOB SERPL NEPH-MCNC: 164 MG/DL (ref 30–200)
HCT VFR BLD AUTO: 38.3 % (ref 36–46)
HCV AB SER QL: NONREACTIVE
HGB BLD-MCNC: 12.3 G/DL (ref 12–16)
HGB RETIC QN: 36 PG (ref 28–38)
IMM GRANULOCYTES # BLD AUTO: 0.01 X10*3/UL (ref 0–0.5)
IMM GRANULOCYTES NFR BLD AUTO: 0.1 % (ref 0–0.9)
IMMATURE RETIC FRACTION: 7 %
IRON SATN MFR SERPL: 18 % (ref 25–45)
IRON SERPL-MCNC: 48 UG/DL (ref 35–150)
LDH SERPL L TO P-CCNC: 120 U/L (ref 84–246)
LYMPHOCYTES # BLD AUTO: 1.28 X10*3/UL (ref 0.8–3)
LYMPHOCYTES NFR BLD AUTO: 16.2 %
MCH RBC QN AUTO: 30 PG (ref 26–34)
MCHC RBC AUTO-ENTMCNC: 32.1 G/DL (ref 32–36)
MCV RBC AUTO: 93 FL (ref 80–100)
MONOCYTES # BLD AUTO: 0.56 X10*3/UL (ref 0.05–0.8)
MONOCYTES NFR BLD AUTO: 7.1 %
NEUTROPHILS # BLD AUTO: 5.94 X10*3/UL (ref 1.6–5.5)
NEUTROPHILS NFR BLD AUTO: 75.2 %
NRBC BLD-RTO: ABNORMAL /100{WBCS}
PLATELET # BLD AUTO: 166 X10*3/UL (ref 150–450)
POTASSIUM SERPL-SCNC: 4.4 MMOL/L (ref 3.5–5.3)
PROT SERPL-MCNC: 7 G/DL (ref 6.4–8.2)
PROT SERPL-MCNC: 7 G/DL (ref 6.4–8.2)
PROT UR-ACNC: 18 MG/DL (ref 5–25)
RBC # BLD AUTO: 4.1 X10*6/UL (ref 4–5.2)
RETICS #: 0.07 X10*6/UL (ref 0.02–0.11)
RETICS/RBC NFR AUTO: 1.9 % (ref 0.5–2)
RHEUMATOID FACT SER NEPH-ACNC: 12 IU/ML (ref 0–15)
SODIUM SERPL-SCNC: 142 MMOL/L (ref 136–145)
TIBC SERPL-MCNC: 269 UG/DL (ref 240–445)
UIBC SERPL-MCNC: 221 UG/DL (ref 110–370)
WBC # BLD AUTO: 7.9 X10*3/UL (ref 4.4–11.3)

## 2025-08-20 PROCEDURE — 86665 EPSTEIN-BARR CAPSID VCA: CPT

## 2025-08-20 PROCEDURE — 83540 ASSAY OF IRON: CPT

## 2025-08-20 PROCEDURE — 83010 ASSAY OF HAPTOGLOBIN QUANT: CPT

## 2025-08-20 PROCEDURE — 80053 COMPREHEN METABOLIC PANEL: CPT

## 2025-08-20 PROCEDURE — 99215 OFFICE O/P EST HI 40 MIN: CPT

## 2025-08-20 PROCEDURE — 36415 COLL VENOUS BLD VENIPUNCTURE: CPT

## 2025-08-20 PROCEDURE — 83521 IG LIGHT CHAINS FREE EACH: CPT

## 2025-08-20 PROCEDURE — 86645 CMV ANTIBODY IGM: CPT

## 2025-08-20 PROCEDURE — 86803 HEPATITIS C AB TEST: CPT

## 2025-08-20 PROCEDURE — 86431 RHEUMATOID FACTOR QUANT: CPT

## 2025-08-20 PROCEDURE — 82728 ASSAY OF FERRITIN: CPT

## 2025-08-20 PROCEDURE — 3074F SYST BP LT 130 MM HG: CPT

## 2025-08-20 PROCEDURE — 85025 COMPLETE CBC W/AUTO DIFF WBC: CPT

## 2025-08-20 PROCEDURE — 84166 PROTEIN E-PHORESIS/URINE/CSF: CPT

## 2025-08-20 PROCEDURE — 84155 ASSAY OF PROTEIN SERUM: CPT

## 2025-08-20 PROCEDURE — 86038 ANTINUCLEAR ANTIBODIES: CPT

## 2025-08-20 PROCEDURE — 3078F DIAST BP <80 MM HG: CPT

## 2025-08-20 PROCEDURE — 83615 LACTATE (LD) (LDH) ENZYME: CPT

## 2025-08-20 PROCEDURE — 83921 ORGANIC ACID SINGLE QUANT: CPT

## 2025-08-20 PROCEDURE — 86146 BETA-2 GLYCOPROTEIN ANTIBODY: CPT

## 2025-08-20 PROCEDURE — 86334 IMMUNOFIX E-PHORESIS SERUM: CPT

## 2025-08-20 PROCEDURE — 86140 C-REACTIVE PROTEIN: CPT

## 2025-08-20 PROCEDURE — 86644 CMV ANTIBODY: CPT

## 2025-08-20 PROCEDURE — 1160F RVW MEDS BY RX/DR IN RCRD: CPT

## 2025-08-20 PROCEDURE — 1159F MED LIST DOCD IN RCRD: CPT

## 2025-08-20 PROCEDURE — 86147 CARDIOLIPIN ANTIBODY EA IG: CPT

## 2025-08-20 PROCEDURE — 85045 AUTOMATED RETICULOCYTE COUNT: CPT

## 2025-08-20 PROCEDURE — 84156 ASSAY OF PROTEIN URINE: CPT

## 2025-08-20 PROCEDURE — 1126F AMNT PAIN NOTED NONE PRSNT: CPT

## 2025-08-20 PROCEDURE — 86225 DNA ANTIBODY NATIVE: CPT

## 2025-08-20 ASSESSMENT — PAIN SCALES - GENERAL: PAINLEVEL_OUTOF10: 0-NO PAIN

## 2025-08-21 LAB
ANA SER QL HEP2 SUBST: NEGATIVE
B2 GLYCOPROT1 IGA SER-ACNC: 0.6 U/ML
B2 GLYCOPROT1 IGG SER-ACNC: <1.4 U/ML
B2 GLYCOPROT1 IGM SER-ACNC: <0.2 U/ML
CARDIOLIPIN IGA SERPL-ACNC: 0.8 APL U/ML
CARDIOLIPIN IGG SER IA-ACNC: <1.6 GPL U/ML
CARDIOLIPIN IGM SER IA-ACNC: <0.2 MPL U/ML
CENTROMERE B AB SER-ACNC: <0.2 AI
CHROMATIN AB SERPL-ACNC: <0.2 AI
DSDNA AB SER-ACNC: <1 IU/ML
EBV VCA IGG SER IA-ACNC: NEGATIVE
EBV VCA IGM SER IA-ACNC: NEGATIVE
ENA JO1 AB SER QL IA: <0.2 AI
ENA RNP AB SER IA-ACNC: <0.2 AI
ENA SCL70 AB SER QL IA: <0.2 AI
ENA SM AB SER IA-ACNC: <0.2 AI
ENA SM+RNP AB SER QL IA: <0.2 AI
ENA SS-A AB SER IA-ACNC: <0.2 AI
ENA SS-B AB SER IA-ACNC: <0.2 AI
KAPPA LC SERPL-MCNC: 2.29 MG/DL (ref 0.33–1.94)
KAPPA LC/LAMBDA SER: 0.91 {RATIO} (ref 0.26–1.65)
LAMBDA LC SERPL-MCNC: 2.51 MG/DL (ref 0.57–2.63)
RIBOSOMAL P AB SER-ACNC: <0.2 AI

## 2025-08-23 LAB — CMV IGM SERPL-ACNC: <8 AU/ML

## 2025-08-24 LAB
ALBUMIN MFR UR ELPH: 39.7 %
ALPHA1 GLOB MFR UR ELPH: 9.6 %
ALPHA2 GLOB MFR UR ELPH: 19 %
B-GLOBULIN MFR UR ELPH: 21.2 %
GAMMA GLOB MFR UR ELPH: 10.5 %
PATH REVIEW-URINE PROTEIN ELECTROPHORESIS: NORMAL
URINE ELECTROPHORESIS COMMENT: NORMAL

## 2025-08-26 ENCOUNTER — HOSPITAL ENCOUNTER (OUTPATIENT)
Dept: RADIOLOGY | Facility: HOSPITAL | Age: 80
Discharge: HOME | End: 2025-08-26
Payer: MEDICARE

## 2025-08-26 DIAGNOSIS — C67.9 MALIGNANT NEOPLASM OF URINARY BLADDER, UNSPECIFIED SITE (MULTI): ICD-10-CM

## 2025-08-26 DIAGNOSIS — N20.0 KIDNEY STONE: ICD-10-CM

## 2025-08-26 LAB
ALBUMIN: 4.2 G/DL (ref 3.4–5)
ALPHA 1 GLOBULIN: 0.3 G/DL (ref 0.2–0.6)
ALPHA 2 GLOBULIN: 0.8 G/DL (ref 0.4–1.1)
BETA GLOBULIN: 0.7 G/DL (ref 0.5–1.2)
GAMMA GLOBULIN: 1 G/DL (ref 0.5–1.4)
IMMUNOFIXATION COMMENT: NORMAL
METHYLMALONATE SERPL-SCNC: 0.96 UMOL/L (ref 0–0.4)
PATH REVIEW - SERUM IMMUNOFIXATION: NORMAL
PATH REVIEW-SERUM PROTEIN ELECTROPHORESIS: NORMAL
PROTEIN ELECTROPHORESIS COMMENT: NORMAL

## 2025-08-26 PROCEDURE — 76770 US EXAM ABDO BACK WALL COMP: CPT

## 2025-08-26 PROCEDURE — 74018 RADEX ABDOMEN 1 VIEW: CPT

## 2025-08-26 PROCEDURE — 76770 US EXAM ABDO BACK WALL COMP: CPT | Performed by: RADIOLOGY

## 2025-08-27 ENCOUNTER — APPOINTMENT (OUTPATIENT)
Dept: RADIOLOGY | Facility: HOSPITAL | Age: 80
End: 2025-08-27
Payer: MEDICARE

## 2025-08-27 ENCOUNTER — OFFICE VISIT (OUTPATIENT)
Dept: SURGERY | Facility: CLINIC | Age: 80
End: 2025-08-27
Payer: MEDICARE

## 2025-08-27 VITALS
WEIGHT: 154.6 LBS | BODY MASS INDEX: 28.45 KG/M2 | SYSTOLIC BLOOD PRESSURE: 119 MMHG | DIASTOLIC BLOOD PRESSURE: 75 MMHG | HEIGHT: 62 IN | HEART RATE: 99 BPM

## 2025-08-27 DIAGNOSIS — D17.21 LIPOMA OF RIGHT SHOULDER: Primary | ICD-10-CM

## 2025-08-27 PROCEDURE — 3074F SYST BP LT 130 MM HG: CPT | Performed by: SURGERY

## 2025-08-27 PROCEDURE — 99214 OFFICE O/P EST MOD 30 MIN: CPT | Performed by: SURGERY

## 2025-08-27 PROCEDURE — 3078F DIAST BP <80 MM HG: CPT | Performed by: SURGERY

## 2025-08-27 PROCEDURE — 1125F AMNT PAIN NOTED PAIN PRSNT: CPT | Performed by: SURGERY

## 2025-08-27 PROCEDURE — 1159F MED LIST DOCD IN RCRD: CPT | Performed by: SURGERY

## 2025-08-27 PROCEDURE — 1036F TOBACCO NON-USER: CPT | Performed by: SURGERY

## 2025-08-27 ASSESSMENT — ENCOUNTER SYMPTOMS
OCCASIONAL FEELINGS OF UNSTEADINESS: 0
LOSS OF SENSATION IN FEET: 0
DEPRESSION: 0

## 2025-08-27 ASSESSMENT — PAIN SCALES - GENERAL: PAINLEVEL_OUTOF10: 4

## 2025-09-02 ENCOUNTER — APPOINTMENT (OUTPATIENT)
Dept: UROLOGY | Facility: HOSPITAL | Age: 80
End: 2025-09-02
Payer: MEDICARE

## 2025-09-03 ENCOUNTER — TELEPHONE (OUTPATIENT)
Dept: HEMATOLOGY/ONCOLOGY | Facility: CLINIC | Age: 80
End: 2025-09-03
Payer: MEDICARE

## 2025-09-04 ENCOUNTER — TELEPHONE (OUTPATIENT)
Dept: SURGERY | Facility: HOSPITAL | Age: 80
End: 2025-09-04

## 2025-09-04 ENCOUNTER — OFFICE VISIT (OUTPATIENT)
Dept: ORTHOPEDIC SURGERY | Facility: CLINIC | Age: 80
End: 2025-09-04
Payer: MEDICARE

## 2025-09-04 ENCOUNTER — HOSPITAL ENCOUNTER (OUTPATIENT)
Dept: RADIOLOGY | Facility: CLINIC | Age: 80
Discharge: HOME | End: 2025-09-04
Payer: MEDICARE

## 2025-09-04 ENCOUNTER — APPOINTMENT (OUTPATIENT)
Dept: HEMATOLOGY/ONCOLOGY | Facility: HOSPITAL | Age: 80
End: 2025-09-04
Payer: MEDICARE

## 2025-09-04 DIAGNOSIS — M25.511 RIGHT SHOULDER PAIN, UNSPECIFIED CHRONICITY: ICD-10-CM

## 2025-09-04 DIAGNOSIS — M25.811 MASS OF JOINT OF RIGHT SHOULDER: ICD-10-CM

## 2025-09-04 DIAGNOSIS — M25.511 RIGHT SHOULDER PAIN, UNSPECIFIED CHRONICITY: Primary | ICD-10-CM

## 2025-09-04 PROCEDURE — 99214 OFFICE O/P EST MOD 30 MIN: CPT | Performed by: ORTHOPAEDIC SURGERY

## 2025-09-04 PROCEDURE — 99213 OFFICE O/P EST LOW 20 MIN: CPT

## 2025-09-04 PROCEDURE — 73030 X-RAY EXAM OF SHOULDER: CPT | Mod: RIGHT SIDE | Performed by: RADIOLOGY

## 2025-09-04 PROCEDURE — 1159F MED LIST DOCD IN RCRD: CPT | Performed by: ORTHOPAEDIC SURGERY

## 2025-09-04 PROCEDURE — 1036F TOBACCO NON-USER: CPT | Performed by: ORTHOPAEDIC SURGERY

## 2025-09-04 PROCEDURE — 73030 X-RAY EXAM OF SHOULDER: CPT | Mod: RT

## 2025-09-04 RX ORDER — TRAMADOL HYDROCHLORIDE 50 MG/1
50 TABLET, FILM COATED ORAL EVERY 8 HOURS PRN
Qty: 28 TABLET | Refills: 0 | Status: SHIPPED | OUTPATIENT
Start: 2025-09-04 | End: 2025-09-11

## 2025-09-05 ENCOUNTER — APPOINTMENT (OUTPATIENT)
Dept: RADIOLOGY | Facility: CLINIC | Age: 80
End: 2025-09-05
Payer: MEDICARE

## 2025-09-05 ENCOUNTER — HOSPITAL ENCOUNTER (OUTPATIENT)
Dept: RADIOLOGY | Facility: CLINIC | Age: 80
Discharge: HOME | End: 2025-09-05
Payer: MEDICARE

## 2025-09-05 DIAGNOSIS — M25.811 MASS OF JOINT OF RIGHT SHOULDER: ICD-10-CM

## 2025-09-05 PROCEDURE — 73221 MRI JOINT UPR EXTREM W/O DYE: CPT | Mod: RT

## 2025-09-10 ENCOUNTER — APPOINTMENT (OUTPATIENT)
Dept: RADIOLOGY | Facility: CLINIC | Age: 80
End: 2025-09-10
Payer: MEDICARE

## 2025-09-15 ENCOUNTER — APPOINTMENT (OUTPATIENT)
Dept: HEMATOLOGY/ONCOLOGY | Facility: CLINIC | Age: 80
End: 2025-09-15
Payer: MEDICARE

## 2025-10-08 ENCOUNTER — APPOINTMENT (OUTPATIENT)
Dept: HEMATOLOGY/ONCOLOGY | Facility: CLINIC | Age: 80
End: 2025-10-08
Payer: MEDICARE

## 2025-10-13 ENCOUNTER — APPOINTMENT (OUTPATIENT)
Dept: HEMATOLOGY/ONCOLOGY | Facility: CLINIC | Age: 80
End: 2025-10-13
Payer: MEDICARE

## (undated) DEVICE — TRAY, FOLEY, LUBRI-SIL, 16FR, COMPLETE CARE W/STATLOCK

## (undated) DEVICE — OBTURATOR, BLADELESS  8MM

## (undated) DEVICE — CATHETER TRAY, SURESTEP, 16FR, URINE METER W/STATLOCK

## (undated) DEVICE — Device

## (undated) DEVICE — CATHETER, DUAL LUMEN, UDC/10/50 M

## (undated) DEVICE — REST, HEAD, BAGEL, 7 IN

## (undated) DEVICE — PROTECTOR, NERVE, ULNAR, PINK

## (undated) DEVICE — STAPLER, ENDO ECHELON 45MM RELOAD, WHITE, REUSABLE

## (undated) DEVICE — TOWELS 4-PK

## (undated) DEVICE — CATHETER, URETERAL, DUAL LUMEN, 10FR X 50CM

## (undated) DEVICE — FORCEPS, PROGRASP, DAVINCI XI

## (undated) DEVICE — SCISSORS, MONOPOLAR, CURVED, 8MM

## (undated) DEVICE — SEAL, UNIVERSAL 5-8MM  XI

## (undated) DEVICE — GLOVES, SURG BIOGEL, SZ-7.5, PF, LF

## (undated) DEVICE — SUTURE, MONOCRYL, 4-0, 18 IN, PS2, UNDYED

## (undated) DEVICE — DRESSING, ADHESIVE, ISLAND, TELFA, 2 X 3.75 IN, LF

## (undated) DEVICE — TUBING SET, TRI-LUMEN, FILTERED, F/AIRSEAL

## (undated) DEVICE — TUBING, SUCTION, CONNECTING, STERILE 0.25 X 120 IN., LF

## (undated) DEVICE — OBTURATOR, BLADELESS , SU

## (undated) DEVICE — EVACUATOR, WOUND, SUCTION, CLOSED, JACKSON-PRATT, 100 CC, SILICONE

## (undated) DEVICE — CLIP, LIGATING, HEM-O-LOCK, MLX, LARGE, LF, PURPLE

## (undated) DEVICE — PREP TRAY, BASIC

## (undated) DEVICE — GUIDEWIRE, SOLO FLEX HYBRID, .035 STRAIGHT TIP

## (undated) DEVICE — IRRIGATION SET, CYSTOSCOPY, F/CONSTANT/INTERMITTENT, 8 GTT/CC, 77 IN

## (undated) DEVICE — GUIDEWIRE, DUAL SENSOR, .035 X 150 STRAIGHT,  3CM

## (undated) DEVICE — GLOVE, SURGICAL, PROTEXIS PI MICRO, 7.5, PF, LF

## (undated) DEVICE — BAG, DRAINAGE, CONTINUOUS IRRIGATION, 4L

## (undated) DEVICE — CLIPPER, SURGICAL BLADE ASSEMBLY, GENERAL PURPOSE, SINGLE USE

## (undated) DEVICE — TOWEL, SURGICAL, NEURO, O/R, 16 X 26, BLUE, STERILE

## (undated) DEVICE — BLANKET, LOWER BODY, VHA PLUS, ADULT

## (undated) DEVICE — STAPLER, ECHELON 3000, 45MM STD

## (undated) DEVICE — COVER, CART, 45 X 27 X 48 IN, CLEAR

## (undated) DEVICE — CATHETER TRAY, URETHRAL, W/O CATHETER, W/O BAG, LF

## (undated) DEVICE — KIT, ROBOTIC, CUSTOM UHC

## (undated) DEVICE — STATLOCK, FOLEY SWIVEL, SILICONE TRICOT

## (undated) DEVICE — CVAC ASPIRATION SYSTEM

## (undated) DEVICE — ACCESS PORT, 12MM, 100MM LENGTH, LOW PROFILE W/BLADELESS OPTICAL TIP

## (undated) DEVICE — SYRINGE, 60 CC, IRRIGATION, TOOMEY TIP

## (undated) DEVICE — COLLECTION BAG, FLUID, F/STERIS LOOP, STERILE

## (undated) DEVICE — MASK, AURASTRAIGHT, LARYN, SIZE 3

## (undated) DEVICE — SUTURE, SILK, 2-0, FSL, BR, 30 IN, BLACK

## (undated) DEVICE — SHEATH, URETERAL ACCESS, NAVIGATOR 12/14FR X 28CM

## (undated) DEVICE — DRESSING, NON-ADHERENT, CURAD, ABSORBENT, 3 X 8 IN, STERILE

## (undated) DEVICE — SCOPE, LITHOVUE SUD ONLY, GLOBAL STANDARD

## (undated) DEVICE — MASK, AURASTRAIGHT, LARYN, SIZE 5

## (undated) DEVICE — SUTURE, VICRYL, 0, 27 IN, UR-6, VIOLET

## (undated) DEVICE — DRAPE, COLUMN, DAVINCI XI

## (undated) DEVICE — GLOVE, SURGICAL, PROTEXIS MICRO, 7.5, PF, LATEX

## (undated) DEVICE — MANIFOLD, 4 PORT NEPTUNE STANDARD

## (undated) DEVICE — APPLIER, CLIP LARGE XI

## (undated) DEVICE — FIBER, MOSES 200 DFL

## (undated) DEVICE — CATHETER, URETERAL, OPEN END, 5 FR, 70 CM

## (undated) DEVICE — COLLECTION BAG, FLUID, NON-STERILE

## (undated) DEVICE — 18FR, 2-WAY, 5CC BARDEX ALL-SILICONE FOLEY CATHETER, UNCOATED

## (undated) DEVICE — DRAIN, WOUND, FLAT, HUBLESS, FULL LENGTH PERFORATION, 10 MM X 20 CM, SILICONE

## (undated) DEVICE — MASK, AURAGAIN, LARYN, SIZE 4.0

## (undated) DEVICE — DRAPE, ARM XI

## (undated) DEVICE — NEEDLE, INSUFFLATION, 13GAX120MM, DISP

## (undated) DEVICE — BAG, DRAINAGE, URINARY, W/ANTI-REFLUX CHAMBER, INFECTION CON

## (undated) DEVICE — DRAPE, SHEET, ENDOSCOPY, GENERAL, FENESTRATED, ARMBOARD COVER, 98 X 123.5 IN, DISPOSABLE, LF, STERILE

## (undated) DEVICE — ADHESIVE, SKIN, LIQUIBAND EXCEED

## (undated) DEVICE — DRIVER, NEEDLE LARGE, DAVINCI XI

## (undated) DEVICE — GOWN, SURGICAL, SMARTGOWN, XLARGE, STERILE

## (undated) DEVICE — FORCEPS, BIPOLAR FENESTRATED XI

## (undated) DEVICE — COVER, TIP HOT SHEARS ENDOWRIST